# Patient Record
Sex: MALE | Race: ASIAN | NOT HISPANIC OR LATINO | Employment: OTHER | URBAN - METROPOLITAN AREA
[De-identification: names, ages, dates, MRNs, and addresses within clinical notes are randomized per-mention and may not be internally consistent; named-entity substitution may affect disease eponyms.]

---

## 2019-06-04 ENCOUNTER — HISTORICAL (OUTPATIENT)
Dept: ADMINISTRATIVE | Facility: HOSPITAL | Age: 69
End: 2019-06-04

## 2022-01-31 ENCOUNTER — HISTORICAL (OUTPATIENT)
Dept: ADMINISTRATIVE | Facility: HOSPITAL | Age: 72
End: 2022-01-31

## 2022-04-30 VITALS
WEIGHT: 133.38 LBS | HEIGHT: 61 IN | DIASTOLIC BLOOD PRESSURE: 82 MMHG | BODY MASS INDEX: 25.18 KG/M2 | SYSTOLIC BLOOD PRESSURE: 124 MMHG

## 2022-12-09 ENCOUNTER — HOSPITAL ENCOUNTER (EMERGENCY)
Facility: HOSPITAL | Age: 72
Discharge: LEFT BEFORE DISCHARGE | End: 2022-12-09
Attending: FAMILY MEDICINE
Payer: MEDICAID

## 2022-12-09 VITALS
HEART RATE: 75 BPM | OXYGEN SATURATION: 97 % | RESPIRATION RATE: 22 BRPM | BODY MASS INDEX: 27.59 KG/M2 | DIASTOLIC BLOOD PRESSURE: 74 MMHG | TEMPERATURE: 99 F | WEIGHT: 146 LBS | SYSTOLIC BLOOD PRESSURE: 103 MMHG

## 2022-12-09 DIAGNOSIS — N13.30 HYDRONEPHROSIS OF LEFT KIDNEY: Primary | ICD-10-CM

## 2022-12-09 DIAGNOSIS — N17.9 ACUTE KIDNEY INJURY: ICD-10-CM

## 2022-12-09 DIAGNOSIS — N13.5 URETERAL OBSTRUCTION, LEFT: ICD-10-CM

## 2022-12-09 LAB
ALBUMIN SERPL-MCNC: 2.9 GM/DL (ref 3.4–4.8)
ALBUMIN/GLOB SERPL: 0.8 RATIO (ref 1.1–2)
ALP SERPL-CCNC: 58 UNIT/L (ref 40–150)
ALT SERPL-CCNC: 12 UNIT/L (ref 0–55)
APPEARANCE UR: CLEAR
AST SERPL-CCNC: 11 UNIT/L (ref 5–34)
BACTERIA #/AREA URNS AUTO: ABNORMAL /HPF
BASOPHILS # BLD AUTO: 0.03 X10(3)/MCL (ref 0–0.2)
BASOPHILS NFR BLD AUTO: 0.2 %
BILIRUB UR QL STRIP.AUTO: NEGATIVE MG/DL
BILIRUBIN DIRECT+TOT PNL SERPL-MCNC: 0.5 MG/DL
BUN SERPL-MCNC: 47 MG/DL (ref 8.4–25.7)
CALCIUM SERPL-MCNC: 9.6 MG/DL (ref 8.8–10)
CHLORIDE SERPL-SCNC: 101 MMOL/L (ref 98–107)
CO2 SERPL-SCNC: 20 MMOL/L (ref 23–31)
COLOR UR AUTO: COLORLESS
CREAT SERPL-MCNC: 2.8 MG/DL (ref 0.73–1.18)
EOSINOPHIL # BLD AUTO: 0.16 X10(3)/MCL (ref 0–0.9)
EOSINOPHIL NFR BLD AUTO: 1 %
ERYTHROCYTE [DISTWIDTH] IN BLOOD BY AUTOMATED COUNT: 13.5 % (ref 11.5–17)
GFR SERPLBLD CREATININE-BSD FMLA CKD-EPI: 23 MLS/MIN/1.73/M2
GLOBULIN SER-MCNC: 3.7 GM/DL (ref 2.4–3.5)
GLUCOSE SERPL-MCNC: 168 MG/DL (ref 82–115)
GLUCOSE UR QL STRIP.AUTO: NORMAL MG/DL
HCT VFR BLD AUTO: 33.9 % (ref 42–52)
HGB BLD-MCNC: 11 GM/DL (ref 14–18)
HYALINE CASTS #/AREA URNS LPF: ABNORMAL /LPF
IMM GRANULOCYTES # BLD AUTO: 0.09 X10(3)/MCL (ref 0–0.04)
IMM GRANULOCYTES NFR BLD AUTO: 0.5 %
KETONES UR QL STRIP.AUTO: NEGATIVE MG/DL
LACTATE SERPL-SCNC: 1.1 MMOL/L (ref 0.5–2.2)
LEUKOCYTE ESTERASE UR QL STRIP.AUTO: NEGATIVE UNIT/L
LIPASE SERPL-CCNC: 31 U/L
LYMPHOCYTES # BLD AUTO: 2.05 X10(3)/MCL (ref 0.6–4.6)
LYMPHOCYTES NFR BLD AUTO: 12.5 %
MCH RBC QN AUTO: 27.9 PG (ref 27–31)
MCHC RBC AUTO-ENTMCNC: 32.4 MG/DL (ref 33–36)
MCV RBC AUTO: 86 FL (ref 80–94)
MONOCYTES # BLD AUTO: 1.28 X10(3)/MCL (ref 0.1–1.3)
MONOCYTES NFR BLD AUTO: 7.8 %
MUCOUS THREADS URNS QL MICRO: ABNORMAL /LPF
NEUTROPHILS # BLD AUTO: 12.8 X10(3)/MCL (ref 2.1–9.2)
NEUTROPHILS NFR BLD AUTO: 78 %
NITRITE UR QL STRIP.AUTO: NEGATIVE
NRBC BLD AUTO-RTO: 0 %
PH UR STRIP.AUTO: 6 [PH]
PLATELET # BLD AUTO: 239 X10(3)/MCL (ref 130–400)
PMV BLD AUTO: 11.7 FL (ref 7.4–10.4)
POTASSIUM SERPL-SCNC: 4.7 MMOL/L (ref 3.5–5.1)
PROT SERPL-MCNC: 6.6 GM/DL (ref 5.8–7.6)
PROT UR QL STRIP.AUTO: ABNORMAL MG/DL
RBC # BLD AUTO: 3.94 X10(6)/MCL (ref 4.7–6.1)
RBC #/AREA URNS AUTO: ABNORMAL /HPF
RBC UR QL AUTO: ABNORMAL UNIT/L
SODIUM SERPL-SCNC: 132 MMOL/L (ref 136–145)
SP GR UR STRIP.AUTO: 1.01
SQUAMOUS #/AREA URNS LPF: ABNORMAL /HPF
UROBILINOGEN UR STRIP-ACNC: NORMAL MG/DL
WBC # SPEC AUTO: 16.4 X10(3)/MCL (ref 4.5–11.5)
WBC #/AREA URNS AUTO: ABNORMAL /HPF

## 2022-12-09 PROCEDURE — 96365 THER/PROPH/DIAG IV INF INIT: CPT

## 2022-12-09 PROCEDURE — 96376 TX/PRO/DX INJ SAME DRUG ADON: CPT

## 2022-12-09 PROCEDURE — 81001 URINALYSIS AUTO W/SCOPE: CPT | Performed by: NURSE PRACTITIONER

## 2022-12-09 PROCEDURE — 85025 COMPLETE CBC W/AUTO DIFF WBC: CPT | Performed by: NURSE PRACTITIONER

## 2022-12-09 PROCEDURE — 36415 COLL VENOUS BLD VENIPUNCTURE: CPT | Performed by: FAMILY MEDICINE

## 2022-12-09 PROCEDURE — 80053 COMPREHEN METABOLIC PANEL: CPT | Performed by: NURSE PRACTITIONER

## 2022-12-09 PROCEDURE — 63600175 PHARM REV CODE 636 W HCPCS: Performed by: NURSE PRACTITIONER

## 2022-12-09 PROCEDURE — 87040 BLOOD CULTURE FOR BACTERIA: CPT | Performed by: FAMILY MEDICINE

## 2022-12-09 PROCEDURE — 63600175 PHARM REV CODE 636 W HCPCS: Performed by: FAMILY MEDICINE

## 2022-12-09 PROCEDURE — 99285 EMERGENCY DEPT VISIT HI MDM: CPT | Mod: 25

## 2022-12-09 PROCEDURE — 25000003 PHARM REV CODE 250: Performed by: FAMILY MEDICINE

## 2022-12-09 PROCEDURE — 83690 ASSAY OF LIPASE: CPT | Performed by: NURSE PRACTITIONER

## 2022-12-09 PROCEDURE — 25000003 PHARM REV CODE 250: Performed by: NURSE PRACTITIONER

## 2022-12-09 PROCEDURE — 83605 ASSAY OF LACTIC ACID: CPT | Performed by: FAMILY MEDICINE

## 2022-12-09 PROCEDURE — 96361 HYDRATE IV INFUSION ADD-ON: CPT

## 2022-12-09 PROCEDURE — 96375 TX/PRO/DX INJ NEW DRUG ADDON: CPT

## 2022-12-09 RX ORDER — HYDROCODONE BITARTRATE AND ACETAMINOPHEN 5; 325 MG/1; MG/1
1 TABLET ORAL
Status: COMPLETED | OUTPATIENT
Start: 2022-12-09 | End: 2022-12-09

## 2022-12-09 RX ORDER — ONDANSETRON 2 MG/ML
4 INJECTION INTRAMUSCULAR; INTRAVENOUS
Status: COMPLETED | OUTPATIENT
Start: 2022-12-09 | End: 2022-12-09

## 2022-12-09 RX ORDER — DIPHENHYDRAMINE HYDROCHLORIDE 50 MG/ML
12.5 INJECTION INTRAMUSCULAR; INTRAVENOUS
Status: COMPLETED | OUTPATIENT
Start: 2022-12-09 | End: 2022-12-09

## 2022-12-09 RX ORDER — MORPHINE SULFATE 2 MG/ML
2 INJECTION, SOLUTION INTRAMUSCULAR; INTRAVENOUS
Status: COMPLETED | OUTPATIENT
Start: 2022-12-09 | End: 2022-12-09

## 2022-12-09 RX ORDER — SODIUM CHLORIDE 9 MG/ML
INJECTION, SOLUTION INTRAVENOUS CONTINUOUS
Status: DISCONTINUED | OUTPATIENT
Start: 2022-12-09 | End: 2022-12-10 | Stop reason: HOSPADM

## 2022-12-09 RX ORDER — ACETAMINOPHEN 500 MG
1000 TABLET ORAL
Status: COMPLETED | OUTPATIENT
Start: 2022-12-09 | End: 2022-12-09

## 2022-12-09 RX ADMIN — ONDANSETRON 4 MG: 2 INJECTION INTRAMUSCULAR; INTRAVENOUS at 09:12

## 2022-12-09 RX ADMIN — MORPHINE SULFATE 2 MG: 2 INJECTION, SOLUTION INTRAMUSCULAR; INTRAVENOUS at 09:12

## 2022-12-09 RX ADMIN — ACETAMINOPHEN 1000 MG: 500 TABLET ORAL at 10:12

## 2022-12-09 RX ADMIN — HYDROCODONE BITARTRATE AND ACETAMINOPHEN 1 TABLET: 5; 325 TABLET ORAL at 05:12

## 2022-12-09 RX ADMIN — ONDANSETRON 4 MG: 2 INJECTION INTRAMUSCULAR; INTRAVENOUS at 05:12

## 2022-12-09 RX ADMIN — SODIUM CHLORIDE 1000 ML: 9 INJECTION, SOLUTION INTRAVENOUS at 07:12

## 2022-12-09 RX ADMIN — DIPHENHYDRAMINE HYDROCHLORIDE 12.5 MG: 50 INJECTION INTRAMUSCULAR; INTRAVENOUS at 10:12

## 2022-12-09 RX ADMIN — PIPERACILLIN AND TAZOBACTAM 4.5 G: 4; .5 INJECTION, POWDER, LYOPHILIZED, FOR SOLUTION INTRAVENOUS; PARENTERAL at 07:12

## 2022-12-09 RX ADMIN — SODIUM CHLORIDE 500 ML: 9 INJECTION, SOLUTION INTRAVENOUS at 05:12

## 2022-12-09 NOTE — ED PROVIDER NOTES
"Encounter Date: 12/9/2022       History     Chief Complaint   Patient presents with    Abdominal Pain     Abd pain and n/v, gen weakness, seen pcp and put on antibiotics for "elevated wbc count".      The patient presents with abdominal pain.  The onset was 6 days ago.  The course/duration of symptoms is constant and fluctuating in intensity.  The character of symptoms is dull.  The degree at onset was minimal.  The Location of pain at onset was left, lower and abdominal.  The degree at present is moderate.  The Location of pain at present is left, lower and abdominal.  Radiating pain: none. There are exacerbating factors including eating and movement.  The relieving factor is rest.  Therapy today: augmentin, tramadol.  Risk factors consist of age, htn, dm.  Associated symptoms: denies chest pain, denies nausea, denies vomiting, denies diarrhea, denies back pain, denies shortness of breath, denies fever, denies chills, denies headache and denies dizziness. Additional history: was seen by his pcp 2 days ago, wbc was 16.9, no imaging, diverticulitis was suspected and patient was prescribed augmentin and tramadol. He is here with his son who translates for him.        Review of patient's allergies indicates:  No Known Allergies  History reviewed. No pertinent past medical history.  History reviewed. No pertinent surgical history.  History reviewed. No pertinent family history.  Social History     Tobacco Use    Smoking status: Former     Types: Cigarettes    Smokeless tobacco: Former     Review of Systems   Constitutional:  Negative for fever.   HENT:  Negative for sore throat.    Respiratory:  Negative for shortness of breath.    Cardiovascular:  Negative for chest pain.   Gastrointestinal:  Positive for abdominal pain. Negative for nausea.   Genitourinary:  Negative for dysuria.   Musculoskeletal:  Negative for back pain.   Skin:  Negative for rash.   Neurological:  Negative for weakness.   Hematological:  Does not " bruise/bleed easily.   All other systems reviewed and are negative.    Physical Exam     Initial Vitals [12/09/22 1708]   BP Pulse Resp Temp SpO2   (!) 176/89 94 18 99.1 °F (37.3 °C) 96 %      MAP       --         Physical Exam    Nursing note and vitals reviewed.  Constitutional: He appears well-developed and well-nourished.   HENT:   Head: Normocephalic and atraumatic.   Neck: Neck supple.   Normal range of motion.  Cardiovascular:  Normal rate, regular rhythm, normal heart sounds and intact distal pulses.           Pulmonary/Chest: Breath sounds normal.   Abdominal: Abdomen is soft. Bowel sounds are normal. There is abdominal tenderness in the left lower quadrant. There is guarding. There is no rebound.   Musculoskeletal:         General: Normal range of motion.      Cervical back: Normal range of motion and neck supple.     Neurological: He is alert and oriented to person, place, and time. He has normal strength.   Skin: Skin is warm and dry.   Psychiatric: He has a normal mood and affect.       ED Course   Procedures  Labs Reviewed   COMPREHENSIVE METABOLIC PANEL - Abnormal; Notable for the following components:       Result Value    Sodium Level 132 (*)     Carbon Dioxide 20 (*)     Glucose Level 168 (*)     Blood Urea Nitrogen 47.0 (*)     Creatinine 2.80 (*)     Albumin Level 2.9 (*)     Globulin 3.7 (*)     Albumin/Globulin Ratio 0.8 (*)     All other components within normal limits   URINALYSIS, REFLEX TO URINE CULTURE - Abnormal; Notable for the following components:    Color, UA Colorless (*)     Protein, UA 3+ (*)     Blood, UA 1+ (*)     Squamous Epithelial Cells, UA Trace (*)     Mucous, UA Trace (*)     All other components within normal limits   CBC WITH DIFFERENTIAL - Abnormal; Notable for the following components:    WBC 16.4 (*)     RBC 3.94 (*)     Hgb 11.0 (*)     Hct 33.9 (*)     MCHC 32.4 (*)     MPV 11.7 (*)     Neut # 12.8 (*)     IG# 0.09 (*)     All other components within normal limits    LIPASE - Normal   LACTIC ACID, PLASMA - Normal   BLOOD CULTURE OLG   BLOOD CULTURE OLG   CBC W/ AUTO DIFFERENTIAL    Narrative:     The following orders were created for panel order CBC auto differential.  Procedure                               Abnormality         Status                     ---------                               -----------         ------                     CBC with Differential[[id number]]        Abnormal            Final result                 Please view results for these tests on the individual orders.   EXTRA TUBES    Narrative:     The following orders were created for panel order EXTRA TUBES.  Procedure                               Abnormality         Status                     ---------                               -----------         ------                     Light Blue Top Hold[[id number]]                              In process                 Red Top Hold[[id number]]                                     In process                   Please view results for these tests on the individual orders.   LIGHT BLUE TOP HOLD   RED TOP HOLD          Imaging Results              CT Abdomen Pelvis  Without Contrast (Final result)  Result time 12/09/22 19:48:55      Final result by Иван Saleh MD (12/09/22 19:48:55)                   Impression:      1. Left marked hydroureteronephrosis caused by distal ureteral calculi described above.    2. Bilateral kidneys hypodensities are not adequately characterized on this exam without contrast and may represent cysts.  These can be assessed with ultrasound exam on non emergent basis.      Electronically signed by: Иван Saleh  Date:    12/09/2022  Time:    19:48               Narrative:    EXAMINATION:  CT ABDOMEN PELVIS WITHOUT CONTRAST    CLINICAL HISTORY:  LLQ abdominal pain;    TECHNIQUE:  Multidetector axial images were obtained from the  diaphragms to below symphysis pubis without the administration of IV contrast. Oral contrast was not  administered.    Dose length product of 441 mGycm. Automated exposure control was utilized to minimize radiation dose.    COMPARISON:  None available    FINDINGS:  Image portion lungs show ground-glass opacity which may be related to hypoventilation versus mild congestive process in a patient with cardiomegaly.    Within limitations of noncontrast technique, no acute findings of the liver, pancreas and spleen identified. Gallbladder wall is not thickened and there is no intraluminal calcified calculus. No apparent biliary dilation. The adrenal glands noncontrast evaluation is unremarkable.    Right kidney is remarkable for hypodensities which are not adequately characterized on this exam without contrast and may represent cysts.  There is right kidney small non occluding calculus.  No right hydronephrosis.  There is marked left hydronephrosis with perinephric and periureteric inflammatory standings.  Within distal left ureter is either one large 1.2 cm long calculus versus 2 or 3 adjacent calculi seen on image 67 series 6.  There is additional more distal adjacent to the ureterovesical junction 3 mm calculus on image 63 series 6.  Left kidney is also remarkable multiple hypodensities.  Urinary bladder wall is mildly thickened.  No intravesical calculus.  Prostate is mildly enlarged in size with few calcifications.    Small size hiatal hernia.  Stomach is decompressed.  No abnormal dilatation of loops of small bowel.  Appendix is unremarkable.  No apparent dilatation of the colon pericolonic acute standings.  No free fluid.    No acute or aggressive skeletal abnormality.                                       Medications   0.9%  NaCl infusion (has no administration in time range)   sodium chloride 0.9% bolus 500 mL (500 mLs Intravenous New Bag 12/9/22 1755)   HYDROcodone-acetaminophen 5-325 mg per tablet 1 tablet (1 tablet Oral Given 12/9/22 1755)   ondansetron injection 4 mg (4 mg Intravenous Given 12/9/22 1755)    sodium chloride 0.9% bolus 1,000 mL (0 mLs Intravenous Stopped 12/9/22 2030)   piperacillin-tazobactam (ZOSYN) 4.5 g in sodium chloride 0.9% 100 mL IVPB (MB+) (0 g Intravenous Stopped 12/9/22 2030)   morphine injection 2 mg (2 mg Intravenous Given 12/9/22 2113)   ondansetron injection 4 mg (4 mg Intravenous Given 12/9/22 2113)   diphenhydrAMINE injection 12.5 mg (12.5 mg Intravenous Given 12/9/22 2211)   acetaminophen tablet 1,000 mg (1,000 mg Oral Given 12/9/22 2211)     Medical Decision Making:   History:   Old Records Summarized: records from clinic visits and records from previous admission(s).  ED Management:  Care transitioned to Dr Gutierrez (ER staff)           ED Course as of 12/09/22 2323   Fri Dec 09, 2022   1900 Pt transitioned to me at 1900. I, Dr. Gutierrez have seen and examined the patient.  Pt speaks a dialect of alysa therefore son used as .   HPI- son reports patient was not feeling good and went to his PCP who put him on antibiotics.  Son reports the patient continued to complain of pain and started shuffling his feet which is unusual for him therefore brought him to the ER for further evaluation.  Son reports that his shuffling of the feet usually happens if he is in pain or sick.  He reports brother-in-law can verify this.     Physical Exam-   General- NAD   CV- s1 S 2 RRR   Lungs: CTAB  Abdomen- LLQ- mild tenderness upon deep palpation.  No guarding, no rebound. Normal bs  Rectal tone- normal- RN Shannan- chaperone    Ext- BLE- no peripheral edema   Neuro- AAOx4, GCS 15, no acute neuro defecits     Management and plan  Reviewed labs.  CT pending we will continue to monitor. [AK]   2000 Reviewed CT imaging with son at bedside.  Discussed admission patient agrees.  Medicine consulted for admission however recommends transfer for higher level of care as no urology services over the weekend at this facility  in addition to patient might be needing interventional urology given size of stone.  Please  see medicine consultation note [AK]   2158 Reassessed patient.  RN reports patient started to itch after receiving morphine.  I evaluated patient.  Patient with no rash, vital signs stable, patient with no shortness of breath or difficulty speaking.  Benadryl ordered.  Patient with diagnosis of  left hydronephrosis with ureteral obstruction and sepsis.  Currently awaiting transfer to a facility with urology services  [AK]   2235 Rn reports pt has been accepted to [facility] in [place], [place] by Dr. JARAD Bateman.  Of note- multiple local facilities were called however are currently on divert due to capacity or capability reasons- please see [facility]  transfer  and RN documentation.  [AK]   2237 Pt Suleiman Beck have a diagnosis of left hydronephrosis with urethral obstruction. This requires evaluation and management by Urology service. At this facility we do have the capacity and the capability this patient need at this time. Pt and son was informed about his condition and the recommendation of transfer for specialty care. Pt will be transfer by ([facility]) by ground Transportation using a Critical Care unit . Treatment in route will be NS @ 100ml/hr.  The risk of transfer are Motor Vehicle Accident, Respiratory Failure, Cardiac Dysrhythmias, Progression of urethral stone, severe sepsis or Death.  The benefits of the transfer are adequate evaluation and management by a specialist in the area of Urology.  At this time Pt is stable for transfer.      [AK]   1036 Spoke with patient's son regarding acceptance at [facility].  Patient's son is currently declineing transfer as it is too far for  the patient. Dylan Kulkarni called the transfer center who reports that patient can be placed on a waiting list for closer facilities  however I advised patient's son that it may take greater than 24 hours to get acceptance from a closer facility.  I urged son to reconsider his decesion.   Patient's son is  currently thinking about and calling family to figure out the best solution for the patient. Conversation held in front of RN Shannan.   [AK]   2314 Patient's son decided to leave against medical advice.  I have urged multiple times with RN  to continue the transfer process however patient's son verbalized understanding to the risk including death and signed out against medical advice. [AK]   2321 This patient is choosing to leave against medical advice.  I has personally explained to him and the son at bedside that choosing to do so may result in permanent bodily harm or death.  The EP discussed at great length that without further evaluation and monitoring there may be unforeseen circumstances and/or deterioration causing permanent bodily harm or death as a result of his choice.  He and his son verbalized these risks back to the physician in layman's terms.  He and his son is alert, oriented, and shows the mental capacity to make clear decisions regarding his health care at this time. He  and his son continues to wish to leave against medical advice.     In light of his decision to leave AMA, follow-up has been arranged and he is aware of the importance of following up as instructed.  He  and his son has been advised that he should return to the ED immediately if he changes his mind at any time, or if his condition begins to change or worsen in any way.    [AK]      ED Course User Index  [AK] Brittney Gutierrez MD                 Clinical Impression:   Final diagnoses:  [N13.30] Hydronephrosis of left kidney (Primary)  [N13.5] Ureteral obstruction, left  [N17.9] Acute kidney injury      ED Disposition Condition    AMA Stable                Brittney Gutierrez MD  12/09/22 2317       Brittney Gutierrez MD  12/09/22 6904

## 2022-12-10 ENCOUNTER — HOSPITAL ENCOUNTER (INPATIENT)
Facility: HOSPITAL | Age: 72
LOS: 9 days | Discharge: HOME | DRG: 659 | End: 2022-12-19
Attending: EMERGENCY MEDICINE | Admitting: STUDENT IN AN ORGANIZED HEALTH CARE EDUCATION/TRAINING PROGRAM
Payer: MEDICAID

## 2022-12-10 DIAGNOSIS — G93.40 ENCEPHALOPATHY ACUTE: ICD-10-CM

## 2022-12-10 DIAGNOSIS — N13.5 OBSTRUCTION OF URETER, UNSPECIFIED LATERALITY: ICD-10-CM

## 2022-12-10 DIAGNOSIS — R00.0 TACHYCARDIA: ICD-10-CM

## 2022-12-10 DIAGNOSIS — R06.02 SOB (SHORTNESS OF BREATH): ICD-10-CM

## 2022-12-10 DIAGNOSIS — N13.9 OBSTRUCTIVE UROPATHY: ICD-10-CM

## 2022-12-10 DIAGNOSIS — Z66 DO NOT RESUSCITATE: ICD-10-CM

## 2022-12-10 DIAGNOSIS — N17.9 ACUTE RENAL FAILURE, UNSPECIFIED ACUTE RENAL FAILURE TYPE: Primary | ICD-10-CM

## 2022-12-10 DIAGNOSIS — J96.90 RESPIRATORY FAILURE, UNSPECIFIED CHRONICITY, UNSPECIFIED WHETHER WITH HYPOXIA OR HYPERCAPNIA: ICD-10-CM

## 2022-12-10 DIAGNOSIS — N13.5 OBSTRUCTION OF LEFT URETER: ICD-10-CM

## 2022-12-10 DIAGNOSIS — I49.9 CARDIAC ARRHYTHMIA: ICD-10-CM

## 2022-12-10 DIAGNOSIS — R14.0 ABDOMINAL DISTENSION: ICD-10-CM

## 2022-12-10 LAB
ALBUMIN SERPL-MCNC: 2.9 GM/DL (ref 3.4–4.8)
ALBUMIN/GLOB SERPL: 1 RATIO (ref 1.1–2)
ALP SERPL-CCNC: 58 UNIT/L (ref 40–150)
ALT SERPL-CCNC: 13 UNIT/L (ref 0–55)
APPEARANCE UR: CLEAR
AST SERPL-CCNC: 13 UNIT/L (ref 5–34)
BACTERIA #/AREA URNS AUTO: NORMAL /HPF
BASOPHILS # BLD AUTO: 0.03 X10(3)/MCL (ref 0–0.2)
BASOPHILS NFR BLD AUTO: 0.2 %
BILIRUB UR QL STRIP.AUTO: NEGATIVE MG/DL
BILIRUBIN DIRECT+TOT PNL SERPL-MCNC: 0.4 MG/DL
BUN SERPL-MCNC: 46.2 MG/DL (ref 8.4–25.7)
CALCIUM SERPL-MCNC: 8.3 MG/DL (ref 8.8–10)
CHLORIDE SERPL-SCNC: 104 MMOL/L (ref 98–107)
CO2 SERPL-SCNC: 21 MMOL/L (ref 23–31)
COLOR UR AUTO: YELLOW
CREAT SERPL-MCNC: 3.26 MG/DL (ref 0.73–1.18)
EOSINOPHIL # BLD AUTO: 0.28 X10(3)/MCL (ref 0–0.9)
EOSINOPHIL NFR BLD AUTO: 1.8 %
ERYTHROCYTE [DISTWIDTH] IN BLOOD BY AUTOMATED COUNT: 14.1 % (ref 11.5–17)
GFR SERPLBLD CREATININE-BSD FMLA CKD-EPI: 19 MLS/MIN/1.73/M2
GLOBULIN SER-MCNC: 3 GM/DL (ref 2.4–3.5)
GLUCOSE SERPL-MCNC: 279 MG/DL (ref 82–115)
GLUCOSE UR QL STRIP.AUTO: NEGATIVE MG/DL
HCT VFR BLD AUTO: 31.1 % (ref 42–52)
HGB BLD-MCNC: 9.8 GM/DL (ref 14–18)
IMM GRANULOCYTES # BLD AUTO: 0.09 X10(3)/MCL (ref 0–0.04)
IMM GRANULOCYTES NFR BLD AUTO: 0.6 %
KETONES UR QL STRIP.AUTO: NEGATIVE MG/DL
LEUKOCYTE ESTERASE UR QL STRIP.AUTO: NEGATIVE UNIT/L
LIPASE SERPL-CCNC: 38 U/L
LYMPHOCYTES # BLD AUTO: 1.7 X10(3)/MCL (ref 0.6–4.6)
LYMPHOCYTES NFR BLD AUTO: 10.7 %
MCH RBC QN AUTO: 28 PG (ref 27–31)
MCHC RBC AUTO-ENTMCNC: 31.5 MG/DL (ref 33–36)
MCV RBC AUTO: 88.9 FL (ref 80–94)
MONOCYTES # BLD AUTO: 1.12 X10(3)/MCL (ref 0.1–1.3)
MONOCYTES NFR BLD AUTO: 7 %
NEUTROPHILS # BLD AUTO: 12.7 X10(3)/MCL (ref 2.1–9.2)
NEUTROPHILS NFR BLD AUTO: 79.7 %
NITRITE UR QL STRIP.AUTO: NEGATIVE
NRBC BLD AUTO-RTO: 0 %
PH UR STRIP.AUTO: 5.5 [PH]
PLATELET # BLD AUTO: 253 X10(3)/MCL (ref 130–400)
PMV BLD AUTO: 12.4 FL (ref 7.4–10.4)
POTASSIUM SERPL-SCNC: 4.8 MMOL/L (ref 3.5–5.1)
PROT SERPL-MCNC: 5.9 GM/DL (ref 5.8–7.6)
PROT UR QL STRIP.AUTO: ABNORMAL MG/DL
RBC # BLD AUTO: 3.5 X10(6)/MCL (ref 4.7–6.1)
RBC #/AREA URNS AUTO: 5 /HPF
RBC UR QL AUTO: ABNORMAL UNIT/L
SODIUM SERPL-SCNC: 132 MMOL/L (ref 136–145)
SP GR UR STRIP.AUTO: 1.01 (ref 1–1.03)
SQUAMOUS #/AREA URNS AUTO: <5 /HPF
UROBILINOGEN UR STRIP-ACNC: 0.2 MG/DL
WBC # SPEC AUTO: 15.9 X10(3)/MCL (ref 4.5–11.5)
WBC #/AREA URNS AUTO: <5 /HPF

## 2022-12-10 PROCEDURE — 11000001 HC ACUTE MED/SURG PRIVATE ROOM

## 2022-12-10 PROCEDURE — 99291 CRITICAL CARE FIRST HOUR: CPT | Mod: 25

## 2022-12-10 PROCEDURE — 31500 INSERT EMERGENCY AIRWAY: CPT

## 2022-12-10 PROCEDURE — 25000003 PHARM REV CODE 250: Performed by: EMERGENCY MEDICINE

## 2022-12-10 PROCEDURE — 63600175 PHARM REV CODE 636 W HCPCS: Performed by: EMERGENCY MEDICINE

## 2022-12-10 PROCEDURE — 81001 URINALYSIS AUTO W/SCOPE: CPT | Performed by: NURSE PRACTITIONER

## 2022-12-10 PROCEDURE — 85025 COMPLETE CBC W/AUTO DIFF WBC: CPT | Performed by: NURSE PRACTITIONER

## 2022-12-10 PROCEDURE — 96361 HYDRATE IV INFUSION ADD-ON: CPT

## 2022-12-10 PROCEDURE — 96375 TX/PRO/DX INJ NEW DRUG ADDON: CPT

## 2022-12-10 PROCEDURE — 83690 ASSAY OF LIPASE: CPT | Performed by: NURSE PRACTITIONER

## 2022-12-10 PROCEDURE — 96374 THER/PROPH/DIAG INJ IV PUSH: CPT

## 2022-12-10 PROCEDURE — 80053 COMPREHEN METABOLIC PANEL: CPT | Performed by: NURSE PRACTITIONER

## 2022-12-10 RX ORDER — MINERAL OIL
60 ENEMA (ML) RECTAL DAILY
COMMUNITY
End: 2022-12-11

## 2022-12-10 RX ORDER — SODIUM CHLORIDE 9 MG/ML
1000 INJECTION, SOLUTION INTRAVENOUS
Status: COMPLETED | OUTPATIENT
Start: 2022-12-10 | End: 2022-12-10

## 2022-12-10 RX ORDER — NIFEDIPINE 30 MG/1
30 TABLET, FILM COATED, EXTENDED RELEASE ORAL DAILY
COMMUNITY
End: 2022-12-11

## 2022-12-10 RX ORDER — ONDANSETRON 2 MG/ML
4 INJECTION INTRAMUSCULAR; INTRAVENOUS
Status: COMPLETED | OUTPATIENT
Start: 2022-12-10 | End: 2022-12-10

## 2022-12-10 RX ORDER — HALOPERIDOL 5 MG/ML
2 INJECTION INTRAMUSCULAR ONCE
Status: COMPLETED | OUTPATIENT
Start: 2022-12-11 | End: 2022-12-10

## 2022-12-10 RX ORDER — HALOPERIDOL 5 MG/ML
INJECTION INTRAMUSCULAR
Status: DISPENSED
Start: 2022-12-10 | End: 2022-12-11

## 2022-12-10 RX ORDER — TERAZOSIN 1 MG/1
1 CAPSULE ORAL 2 TIMES DAILY
COMMUNITY
End: 2022-12-11

## 2022-12-10 RX ORDER — HYDROMORPHONE HYDROCHLORIDE 2 MG/ML
0.5 INJECTION, SOLUTION INTRAMUSCULAR; INTRAVENOUS; SUBCUTANEOUS
Status: COMPLETED | OUTPATIENT
Start: 2022-12-10 | End: 2022-12-10

## 2022-12-10 RX ORDER — SODIUM CHLORIDE 9 MG/ML
1000 INJECTION, SOLUTION INTRAVENOUS
Status: COMPLETED | OUTPATIENT
Start: 2022-12-10 | End: 2022-12-11

## 2022-12-10 RX ORDER — GLYCOPYRROLATE 1 MG/1
1 TABLET ORAL DAILY
COMMUNITY
End: 2022-12-11

## 2022-12-10 RX ORDER — CLONIDINE HYDROCHLORIDE 0.1 MG/1
0.1 TABLET ORAL
COMMUNITY
End: 2022-12-11

## 2022-12-10 RX ADMIN — HALOPERIDOL LACTATE 2 MG: 5 INJECTION, SOLUTION INTRAMUSCULAR at 11:12

## 2022-12-10 RX ADMIN — ONDANSETRON 4 MG: 2 INJECTION INTRAMUSCULAR; INTRAVENOUS at 09:12

## 2022-12-10 RX ADMIN — HYDROMORPHONE HYDROCHLORIDE 0.5 MG: 2 INJECTION INTRAMUSCULAR; INTRAVENOUS; SUBCUTANEOUS at 09:12

## 2022-12-10 RX ADMIN — SODIUM CHLORIDE 1000 ML: 9 INJECTION, SOLUTION INTRAVENOUS at 09:12

## 2022-12-10 NOTE — LETTER
Anabel Sims  accompanied  Suleiman Beck  in the Intensive Care Unit on 12/11/2022. They may return to work on 12/19/2022    If you have any questions or concerns, please don't hesitate to call.      Usha Gonzalez RN , Charge Nurse

## 2022-12-10 NOTE — ED NOTES
Transfer has been arranged with acceptance to [facility] in [place]. Son present has concerns with transfer not local. Son states a transfer out of town effects all of his family and work. Son does not feel his father can be without his presence stating a a prior episode of tansfer where patient had a panic attack requiring CPAP and ICU for >a week. Notified Dr Gutierrez of son/patient concerns. Patient is Spiritism speaking only. Son states he previously would not communicate with .Son's name is Dil. Warned of risks leaving AMA if refusing transfer. Son requests to call his sister in [place] to discuss plan.

## 2022-12-10 NOTE — FIRST PROVIDER EVALUATION
Medical screening examination initiated.  I have conducted a focused provider triage encounter, findings are as follows:    Brief history of present illness:  72-year-old male presents to ER complaining of abdominal pain and swelling and not being able to ambulate well.  Patient was hospitalized at [facility] and left AMA yesterday due to feeling better.  Son at bedside who is also the  reports abdominal swelling started today.  Previous labs show some TINO with creatinine of 2.8 white count of 16.  CT abdomen and pelvis without contrast was reviewed:  EXAMINATION:  CT ABDOMEN PELVIS WITHOUT CONTRAST     CLINICAL HISTORY:  LLQ abdominal pain;     TECHNIQUE:  Multidetector axial images were obtained from the  diaphragms to below symphysis pubis without the administration of IV contrast. Oral contrast was not administered.     Dose length product of 441 mGycm. Automated exposure control was utilized to minimize radiation dose.     COMPARISON:  None available     FINDINGS:  Image portion lungs show ground-glass opacity which may be related to hypoventilation versus mild congestive process in a patient with cardiomegaly.     Within limitations of noncontrast technique, no acute findings of the liver, pancreas and spleen identified. Gallbladder wall is not thickened and there is no intraluminal calcified calculus. No apparent biliary dilation. The adrenal glands noncontrast evaluation is unremarkable.     Right kidney is remarkable for hypodensities which are not adequately characterized on this exam without contrast and may represent cysts.  There is right kidney small non occluding calculus.  No right hydronephrosis.  There is marked left hydronephrosis with perinephric and periureteric inflammatory standings.  Within distal left ureter is either one large 1.2 cm long calculus versus 2 or 3 adjacent calculi seen on image 67 series 6.  There is additional more distal adjacent to the ureterovesical junction 3 mm  calculus on image 63 series 6.  Left kidney is also remarkable multiple hypodensities.  Urinary bladder wall is mildly thickened.  No intravesical calculus.  Prostate is mildly enlarged in size with few calcifications.     Small size hiatal hernia.  Stomach is decompressed.  No abnormal dilatation of loops of small bowel.  Appendix is unremarkable.  No apparent dilatation of the colon pericolonic acute standings.  No free fluid.     No acute or aggressive skeletal abnormality.     Impression:     1. Left marked hydroureteronephrosis caused by distal ureteral calculi described above.     2. Bilateral kidneys hypodensities are not adequately characterized on this exam without contrast and may represent cysts.  These can be assessed with ultrasound exam on non emergent basis.        Electronically signed by: Иван Saleh  Date:                                            12/09/2022  Time:                                           19:48    Vitals:    12/10/22 1539   BP: (!) 171/76   BP Location: Left arm   Patient Position: Sitting   Pulse: 88   Resp: 16   Temp: 98.4 °F (36.9 °C)   TempSrc: Oral   SpO2: 96%   Weight: 67.1 kg (148 lb)       Pertinent physical exam:  Patient appears uncomfortable with abdominal distention present.    Brief workup plan:  Ultrasound and labs    Preliminary workup initiated; this workup will be continued and followed by the physician or advanced practice provider that is assigned to the patient when roomed.

## 2022-12-10 NOTE — CONSULTS
"[facility] History and Physical     Date of Admit: 12/9/2022    Chief Complaint     Abdominal Pain (Abd pain and n/v, gen weakness, seen pcp and put on antibiotics for "elevated wbc count". )  Hydronephrosis and TINO 2/2 stone     Subjective:      History of Present Illness:  Suleiman Beck is a 72 y.o. male with a PMHx of hypertension, hemorrhoids, and appendicitis s/p open appendectomy several years ago. The patient presented to [facility] ED on 12/9/2022 with a primary complaint of abdominal pain associated with nausea and one episode of emesis. Patient's son at bedside. Reports several days of abdominal pain for which patient was prescribed Augmentin at recent PCP visit 3 days ago with a diagnosis of diverticulitis. Patient found to have WBC of 16.9 at that time. Over the past 3 days, patient's son reports patient has continued to complain of abdominal pain and nausea with an episode of emesis. He reports chronic constipation. Since patient was diagnosed with diverticulitis, patient's son reports patient has appeared weak and shuffled feet while walking whereas patient was playing soccer with grandchildren the day prior. Patient's son reports patient often behaves this way with shuffling feet when patient is ill. Patient has a significant smoking history of 40+ pack years and occasional drinking history. Denies smoking or drinking at this time.    ED workup significant for WBC 16.4, BUN/Cr 47/2.80, and CT Abdomen/Pelvis with left marked hydroureteronephrosis 2/2 distal ureteral calculi (1.2 cm vs 2 or 3 adjacent calculi). Internal Medicine consulted for admission. Recommend transfer out to facility with Urology services as [facility] does not have Urology services this weekend.     Past Medical History:  Hypertension  Chronic constipation    Past Surgical History:  Hemorrhoidectomy  Appendectomy, open    Allergies:  Review of patient's allergies indicates:  No Known Allergies    Home Medications:  Prior to Admission " medications    Medication Sig Start Date End Date Taking? Authorizing Provider   amoxicillin-clavulanate 875-125mg (AUGMENTIN) 875-125 mg per tablet Take 1 tablet by mouth every 12 (twelve) hours. for 10 days 12/7/22 12/17/22  NATALY Winn Jr., MD   aspirin (ECOTRIN) 81 MG EC tablet Take 81 mg by mouth. 3/11/22   Historical Provider   carvediloL (COREG) 3.125 MG tablet Take 6.25 mg by mouth 2 (two) times daily. 10/30/22   Historical Provider   glimepiride (AMARYL) 2 MG tablet Take 2 mg by mouth. 3/11/22   Historical Provider   meloxicam (MOBIC) 15 MG tablet Take 15 mg by mouth. 4/7/22   Historical Provider   metFORMIN (GLUCOPHAGE-XR) 500 MG ER 24hr tablet TAKE 2 TABLETS BY MOUTH ONCE DAILY WITH EVENING MEAL 12/2/22   Historical Provider   pantoprazole (PROTONIX) 40 MG tablet Take 40 mg by mouth. 3/11/22   Historical Provider   pioglitazone (ACTOS) 15 MG tablet Take 15 mg by mouth. 3/11/22   Historical Provider   traMADoL (ULTRAM) 50 mg tablet Take 1 tablet (50 mg total) by mouth every 6 (six) hours as needed for Pain. 12/7/22   NATALY Winn Jr., MD       Family History:  History reviewed. No pertinent family history.    Social History:  Social History     Tobacco Use    Smoking status: Former     Types: Cigarettes    Smokeless tobacco: Former       Review of Systems:  Constitutional: +weakness, +fatigue, no fever  Eye: no vision loss, eye redness, drainage, or pain  ENMT: no sore throat, ear pain, sinus pain/congestion, nasal congestion/drainage  Respiratory: no cough, no wheezing, no shortness of breath  Cardiovascular: no chest pain, no palpitations, no edema  Gastrointestinal: +abdominal pain, +nausea, +constipation  Genitourinary: +nocturia, no dysuria, no urinary frequency or urgency, no hematuria  Hema/Lymph: no abnormal bruising or bleeding  Endocrine: no heat or cold intolerance, no excessive thirst or excessive urination  Musculoskeletal: no muscle or joint pain, no joint  swelling  Integumentary: no skin rash or abnormal lesion  Neurologic: no headache, no dizziness, no weakness or numbness         Objective:   Last 24 Hour Vital Signs:  Vitals  BP: (!) 155/79  Temp: 99.1 °F (37.3 °C)  Temp src: Oral  Pulse: 72  Resp: 20  SpO2: (!) 94 %  Weight: 66.2 kg (146 lb)    Physical Examination:  General: no acute distress, son at bedside  HEENT: NC/AT, EOMI, clear conjunctivae, anicteric sclera  Resp: normal breathing effort, no increased WOB  CV: regular rate  GI: soft, mildly distended, LLQ tender to palpation  MSK: no LE edema, sensorimotor function grossly intact in all 4 extremities  Neuro: alert and oriented      Laboratory:  Most Recent Data:  CMP:  Recent Labs   Lab 12/09/22  1747   CHLORIDE 101   CO2 20*   BUN 47.0*   CREATININE 2.80*   GLUCOSE 168*   ALBUMIN 2.9*   AST 11   ALT 12   ALKPHOS 58     CBC:  Recent Labs   Lab 12/07/22  1556 12/09/22  1748   WBC 16.9* 16.4*   ABSNEUTRO 12.9* 12.8*   RBC 4.38* 3.94*   HGB 12.2* 11.0*   HCT 37.9* 33.9*   MCV 86.5 86.0   RDW 13.8 13.5     Coags: No results found for: INR, PROTIME, PTT  FLP: No results found for: CHOL, HDL, LDLCALC, TRIG, CHOLHDL  DM:   Lab Results   Component Value Date    HGBA1C 6.1 02/01/2022    CREATININE 2.80 (H) 12/09/2022     Thyroid:   Lab Results   Component Value Date    TSH 0.0995 02/03/2022     Anemia:   Lab Results   Component Value Date    FERRITIN 726.46 02/14/2022     Cardiac:   Lab Results   Component Value Date    TROPONINI 0.050 02/03/2022    .0 02/20/2022     Urinalysis:   Lab Results   Component Value Date    PHUA 6.0 12/09/2022    UROBILINOGEN Normal 12/09/2022    WBCUA 0-5 12/09/2022       Trended Cardiac Data:  No results for input(s): TROPONINI, CKTOTAL, CKMB, BNP in the last 168 hours.      Radiology:  Imaging Results              CT Abdomen Pelvis  Without Contrast (Final result)  Result time 12/09/22 19:48:55      Final result by Иван Saleh MD (12/09/22 19:48:55)                    Impression:      1. Left marked hydroureteronephrosis caused by distal ureteral calculi described above.    2. Bilateral kidneys hypodensities are not adequately characterized on this exam without contrast and may represent cysts.  These can be assessed with ultrasound exam on non emergent basis.      Electronically signed by: Иван Saleh  Date:    12/09/2022  Time:    19:48               Narrative:    EXAMINATION:  CT ABDOMEN PELVIS WITHOUT CONTRAST    CLINICAL HISTORY:  LLQ abdominal pain;    TECHNIQUE:  Multidetector axial images were obtained from the  diaphragms to below symphysis pubis without the administration of IV contrast. Oral contrast was not administered.    Dose length product of 441 mGycm. Automated exposure control was utilized to minimize radiation dose.    COMPARISON:  None available    FINDINGS:  Image portion lungs show ground-glass opacity which may be related to hypoventilation versus mild congestive process in a patient with cardiomegaly.    Within limitations of noncontrast technique, no acute findings of the liver, pancreas and spleen identified. Gallbladder wall is not thickened and there is no intraluminal calcified calculus. No apparent biliary dilation. The adrenal glands noncontrast evaluation is unremarkable.    Right kidney is remarkable for hypodensities which are not adequately characterized on this exam without contrast and may represent cysts.  There is right kidney small non occluding calculus.  No right hydronephrosis.  There is marked left hydronephrosis with perinephric and periureteric inflammatory standings.  Within distal left ureter is either one large 1.2 cm long calculus versus 2 or 3 adjacent calculi seen on image 67 series 6.  There is additional more distal adjacent to the ureterovesical junction 3 mm calculus on image 63 series 6.  Left kidney is also remarkable multiple hypodensities.  Urinary bladder wall is mildly thickened.  No intravesical calculus.  Prostate  is mildly enlarged in size with few calcifications.    Small size hiatal hernia.  Stomach is decompressed.  No abnormal dilatation of loops of small bowel.  Appendix is unremarkable.  No apparent dilatation of the colon pericolonic acute standings.  No free fluid.    No acute or aggressive skeletal abnormality.                                         Assessment & Plan:     Hydroureteronephrosis 2/2 kidney stone  -CT Abd/Pelv significant for 1.2 cm left distal ureteral stone with marked hydroureteronephrosis  -Patient also with marked TINO given BUN/Cr 47/2.80 with no history of CKD  -Urology services currently unavailable over the weekend   -Recommend transfer to facility with Urology services      Norma Brooke MD  [facility], [facility]  12/9/2022

## 2022-12-10 NOTE — ED NOTES
Son, Dil and patient have to decided to refuse transfer. AMA form signed. Dr Gutierrez fully explained the risks of kidney stone, renal failure, sepsis and possiblity of dialysis/death. IV dc'd with cath intact. Total urine output 1000cc's. External urinary cath dc'd. Assisted with dressing. Patient smiles, says thank you . Has no pain now.

## 2022-12-10 NOTE — LETTER
December 16, 2022    Suleiman Beck  [address] [place] [facility] [address]         AUDIOLOGY    Alanna Benavides M.S., CCC-A  TORI Boyle, CCC-A  Alverto Stoner, CCC-A  Alverto Thornton, CCC-A  Jeremie Jin Jr., M.C.D., CCA-A  TORI Fan, CCC-A  Alverto Marsh, CCC-A    Patient:  Suleiman Beck  YOB: 1950  Date of Visit:  12/10/2022      To Whom it May Concern:    Suleiman Beck was seen in the ICU Department on 12/11/2022 and may return to work/school on 12/19/2022.  If you have any questions or concerns please contact our office.    Sincerely,    Usha Gonzalez, Charge Nurse       [facility] [address] [place] [address]  phone [phone]  fax [phone]  [email or web address]

## 2022-12-11 ENCOUNTER — ANESTHESIA (OUTPATIENT)
Dept: SURGERY | Facility: HOSPITAL | Age: 72
DRG: 659 | End: 2022-12-11
Payer: MEDICAID

## 2022-12-11 ENCOUNTER — ANESTHESIA EVENT (OUTPATIENT)
Dept: SURGERY | Facility: HOSPITAL | Age: 72
DRG: 659 | End: 2022-12-11
Payer: MEDICAID

## 2022-12-11 PROBLEM — N13.5 URETERAL OBSTRUCTION: Status: ACTIVE | Noted: 2022-12-11

## 2022-12-11 LAB
ALBUMIN SERPL-MCNC: 2.9 GM/DL (ref 3.4–4.8)
ALBUMIN/GLOB SERPL: 1 RATIO (ref 1.1–2)
ALP SERPL-CCNC: 57 UNIT/L (ref 40–150)
ALT SERPL-CCNC: 9 UNIT/L (ref 0–55)
AMPHET UR QL SCN: NEGATIVE
AST SERPL-CCNC: 17 UNIT/L (ref 5–34)
BARBITURATE SCN PRESENT UR: NEGATIVE
BASOPHILS # BLD AUTO: 0.04 X10(3)/MCL (ref 0–0.2)
BASOPHILS NFR BLD AUTO: 0.3 %
BENZODIAZ UR QL SCN: NEGATIVE
BILIRUBIN DIRECT+TOT PNL SERPL-MCNC: 0.6 MG/DL
BNP BLD-MCNC: 211 PG/ML
BUN SERPL-MCNC: 37.2 MG/DL (ref 8.4–25.7)
CALCIUM SERPL-MCNC: 8.6 MG/DL (ref 8.8–10)
CANNABINOIDS UR QL SCN: NEGATIVE
CHLORIDE SERPL-SCNC: 106 MMOL/L (ref 98–107)
CO2 SERPL-SCNC: 16 MMOL/L (ref 23–31)
COCAINE UR QL SCN: NEGATIVE
CREAT SERPL-MCNC: 2.7 MG/DL (ref 0.73–1.18)
EOSINOPHIL # BLD AUTO: 0.33 X10(3)/MCL (ref 0–0.9)
EOSINOPHIL NFR BLD AUTO: 2.8 %
ERYTHROCYTE [DISTWIDTH] IN BLOOD BY AUTOMATED COUNT: 14 % (ref 11.5–17)
FENTANYL UR QL SCN: NEGATIVE
FLUAV AG UPPER RESP QL IA.RAPID: NOT DETECTED
FLUBV AG UPPER RESP QL IA.RAPID: NOT DETECTED
GFR SERPLBLD CREATININE-BSD FMLA CKD-EPI: 24 MLS/MIN/1.73/M2
GLOBULIN SER-MCNC: 2.8 GM/DL (ref 2.4–3.5)
GLUCOSE SERPL-MCNC: 179 MG/DL (ref 82–115)
HCT VFR BLD AUTO: 31 % (ref 42–52)
HGB BLD-MCNC: 9.8 GM/DL (ref 14–18)
IMM GRANULOCYTES # BLD AUTO: 0.04 X10(3)/MCL (ref 0–0.04)
IMM GRANULOCYTES NFR BLD AUTO: 0.3 %
LYMPHOCYTES # BLD AUTO: 2.17 X10(3)/MCL (ref 0.6–4.6)
LYMPHOCYTES NFR BLD AUTO: 18.1 %
MCH RBC QN AUTO: 27.8 PG (ref 27–31)
MCHC RBC AUTO-ENTMCNC: 31.6 MG/DL (ref 33–36)
MCV RBC AUTO: 88.1 FL (ref 80–94)
MDMA UR QL SCN: NEGATIVE
MONOCYTES # BLD AUTO: 0.98 X10(3)/MCL (ref 0.1–1.3)
MONOCYTES NFR BLD AUTO: 8.2 %
NEUTROPHILS # BLD AUTO: 8.4 X10(3)/MCL (ref 2.1–9.2)
NEUTROPHILS NFR BLD AUTO: 70.3 %
NRBC BLD AUTO-RTO: 0 %
OPIATES UR QL SCN: POSITIVE
PCP UR QL: NEGATIVE
PH UR: 5.5 [PH] (ref 3–11)
PLATELET # BLD AUTO: 238 X10(3)/MCL (ref 130–400)
PMV BLD AUTO: 12.5 FL (ref 7.4–10.4)
POCT GLUCOSE: 113 MG/DL (ref 70–110)
POTASSIUM SERPL-SCNC: 3.9 MMOL/L (ref 3.5–5.1)
PROT SERPL-MCNC: 5.7 GM/DL (ref 5.8–7.6)
RBC # BLD AUTO: 3.52 X10(6)/MCL (ref 4.7–6.1)
SARS-COV-2 RNA RESP QL NAA+PROBE: NOT DETECTED
SODIUM SERPL-SCNC: 135 MMOL/L (ref 136–145)
SPECIFIC GRAVITY, URINE AUTO (.000) (OHS): 1.01 (ref 1–1.03)
TROPONIN I SERPL-MCNC: 0.04 NG/ML (ref 0–0.04)
WBC # SPEC AUTO: 12 X10(3)/MCL (ref 4.5–11.5)

## 2022-12-11 PROCEDURE — 63600175 PHARM REV CODE 636 W HCPCS: Performed by: INTERNAL MEDICINE

## 2022-12-11 PROCEDURE — 25000003 PHARM REV CODE 250: Performed by: EMERGENCY MEDICINE

## 2022-12-11 PROCEDURE — 27200966 HC CLOSED SUCTION SYSTEM

## 2022-12-11 PROCEDURE — 20000000 HC ICU ROOM

## 2022-12-11 PROCEDURE — 0240U COVID/FLU A&B PCR: CPT | Performed by: EMERGENCY MEDICINE

## 2022-12-11 PROCEDURE — 63600175 PHARM REV CODE 636 W HCPCS: Performed by: STUDENT IN AN ORGANIZED HEALTH CARE EDUCATION/TRAINING PROGRAM

## 2022-12-11 PROCEDURE — C9113 INJ PANTOPRAZOLE SODIUM, VIA: HCPCS | Performed by: STUDENT IN AN ORGANIZED HEALTH CARE EDUCATION/TRAINING PROGRAM

## 2022-12-11 PROCEDURE — 25500020 PHARM REV CODE 255: Performed by: UROLOGY

## 2022-12-11 PROCEDURE — 94761 N-INVAS EAR/PLS OXIMETRY MLT: CPT

## 2022-12-11 PROCEDURE — 85025 COMPLETE CBC W/AUTO DIFF WBC: CPT | Performed by: INTERNAL MEDICINE

## 2022-12-11 PROCEDURE — C1758 CATHETER, URETERAL: HCPCS | Performed by: UROLOGY

## 2022-12-11 PROCEDURE — 25000003 PHARM REV CODE 250: Performed by: INTERNAL MEDICINE

## 2022-12-11 PROCEDURE — 37000008 HC ANESTHESIA 1ST 15 MINUTES: Performed by: UROLOGY

## 2022-12-11 PROCEDURE — 99900035 HC TECH TIME PER 15 MIN (STAT)

## 2022-12-11 PROCEDURE — 63600175 PHARM REV CODE 636 W HCPCS

## 2022-12-11 PROCEDURE — 80307 DRUG TEST PRSMV CHEM ANLYZR: CPT | Performed by: STUDENT IN AN ORGANIZED HEALTH CARE EDUCATION/TRAINING PROGRAM

## 2022-12-11 PROCEDURE — 93005 ELECTROCARDIOGRAM TRACING: CPT

## 2022-12-11 PROCEDURE — 84484 ASSAY OF TROPONIN QUANT: CPT | Performed by: EMERGENCY MEDICINE

## 2022-12-11 PROCEDURE — 99900026 HC AIRWAY MAINTENANCE (STAT)

## 2022-12-11 PROCEDURE — 27000221 HC OXYGEN, UP TO 24 HOURS

## 2022-12-11 PROCEDURE — 25000003 PHARM REV CODE 250: Performed by: STUDENT IN AN ORGANIZED HEALTH CARE EDUCATION/TRAINING PROGRAM

## 2022-12-11 PROCEDURE — C1769 GUIDE WIRE: HCPCS | Performed by: UROLOGY

## 2022-12-11 PROCEDURE — 94002 VENT MGMT INPAT INIT DAY: CPT

## 2022-12-11 PROCEDURE — 25000003 PHARM REV CODE 250: Performed by: NURSE ANESTHETIST, CERTIFIED REGISTERED

## 2022-12-11 PROCEDURE — 37000009 HC ANESTHESIA EA ADD 15 MINS: Performed by: UROLOGY

## 2022-12-11 PROCEDURE — 93010 ELECTROCARDIOGRAM REPORT: CPT | Mod: ,,, | Performed by: INTERNAL MEDICINE

## 2022-12-11 PROCEDURE — 63600175 PHARM REV CODE 636 W HCPCS: Performed by: NURSE ANESTHETIST, CERTIFIED REGISTERED

## 2022-12-11 PROCEDURE — A4217 STERILE WATER/SALINE, 500 ML: HCPCS | Performed by: STUDENT IN AN ORGANIZED HEALTH CARE EDUCATION/TRAINING PROGRAM

## 2022-12-11 PROCEDURE — 36000707: Performed by: UROLOGY

## 2022-12-11 PROCEDURE — 83880 ASSAY OF NATRIURETIC PEPTIDE: CPT | Performed by: EMERGENCY MEDICINE

## 2022-12-11 PROCEDURE — 93010 EKG 12-LEAD: ICD-10-PCS | Mod: ,,, | Performed by: INTERNAL MEDICINE

## 2022-12-11 PROCEDURE — 87088 URINE BACTERIA CULTURE: CPT | Performed by: INTERNAL MEDICINE

## 2022-12-11 PROCEDURE — 51702 INSERT TEMP BLADDER CATH: CPT

## 2022-12-11 PROCEDURE — 80053 COMPREHEN METABOLIC PANEL: CPT | Performed by: INTERNAL MEDICINE

## 2022-12-11 PROCEDURE — 25000003 PHARM REV CODE 250

## 2022-12-11 PROCEDURE — 36000706: Performed by: UROLOGY

## 2022-12-11 PROCEDURE — C2617 STENT, NON-COR, TEM W/O DEL: HCPCS | Performed by: UROLOGY

## 2022-12-11 PROCEDURE — 27201423 OPTIME MED/SURG SUP & DEVICES STERILE SUPPLY: Performed by: UROLOGY

## 2022-12-11 DEVICE — STENT SET URETERAL 6X26CM: Type: IMPLANTABLE DEVICE | Site: URETER | Status: FUNCTIONAL

## 2022-12-11 RX ORDER — INSULIN ASPART 100 [IU]/ML
1-10 INJECTION, SOLUTION INTRAVENOUS; SUBCUTANEOUS EVERY 6 HOURS PRN
Status: DISCONTINUED | OUTPATIENT
Start: 2022-12-11 | End: 2022-12-19 | Stop reason: HOSPADM

## 2022-12-11 RX ORDER — PANTOPRAZOLE SODIUM 40 MG/10ML
40 INJECTION, POWDER, LYOPHILIZED, FOR SOLUTION INTRAVENOUS DAILY
Status: DISCONTINUED | OUTPATIENT
Start: 2022-12-11 | End: 2022-12-18

## 2022-12-11 RX ORDER — TRAMADOL HYDROCHLORIDE 50 MG/1
50 TABLET ORAL EVERY 6 HOURS PRN
Status: DISCONTINUED | OUTPATIENT
Start: 2022-12-11 | End: 2022-12-13

## 2022-12-11 RX ORDER — PROPOFOL 10 MG/ML
VIAL (ML) INTRAVENOUS
Status: DISCONTINUED | OUTPATIENT
Start: 2022-12-11 | End: 2022-12-26

## 2022-12-11 RX ORDER — SODIUM CHLORIDE 0.9 % (FLUSH) 0.9 %
10 SYRINGE (ML) INJECTION
Status: DISCONTINUED | OUTPATIENT
Start: 2022-12-11 | End: 2022-12-19 | Stop reason: HOSPADM

## 2022-12-11 RX ORDER — DEXMEDETOMIDINE HYDROCHLORIDE 4 UG/ML
0-1.4 INJECTION, SOLUTION INTRAVENOUS CONTINUOUS
Status: DISCONTINUED | OUTPATIENT
Start: 2022-12-11 | End: 2022-12-13

## 2022-12-11 RX ORDER — MUPIROCIN 20 MG/G
OINTMENT TOPICAL 2 TIMES DAILY
Status: DISPENSED | OUTPATIENT
Start: 2022-12-11 | End: 2022-12-16

## 2022-12-11 RX ORDER — PHENYLEPHRINE HCL IN 0.9% NACL 1 MG/10 ML
SYRINGE (ML) INTRAVENOUS
Status: DISCONTINUED | OUTPATIENT
Start: 2022-12-11 | End: 2022-12-26

## 2022-12-11 RX ORDER — NALOXONE HCL 0.4 MG/ML
VIAL (ML) INJECTION
Status: COMPLETED
Start: 2022-12-11 | End: 2022-12-11

## 2022-12-11 RX ORDER — GLUCAGON 1 MG
1 KIT INJECTION
Status: DISCONTINUED | OUTPATIENT
Start: 2022-12-11 | End: 2022-12-19 | Stop reason: HOSPADM

## 2022-12-11 RX ORDER — LABETALOL HYDROCHLORIDE 5 MG/ML
10 INJECTION, SOLUTION INTRAVENOUS
Status: DISCONTINUED | OUTPATIENT
Start: 2022-12-11 | End: 2022-12-19 | Stop reason: HOSPADM

## 2022-12-11 RX ORDER — NALOXONE HYDROCHLORIDE 1 MG/ML
INJECTION INTRAMUSCULAR; INTRAVENOUS; SUBCUTANEOUS
Status: DISPENSED
Start: 2022-12-11 | End: 2022-12-11

## 2022-12-11 RX ORDER — SODIUM CHLORIDE 9 MG/ML
INJECTION, SOLUTION INTRAVENOUS CONTINUOUS
Status: DISCONTINUED | OUTPATIENT
Start: 2022-12-11 | End: 2022-12-13

## 2022-12-11 RX ORDER — FENTANYL CITRATE 50 UG/ML
INJECTION, SOLUTION INTRAMUSCULAR; INTRAVENOUS
Status: DISCONTINUED | OUTPATIENT
Start: 2022-12-11 | End: 2022-12-26

## 2022-12-11 RX ORDER — TALC
6 POWDER (GRAM) TOPICAL NIGHTLY PRN
Status: DISCONTINUED | OUTPATIENT
Start: 2022-12-11 | End: 2022-12-19 | Stop reason: HOSPADM

## 2022-12-11 RX ORDER — PROPOFOL 10 MG/ML
0-50 INJECTION, EMULSION INTRAVENOUS CONTINUOUS
Status: DISCONTINUED | OUTPATIENT
Start: 2022-12-11 | End: 2022-12-13

## 2022-12-11 RX ORDER — ONDANSETRON 2 MG/ML
4 INJECTION INTRAMUSCULAR; INTRAVENOUS EVERY 8 HOURS PRN
Status: DISCONTINUED | OUTPATIENT
Start: 2022-12-11 | End: 2022-12-19 | Stop reason: HOSPADM

## 2022-12-11 RX ORDER — DEXMEDETOMIDINE HYDROCHLORIDE 4 UG/ML
INJECTION, SOLUTION INTRAVENOUS
Status: COMPLETED
Start: 2022-12-11 | End: 2022-12-11

## 2022-12-11 RX ORDER — DIPHENHYDRAMINE HCL 25 MG
25 CAPSULE ORAL
Status: COMPLETED | OUTPATIENT
Start: 2022-12-11 | End: 2022-12-11

## 2022-12-11 RX ORDER — ETOMIDATE 2 MG/ML
20 INJECTION INTRAVENOUS
Status: COMPLETED | OUTPATIENT
Start: 2022-12-11 | End: 2022-12-11

## 2022-12-11 RX ORDER — FENTANYL CITRATE 50 UG/ML
50 INJECTION, SOLUTION INTRAMUSCULAR; INTRAVENOUS
Status: DISCONTINUED | OUTPATIENT
Start: 2022-12-12 | End: 2022-12-13

## 2022-12-11 RX ORDER — CHLORHEXIDINE GLUCONATE ORAL RINSE 1.2 MG/ML
15 SOLUTION DENTAL 2 TIMES DAILY
Status: DISCONTINUED | OUTPATIENT
Start: 2022-12-11 | End: 2022-12-13

## 2022-12-11 RX ORDER — ROCURONIUM BROMIDE 10 MG/ML
INJECTION, SOLUTION INTRAVENOUS
Status: DISCONTINUED | OUTPATIENT
Start: 2022-12-11 | End: 2022-12-26

## 2022-12-11 RX ORDER — FENTANYL CITRATE 50 UG/ML
50 INJECTION, SOLUTION INTRAMUSCULAR; INTRAVENOUS
Status: DISCONTINUED | OUTPATIENT
Start: 2022-12-12 | End: 2022-12-12

## 2022-12-11 RX ORDER — HALOPERIDOL 5 MG/ML
2 INJECTION INTRAMUSCULAR
Status: COMPLETED | OUTPATIENT
Start: 2022-12-11 | End: 2022-12-11

## 2022-12-11 RX ORDER — ACETAMINOPHEN 325 MG/1
650 TABLET ORAL EVERY 8 HOURS PRN
Status: DISCONTINUED | OUTPATIENT
Start: 2022-12-11 | End: 2022-12-19 | Stop reason: HOSPADM

## 2022-12-11 RX ORDER — HYDRALAZINE HYDROCHLORIDE 20 MG/ML
10 INJECTION INTRAMUSCULAR; INTRAVENOUS
Status: DISCONTINUED | OUTPATIENT
Start: 2022-12-11 | End: 2022-12-19 | Stop reason: HOSPADM

## 2022-12-11 RX ORDER — HALOPERIDOL 5 MG/ML
INJECTION INTRAMUSCULAR
Status: COMPLETED
Start: 2022-12-11 | End: 2022-12-11

## 2022-12-11 RX ORDER — PROPOFOL 10 MG/ML
INJECTION, EMULSION INTRAVENOUS
Status: COMPLETED
Start: 2022-12-11 | End: 2022-12-11

## 2022-12-11 RX ORDER — ROCURONIUM BROMIDE 10 MG/ML
100 INJECTION, SOLUTION INTRAVENOUS ONCE
Status: COMPLETED | OUTPATIENT
Start: 2022-12-11 | End: 2022-12-11

## 2022-12-11 RX ADMIN — HALOPERIDOL LACTATE: 5 INJECTION, SOLUTION INTRAMUSCULAR at 03:12

## 2022-12-11 RX ADMIN — DEXMEDETOMIDINE HYDROCHLORIDE 1 MCG/KG/HR: 400 INJECTION INTRAVENOUS at 09:12

## 2022-12-11 RX ADMIN — MUPIROCIN: 20 OINTMENT TOPICAL at 08:12

## 2022-12-11 RX ADMIN — Medication 100 MCG: at 07:12

## 2022-12-11 RX ADMIN — SODIUM CHLORIDE 1000 ML: 9 INJECTION, SOLUTION INTRAVENOUS at 01:12

## 2022-12-11 RX ADMIN — PANTOPRAZOLE SODIUM 40 MG: 40 INJECTION, POWDER, FOR SOLUTION INTRAVENOUS at 08:12

## 2022-12-11 RX ADMIN — HALOPERIDOL LACTATE 2 MG: 5 INJECTION, SOLUTION INTRAMUSCULAR at 12:12

## 2022-12-11 RX ADMIN — CEFEPIME 1 G: 1 INJECTION, POWDER, FOR SOLUTION INTRAMUSCULAR; INTRAVENOUS at 08:12

## 2022-12-11 RX ADMIN — CHLORHEXIDINE GLUCONATE 0.12% ORAL RINSE 15 ML: 1.2 LIQUID ORAL at 08:12

## 2022-12-11 RX ADMIN — SODIUM BICARBONATE: 84 INJECTION, SOLUTION INTRAVENOUS at 09:12

## 2022-12-11 RX ADMIN — DIPHENHYDRAMINE HYDROCHLORIDE 25 MG: 25 CAPSULE ORAL at 02:12

## 2022-12-11 RX ADMIN — ONDANSETRON 4 MG: 2 INJECTION INTRAMUSCULAR; INTRAVENOUS at 04:12

## 2022-12-11 RX ADMIN — PROPOFOL 50 MCG/KG/MIN: 10 INJECTION, EMULSION INTRAVENOUS at 04:12

## 2022-12-11 RX ADMIN — ROCURONIUM BROMIDE 100 MG: 10 INJECTION, SOLUTION INTRAVENOUS at 04:12

## 2022-12-11 RX ADMIN — PROPOFOL 50 MG: 10 INJECTION, EMULSION INTRAVENOUS at 06:12

## 2022-12-11 RX ADMIN — FENTANYL CITRATE 50 MCG: 50 INJECTION, SOLUTION INTRAMUSCULAR; INTRAVENOUS at 07:12

## 2022-12-11 RX ADMIN — HYDRALAZINE HYDROCHLORIDE 10 MG: 20 INJECTION INTRAMUSCULAR; INTRAVENOUS at 08:12

## 2022-12-11 RX ADMIN — NALOXONE HYDROCHLORIDE 0.4 MG: 0.4 INJECTION, SOLUTION INTRAMUSCULAR; INTRAVENOUS; SUBCUTANEOUS at 04:12

## 2022-12-11 RX ADMIN — AMIODARONE HYDROCHLORIDE: 1.8 INJECTION, SOLUTION INTRAVENOUS at 04:12

## 2022-12-11 RX ADMIN — PROPOFOL 40 MCG/KG/MIN: 10 INJECTION, EMULSION INTRAVENOUS at 01:12

## 2022-12-11 RX ADMIN — MELATONIN TAB 3 MG 6 MG: 3 TAB at 02:12

## 2022-12-11 RX ADMIN — PROPOFOL 40 MCG/KG/MIN: 10 INJECTION, EMULSION INTRAVENOUS at 05:12

## 2022-12-11 RX ADMIN — ROCURONIUM BROMIDE 50 MG: 50 INJECTION INTRAVENOUS at 06:12

## 2022-12-11 RX ADMIN — CEFEPIME 1 G: 1 INJECTION, POWDER, FOR SOLUTION INTRAMUSCULAR; INTRAVENOUS at 12:12

## 2022-12-11 RX ADMIN — PROPOFOL 40 MCG/KG/MIN: 10 INJECTION, EMULSION INTRAVENOUS at 08:12

## 2022-12-11 RX ADMIN — ETOMIDATE 20 MG: 2 INJECTION INTRAVENOUS at 04:12

## 2022-12-11 RX ADMIN — SODIUM CHLORIDE: 9 INJECTION, SOLUTION INTRAVENOUS at 03:12

## 2022-12-11 NOTE — ED NOTES
Pt's son stating that pt c/o dry mouth. D/t patient's current altered state, unable to give water but provided swabs for patient to wet his mouth.

## 2022-12-11 NOTE — H&P
[facility] - 7th Floor ICU  Pulmonary Critical Care Note    Patient Name: Suleiman Beck  MRN: [id number]  Admission Date: 12/10/2022  Hospital Length of Stay: 0 days  Code Status: Full Code  Attending Provider: Joe Mckeon MD  Primary Care Provider: Primary Doctor No     Subjective:     HPI:   Patient is a 72-year-old in the speaking male with PMH pertinent for CKD stage 3, HTN, and hemorrhoids who initially was admitted to [facility] for hydronephrosis with obstructive nephrolithiasis of the left ureter.  During hospitalization, had acute agitation episode with tachyarrhythmia and hypoxia.  Patient was given IM Haldol but eventually was not responding.  Patient required intubation and was given initiation of amiodarone.  Patient was transferred to ICU for post intubation management.    Discussed case with patient's son, states that patient has had similar episode like this in past as patient is a little bit and only speaks [place] he gets agitated and scared when patient's son is not present.  Of note, patient's son states that patient is DNR/DNI.    Hospital Course/Significant events:  12/11:  Agitation episode, intubated    24 Hour Interval History:  Pending    Past Medical History:   Diagnosis Date    Essential (primary) hypertension     Unspecified chronic bronchitis        History reviewed. No pertinent surgical history.    Social History     Socioeconomic History    Marital status:    Tobacco Use    Smoking status: Never    Smokeless tobacco: Never   Substance and Sexual Activity    Alcohol use: Not Currently           Current Outpatient Medications   Medication Instructions    amoxicillin-clavulanate 875-125mg (AUGMENTIN) 875-125 mg per tablet 1 tablet, Oral, Every 12 hours    carvediloL (COREG) 12.5 mg, Oral, 2 times daily, 6.25mg in mornings and 12.5 mg in evenings.    meloxicam (MOBIC) 15 mg, Oral    metFORMIN (GLUCOPHAGE-XR) 500 MG ER 24hr tablet TAKE 2 TABLETS BY MOUTH ONCE DAILY  WITH EVENING MEAL    traMADoL (ULTRAM) 50 mg, Oral, Every 6 hours PRN       Current Inpatient Medications   amiodarone in dextrose        chlorhexidine  15 mL Mouth/Throat BID    haloperidol lactate        mupirocin   Nasal BID    naloxone        pantoprazole  40 mg Intravenous Daily       Current Intravenous Infusions   sodium chloride 0.9% 125 mL/hr at 12/11/22 0330    propofoL           Review of Systems   Unable to perform ROS: Intubated       Objective:     No intake or output data in the 24 hours ending 12/11/22 0605      Vital Signs (Most Recent):  Temp: 98.4 °F (36.9 °C) (12/10/22 1539)  Pulse: 90 (12/11/22 0550)  Resp: 18 (12/11/22 0550)  BP: 132/65 (12/11/22 0550)  SpO2: 98 % (12/11/22 0550)  Body mass index is 27.96 kg/m².  Weight: 67.1 kg (148 lb) Vital Signs (24h Range):  Temp:  [98.4 °F (36.9 °C)] 98.4 °F (36.9 °C)  Pulse:  [84-90] 90  Resp:  [16-28] 18  SpO2:  [94 %-98 %] 98 %  BP: (132-171)/() 132/65     Physical Exam:  General: Patient resting in bed, in no acute distress, intubated and sedated  Eye: PERRLA, clear conjunctiva, eyelids normal  HENT: Head-normocephalic and atraumatic, ET tube in place  Neck: full range of motion, no thyromegaly or lymphadenopathy, trachea midline, supple, no palpable thyroid nodules  Respiratory: clear to auscultation bilaterally without wheezes, rales, rhonchi  Cardiovascular: regular rate and rhythm without murmurs.  No gallops or rubs no JVD.  Capillary refill within normal limits.  Gastrointestinal: soft, non-tender, non-distended with normal bowel sounds, without masses to palpation  Musculoskeletal: full range of motion of all extremities/spine without limitation or discomfort  Integumentary: no rashes or skin lesions present  Neurologic: intubated and sedated, PERRLA, cough/gag intact        Lines/Drains/Airways       Peripheral Intravenous Line  Duration                  Peripheral IV - Single Lumen 12/10/22 1959 20 G Left Antecubital <1 day                     Significant Labs:    CBC:  Recent Labs   Lab 12/09/22  1748 12/10/22  1610 12/11/22  0437   WBC 16.4* 15.9* 12.0*   HGB 11.0* 9.8* 9.8*   HCT 33.9* 31.1* 31.0*    253 238   MCV 86.0 88.9 88.1   RDW 13.5 14.1 14.0     CMP:  Recent Labs   Lab 12/09/22 1747 12/10/22  1610 12/11/22 0437   K 4.7 4.8 3.9   CO2 20* 21* 16*   BUN 47.0* 46.2* 37.2*   CREATININE 2.80* 3.26* 2.70*   ALBUMIN 2.9* 2.9* 2.9*   BILITOT 0.5 0.4 0.6   AST 11 13 17   ALKPHOS 58 58 57   ALT 12 13 9     Coags:   Recent Labs   Lab 12/09/22  1747 12/10/22  1610 12/11/22  0437   LABPROT 6.6 5.9 5.7*     DM:   Recent Labs   Lab 12/09/22  1747 12/10/22  1610 12/11/22  0437   CREATININE 2.80* 3.26* 2.70*     Cardiac:   Recent Labs   Lab 12/11/22 0437   TROPONINI 0.044   .0*     Urinalysis:   Lab Results   Component Value Date    APPEARANCEUA Clear 12/10/2022    PROTEINUA 4+ (A) 12/10/2022    UROBILINOGEN 0.2 12/10/2022    BILIRUBINUA Negative 12/10/2022    RBCUA 5 12/10/2022    WBCUA <5 12/10/2022       Trended Cardiac Data:  Recent Labs   Lab 12/11/22 0437   TROPONINI 0.044   .0*         Mechanical Ventilation Support:  Vent Mode: A/C (12/11/22 0550)  Set Rate: 18 BPM (12/11/22 0550)  Vt Set: 400 mL (12/11/22 0550)  PEEP/CPAP: 5 cmH20 (12/11/22 0550)  Peak Airway Pressure: 22 cmH20 (12/11/22 0550)  Total Ve: 8.5 L/m (12/11/22 0550)  F/VT Ratio<105 (RSBI): (!) 38.71 (12/11/22 0550)    Significant Imaging:  I have reviewed the pertinent imaging within the past 24 hours.    Imaging Results              CT Head Without Contrast (Preliminary result)  Result time 12/11/22 07:31:48      Preliminary result by Иван Saleh MD (12/11/22 07:31:48)                   Narrative:    START OF REPORT:  Technique: CT of the head was performed without intravenous contrast with axial as well as coronal and sagittal images.    Comparison: Comparison is with study dated â2022-02-18 01:51:49â.    Dosage Information: Automated exposure control  was utilized.    Clinical history: Ams.    Findings:  Artifact: There is mild motion artifact a few of the images which decreases sensitivity and specificity of the study.  Hemorrhage: No acute intracranial hemorrhage is seen.  CSF spaces: The ventricles, sulci and basal cisterns all appear moderately prominent consistent with global cerebral atrophy.  Brain parenchyma: Mild stable appearing scattered microvascular change is seen in portions of the periventricular and deep white matter tracts.  Cerebellum: Unremarkable.  Sella and skull base: The sella appears to be within normal limits for age.  Intracranial calcifications: Incidental note is made of bilateral choroid plexus calcification. Incidental note is made of some pineal region calcification.  Calvarium: No acute linear or depressed skull fracture is seen.    Maxillofacial Structures:  Paranasal sinuses: There is some opacity in the bilateral maxillary sinuses and bilateral ethmoid air cells. This may reflect acute sinusitis.  Orbits: The orbits appear unremarkable.  Zygomatic arches: The zygomatic arches are intact and unremarkable.  Temporal bones and mastoids: The temporal bones and mastoids appear unremarkable.  TMJ: The mandibular condyles appear normally placed with respect to the mandibular fossa.    Visualized upper cervical spine: The visualized cervical spine appears unremarkable.      Impression:  1. No acute intracranial process identified. Details and other findings as noted above.                          Preliminary result by Interface, Rad Results In (12/11/22 05:54:17)                   Narrative:    START OF REPORT:  Technique: CT of the head was performed without intravenous contrast with axial as well as coronal and sagittal images.    Comparison: Comparison is with study dated â2022-02-18 01:51:49â.    Dosage Information: Automated exposure control was utilized.    Clinical history: Ams.    Findings:  Artifact: There is mild motion  artifact a few of the images which decreases sensitivity and specificity of the study.  Hemorrhage: No acute intracranial hemorrhage is seen.  CSF spaces: The ventricles, sulci and basal cisterns all appear moderately prominent consistent with global cerebral atrophy.  Brain parenchyma: Mild stable appearing scattered microvascular change is seen in portions of the periventricular and deep white matter tracts.  Cerebellum: Unremarkable.  Sella and skull base: The sella appears to be within normal limits for age.  Intracranial calcifications: Incidental note is made of bilateral choroid plexus calcification. Incidental note is made of some pineal region calcification.  Calvarium: No acute linear or depressed skull fracture is seen.    Maxillofacial Structures:  Paranasal sinuses: There is some opacity in the bilateral maxillary sinuses and bilateral ethmoid air cells. This may reflect acute sinusitis.  Orbits: The orbits appear unremarkable.  Zygomatic arches: The zygomatic arches are intact and unremarkable.  Temporal bones and mastoids: The temporal bones and mastoids appear unremarkable.  TMJ: The mandibular condyles appear normally placed with respect to the mandibular fossa.    Visualized upper cervical spine: The visualized cervical spine appears unremarkable.      Impression:  1. No acute intracranial process identified. Details and other findings as noted above.                          Preliminary result by Interface, Rad Results In (12/11/22 05:54:17)                   Narrative:    START OF REPORT:  Technique: CT of the head was performed without intravenous contrast with axial as well as coronal and sagittal images.    Comparison: Comparison is with study dated â2022-02-18 01:51:49â.    Dosage Information: Automated exposure control was utilized.    Clinical history: Ams.    Findings:  Artifact: There is mild motion artifact a few of the images which decreases sensitivity and specificity of the  study.  Hemorrhage: No acute intracranial hemorrhage is seen.  CSF spaces: The ventricles, sulci and basal cisterns all appear moderately prominent consistent with global cerebral atrophy.  Brain parenchyma: Mild stable appearing scattered microvascular change is seen in portions of the periventricular and deep white matter tracts.  Cerebellum: Unremarkable.  Sella and skull base: The sella appears to be within normal limits for age.  Intracranial calcifications: Incidental note is made of bilateral choroid plexus calcification. Incidental note is made of some pineal region calcification.  Calvarium: No acute linear or depressed skull fracture is seen.    Maxillofacial Structures:  Paranasal sinuses: There is some opacity in the bilateral maxillary sinuses and bilateral ethmoid air cells. This may reflect acute sinusitis.  Orbits: The orbits appear unremarkable.  Zygomatic arches: The zygomatic arches are intact and unremarkable.  Temporal bones and mastoids: The temporal bones and mastoids appear unremarkable.  TMJ: The mandibular condyles appear normally placed with respect to the mandibular fossa.    Visualized upper cervical spine: The visualized cervical spine appears unremarkable.      Impression:  1. No acute intracranial process identified. Details and other findings as noted above.                                         X-Ray Chest AP Portable (In process)                      US Abdomen Complete (Final result)  Result time 12/10/22 19:12:34      Final result by Jeremie Magallon MD (12/10/22 19:12:34)                   Impression:      One.  Findings consistent medical renal disease and Bosniak type 1 cyst.    1. Hepatic steatosis.  2. Other secondary findings as noted.      Electronically signed by: Jeremie Magallon MD  Date:    12/10/2022  Time:    19:12               Narrative:    EXAMINATION:  US ABDOMEN COMPLETE    CLINICAL HISTORY:  Abdominal distension (gaseous)    TECHNIQUE:  Multiple sonographic images of  the abdomen were obtained by department sonographer.    COMPARISON:  None    FINDINGS:  Liver demonstrates increased echogenicity with a smooth capsule.  No evidence for focal hepatic lesion.  No intrahepatic duct dilatation.  The portal vein is patent with hepatopetal flow.  Gallbladder is present without evidence for pericholecystic fluid, gallbladder wall thickening, or cholelithiasis.  Sonographic Kate sign is negative.  The common duct is not identified.    The right kidney measures 11.1 cm in length.  The left kidney measures 10.5 cm in length.  No evidence for hydronephrosis or calculus.  Parenchyma demonstrates increased echogenicity without evidence for cortical thinning.  Simple cystic changes are identified.    The spleen is of normal size.  The visualized pancreas, abdominal aorta, IVC within normal limits.                                       X-Ray Abdomen Flat And Erect (Final result)  Result time 12/10/22 16:06:41      Final result by Иван Saleh MD (12/10/22 16:06:41)                   Impression:      Moderate colonic fecal loading with overall nonspecific bowel gas pattern.      Electronically signed by: Иван Saleh  Date:    12/10/2022  Time:    16:06               Narrative:    EXAMINATION:  XR ABDOMEN FLAT AND ERECT    CLINICAL HISTORY:  Abdominal distension (gaseous)    TECHNIQUE:  Two views    COMPARISON:  February 9, 2022    FINDINGS:  Moderate colonic fecal loading.  The intestinal gas pattern is nonspecific and nonobstructive. No air fluid levels or pneumoperitoneum identified.  Demineralization and degenerative changes of the lumbar spine.                                        Assessment/Plan:     Assessment  Hydronephrosis  Obstructive nephrolithiasis  Acute hypoxic respiratory failure  NAGMA  Hx of CKD 3      Plan  - admit to ICU   - continue mechanical ventilation, can wean as tolerates  - will wean sedation, currently on propofol  - consult to urology, appreciate their  assistance  - will give 1L of free water with 150 mEQ of bicarb  - had discussion with patient's son at bedside, patient is DNR/DNI, will confirm with son for wishes should patient require intubation  - acidosis potentially from obstruction of ureter vs RTA4, will consult nephrology should remain refractory to bicarb gtt    DVT Prophylaxis: SCDs pending urology evaluation  GI Prophylaxis: protonix     32 minutes of critical care was time spent personally by me on the following activities: development of treatment plan with patient or surrogate and bedside caregivers, discussions with consultants, evaluation of patient's response to treatment, examination of patient, ordering and performing treatments and interventions, ordering and review of laboratory studies, ordering and review of radiographic studies, pulse oximetry, re-evaluation of patient's condition.  This critical care time did not overlap with that of any other provider or involve time for any procedures.     Sanjay Henry DO  [facility], -       Alert-The patient is alert, awake and responds to voice. The patient is oriented to time, place, and person. The triage nurse is able to obtain subjective information.

## 2022-12-11 NOTE — ED NOTES
Pt transferred from CT to ICU, ER RN, ICU RN and RT at bedside. Monitors in progress throughout, no distress noted.

## 2022-12-11 NOTE — ED NOTES
Pt to ED c/o L flank pain radiating through abdomen. Was seen at [facility] yesterday and dx with kidney stone in ureter but needed to be transferred for urology services. Pt's son states that only facility that could accept patient was in [place] and they did not want to be transferred so far, signed out AMA but returned to ED for worsening pain. Pt speaks unknown dialect, unable to use translation services but son at bedside able to translate for patient. Monitors in progress, NAD noted. Will continue to monitor.

## 2022-12-11 NOTE — NURSING
Nurses Note -- 4 Eyes      12/11/2022   5:06 PM      Skin assessed during: Daily Assessment      [x] No Pressure Injuries Present    [x]Prevention Measures Documented      [] Yes- Altered Skin Integrity Present or Discovered   [] LDA Added if Not in Epic (Describe Wound)   [] New Altered Skin Integrity was Present on Admit and Documented in LDA   [] Wound Image Taken    Wound Care Consulted? No    Attending Nurse:  Monica Etienne RN     Second RN/Staff Member:  Mitch Awan RN

## 2022-12-11 NOTE — ED PROVIDER NOTES
PROGRESS NOTE         Patient was boarding in the emergency department due to lack of inpatient beds.  I noticed patient's monitor was showing a wide complex tachycardia in his oxygen saturation was in the mid 80s with a good waveform.  At that time I went to check on patient.  There were multiple nurses in the room with patient and he was acutely agitated, diaphoretic requiring them to restrain him.  I was told by the nursing staff the patient had been admitted for a kidney stone and it had a bad reaction to Dilaudid that he was given earlier in the night.  He had been given Haldol twice for agitation but woke up shortly severely agitated.  Unable to get accurate pulse ox, heart rate or blood pressure due to patient's combativeness.  His cardiac monitor showed a wide complex tachycardia, either V-tach or AFib with bundle branch block.  Decided to give patient amiodarone bolus.  I called and spoke with the hospitalist to obtain more of a history on the patient and discuss patient's current state.  Nursing staff did call in RR T on this patient.  I discussed with nursing staff as well as Dr. Mckeon that I thought it was best to sedate and intubate this patient stated he did not harm himself or others.  He was intubated without difficulty and OG tube was placed.  Post intubation chest x-ray showed tubes in place.  His 1st blood pressure after intubation with severely elevated with a systolic blood pressure of 250.  He was given a bolus of propofol and started on a drip and blood pressure began trending down.  At that time I obtained a stat CT scan as well as stat chemistry, troponin, BNP.  Dr. Mckeon called the ICU resident to discuss this patient and he was transitioned to their care.    Intubation    Date/Time: 12/11/2022 4:30 AM  Location procedure was performed: [facility] EMERGENCY DEPARTMENT  Performed by: Makenna King MD  Authorized by: Makenna King MD   Indications: respiratory distress and airway  protection  Intubation method: video-assisted  Patient status: paralyzed (RSI)  Preoxygenation: bag valve mask  Sedatives: etomidate  Paralytic: rocuronium  Laryngoscope size: Glide 4  Tube size: 8.0 mm  Tube type: cuffed  Number of attempts: 1  Cords visualized: yes  Post-procedure assessment: ETCO2 monitor and chest rise  Breath sounds: equal  Cuff inflated: yes  ETT to lip (cm): 22.  Chest x-ray interpreted by me.  Chest x-ray findings: endotracheal tube in appropriate position  Patient tolerance: Patient tolerated the procedure well with no immediate complications      Critical Care    Date/Time: 12/11/2022 4:30 AM  Performed by: Makenna King MD  Authorized by: Makenna King MD   Direct patient critical care time: 20 minutes  Additional history critical care time: 5 minutes  Ordering / reviewing critical care time: 3 minutes  Documentation critical care time: 3 minutes  Consulting other physicians critical care time: 10 minutes  Total critical care time (exclusive of procedural time) : 41 minutes  Critical care time was exclusive of separately billable procedures and treating other patients and teaching time.  Critical care was necessary to treat or prevent imminent or life-threatening deterioration of the following conditions: cardiac failure, dehydration, metabolic crisis, sepsis, circulatory failure, renal failure, shock, CNS failure or compromise, respiratory failure, hepatic failure, toxidrome, endocrine crisis and trauma.  Critical care was time spent personally by me on the following activities: discussions with consultants, discussions with primary provider, ordering and review of laboratory studies, interpretation of cardiac output measurements, evaluation of patient's response to treatment, examination of patient, obtaining history from patient or surrogate, ordering and performing treatments and interventions, ordering and review of radiographic studies, pulse oximetry, re-evaluation of patient's  condition and ventilator management.           Makenna King MD  12/11/22 1002

## 2022-12-11 NOTE — CONSULTS
"Name: Suleiman Beck   : 1950   MRN: [id number]   Consulting Provider: Mar Fernandez   Today"s date: 2022     HPI:  Hydronephrosis with urinary stones.    This history is obtained from review of the chart patient is currently sedated and intubated with no family at the bedside.      Prior history of kidney stones is unknown however patient was giving a 3-4 day history of abdominal pain he was treated initially as an outpatient for diverticulitis but when he failed to improve he was brought to the hospital.  CT scan of the abdomen was obtained which demonstrated left-sided hydronephrosis and distal ureteral stones.  Before I could be consulted the patient was experiencing agitation possibly due to a language barrier as his son was not available at the time he was over-sedated requiring transferred to the ICU for intubation.    It seems his baseline is quite active.  Past medical History:   Past Medical History:   Diagnosis Date    Essential (primary) hypertension     Unspecified chronic bronchitis         Past Surgical Hx: History reviewed. No pertinent surgical history.       Medication:   Current Facility-Administered Medications:     0.9%  NaCl infusion, , Intravenous, Continuous, Joe Mckeon MD, Last Rate: 125 mL/hr at 22 0330, New Bag at 22 0330    acetaminophen tablet 650 mg, 650 mg, Oral, Q8H PRN, Joe Mckeon MD    amiodarone in dextrose 150 mg/100 mL (1.5 mg/mL) loading dose, , , ,     ceFEPIme (MAXIPIME) 1 g in dextrose 5 % in water (D5W) 5 % 50 mL IVPB (MB+), 1 g, Intravenous, Q12H, Jose A Soliz MD    chlorhexidine 0.12 % solution 15 mL, 15 mL, Mouth/Throat, BID, Sanjay Henry, DO, 15 mL at 22 0819    dextrose 10% bolus 125 mL, 12.5 g, Intravenous, PRN, Joe Mckeon MD    dextrose 10% bolus 250 mL, 25 g, Intravenous, PRN, Joe Mckeon MD    glucagon (human recombinant) injection 1 mg, 1 mg, Intramuscular, PRN, Joe" JEFF Mckeon MD    haloperidol lactate (HALDOL) 5 mg/mL injection, , , ,     insulin aspart U-100 injection 1-10 Units, 1-10 Units, Subcutaneous, Q6H PRN, Joe Mckeon MD    melatonin tablet 6 mg, 6 mg, Oral, Nightly PRN, Joe Mckeon MD, 6 mg at 12/11/22 0207    melatonin tablet 6 mg, 6 mg, Oral, Nightly PRN, Joe Mckeon MD    mupirocin 2 % ointment, , Nasal, BID, Joe Mckeon MD, Given at 12/11/22 0819    naloxone (NARCAN) 1 mg/mL injection, , , ,     ondansetron injection 4 mg, 4 mg, Intravenous, Q8H PRN, Joe Mckeon MD, 4 mg at 12/11/22 0416    pantoprazole injection 40 mg, 40 mg, Intravenous, Daily, Sanjay Henry DO, 40 mg at 12/11/22 0819    propofol (DIPRIVAN) 10 mg/mL infusion, 0-50 mcg/kg/min, Intravenous, Continuous, Sanjay Henry DO, Last Rate: 16.1 mL/hr at 12/11/22 0818, 40 mcg/kg/min at 12/11/22 0818    sodium bicarbonate 150 mEq in sterile water 1,000 mL infusion, , Intravenous, Continuous, Sanjay Henry DO, Last Rate: 75 mL/hr at 12/11/22 0953, New Bag at 12/11/22 0953    sodium chloride 0.9% flush 10 mL, 10 mL, Intravenous, PRN, Jeo Mckeon MD    traMADoL tablet 50 mg, 50 mg, Oral, Q6H PRN, Joe Mckeon MD       Allergy:   Review of patient's allergies indicates:   Allergen Reactions    Dilaudid [hydromorphone] Anxiety          History: History reviewed. No pertinent family history.   Social Determinants of Health     Tobacco Use: Low Risk     Smoking Tobacco Use: Never    Smokeless Tobacco Use: Never    Passive Exposure: Not on file   Alcohol Use: Not on file   Financial Resource Strain: Not on file   Food Insecurity: Not on file   Transportation Needs: Not on file   Physical Activity: Not on file   Stress: Not on file   Social Connections: Not on file   Housing Stability: Not on file   Depression: Low Risk     Last PHQ Score: 0          Review of Systems: Review of Systems       Physical Exam: Physical  Exam  Constitutional:       Comments: Patient is sedated on the ventilator.   Pulmonary:      Comments: Intubated, ventilated.  Genitourinary:     Comments: Urinary Feliz catheter in place draining a very light nearly  Skin:     General: Skin is warm and dry.          Recent Labs   Lab 12/09/22  1748 12/10/22  1610 12/11/22  0437   WBC 16.4* 15.9* 12.0*   HGB 11.0* 9.8* 9.8*   HCT 33.9* 31.1* 31.0*    253 238          Imaging:     X-Ray Abdomen Flat And Erect    Result Date: 12/10/2022  EXAMINATION: XR ABDOMEN FLAT AND ERECT CLINICAL HISTORY: Abdominal distension (gaseous) TECHNIQUE: Two views COMPARISON: February 9, 2022 FINDINGS: Moderate colonic fecal loading.  The intestinal gas pattern is nonspecific and nonobstructive. No air fluid levels or pneumoperitoneum identified.  Demineralization and degenerative changes of the lumbar spine.     Moderate colonic fecal loading with overall nonspecific bowel gas pattern. Electronically signed by: Иван Saleh Date:    12/10/2022 Time:    16:06    CT Head Without Contrast    Result Date: 12/11/2022  Technique:CT of the head was performed without intravenous contrast with axial as well as coronal and sagittal images. Comparison:Comparison is with study dated 2022-02-18 01:51:49. Dosage Information:Automated exposure control was utilized. Clinical history:Ams. Findings: Artifact:There is mild motion artifact a few of the images which decreases sensitivity and specificity of the study. Hemorrhage:No acute intracranial hemorrhage is seen. CSF spaces:The ventricles, sulci and basal cisterns all appear moderately prominent consistent with global cerebral atrophy.  Left frontal lobe periventricular old lacunar infarct.  There is no acute large vessel territory infarct. Brain parenchyma: Chronic stable appearing scattered microvascular change is seen in portions of the periventricular and deep white matter tracts. Cerebellum:Unremarkable. Calvarium:No acute linear or  depressed skull fracture is seen. Maxillofacial Structures: Paranasal sinuses:There is some opacity in the bilateral maxillary sinuses and bilateral ethmoid air cells. This may reflect acute sinusitis. Visualized upper cervical spine:The visualized cervical spine appears unremarkable.     Impression: No acute intracranial process identified. Details and other findings as noted above. No significant discrepancy with overnight report. Electronically signed by: Иван Saleh Date:    12/11/2022 Time:    07:35    US Abdomen Complete    Result Date: 12/10/2022  EXAMINATION: US ABDOMEN COMPLETE CLINICAL HISTORY: Abdominal distension (gaseous) TECHNIQUE: Multiple sonographic images of the abdomen were obtained by department sonographer. COMPARISON: None FINDINGS: Liver demonstrates increased echogenicity with a smooth capsule.  No evidence for focal hepatic lesion.  No intrahepatic duct dilatation.  The portal vein is patent with hepatopetal flow.  Gallbladder is present without evidence for pericholecystic fluid, gallbladder wall thickening, or cholelithiasis.  Sonographic Kate sign is negative.  The common duct is not identified. The right kidney measures 11.1 cm in length.  The left kidney measures 10.5 cm in length.  No evidence for hydronephrosis or calculus.  Parenchyma demonstrates increased echogenicity without evidence for cortical thinning.  Simple cystic changes are identified. The spleen is of normal size.  The visualized pancreas, abdominal aorta, IVC within normal limits.     One.  Findings consistent medical renal disease and Bosniak type 1 cyst. 1. Hepatic steatosis. 2. Other secondary findings as noted. Electronically signed by: Jeremie Magallon MD Date:    12/10/2022 Time:    19:12    X-Ray Chest AP Portable    Result Date: 12/11/2022  EXAMINATION: XR CHEST AP PORTABLE CLINICAL HISTORY: ET tube placement;Respiratory failure, unspecified, unspecified whether with hypoxia or hypercapnia TECHNIQUE: One view  COMPARISON: December 11, 2022. FINDINGS: Cardiopericardial silhouette appearance is similar.  Endotracheal tube tip is at the marcia.  Please withdrawal 3 cm.  Lungs are low volume with improved congestive changes.  Small pleural effusions are similar.  No pneumothorax.  Nasogastric tube extends the stomach     Please partially withdraw the endotracheal tube which terminates at the marcia. Electronically signed by: Иван Saleh Date:    12/11/2022 Time:    09:29    X-Ray Chest AP Portable    Result Date: 12/11/2022  EXAMINATION: XR CHEST AP PORTABLE CLINICAL HISTORY: Shortness of breath TECHNIQUE: One view COMPARISON: April 7, 2022. FINDINGS: Cardiopericardial silhouette is within normal limits.  Endotracheal tube tip is approximately 1.8 cm from the marcia.  Please minimally withdraw.  Nasogastric tube extends in the stomach.  Lungs ground-glass and reticulonodular opacities suggest mild congestive process.  There is no focally dense consolidation.  There may be minimal pleural effusions.  No pneumothorax.     Please minimally withdrawal the endotracheal tube. Electronically signed by: Иван Saleh Date:    12/11/2022 Time:    08:10    CT Abdomen Pelvis  Without Contrast    Result Date: 12/9/2022  EXAMINATION: CT ABDOMEN PELVIS WITHOUT CONTRAST CLINICAL HISTORY: LLQ abdominal pain; TECHNIQUE: Multidetector axial images were obtained from the  diaphragms to below symphysis pubis without the administration of IV contrast. Oral contrast was not administered. Dose length product of 441 mGycm. Automated exposure control was utilized to minimize radiation dose. COMPARISON: None available FINDINGS: Image portion lungs show ground-glass opacity which may be related to hypoventilation versus mild congestive process in a patient with cardiomegaly. Within limitations of noncontrast technique, no acute findings of the liver, pancreas and spleen identified. Gallbladder wall is not thickened and there is no intraluminal  calcified calculus. No apparent biliary dilation. The adrenal glands noncontrast evaluation is unremarkable. Right kidney is remarkable for hypodensities which are not adequately characterized on this exam without contrast and may represent cysts.  There is right kidney small non occluding calculus.  No right hydronephrosis.  There is marked left hydronephrosis with perinephric and periureteric inflammatory standings.  Within distal left ureter is either one large 1.2 cm long calculus versus 2 or 3 adjacent calculi seen on image 67 series 6.  There is additional more distal adjacent to the ureterovesical junction 3 mm calculus on image 63 series 6.  Left kidney is also remarkable multiple hypodensities.  Urinary bladder wall is mildly thickened.  No intravesical calculus.  Prostate is mildly enlarged in size with few calcifications. Small size hiatal hernia.  Stomach is decompressed.  No abnormal dilatation of loops of small bowel.  Appendix is unremarkable.  No apparent dilatation of the colon pericolonic acute standings.  No free fluid. No acute or aggressive skeletal abnormality.     1. Left marked hydroureteronephrosis caused by distal ureteral calculi described above. 2. Bilateral kidneys hypodensities are not adequately characterized on this exam without contrast and may represent cysts.  These can be assessed with ultrasound exam on non emergent basis. Electronically signed by: Иван Saleh Date:    12/09/2022 Time:    19:48         Assesment & Plan:  Left distal ureteral stones with left-sided hydronephrosis.  Recommend cystoscopy with left ureteral stent placement.  We will continue to attempt contact with the patient's son to give consent.  It would be appropriate to leave him intubated until he is ready to be extubated after surgery.

## 2022-12-11 NOTE — ANESTHESIA PREPROCEDURE EVALUATION
12/11/2022  Suleiman Beck is a 72 y.o., male admitted December 9th with abdominal pain and nausea with further workup revealing symptomatic nephrolithiasis.  Patient presents today for cystoscopy with ureteral stent.  Patient was upgraded to ICU care due to respiratory failure and intubated/ventilated (50% FiO2). consent obtained over the phone from sun  HPI: Patient is a 72-year-old in the speaking male with PMH pertinent for CKD stage 3, HTN, and hemorrhoids who initially was admitted to [facility] for hydronephrosis with obstructive nephrolithiasis of the left ureter.  During hospitalization, had acute agitation episode with tachyarrhythmia and hypoxia.  Patient was given IM Haldol but eventually was not responding.  Patient required intubation and was given initiation of amiodarone.  Patient was transferred to ICU for post intubation management.    Discussed case with patient's son, states that patient has had similar episode like this in past as patient is a little bit and only speaks [place] he gets agitated and scared when patient's son is not present.  Of note, patient's son states that patient is DNR/DNI.     Hospital Course/Significant events: 12/11:  Agitation episode, intubated    CT HEAD includes:  CSF spaces: The ventricles, sulci and basal cisterns all appear moderately prominent consistent with global cerebral atrophy.  Brain parenchyma: Mild stable appearing scattered microvascular change is seen in portions of the periventricular and deep white matter tracts.    Suleiman Beck    Pre-op Diagnosis: Hydronephrosis, unspecified hydronephrosis type [N13.30]    Procedure(s): CYSTOSCOPY, WITH URETERAL STENT INSERTION     Review of patient's allergies indicates:   Allergen Reactions    Dilaudid [hydromorphone] Anxiety       Current Outpatient Medications   Medication Instructions    amoxicillin-clavulanate  875-125mg (AUGMENTIN) 875-125 mg per tablet 1 tablet, Oral, Every 12 hours    carvediloL (COREG) 12.5 mg, Oral, 2 times daily, 6.25mg in mornings and 12.5 mg in evenings.    meloxicam (MOBIC) 15 mg, Oral    metFORMIN (GLUCOPHAGE-XR) 500 MG ER 24hr tablet TAKE 2 TABLETS BY MOUTH ONCE DAILY WITH EVENING MEAL    traMADoL (ULTRAM) 50 mg, Oral, Every 6 hours PRN       Past Medical History:   Diagnosis Date    Essential (primary) hypertension     Unspecified chronic bronchitis        History reviewed. No pertinent surgical history.    Transthoracic echo February 2022   EF 55%  Mild TR   RVSP less than 35      Pre-op Assessment    I have reviewed the Patient Summary Reports.    I have reviewed the NPO Status.   I have reviewed the Medications.     Review of Systems  Anesthesia Hx:   Denies Personal Hx of Anesthesia complications.   Social:  Non-Smoker    Cardiovascular:   Exercise tolerance: poor Hypertension  Functional Capacity low / < 4 METS    Pulmonary:   CHRONIC BRONCHITIS   Renal/:   Chronic Renal Disease (ckd stage 3), CKD renal calculi    Psych:   anxiety          Physical Exam  General: Well nourished  SEDATED ON PROPOFOL DRIP  Airway:  Mouth Opening: Normal  TM Distance: Normal  Tongue: Normal  Neck ROM: Normal ROM  Pre-Existing Airway: Oral Endotracheal tube    Dental:  Intact    Chest/Lungs:  Clear to auscultation, Normal Respiratory Rate    Heart:  Rate: Normal  Rhythm: Regular Rhythm      Lab Results   Component Value Date    WBC 12.0 (H) 12/11/2022    HGB 9.8 (L) 12/11/2022    HCT 31.0 (L) 12/11/2022    MCV 88.1 12/11/2022     12/11/2022   BMP  Lab Results   Component Value Date     (L) 12/11/2022    K 3.9 12/11/2022    CO2 16 (L) 12/11/2022    BUN 37.2 (H) 12/11/2022    CREATININE 2.70 (H) 12/11/2022    CALCIUM 8.6 (L) 12/11/2022    EGFRNONAA 38 02/22/2022          Anesthesia Plan  Type of Anesthesia, risks & benefits discussed:    Anesthesia Type: Gen ETT  Intra-op Monitoring Plan:  Standard ASA Monitors  Post Op Pain Control Plan: multimodal analgesia and IV/PO Opioids PRN  Induction:  IV  Informed Consent: Informed consent signed with the Patient representative and all parties understand the risks and agree with anesthesia plan.  All questions answered.   ASA Score: 4 Emergent  Day of Surgery Review of History & Physical: H&P Update referred to the surgeon/provider.  Anesthesia Plan Notes: INTUBATED ON VENTILATOR- PROPOFOL DRIP; RESP FAILURE  PLAN IS POSTOP VENTILATION  PHONE CONSENT WITH SON BY DR NAPIER    Ready For Surgery From Anesthesia Perspective.     .

## 2022-12-11 NOTE — CARE UPDATE
Patient became progressively more agitated overnight, would briefly respond to IM Haldol but within an hour or 2 required additional doses.  At some point he suddenly went into a wide complex tachycardia and became extremely agitated, hypoxic, with inability to obtain a blood pressure.  He required urgent intubation.  Planning for CT head after, stat laboratory work, EKG and troponin.    Discussed plan of care with patient's son who was in the lobby, and discussed case with ICU as patient is being upgraded to the ICU.    Joe Mckeon MD

## 2022-12-11 NOTE — ED NOTES
Pt continues to be restless. Family very concerned about behavior, requesting more medications to alleviate symptoms. Dr Mike blake. Will continue to monitor.

## 2022-12-11 NOTE — ED PROVIDER NOTES
Encounter Date: 12/10/2022       History     Chief Complaint   Patient presents with    Abdominal Pain     Pt presents c/o left flank/abd pain with one episode of vomiting. Onset Saturday.  Left AMA from [facility].       HPI  Review of patient's allergies indicates:  No Known Allergies  Past Medical History:   Diagnosis Date    Essential (primary) hypertension     Unspecified chronic bronchitis      History reviewed. No pertinent surgical history.  History reviewed. No pertinent family history.  Social History     Tobacco Use    Smoking status: Never    Smokeless tobacco: Never   Substance Use Topics    Alcohol use: Not Currently     Review of Systems    Physical Exam     Initial Vitals [12/10/22 1539]   BP Pulse Resp Temp SpO2   (!) 171/76 88 16 98.4 °F (36.9 °C) 96 %      MAP       --         Physical Exam    ED Course   Procedures  Labs Reviewed   COMPREHENSIVE METABOLIC PANEL - Abnormal; Notable for the following components:       Result Value    Sodium Level 132 (*)     Carbon Dioxide 21 (*)     Glucose Level 279 (*)     Blood Urea Nitrogen 46.2 (*)     Creatinine 3.26 (*)     Calcium Level Total 8.3 (*)     Albumin Level 2.9 (*)     Albumin/Globulin Ratio 1.0 (*)     All other components within normal limits   CBC WITH DIFFERENTIAL - Abnormal; Notable for the following components:    WBC 15.9 (*)     RBC 3.50 (*)     Hgb 9.8 (*)     Hct 31.1 (*)     MCHC 31.5 (*)     MPV 12.4 (*)     Neut # 12.7 (*)     IG# 0.09 (*)     All other components within normal limits   LIPASE - Normal   CBC W/ AUTO DIFFERENTIAL    Narrative:     The following orders were created for panel order CBC auto differential.  Procedure                               Abnormality         Status                     ---------                               -----------         ------                     CBC with Differential[[id number]]        Abnormal            Final result                 Please view results for these tests on the individual orders.    URINALYSIS, REFLEX TO URINE CULTURE          Imaging Results              US Abdomen Complete (Final result)  Result time 12/10/22 19:12:34      Final result by Jeremie Magallon MD (12/10/22 19:12:34)                   Impression:      One.  Findings consistent medical renal disease and Bosniak type 1 cyst.    1. Hepatic steatosis.  2. Other secondary findings as noted.      Electronically signed by: Jeremie Magallon MD  Date:    12/10/2022  Time:    19:12               Narrative:    EXAMINATION:  US ABDOMEN COMPLETE    CLINICAL HISTORY:  Abdominal distension (gaseous)    TECHNIQUE:  Multiple sonographic images of the abdomen were obtained by department sonographer.    COMPARISON:  None    FINDINGS:  Liver demonstrates increased echogenicity with a smooth capsule.  No evidence for focal hepatic lesion.  No intrahepatic duct dilatation.  The portal vein is patent with hepatopetal flow.  Gallbladder is present without evidence for pericholecystic fluid, gallbladder wall thickening, or cholelithiasis.  Sonographic Kate sign is negative.  The common duct is not identified.    The right kidney measures 11.1 cm in length.  The left kidney measures 10.5 cm in length.  No evidence for hydronephrosis or calculus.  Parenchyma demonstrates increased echogenicity without evidence for cortical thinning.  Simple cystic changes are identified.    The spleen is of normal size.  The visualized pancreas, abdominal aorta, IVC within normal limits.                                       X-Ray Abdomen Flat And Erect (Final result)  Result time 12/10/22 16:06:41      Final result by Иван Saleh MD (12/10/22 16:06:41)                   Impression:      Moderate colonic fecal loading with overall nonspecific bowel gas pattern.      Electronically signed by: Иван Saleh  Date:    12/10/2022  Time:    16:06               Narrative:    EXAMINATION:  XR ABDOMEN FLAT AND ERECT    CLINICAL HISTORY:  Abdominal distension  (gaseous)    TECHNIQUE:  Two views    COMPARISON:  February 9, 2022    FINDINGS:  Moderate colonic fecal loading.  The intestinal gas pattern is nonspecific and nonobstructive. No air fluid levels or pneumoperitoneum identified.  Demineralization and degenerative changes of the lumbar spine.                                       Medications   HYDROmorphone (PF) injection 0.5 mg (has no administration in time range)   0.9%  NaCl infusion (has no administration in time range)   ondansetron injection 4 mg (has no administration in time range)                Attending Attestation:     Physician Attestation Statement for NP/PA:   I have conducted a face to face encounter with this patient in addition to the NP/PA, due to    Other NP/PA Attestation Additions:    History of Present Illness: History per the son patient with back/abdominal pain for the last several days.  They were seen at a local emergency department, diagnosed with obstructive ureterolithiasis worsening renal indices.  Arrangements were made to transfer to facility with Medicine Service and urology backup but because of distance patient left against medical advice.   Physical Exam: Patient seems to be writhing on the stretcher and pain   Medical Decision Making: I performed a substantive portion of the medical decision-making.  Differential diagnosis occluded obstructive uropathy, acute renal failure, electrolyte abnormalities, UTI, sepsis  A review of the patient's CT scan from last night shows obstructive uropathy on 1 side.  Lab work from today reveals worsening acute renal failure.  In this context patient needs to be admitted for further pain control IV hydration.  Patient admitted to hospitalist                        Clinical Impression:   Final diagnoses:  [R14.0] Abdominal distension               Doug El MD  12/10/22 4934

## 2022-12-11 NOTE — ED NOTES
Pt became very agitated at this time, attempting to leave bed and pull lines. Roll belt placed. D/t son leaving, unable to communicate with patient appropriately. Pt diaphoretic. HR noted to increase to 150s, appears to be SVT on monitor but unable to determine rhythm d/t patient continually moving. Zoll pads placed, Oxymask placed. Pt demonstrating tachypnea, breath sounds wet. RRT called, Dr King at bedside. Son called to return to ER Dil [phone]

## 2022-12-11 NOTE — ED NOTES
Dr Mckeon and Dr King at bedside, decision made to intubate patient d/t HR, increased BP, and AMS. RT at bedside. Patient hyper-oxygenated, etomidate 20 mg and rocuronium 100 mg administered. Dr King intubated with 8.0ETT, 23' at the lip. Positive color change, breath sounds clear and equal bilaterally.. OG placed, verified by XR. Pt noted to be extremely hypertensive, propofol drip started with 10 mg bolus pushed by Dr. King. Zoll pads continue to be in place, monitors in progress.

## 2022-12-11 NOTE — H&P
[facility] Hospital Medicine History & Physical Examination       Patient Name: Suleiman Beck  MRN: [id number]  Patient Class: IP- Inpatient   Admission Date: 12/10/2022  3:40 PM  Length of Stay: 0  Admitting Service: Hospital Medicine   Attending Physician: Joe Mckeon MD   Primary Care Provider: Primary Doctor No  History source: EMR, patient and/or patient's family    CHIEF COMPLAINT   Abdominal Pain (Pt presents c/o left flank/abd pain with one episode of vomiting. Onset Saturday.  Left AMA from [facility].  )    HISTORY OF PRESENT ILLNESS:   Patient is a 72-year-old non-English-speaking male with past medical history of hypertension, chronic kidney disease and diabetes who presented to an outside facility yesterday 12/09/2022 with complaints of abdominal pain and had a CT of his abdomen which showed a 1.2 left distal ureteral stone with significant left-sided hydronephrosis and laboratory work showing acute kidney injury.  It appears he left from that facility against medical advice and presented to the ER again tonight.  His son provides translation services at bedside as patient speaks a language that is not available in our translating system.     On arrival patient was afebrile hemodynamically stable laboratory work showed leukocytosis, anemia and acute on chronic kidney disease with a creatinine of 3.2 and a baseline around 1.8.  Hospitalist was consulted for admission for further management.    PAST MEDICAL HISTORY:   Hypertension  Type 2 diabetes mellitus   Chronic kidney disease stage IIIb    PAST SURGICAL HISTORY:   Appendectomy  Hemorrhoidectomy    ALLERGIES:   Dilaudid [hydromorphone]    FAMILY HISTORY:   Reviewed and non-contributory     SOCIAL HISTORY:   No reported tobacco drug or alcohol use    HOME MEDICATIONS:     amoxicillin-clavulanate 875-125mg (AUGMENTIN) 875-125 mg per tablet Take 1 tablet by mouth every 12 (twelve) hours. for 10 days   carvediloL (COREG)  3.125 MG tablet Take 12.5 mg by mouth 2 (two) times daily. 6.25mg in mornings and 12.5 mg in evenings.   meloxicam (MOBIC) 15 MG tablet Take 15 mg by mouth.   metFORMIN (GLUCOPHAGE-XR) 500 MG ER 24hr tablet TAKE 2 TABLETS BY MOUTH ONCE DAILY WITH EVENING MEAL   traMADoL (ULTRAM) 50 mg tablet Take 1 tablet (50 mg total) by mouth every 6 (six) hours as needed for Pain.   albuterol sulfate 90 mcg/actuation aebs Inhale into the lungs 2 (two) times a day.   aspirin (ECOTRIN) 81 MG EC tablet Take 81 mg by mouth.   azilsartan med/chlorthalidone (EDARBYCLOR ORAL) Take 80 mg by mouth once daily.   cloNIDine (CATAPRES) 0.1 MG tablet Take 0.1 mg by mouth as needed.   fexofenadine (ALLEGRA) 180 MG tablet Take 60 mg by mouth once daily.   glimepiride (AMARYL) 2 MG tablet Take 2 mg by mouth.   glycopyrrolate (ROBINUL) 1 mg Tab Take 1 mg by mouth once daily.   NIFEdipine (ADALAT CC) 30 MG TbSR Take 30 mg by mouth once daily.   pantoprazole (PROTONIX) 40 MG tablet Take 40 mg by mouth.   pioglitazone (ACTOS) 15 MG tablet Take 15 mg by mouth.   terazosin (HYTRIN) 1 MG capsule Take 1 mg by mouth 2 (two) times a day.       REVIEW OF SYSTEMS:   Except as documented, all other systems reviewed and negative     PHYSICAL EXAM:   T 98.4 °F (36.9 °C)   BP (!) 142/101   P 86   RR (!) 26   O2 (!) 94 %  GENERAL: awake, alert, oriented and in no acute distress, non-toxic appearing   HEENT: normocephalic atraumatic   NECK: supple   LUNGS: Clear bilaterally, no wheezing or rales, no accessory muscle use   CVS: Regular rate and rhythm, normal peripheral perfusion  ABD: Soft, non-tender, non-distended, bowel sounds present  EXTREMITIES: no clubbing or cyanosis  SKIN: Warm, dry.   NEURO: alert and oriented, grossly without focal deficits   PSYCHIATRIC: Cooperative    LABS AND IMAGING:     Recent Labs     12/09/22  1748 12/10/22  1610   WBC 16.4* 15.9*   RBC 3.94* 3.50*   HGB 11.0* 9.8*   HCT 33.9* 31.1*   MCV 86.0 88.9   MCH 27.9 28.0   MCHC 32.4*  31.5*   RDW 13.5 14.1    253     Recent Labs     12/09/22  2017   LACTIC 1.1     No results for input(s): INR, APTT, D-DIMER in the last 72 hours.  No results for input(s): HGBA1C, CHOL, TRIG, LDL, VLDL, HDL in the last 72 hours.   Recent Labs     12/09/22  1747 12/10/22  1610   * 132*   K 4.7 4.8   CHLORIDE 101 104   CO2 20* 21*   BUN 47.0* 46.2*   CREATININE 2.80* 3.26*   GLUCOSE 168* 279*   CALCIUM 9.6 8.3*   ALBUMIN 2.9* 2.9*   GLOBULIN 3.7* 3.0   ALKPHOS 58 58   ALT 12 13   AST 11 13   BILITOT 0.5 0.4   LIPASE 31 38     No results for input(s): BNP, CPK, TROPONINI in the last 72 hours.       US Abdomen Complete  Impression: One.  Findings consistent medical renal disease and Bosniak type 1 cyst.  1. Hepatic steatosis. 2. Other secondary findings as noted.  Electronically signed by: Jeremie Magallon MD  Date:    12/10/2022  Time:    19:12    X-Ray Abdomen Flat And Erect  Impression: Moderate colonic fecal loading with overall nonspecific bowel gas pattern.  Electronically signed by: Иван Saleh  Date:    12/10/2022  Time:    16:06      ASSESSMENT & PLAN:   Obstructing left ureteral calculi with hydroureteronephrosis   Acute on chronic kidney disease secondary to above   Anemia, likely anemia of chronic kidney disease  Hx of: Hypertension; Type 2 diabetes mellitus, CKD3b    - NPO, IV fluids, consultation to Urology   - ISS and resume home med as appropriate    DVT prophylaxis: SCDs pending urology eval   Code status: full     If patient was admitted under observational status it is with my approval/permission.     At least 55 min was spent on this history and physical.  Time seen: 11PM   Joe Mckeon MD

## 2022-12-11 NOTE — ED NOTES
Pt's family member continuing to voice concerns that patient is restless. Per Dr. Mckeon, allow approx 35 minutes post-administration of Haldol and if continues to be restless, will reassess need for more medication. Pt's son verbalized understanding of current plan. Will continue to monitor.

## 2022-12-11 NOTE — ED NOTES
Pt becoming extremely restless, pulling monitors off. Patient's son states restlessness started after receiving dilaudid. Dr Mckeon at bedside, ordered 2mg IM haldol. IV wrapped to avoid patient pulling out line. Son at bedside. Will continue to monitor.

## 2022-12-11 NOTE — ED PROVIDER NOTES
Encounter Date: 12/10/2022       History     Chief Complaint   Patient presents with    Abdominal Pain     Pt presents c/o left flank/abd pain with one episode of vomiting. Onset Saturday.  Left AMA from [facility].       This is a 72-year-old male who came in with his son.  His son is translating for him.  His son states that his father which is diagnosed yesterday with obstructive ureterolithiasis worsening renal indices.  Arrangements were made to transfer to a facility at the [facility], unfortunate he was not able to bring his that to the facility and he came in today for further evaluation.  Patient's son advised that he is eating and drinking properly, he is in constant pain.       Review of patient's allergies indicates:   Allergen Reactions    Dilaudid [hydromorphone] Anxiety     Past Medical History:   Diagnosis Date    Essential (primary) hypertension     Unspecified chronic bronchitis      History reviewed. No pertinent surgical history.  History reviewed. No pertinent family history.  Social History     Tobacco Use    Smoking status: Never    Smokeless tobacco: Never   Substance Use Topics    Alcohol use: Not Currently     Review of Systems   Constitutional:  Negative for fever.   HENT:  Negative for sore throat.    Respiratory:  Negative for shortness of breath.    Cardiovascular:  Negative for chest pain.   Gastrointestinal:  Positive for abdominal pain. Negative for nausea.   Genitourinary:  Negative for dysuria.   Musculoskeletal:  Positive for back pain.   Skin:  Negative for rash.   Neurological:  Negative for weakness.   Hematological:  Does not bruise/bleed easily.     Physical Exam     Initial Vitals [12/10/22 1539]   BP Pulse Resp Temp SpO2   (!) 171/76 88 16 98.4 °F (36.9 °C) 96 %      MAP       --         Physical Exam    Vitals reviewed.  Constitutional: He appears well-developed.   Cardiovascular:  Normal rate.           Pulmonary/Chest: Breath sounds normal.   Abdominal: There is abdominal tenderness  in the left lower quadrant. There is guarding. There is no rebound.     Neurological: He is alert and oriented to person, place, and time. He has normal strength.   Skin: Skin is warm.   Psychiatric: His behavior is normal. Thought content normal.       ED Course   Procedures  Labs Reviewed   COMPREHENSIVE METABOLIC PANEL - Abnormal; Notable for the following components:       Result Value    Sodium Level 132 (*)     Carbon Dioxide 21 (*)     Glucose Level 279 (*)     Blood Urea Nitrogen 46.2 (*)     Creatinine 3.26 (*)     Calcium Level Total 8.3 (*)     Albumin Level 2.9 (*)     Albumin/Globulin Ratio 1.0 (*)     All other components within normal limits   URINALYSIS, REFLEX TO URINE CULTURE - Abnormal; Notable for the following components:    Protein, UA 4+ (*)     Blood, UA 1+ (*)     All other components within normal limits   CBC WITH DIFFERENTIAL - Abnormal; Notable for the following components:    WBC 15.9 (*)     RBC 3.50 (*)     Hgb 9.8 (*)     Hct 31.1 (*)     MCHC 31.5 (*)     MPV 12.4 (*)     Neut # 12.7 (*)     IG# 0.09 (*)     All other components within normal limits   LIPASE - Normal   URINALYSIS, MICROSCOPIC - Normal   CBC W/ AUTO DIFFERENTIAL    Narrative:     The following orders were created for panel order CBC auto differential.  Procedure                               Abnormality         Status                     ---------                               -----------         ------                     CBC with Differential[[id number]]        Abnormal            Final result                 Please view results for these tests on the individual orders.          Imaging Results              US Abdomen Complete (Final result)  Result time 12/10/22 19:12:34      Final result by Jeremie Magallon MD (12/10/22 19:12:34)                   Impression:      One.  Findings consistent medical renal disease and Bosniak type 1 cyst.    1. Hepatic steatosis.  2. Other secondary findings as  noted.      Electronically signed by: Jeremie Magallon MD  Date:    12/10/2022  Time:    19:12               Narrative:    EXAMINATION:  US ABDOMEN COMPLETE    CLINICAL HISTORY:  Abdominal distension (gaseous)    TECHNIQUE:  Multiple sonographic images of the abdomen were obtained by department sonographer.    COMPARISON:  None    FINDINGS:  Liver demonstrates increased echogenicity with a smooth capsule.  No evidence for focal hepatic lesion.  No intrahepatic duct dilatation.  The portal vein is patent with hepatopetal flow.  Gallbladder is present without evidence for pericholecystic fluid, gallbladder wall thickening, or cholelithiasis.  Sonographic Kate sign is negative.  The common duct is not identified.    The right kidney measures 11.1 cm in length.  The left kidney measures 10.5 cm in length.  No evidence for hydronephrosis or calculus.  Parenchyma demonstrates increased echogenicity without evidence for cortical thinning.  Simple cystic changes are identified.    The spleen is of normal size.  The visualized pancreas, abdominal aorta, IVC within normal limits.                                       X-Ray Abdomen Flat And Erect (Final result)  Result time 12/10/22 16:06:41      Final result by Иван Saleh MD (12/10/22 16:06:41)                   Impression:      Moderate colonic fecal loading with overall nonspecific bowel gas pattern.      Electronically signed by: Иван Saleh  Date:    12/10/2022  Time:    16:06               Narrative:    EXAMINATION:  XR ABDOMEN FLAT AND ERECT    CLINICAL HISTORY:  Abdominal distension (gaseous)    TECHNIQUE:  Two views    COMPARISON:  February 9, 2022    FINDINGS:  Moderate colonic fecal loading.  The intestinal gas pattern is nonspecific and nonobstructive. No air fluid levels or pneumoperitoneum identified.  Demineralization and degenerative changes of the lumbar spine.                                       Medications   0.9%  NaCl infusion (has no administration in  time range)   haloperidol lactate (HALDOL) 5 mg/mL injection (has no administration in time range)   HYDROmorphone (PF) injection 0.5 mg (0.5 mg Intravenous Given 12/10/22 2127)   0.9%  NaCl infusion (1,000 mLs Intravenous New Bag 12/10/22 2127)   ondansetron injection 4 mg (4 mg Intravenous Given 12/10/22 2127)   haloperidol lactate injection 2 mg (2 mg Intramuscular Given 12/10/22 2348)     Medical Decision Making:   Differential Diagnosis:   Occluded obstructive uropathy, acute renal failure, electrolyte abnormality, UTI, sepsis.  Other:   I discussed test(s) with the performing physician.       <> Summary of the Findings: Discussed case with Dr. El at ED  Discussed case with Dr. Mckeon, patient will be admitted for further higher care  A review for patient's CT scan from last night shows obstructive uropathy.  Lab work from today reveals worsening acute renal failure.  Patient will be admitted for further pain control and IV hydration to the hospitalist Dr. Mckeon                        Clinical Impression:   Final diagnoses:  [R14.0] Abdominal distension  [N17.9] Acute renal failure, unspecified acute renal failure type (Primary)  [N13.9] Obstructive uropathy        ED Disposition Condition    Admit Stable                ARIANA Ovalle  12/11/22 0004

## 2022-12-12 LAB
ALBUMIN SERPL-MCNC: 2.4 GM/DL (ref 3.4–4.8)
ALBUMIN/GLOB SERPL: 0.8 RATIO (ref 1.1–2)
ALP SERPL-CCNC: 54 UNIT/L (ref 40–150)
ALT SERPL-CCNC: 11 UNIT/L (ref 0–55)
AST SERPL-CCNC: 20 UNIT/L (ref 5–34)
BASOPHILS # BLD AUTO: 0.05 X10(3)/MCL (ref 0–0.2)
BASOPHILS NFR BLD AUTO: 0.4 %
BILIRUBIN DIRECT+TOT PNL SERPL-MCNC: 0.3 MG/DL
BUN SERPL-MCNC: 33.1 MG/DL (ref 8.4–25.7)
CALCIUM SERPL-MCNC: 8.5 MG/DL (ref 8.8–10)
CHLORIDE SERPL-SCNC: 109 MMOL/L (ref 98–107)
CO2 SERPL-SCNC: 26 MMOL/L (ref 23–31)
CREAT SERPL-MCNC: 2.32 MG/DL (ref 0.73–1.18)
EOSINOPHIL # BLD AUTO: 0.48 X10(3)/MCL (ref 0–0.9)
EOSINOPHIL NFR BLD AUTO: 4.3 %
ERYTHROCYTE [DISTWIDTH] IN BLOOD BY AUTOMATED COUNT: 14 % (ref 11.5–17)
GFR SERPLBLD CREATININE-BSD FMLA CKD-EPI: 29 MLS/MIN/1.73/M2
GLOBULIN SER-MCNC: 2.9 GM/DL (ref 2.4–3.5)
GLUCOSE SERPL-MCNC: 117 MG/DL (ref 82–115)
HCT VFR BLD AUTO: 31.8 % (ref 42–52)
HGB BLD-MCNC: 9.8 GM/DL (ref 14–18)
IMM GRANULOCYTES # BLD AUTO: 0.05 X10(3)/MCL (ref 0–0.04)
IMM GRANULOCYTES NFR BLD AUTO: 0.4 %
LYMPHOCYTES # BLD AUTO: 1.46 X10(3)/MCL (ref 0.6–4.6)
LYMPHOCYTES NFR BLD AUTO: 13.1 %
MCH RBC QN AUTO: 27.7 PG (ref 27–31)
MCHC RBC AUTO-ENTMCNC: 30.8 MG/DL (ref 33–36)
MCV RBC AUTO: 89.8 FL (ref 80–94)
MONOCYTES # BLD AUTO: 0.8 X10(3)/MCL (ref 0.1–1.3)
MONOCYTES NFR BLD AUTO: 7.2 %
NEUTROPHILS # BLD AUTO: 8.3 X10(3)/MCL (ref 2.1–9.2)
NEUTROPHILS NFR BLD AUTO: 74.6 %
NRBC BLD AUTO-RTO: 0 %
PCO2 BLDA: 46 MMHG
PH SMN: 7.41 [PH]
PLATELET # BLD AUTO: 247 X10(3)/MCL (ref 130–400)
PMV BLD AUTO: 12.6 FL (ref 7.4–10.4)
PO2 BLDA: 133 MMHG
POC BASE DEFICIT: 3.8 MMOL/L
POC HCO3: 29.2 MMOL/L
POC IONIZED CALCIUM: 1.14 MMOL/L
POC SATURATED O2: 99 %
POC TEMPERATURE: 37 C
POTASSIUM BLD-SCNC: 3.5 MMOL/L
POTASSIUM SERPL-SCNC: 4.5 MMOL/L (ref 3.5–5.1)
PROT SERPL-MCNC: 5.3 GM/DL (ref 5.8–7.6)
RBC # BLD AUTO: 3.54 X10(6)/MCL (ref 4.7–6.1)
SODIUM BLD-SCNC: 140 MMOL/L
SODIUM SERPL-SCNC: 144 MMOL/L (ref 136–145)
SPECIMEN SOURCE: ABNORMAL
WBC # SPEC AUTO: 11.1 X10(3)/MCL (ref 4.5–11.5)

## 2022-12-12 PROCEDURE — 27000221 HC OXYGEN, UP TO 24 HOURS

## 2022-12-12 PROCEDURE — 87088 URINE BACTERIA CULTURE: CPT | Performed by: INTERNAL MEDICINE

## 2022-12-12 PROCEDURE — 25000003 PHARM REV CODE 250: Performed by: STUDENT IN AN ORGANIZED HEALTH CARE EDUCATION/TRAINING PROGRAM

## 2022-12-12 PROCEDURE — 85025 COMPLETE CBC W/AUTO DIFF WBC: CPT | Performed by: INTERNAL MEDICINE

## 2022-12-12 PROCEDURE — 20000000 HC ICU ROOM

## 2022-12-12 PROCEDURE — 94761 N-INVAS EAR/PLS OXIMETRY MLT: CPT

## 2022-12-12 PROCEDURE — 82803 BLOOD GASES ANY COMBINATION: CPT

## 2022-12-12 PROCEDURE — C9113 INJ PANTOPRAZOLE SODIUM, VIA: HCPCS | Performed by: STUDENT IN AN ORGANIZED HEALTH CARE EDUCATION/TRAINING PROGRAM

## 2022-12-12 PROCEDURE — 94003 VENT MGMT INPAT SUBQ DAY: CPT

## 2022-12-12 PROCEDURE — 63600175 PHARM REV CODE 636 W HCPCS: Performed by: STUDENT IN AN ORGANIZED HEALTH CARE EDUCATION/TRAINING PROGRAM

## 2022-12-12 PROCEDURE — 36415 COLL VENOUS BLD VENIPUNCTURE: CPT | Performed by: INTERNAL MEDICINE

## 2022-12-12 PROCEDURE — 25000003 PHARM REV CODE 250: Performed by: INTERNAL MEDICINE

## 2022-12-12 PROCEDURE — 80053 COMPREHEN METABOLIC PANEL: CPT | Performed by: INTERNAL MEDICINE

## 2022-12-12 PROCEDURE — 63600175 PHARM REV CODE 636 W HCPCS: Performed by: INTERNAL MEDICINE

## 2022-12-12 PROCEDURE — 99900035 HC TECH TIME PER 15 MIN (STAT)

## 2022-12-12 PROCEDURE — 36600 WITHDRAWAL OF ARTERIAL BLOOD: CPT

## 2022-12-12 RX ORDER — LOPERAMIDE HYDROCHLORIDE 2 MG/1
2 CAPSULE ORAL 4 TIMES DAILY PRN
Status: DISCONTINUED | OUTPATIENT
Start: 2022-12-12 | End: 2022-12-19 | Stop reason: HOSPADM

## 2022-12-12 RX ORDER — HYDROCODONE BITARTRATE AND ACETAMINOPHEN 5; 325 MG/1; MG/1
1 TABLET ORAL EVERY 4 HOURS PRN
Status: DISCONTINUED | OUTPATIENT
Start: 2022-12-12 | End: 2022-12-19 | Stop reason: HOSPADM

## 2022-12-12 RX ADMIN — CEFEPIME 1 G: 1 INJECTION, POWDER, FOR SOLUTION INTRAMUSCULAR; INTRAVENOUS at 08:12

## 2022-12-12 RX ADMIN — MUPIROCIN: 20 OINTMENT TOPICAL at 08:12

## 2022-12-12 RX ADMIN — LOPERAMIDE HYDROCHLORIDE 2 MG: 2 CAPSULE ORAL at 10:12

## 2022-12-12 RX ADMIN — DEXMEDETOMIDINE HYDROCHLORIDE 1 MCG/KG/HR: 400 INJECTION INTRAVENOUS at 02:12

## 2022-12-12 RX ADMIN — PROPOFOL 35 MCG/KG/MIN: 10 INJECTION, EMULSION INTRAVENOUS at 04:12

## 2022-12-12 RX ADMIN — HYDRALAZINE HYDROCHLORIDE 10 MG: 20 INJECTION INTRAMUSCULAR; INTRAVENOUS at 11:12

## 2022-12-12 RX ADMIN — PROPOFOL 35 MCG/KG/MIN: 10 INJECTION, EMULSION INTRAVENOUS at 12:12

## 2022-12-12 RX ADMIN — CHLORHEXIDINE GLUCONATE 0.12% ORAL RINSE 15 ML: 1.2 LIQUID ORAL at 08:12

## 2022-12-12 RX ADMIN — PANTOPRAZOLE SODIUM 40 MG: 40 INJECTION, POWDER, FOR SOLUTION INTRAVENOUS at 08:12

## 2022-12-12 RX ADMIN — ACETAMINOPHEN 650 MG: 325 TABLET, FILM COATED ORAL at 07:12

## 2022-12-12 RX ADMIN — MELATONIN TAB 3 MG 6 MG: 3 TAB at 10:12

## 2022-12-12 RX ADMIN — DEXMEDETOMIDINE HYDROCHLORIDE 1.2 MCG/KG/HR: 400 INJECTION INTRAVENOUS at 08:12

## 2022-12-12 NOTE — OP NOTE
[facility] 7th Floor ICU  Surgery Department  Operative Note    SUMMARY     Date of Procedure: 12/11/2022     Procedure:   Cystoscopy with left ureteral stent placement  Surgeon: Mar Fernandez        Pre-Operative Diagnosis:   Left distal ureteral stone, hydronephrosis, urinary tract infection.  Post-Operative Diagnosis:   Same  Anesthesia: General    Operative Findings:  Patient was found to have a left lower ureteral stone with proximal hydronephrosis and suspected infection.  He was agitated on the floor prior to my seeing him and required significant amount of sedatives which necessitated intubation in the ICU.  Some of this was possibly due to a language barrier and the patient being confused without family member at the bedside.  He has no prior history of stones.  CT scan demonstrates possible multiple small stones in the left distal ureter.  He was consented for the above procedure.      After satisfactory informed written consent was obtained he was taken to the operating suite.  General anesthesia was administered.  He was placed in the dorsal lithotomy position the area of the genitalia was prepped and draped sterilely.  A 22 Azeri cystourethroscope was introduced into the urethra.  The urethra and prostatic urethra were free of obstruction.  The bladder was viewed in its entirety with a 30 degree lens and found to be without significant abnormality.  There was some mild-to-moderate periureteral edema a round the left ureteral orifice due to a stone peeking out of the left UO.  It was necessary to use a Glidewire to negotiate past the stone and once this was done some purulence was noted from the left ureteral orifice.  Fluoroscopy was used to assure that the wire was placed adequately into the renal pelvis.  Further pyelogram was performed highlighting these calices which showed moderate hydronephrosis.  Once the guidewire was satisfactorily in place a 6 Azeri by 26 cm  double-J ureteral stent was advanced in the usual fashion with good proximal and distal coil.  The bladder was drained the endoscope was removed and a 16 English Feliz catheter was replaced to facilitate bladder drainage.  Patient was then brought back to the ICU still intubated.  Estimated Blood Loss (EBL): * No values recorded between 12/11/2022  7:15 AM and 12/11/2022  7:41 PM *           Implants: * No implants in log *  6 English by 26 cm double-J ureteral stent.  Specimens:   Specimen (24h ago, onward)      None                    Condition: Stable    Disposition: ICU - intubated and hemodynamically stable.    Attestation: I was present and scrubbed for the entire procedure.

## 2022-12-12 NOTE — NURSING
Nurses Note -- 4 Eyes      12/12/2022   6:36 AM      Skin assessed during: Q Shift Change      [x] No Pressure Injuries Present    []Prevention Measures Documented      [] Yes- Altered Skin Integrity Present or Discovered   [] LDA Added if Not in Epic (Describe Wound)   [] New Altered Skin Integrity was Present on Admit and Documented in LDA   [] Wound Image Taken    Wound Care Consulted? No    Attending Nurse:  Leyla oPwer RN     Second RN/Staff Member:  Hugo HOOPER

## 2022-12-12 NOTE — ANESTHESIA POSTPROCEDURE EVALUATION
Anesthesia Post Evaluation    Patient: Suleiman Beck    Procedure(s) Performed: Procedure(s) (LRB):  CYSTOSCOPY, WITH URETERAL STENT INSERTION (Left)    Final Anesthesia Type: general      Patient location during evaluation: ICU  Patient participation: No - Unable to Participate, Sedation  Level of consciousness: sedated  Post-procedure vital signs: reviewed and stable  Pain management: adequate    PONV status at discharge: No PONV  Anesthetic complications: no      Cardiovascular status: hemodynamically stable  Respiratory status: ETT and intubated  Hydration status: euvolemic  Follow-up not needed.          Vitals Value Taken Time   /116 12/11/22 2001   Temp 97.3 12/11/22 2008   Pulse 82 12/11/22 2007   Resp 18 12/11/22 2007   SpO2 100 % 12/11/22 2007   Vitals shown include unvalidated device data.      No case tracking events are documented in the log.      Pain/Smith Score: Pain Rating Prior to Med Admin: 9 (12/10/2022  9:27 PM)  Pain Rating Post Med Admin: 0 (12/11/2022 12:56 AM)         DISPLAY PLAN FREE TEXT

## 2022-12-12 NOTE — PROGRESS NOTES
.UROLOGY  PROGRESS  NOTE    Suleiman Beck 1950  [id number]  12/12/2022    S/p cysto with left ureteral stent placement POD 1    Family at bedside  Denies flank pain  Leukocytosis and renal function improving  Labile BP  Great urine output      Intake/Output:  I/O this shift:  In: -   Out: 250 [Urine:250]  I/O last 3 completed shifts:  In: 2074.5 [I.V.:2074.5]  Out: 2400 [Urine:2400]       Exam:    NAD  Card RRR  Resp unlabored   clear yellow urine draining to  bag      Recent Results (from the past 24 hour(s))   Comprehensive Metabolic Panel    Collection Time: 12/12/22  6:29 AM   Result Value Ref Range    Sodium Level 144 136 - 145 mmol/L    Potassium Level 4.5 3.5 - 5.1 mmol/L    Chloride 109 (H) 98 - 107 mmol/L    Carbon Dioxide 26 23 - 31 mmol/L    Glucose Level 117 (H) 82 - 115 mg/dL    Blood Urea Nitrogen 33.1 (H) 8.4 - 25.7 mg/dL    Creatinine 2.32 (H) 0.73 - 1.18 mg/dL    Calcium Level Total 8.5 (L) 8.8 - 10.0 mg/dL    Protein Total 5.3 (L) 5.8 - 7.6 gm/dL    Albumin Level 2.4 (L) 3.4 - 4.8 gm/dL    Globulin 2.9 2.4 - 3.5 gm/dL    Albumin/Globulin Ratio 0.8 (L) 1.1 - 2.0 ratio    Bilirubin Total 0.3 <=1.5 mg/dL    Alkaline Phosphatase 54 40 - 150 unit/L    Alanine Aminotransferase 11 0 - 55 unit/L    Aspartate Aminotransferase 20 5 - 34 unit/L    eGFR 29 mls/min/1.73/m2   CBC with Differential    Collection Time: 12/12/22  6:29 AM   Result Value Ref Range    WBC 11.1 4.5 - 11.5 x10(3)/mcL    RBC 3.54 (L) 4.70 - 6.10 x10(6)/mcL    Hgb 9.8 (L) 14.0 - 18.0 gm/dL    Hct 31.8 (L) 42.0 - 52.0 %    MCV 89.8 80.0 - 94.0 fL    MCH 27.7 27.0 - 31.0 pg    MCHC 30.8 (L) 33.0 - 36.0 mg/dL    RDW 14.0 11.5 - 17.0 %    Platelet 247 130 - 400 x10(3)/mcL    MPV 12.6 (H) 7.4 - 10.4 fL    Neut % 74.6 %    Lymph % 13.1 %    Mono % 7.2 %    Eos % 4.3 %    Basophil % 0.4 %    Lymph # 1.46 0.6 - 4.6 x10(3)/mcL    Neut # 8.3 2.1 - 9.2 x10(3)/mcL    Mono # 0.80 0.1 - 1.3 x10(3)/mcL    Eos # 0.48 0 - 0.9 x10(3)/mcL    Baso # 0.05  0 - 0.2 x10(3)/mcL    IG# 0.05 (H) 0 - 0.04 x10(3)/mcL    IG% 0.4 %    NRBC% 0.0 %   POCT ARTERIAL BLOOD GAS    Collection Time: 12/12/22  8:33 AM   Result Value Ref Range    POC PH 7.41     POC PCO2 46 (A) mmHg    POC PO2 133 (A) mmHg    POC SATURATED O2 99 %    POC Potassium 3.5 mmol/l    POC Sodium 140 mmol/l    POC Ionized Calcium 1.14 mmol/l    POC HCO3 29.2 mmol/l    Base Deficit 3.8 mmol/l    POC Temp 37.0 C    Specimen source Arterial sample          Assessment:  Left distal ureteral stone causing hydronephrosis s/p cysto with left stent placement    UTI; prelim cx shows no growth at 24 hours      Plan:  Can d/c Feliz when deemed appropriate per primary team  Continue empiric abx and follow cultures  Will need definitve stone treatment once infection is adequately treated    MARILEE Del Valle

## 2022-12-12 NOTE — PROGRESS NOTES
[facility] - 7th Floor ICU  Pulmonary Critical Care Note    Patient Name: Suleiman Beck  MRN: [id number]  Admission Date: 12/10/2022  Hospital Length of Stay: 1 days  Code Status: DNR  Attending Provider: No att. providers found  Primary Care Provider: Primary Doctor No     Subjective:     HPI:   Patient is a 72-year-old in the speaking male with PMH pertinent for CKD stage 3, HTN, and hemorrhoids who initially was admitted to [facility] for hydronephrosis with obstructive nephrolithiasis of the left ureter.  During hospitalization, had acute agitation episode with tachyarrhythmia and hypoxia.  Patient was given IM Haldol but eventually was not responding.  Patient required intubation and was given initiation of amiodarone.  Patient was transferred to ICU for post intubation management.    Discussed case with patient's son, states that patient has had similar episode like this in past as patient is a little bit and only speaks [place] he gets agitated and scared when patient's son is not present.  Of note, patient's son states that patient is DNR/DNI.      24 Hour Interval History:  Went to OR overnight with urology for stone removal, with purulent drainage noted from left ureter following stone extraction.  Remains hemodynamically stable on no vasopressor support this morning.  Oxygenating well on low ventilator settings.  Son planning to come to bedside at 9:00 a.m. for sedation wean and possible extubation.        Past Medical History:   Diagnosis Date    Essential (primary) hypertension     Unspecified chronic bronchitis        History reviewed. No pertinent surgical history.    Social History     Socioeconomic History    Marital status:    Tobacco Use    Smoking status: Never    Smokeless tobacco: Never   Substance and Sexual Activity    Alcohol use: Not Currently           Current Outpatient Medications   Medication Instructions    amoxicillin-clavulanate 875-125mg (AUGMENTIN) 875-125 mg per tablet 1  tablet, Oral, Every 12 hours    carvediloL (COREG) 12.5 mg, Oral, 2 times daily, 6.25mg in mornings and 12.5 mg in evenings.    meloxicam (MOBIC) 15 mg, Oral    metFORMIN (GLUCOPHAGE-XR) 500 MG ER 24hr tablet TAKE 2 TABLETS BY MOUTH ONCE DAILY WITH EVENING MEAL    traMADoL (ULTRAM) 50 mg, Oral, Every 6 hours PRN       Current Inpatient Medications   ceFEPime (MAXIPIME) IVPB  1 g Intravenous Q12H    chlorhexidine  15 mL Mouth/Throat BID    mupirocin   Nasal BID    pantoprazole  40 mg Intravenous Daily       Current Intravenous Infusions   sodium chloride 0.9% 125 mL/hr at 12/11/22 1711    dexmedetomidine (PRECEDEX) infusion 1.2 mcg/kg/hr (12/12/22 0400)    propofoL 35 mcg/kg/min (12/12/22 0410)    sodium bicarbonate drip 75 mL/hr at 12/11/22 1711         Objective:       Intake/Output Summary (Last 24 hours) at 12/12/2022 0756  Last data filed at 12/12/2022 0600  Gross per 24 hour   Intake 2074.5 ml   Output 2400 ml   Net -325.5 ml         Vital Signs (Most Recent):  Temp: 99.3 °F (37.4 °C) (12/12/22 0400)  Pulse: 65 (12/12/22 0700)  Resp: 19 (12/12/22 0700)  BP: (!) 144/62 (12/12/22 0700)  SpO2: 97 % (12/12/22 0700)    Body mass index is 27.96 kg/m².  Weight: 67.1 kg (148 lb) Vital Signs (24h Range):  Temp:  [98.3 °F (36.8 °C)-99.7 °F (37.6 °C)] 99.3 °F (37.4 °C)  Pulse:  [] 65  Resp:  [18-31] 19  SpO2:  [95 %-100 %] 97 %  BP: ()/() 144/62       Physical Exam  Gen- intubated, sedated  CV- RRR, no murmurs  Resp- CTAB  MSK- WWP, no LE edema  Neuro- intubated and sedated       Lines/Drains/Airways       Drain  Duration                  Urethral Catheter 12/11/22 0600 16 Fr. 1 day              Airway  Duration                  Airway - Non-Surgical 12/11/22 0400 1 day              Peripheral Intravenous Line  Duration                  Peripheral IV - Single Lumen 12/10/22 1959 20 G Left Antecubital 1 day         Peripheral IV - Single Lumen 12/11/22 0400 20 G Posterior;Right Hand 1 day                     Significant Labs:    Lab Results   Component Value Date    WBC 11.1 12/12/2022    HGB 9.8 (L) 12/12/2022    HCT 31.8 (L) 12/12/2022    MCV 89.8 12/12/2022     12/12/2022         BMP  Lab Results   Component Value Date     (L) 12/11/2022    K 3.9 12/11/2022    CO2 16 (L) 12/11/2022    BUN 37.2 (H) 12/11/2022    CREATININE 2.70 (H) 12/11/2022    CALCIUM 8.6 (L) 12/11/2022    EGFRNONAA 38 02/22/2022           Mechanical Ventilation Support:  Vent Mode: A/C (12/12/22 0452)  Ventilator Initiated: Yes (12/11/22 0839)  Set Rate: 18 BPM (12/12/22 0452)  Vt Set: 400 mL (12/12/22 0452)  PEEP/CPAP: 5 cmH20 (12/12/22 0452)  Oxygen Concentration (%): 40 (12/12/22 0452)  Peak Airway Pressure: 22 cmH20 (12/12/22 0452)  Total Ve: 10.7 L/m (12/12/22 0452)  F/VT Ratio<105 (RSBI): (!) 38.75 (12/12/22 0452)            Assessment/Plan:     Mr. Beck is a 73yo Antolin speaking male who is currently admitted to intensive care unit for acute respiratory failure due to severe agitation.  I suspect a significant component of his agitation is due to his language barrier, as we have been unable to communicate with him given his native language is not available through  services.  We will attempt to arrange for his son to be present at bedside as often as possible.     Assessment:  Obstructive nephrolithiasis with hydronephrosis   S/P stone extraction 121/11/2022  Acute hypoxic respiratory failure requiring mechanical ventilation  Suspect significant component of language barrier as  of agitation  Acute kidney injury  Non-anion gap metabolic acidosis      Plan:  -son planning on being at bedside this morning, defer sedation weaning and extubation attempts until son can be present to help control agitation  -continue cefepime for likely infectious component of hydronephrosis, repeat urine culture today given significant purulence expressed following stone extraction yesterday in OR   -continue sodium bicarb  drip for non-anion gap metabolic acidosis, acute kidney injury        I spent 33 minutes providing critical care services to this patient.  This does not include time spent for separately billed procedures.       Jose A Soliz MD  Pulmonary Critical Care Medicine  [facility] - 7th Floor ICU

## 2022-12-13 LAB
ALBUMIN SERPL-MCNC: 2.7 GM/DL (ref 3.4–4.8)
ALBUMIN/GLOB SERPL: 0.8 RATIO (ref 1.1–2)
ALP SERPL-CCNC: 67 UNIT/L (ref 40–150)
ALT SERPL-CCNC: 14 UNIT/L (ref 0–55)
AST SERPL-CCNC: 17 UNIT/L (ref 5–34)
BACTERIA UR CULT: NO GROWTH
BASOPHILS # BLD AUTO: 0.05 X10(3)/MCL (ref 0–0.2)
BASOPHILS NFR BLD AUTO: 0.5 %
BILIRUBIN DIRECT+TOT PNL SERPL-MCNC: 0.6 MG/DL
BUN SERPL-MCNC: 28.1 MG/DL (ref 8.4–25.7)
CALCIUM SERPL-MCNC: 9 MG/DL (ref 8.8–10)
CHLORIDE SERPL-SCNC: 104 MMOL/L (ref 98–107)
CO2 SERPL-SCNC: 23 MMOL/L (ref 23–31)
CREAT SERPL-MCNC: 1.85 MG/DL (ref 0.73–1.18)
EOSINOPHIL # BLD AUTO: 0.31 X10(3)/MCL (ref 0–0.9)
EOSINOPHIL NFR BLD AUTO: 3 %
ERYTHROCYTE [DISTWIDTH] IN BLOOD BY AUTOMATED COUNT: 13.8 % (ref 11.5–17)
GFR SERPLBLD CREATININE-BSD FMLA CKD-EPI: 38 MLS/MIN/1.73/M2
GLOBULIN SER-MCNC: 3.4 GM/DL (ref 2.4–3.5)
GLUCOSE SERPL-MCNC: 252 MG/DL (ref 82–115)
HCT VFR BLD AUTO: 36.1 % (ref 42–52)
HGB BLD-MCNC: 11 GM/DL (ref 14–18)
IMM GRANULOCYTES # BLD AUTO: 0.06 X10(3)/MCL (ref 0–0.04)
IMM GRANULOCYTES NFR BLD AUTO: 0.6 %
LYMPHOCYTES # BLD AUTO: 1.2 X10(3)/MCL (ref 0.6–4.6)
LYMPHOCYTES NFR BLD AUTO: 11.5 %
MCH RBC QN AUTO: 27.8 PG (ref 27–31)
MCHC RBC AUTO-ENTMCNC: 30.5 MG/DL (ref 33–36)
MCV RBC AUTO: 91.2 FL (ref 80–94)
MONOCYTES # BLD AUTO: 0.65 X10(3)/MCL (ref 0.1–1.3)
MONOCYTES NFR BLD AUTO: 6.3 %
NEUTROPHILS # BLD AUTO: 8.1 X10(3)/MCL (ref 2.1–9.2)
NEUTROPHILS NFR BLD AUTO: 78.1 %
NRBC BLD AUTO-RTO: 0 %
PLATELET # BLD AUTO: 323 X10(3)/MCL (ref 130–400)
PMV BLD AUTO: 11.9 FL (ref 7.4–10.4)
POCT GLUCOSE: 288 MG/DL (ref 70–110)
POTASSIUM SERPL-SCNC: 4.1 MMOL/L (ref 3.5–5.1)
PROT SERPL-MCNC: 6.1 GM/DL (ref 5.8–7.6)
RBC # BLD AUTO: 3.96 X10(6)/MCL (ref 4.7–6.1)
SODIUM SERPL-SCNC: 142 MMOL/L (ref 136–145)
WBC # SPEC AUTO: 10.4 X10(3)/MCL (ref 4.5–11.5)

## 2022-12-13 PROCEDURE — C9113 INJ PANTOPRAZOLE SODIUM, VIA: HCPCS | Performed by: STUDENT IN AN ORGANIZED HEALTH CARE EDUCATION/TRAINING PROGRAM

## 2022-12-13 PROCEDURE — 63600175 PHARM REV CODE 636 W HCPCS: Performed by: INTERNAL MEDICINE

## 2022-12-13 PROCEDURE — 63600175 PHARM REV CODE 636 W HCPCS: Performed by: STUDENT IN AN ORGANIZED HEALTH CARE EDUCATION/TRAINING PROGRAM

## 2022-12-13 PROCEDURE — 25000003 PHARM REV CODE 250: Performed by: INTERNAL MEDICINE

## 2022-12-13 PROCEDURE — 36415 COLL VENOUS BLD VENIPUNCTURE: CPT | Performed by: PHYSICIAN ASSISTANT

## 2022-12-13 PROCEDURE — 25000003 PHARM REV CODE 250: Performed by: FAMILY MEDICINE

## 2022-12-13 PROCEDURE — 80053 COMPREHEN METABOLIC PANEL: CPT | Performed by: PHYSICIAN ASSISTANT

## 2022-12-13 PROCEDURE — 11000001 HC ACUTE MED/SURG PRIVATE ROOM

## 2022-12-13 PROCEDURE — 97162 PT EVAL MOD COMPLEX 30 MIN: CPT

## 2022-12-13 PROCEDURE — 25000003 PHARM REV CODE 250: Performed by: STUDENT IN AN ORGANIZED HEALTH CARE EDUCATION/TRAINING PROGRAM

## 2022-12-13 PROCEDURE — 85025 COMPLETE CBC W/AUTO DIFF WBC: CPT | Performed by: PHYSICIAN ASSISTANT

## 2022-12-13 PROCEDURE — 63600175 PHARM REV CODE 636 W HCPCS: Performed by: FAMILY MEDICINE

## 2022-12-13 PROCEDURE — 20000000 HC ICU ROOM

## 2022-12-13 PROCEDURE — 97166 OT EVAL MOD COMPLEX 45 MIN: CPT

## 2022-12-13 RX ORDER — CARVEDILOL 12.5 MG/1
12.5 TABLET ORAL 2 TIMES DAILY
Status: DISCONTINUED | OUTPATIENT
Start: 2022-12-13 | End: 2022-12-19 | Stop reason: HOSPADM

## 2022-12-13 RX ORDER — SODIUM CHLORIDE, SODIUM LACTATE, POTASSIUM CHLORIDE, CALCIUM CHLORIDE 600; 310; 30; 20 MG/100ML; MG/100ML; MG/100ML; MG/100ML
2000 INJECTION, SOLUTION INTRAVENOUS CONTINUOUS
Status: ACTIVE | OUTPATIENT
Start: 2022-12-13 | End: 2022-12-14

## 2022-12-13 RX ORDER — NIFEDIPINE 30 MG/1
30 TABLET, EXTENDED RELEASE ORAL DAILY
Status: DISCONTINUED | OUTPATIENT
Start: 2022-12-13 | End: 2022-12-19 | Stop reason: HOSPADM

## 2022-12-13 RX ADMIN — HYDRALAZINE HYDROCHLORIDE 10 MG: 20 INJECTION INTRAMUSCULAR; INTRAVENOUS at 04:12

## 2022-12-13 RX ADMIN — CARVEDILOL 12.5 MG: 12.5 TABLET, FILM COATED ORAL at 08:12

## 2022-12-13 RX ADMIN — HYDRALAZINE HYDROCHLORIDE 10 MG: 20 INJECTION INTRAMUSCULAR; INTRAVENOUS at 10:12

## 2022-12-13 RX ADMIN — MUPIROCIN: 20 OINTMENT TOPICAL at 09:12

## 2022-12-13 RX ADMIN — CEFEPIME 1 G: 1 INJECTION, POWDER, FOR SOLUTION INTRAMUSCULAR; INTRAVENOUS at 09:12

## 2022-12-13 RX ADMIN — CEFEPIME 1 G: 1 INJECTION, POWDER, FOR SOLUTION INTRAMUSCULAR; INTRAVENOUS at 08:12

## 2022-12-13 RX ADMIN — CARVEDILOL 12.5 MG: 12.5 TABLET, FILM COATED ORAL at 09:12

## 2022-12-13 RX ADMIN — HYDRALAZINE HYDROCHLORIDE 10 MG: 20 INJECTION INTRAMUSCULAR; INTRAVENOUS at 06:12

## 2022-12-13 RX ADMIN — SODIUM CHLORIDE, POTASSIUM CHLORIDE, SODIUM LACTATE AND CALCIUM CHLORIDE 2000 ML: 600; 310; 30; 20 INJECTION, SOLUTION INTRAVENOUS at 10:12

## 2022-12-13 RX ADMIN — LOPERAMIDE HYDROCHLORIDE 2 MG: 2 CAPSULE ORAL at 05:12

## 2022-12-13 RX ADMIN — ACETAMINOPHEN 650 MG: 325 TABLET, FILM COATED ORAL at 06:12

## 2022-12-13 RX ADMIN — PANTOPRAZOLE SODIUM 40 MG: 40 INJECTION, POWDER, FOR SOLUTION INTRAVENOUS at 09:12

## 2022-12-13 RX ADMIN — INSULIN ASPART 6 UNITS: 100 INJECTION, SOLUTION INTRAVENOUS; SUBCUTANEOUS at 05:12

## 2022-12-13 RX ADMIN — NIFEDIPINE 30 MG: 30 TABLET, FILM COATED, EXTENDED RELEASE ORAL at 09:12

## 2022-12-13 NOTE — PLAN OF CARE
CM attempted call to son Jaja [phone], left VM. DCP assessment completed as below per review of the chart. Patient lives with spouse, son, JAJA and extended family in a SS home. Ordinarily IADLs prior to admission. Recently began using a walker (24 hours PTA). Additional DME includes walker and bath bench. Financial counselor Jenifer has applied patient for Medicaid, currently self-pay. Current recommendations per PT/OT for HH and 24/7 SPV from family. Appears from review of the chart patient will not have 24/7 SPV available at discharge. Will offer sitter list for family review. CM will continue to follow for discharge needs as indicated.        12/13/22 1309   Discharge Assessment   Assessment Type Discharge Planning Brief Assessment   Confirmed/corrected address, phone number and insurance No   Reason No family to provide information/patient unable to answer   Source of Information health record   If unable to respond/provide information was family/caregiver contacted? Yes   Contact Name/Number left VM for son Jaja; [phone]   Communicated TANYA with patient/caregiver Date not available/Unable to determine   People in Home other relative(s);child(rush), adult;spouse   Do you expect to return to your current living situation? Yes   Do you have help at home or someone to help you manage your care at home? Yes   Who are your caregiver(s) and their phone number(s)? family   Prior to hospitilization cognitive status: Unable to Assess   Current cognitive status: Unable to Assess   Dressing/Bathing bathing difficulty, requires equipment   Dressing/Bathing Management bath bench   Equipment Currently Used at Home wheelchair;walker, rolling;bath bench   Readmission within 30 days? No   Patient currently being followed by outpatient case management? Unable to determine (comments)   Discharge Plan A Home with family   Discharge Plan B Home with family       John Erazo RN Case Manager

## 2022-12-13 NOTE — PROGRESS NOTES
[facility] - 7th Floor ICU  Pulmonary Critical Care Note    Patient Name: Suleiman Beck  MRN: [id number]  Admission Date: 12/10/2022  Hospital Length of Stay: 2 days  Code Status: DNR  Attending Provider: No att. providers found  Primary Care Provider: Primary Doctor No     Subjective:     HPI:   Patient is a 72-year-old in the speaking male with PMH pertinent for CKD stage 3, HTN, and hemorrhoids who initially was admitted to [facility] for hydronephrosis with obstructive nephrolithiasis of the left ureter.  During hospitalization, had acute agitation episode with tachyarrhythmia and hypoxia.  Patient was given IM Haldol but eventually was not responding.  Patient required intubation and was given initiation of amiodarone.  Patient was transferred to ICU for post intubation management.    Discussed case with patient's son, states that patient has had similar episode like this in past as patient is a little bit and only speaks [place] he gets agitated and scared when patient's son is not present.  Of note, patient's son states that patient is DNR/DNI.      24 Hour Interval History:  VSS overnight, moderately hypertensive 130-180/70-80s on 2L NC, saturating well, with T-max of 100.7 and minimally elevated fever curve. Pt remains on cefepime, and urine cultures are still pending finalization, but there has been NGTD. Bctx NGTD for 72 hours.        Past Medical History:   Diagnosis Date    Essential (primary) hypertension     Unspecified chronic bronchitis        Past Surgical History:   Procedure Laterality Date    CYSTOSCOPY W/ URETERAL STENT PLACEMENT Left 12/11/2022    Procedure: CYSTOSCOPY, WITH URETERAL STENT INSERTION;  Surgeon: Mar Fernandez MD;  Location: [facility];  Service: Urology;  Laterality: Left;       Social History     Socioeconomic History    Marital status:    Tobacco Use    Smoking status: Never    Smokeless tobacco: Never   Substance and Sexual Activity    Alcohol use: Not  Currently           Current Outpatient Medications   Medication Instructions    amoxicillin-clavulanate 875-125mg (AUGMENTIN) 875-125 mg per tablet 1 tablet, Oral, Every 12 hours    carvediloL (COREG) 12.5 mg, Oral, 2 times daily, 6.25mg in mornings and 12.5 mg in evenings.    meloxicam (MOBIC) 15 mg, Oral    metFORMIN (GLUCOPHAGE-XR) 500 MG ER 24hr tablet TAKE 2 TABLETS BY MOUTH ONCE DAILY WITH EVENING MEAL    traMADoL (ULTRAM) 50 mg, Oral, Every 6 hours PRN       Current Inpatient Medications   carvediloL  12.5 mg Oral BID    ceFEPime (MAXIPIME) IVPB  1 g Intravenous Q12H    chlorhexidine  15 mL Mouth/Throat BID    mupirocin   Nasal BID    NIFEdipine  30 mg Oral Daily    pantoprazole  40 mg Intravenous Daily       Current Intravenous Infusions   sodium chloride 0.9% 125 mL/hr at 12/11/22 1711    dexmedetomidine (PRECEDEX) infusion Stopped (12/12/22 1500)    propofoL Stopped (12/12/22 0900)    sodium bicarbonate drip 75 mL/hr at 12/11/22 1711         Objective:       Intake/Output Summary (Last 24 hours) at 12/13/2022 0933  Last data filed at 12/13/2022 0600  Gross per 24 hour   Intake --   Output 2700 ml   Net -2700 ml         Vital Signs (Most Recent):  Temp: 99.1 °F (37.3 °C) (12/13/22 0400)  Pulse: 81 (12/13/22 0700)  Resp: 19 (12/13/22 0700)  BP: (!) 180/75 (12/13/22 0600)  SpO2: 97 % (12/13/22 0700)    Body mass index is 27.96 kg/m².  Weight: 67.1 kg (148 lb) Vital Signs (24h Range):  Temp:  [98.4 °F (36.9 °C)-100.7 °F (38.2 °C)] 99.1 °F (37.3 °C)  Pulse:  [62-90] 81  Resp:  [14-26] 19  SpO2:  [95 %-100 %] 97 %  BP: ()/(52-86) 180/75       Physical Exam  General: Awake, alert, & oriented to person, place & time. No acute distress  Psychiatric: Mood and affect normal  HEENT: Normocephalic, atraumatic. Face symmetric.  Cardiovascular: Regular rate & rhythm. Normal S1 & S2 w/out murmurs, rubs or gallops.  No JVD appreciated.  2+ pulses throughout.  Pulmonary: Bilateral symmetric chest rise. Non-labored, no  wheezes, rhonchi or crackles are appreciated.  Abdominal:  Soft, nontender, nondistended. Bowel sounds present  Extremities: No clubbing, cyanosis or edema.  Skin:  Exposed skin is warm & dry.  Neuro:   Patient moves all extremities equally. Sensation intact bilateraly.      Lines/Drains/Airways       Drain  Duration                  Urethral Catheter 12/11/22 0600 16 Fr. 2 days              Peripheral Intravenous Line  Duration                  Peripheral IV - Single Lumen 12/11/22 0400 20 G Posterior;Right Hand 2 days         Peripheral IV - Single Lumen 12/13/22 0630 20 G Left;Posterior Hand <1 day                    Significant Labs:    Lab Results   Component Value Date    WBC 11.1 12/12/2022    HGB 9.8 (L) 12/12/2022    HCT 31.8 (L) 12/12/2022    MCV 89.8 12/12/2022     12/12/2022         BMP  Lab Results   Component Value Date     12/12/2022    K 4.5 12/12/2022    CO2 26 12/12/2022    BUN 33.1 (H) 12/12/2022    CREATININE 2.32 (H) 12/12/2022    CALCIUM 8.5 (L) 12/12/2022    EGFRNONAA 38 02/22/2022           Mechanical Ventilation Support:  Vent Mode: CPAP / PSV (12/12/22 0925)  Ventilator Initiated: Yes (12/11/22 0839)  Set Rate: 18 BPM (12/12/22 0826)  Vt Set: 400 mL (12/12/22 0826)  Pressure Support: 10 cmH20 (12/12/22 0925)  PEEP/CPAP: 5 cmH20 (12/12/22 0925)  Oxygen Concentration (%): 2 (12/12/22 2000)  Peak Airway Pressure: 20 cmH20 (12/12/22 0826)  Total Ve: 9.2 L/m (12/12/22 0826)  F/VT Ratio<105 (RSBI): (!) 38.75 (12/12/22 0452)      Assessment/Plan:     Mr. Beck is a 73yo Antolin speaking male who is currently admitted to intensive care unit for acute respiratory failure due to severe agitation.  I suspect a significant component of his agitation is due to his language barrier, as we have been unable to communicate with him given his native language is not available through  services.  We will attempt to arrange for his son to be present at bedside as often as possible.      Assessment:  Obstructive nephrolithiasis with hydronephrosis   S/P stone extraction 121/11/2022  Acute hypoxic respiratory failure requiring mechanical ventilation  Suspect significant component of language barrier as  of agitation  Acute kidney injury - resolving  Non-anion gap metabolic acidosis - resolved      Plan:  - extubated yesterday to 2L NC, without need for further sedation  -continue cefepime for likely infectious component of hydronephrosis, repeat urine culture 12/12/22 given significant purulence expressed following stone extraction 12/11/22 in OR   -discontinue sodium bicarb drip, and will initiate diet, as post obstructive TINO will continue to resolve with stent.   - further management per urology, stable to be downgraded     I spent 33 minutes providing critical care services to this patient.  This does not include time spent for separately billed procedures.       Jami Horn MD  Pulmonary Critical Care Medicine  [facility] - 7th Floor ICU

## 2022-12-13 NOTE — PLAN OF CARE
Problem: Occupational Therapy  Goal: Occupational Therapy Goal  Description: Goals to be met by: 1/13/23     Patient will increase functional independence with ADLs by performing:    LE Dressing with Modified [place].  Grooming while standing at sink with Modified [place].  Toileting from toilet with Modified [place] for hygiene and clothing management.   Toilet transfer to toilet with Modified [place].    Outcome: Ongoing, Progressing

## 2022-12-13 NOTE — PT/OT/SLP EVAL
Physical Therapy Evaluation    Patient Name:  Suleiman Beck   MRN:  [id number]    Recommendations:     Discharge Recommendations:  home with home health (home with 24 hr assistance)   Discharge Equipment Recommendations: none   Barriers to discharge: Decreased caregiver support    Assessment:     Suleiman Beck is a 72 y.o. male admitted with a medical diagnosis of Ureteral obstruction, respiratory failure, metabolic acidosis.  He is s/p ERCP and ureteral stent placement. He presents with the following impairments/functional limitations: weakness, impaired endurance, impaired self care skills, impaired functional mobility, gait instability, impaired balance, decreased safety awareness Pt does not speak English and family translates for him. Pt's son noted he was previously independent and he noticed his father dragging his feet last week. He got him a walker and a WC. The patient did not even use the walker a whole day before being admitted to the hospital. Pt is independent with bed mobility and requires Zander to go from supine to sitting EOB. He preformed STS transfer using a RW with CGA. He ambulated using RW and Zander to the restroom and sat on the commode. PT and OT helped pt clean up. He required Zander to stand up from the commode. Pt ambulated to bed side chair and sat up. Upon d/c pt will need 24 hr support at home and would benefit from home health. Son states the pt does not have insurance, so a rehab is not an option.     Rehab Prognosis: Good; patient would benefit from acute skilled PT services to address these deficits and reach maximum level of function.    Recent Surgery: Procedure(s) (LRB):  CYSTOSCOPY, WITH URETERAL STENT INSERTION (Left) 2 Days Post-Op    Plan:     During this hospitalization, patient to be seen 6 x/week to address the identified rehab impairments via therapeutic activities, therapeutic exercises and progress toward the following goals:    Plan of Care Expires:  01/13/23    Subjective      Chief Complaint: none stated  Patient/Family Comments/goals: get better  Pain/Comfort:       Patients cultural, spiritual, [employer]ybeterian conflicts given the current situation: no    Living Environment:  Lives with wife, son, daughter-in-law, grandchildren, and nephew in a SLH with one step to enter. Pt has a bathtub shower but son stated he has a stool in there for his father to sit and bath like they do in [place] by pouring water on himself.  Prior to admission, patients level of function was independent with ADLs.  Equipment used at home: walker, rolling, wheelchair.  DME owned (not currently used): none.  Upon discharge, patient will have assistance from family but son there will be no one at home during the day that will be able to assist him. Nephew leaves to go back to college in three weeks.    Objective:     Communicated with nursing prior to session.  Patient found HOB elevated with blood pressure cuff, veliz catheter, peripheral IV, pulse ox (continuous), telemetry  upon PT entry to room.    General Precautions: Standard, fall  Orthopedic Precautions:    Braces:    Respiratory Status: Room air  Pre-activity vitals:   HR: 73   O2: 94   BP: 152/74  Post activity vitals:   HR: 67   O2: 96   BP: 138/60    Exams:  RLE ROM: WNL  RLE Strength: WNL  LLE ROM: WNL  LLE Strength: WNL    Functional Mobility:  Bed Mobility:     Scooting: independence  Supine to Sit: minimum assistance  Transfers:     Sit to Stand:  minimum assistance with rolling walker  Gait: 15 ft, RW, Zander for balance and maneuvering RW      AM-PAC 6 CLICK MOBILITY  Total Score:17       Treatment & Education:  Bed mobility  STSx3  Ambulation  Toilet transfer w/ OT and clean up    Patient left up in chair with all lines intact, call button in reach, nurse notified, and son present.    GOALS:   Multidisciplinary Problems       Physical Therapy Goals          Problem: Physical Therapy    Goal Priority Disciplines Outcome Goal Variances Interventions    Physical Therapy Goal     PT, PT/OT Ongoing, Progressing     Description: Goals to be met by: 2023     Patient will increase functional independence with mobility by performin. Supine to sit with [place]  2. Sit to supine with [place]  3. Sit to stand transfer with Stand-by Assistance  4. Bed to chair transfer with Stand-by Assistance using Rolling Walker or LRAD  5. Gait  x 400 feet with Stand-by Assistance using Rolling Walker or LRAD.                          History:     Past Medical History:   Diagnosis Date    Essential (primary) hypertension     Unspecified chronic bronchitis        Past Surgical History:   Procedure Laterality Date    CYSTOSCOPY W/ URETERAL STENT PLACEMENT Left 2022    Procedure: CYSTOSCOPY, WITH URETERAL STENT INSERTION;  Surgeon: Mar Fernandez MD;  Location: [facility];  Service: Urology;  Laterality: Left;       Time Tracking:     PT Received On: 22  PT Start Time: 1045     PT Stop Time: 1128  PT Total Time (min): 43 min     Billable Minutes: Evaluation 43      2022

## 2022-12-13 NOTE — PT/OT/SLP EVAL
Occupational Therapy   Evaluation    Name: Suleiman Beck  MRN: [id number]  Admitting Diagnosis: Ureteral obstruction  Recent Surgery: Procedure(s) (LRB):  CYSTOSCOPY, WITH URETERAL STENT INSERTION (Left) 2 Days Post-Op    Recommendations:     Discharge Recommendations: home with home health (home with family/friends SVN)  Discharge Equipment Recommendations:     Barriers to discharge:  Decreased caregiver support    Assessment:     Suleiman Beck is a 72 y.o. male with a medical diagnosis of Ureteral obstruction, respiratory failure, metabolic acidosis.  He is s/p ERCP and ureteral stent placement. He presents with the following impairments/functional limitations: weakness, impaired endurance, impaired self care skills, impaired functional mobility, gait instability, impaired balance, decreased safety awareness. Pt does not speak English and family translates for him.      Rehab Prognosis: Good; patient would benefit from acute skilled OT services to address these deficits and reach maximum level of function.       Plan:     Patient to be seen 5 x/week, daily to address the above listed problems via self-care/home management  Plan of Care Expires:    Plan of Care Reviewed with: patient, son    Subjective     Chief Complaint: Pt does not state to son  Patient/Family Comments/goals: get better    Occupational Profile:  Living Environment: Pt lives in [facility] with multiple family members.  Previous level of function: Pt is independent at baseline; however, Wednesday before hospitalization he required assist with BADLs (dressing, bathing) and IADLs. Started dragging his feet, utilizing RW 1 day prior to hospitalization  Roles and Routines: , father, grandfather,   Equipment Used at Home:  (son reports having wheelchair, RW, shower chair; Pt started using RW one day prior to hospital admission)  Assistance upon Discharge: support is limited at home as family works and Pt's wife requires more assist than he  does    Pain/Comfort:  Pain Rating 1: 0/10    Patients cultural, spiritual, Nondenominational conflicts given the current situation: no    Objective:     Communicated with: NSG prior to session.  Patient found HOB elevated with  (catheter, PIV) upon OT entry to room.    General Precautions: Standard,    Orthopedic Precautions:    Braces:    Respiratory Status: Room air  Pre-activity vitals:              HR: 73              O2: 94              BP: 152/74  Post activity vitals:              HR: 67              O2: 96              BP: 138/60    Occupational Performance:    Bed Mobility:    Patient completed Supine to Sit with minimum assistance    Functional Mobility/Transfers:  Patient completed Sit <> Stand Transfer with minimum assistance  with  rolling walker and gait belt   Patient completed Toilet Transfer Step Transfer technique with Min A to descend/ascend from toilet with  rolling walker and gait belt  Functional Mobility: Pt with 2 LOB posteriorly, Min A to recover. Unfamiliar with RW in which he required tactile guidance    Activities of Daily Living:  Toileting: demo's the ability to wipe after BM, Pt is left handed with PIV in L hand ; noted slight pulling on PIV so OT performed cleanup     Cognitive/Visual Perceptual:  Limited in communication; requires family to help translate. Impaired safety awareness at this time    Physical Exam:  Balance:    -       2 LOB posteriorly during ambulation  Dominant hand:    -       left  Upper Extremity Strength:    -       Right Upper Extremity: WFL except formal MMT limited as Pt does not follow directions fully 2/2 language barrier; able to functionally perform WFL  -       Left Upper Extremity: WFL except formal MMT limited as Pt does not follow directions fully 2/2 language barrier; able to functionally perform WFL    Treatment & Education:  Pt speaks L[employer]-day and son reports he only communicates with family translating. Son reporting limited help at home though numerous  family members are in the household. Pt also does not have insurance, son reporting; therefore placement limited    Patient left up in chair with all lines intact, call button in reach, NSG notified, and son present    GOALS:   Multidisciplinary Problems       Occupational Therapy Goals          Problem: Occupational Therapy    Goal Priority Disciplines Outcome Interventions   Occupational Therapy Goal     OT, PT/OT Ongoing, Progressing    Description: Goals to be met by: 1/13/23     Patient will increase functional independence with ADLs by performing:    LE Dressing with Modified [place].  Grooming while standing at sink with Modified [place].  Toileting from toilet with Modified [place] for hygiene and clothing management.   Toilet transfer to toilet with Modified [place].                         History:     Past Medical History:   Diagnosis Date    Essential (primary) hypertension     Unspecified chronic bronchitis          Past Surgical History:   Procedure Laterality Date    CYSTOSCOPY W/ URETERAL STENT PLACEMENT Left 12/11/2022    Procedure: CYSTOSCOPY, WITH URETERAL STENT INSERTION;  Surgeon: Mar Fernandez MD;  Location: [facility];  Service: Urology;  Laterality: Left;       Time Tracking:     OT Date of Treatment:    OT Start Time: 1058  OT Stop Time: 1129  OT Total Time (min): 31 min    Billable Minutes:Evaluation mod    12/13/2022

## 2022-12-13 NOTE — PLAN OF CARE
Problem: Physical Therapy  Goal: Physical Therapy Goal  Description: Goals to be met by: 2023     Patient will increase functional independence with mobility by performin. Supine to sit with [place]  2. Sit to supine with [place]  3. Sit to stand transfer with Stand-by Assistance  4. Bed to chair transfer with Stand-by Assistance using Rolling Walker or LRAD  5. Gait  x 400 feet with Stand-by Assistance using Rolling Walker or LRAD.     Outcome: Ongoing, Progressing

## 2022-12-14 LAB
ALBUMIN SERPL-MCNC: 2.6 GM/DL (ref 3.4–4.8)
ALBUMIN/GLOB SERPL: 0.7 RATIO (ref 1.1–2)
ALP SERPL-CCNC: 64 UNIT/L (ref 40–150)
ALT SERPL-CCNC: 10 UNIT/L (ref 0–55)
AORTIC VALVE CUSP SEPERATION: 2.1 CM
AST SERPL-CCNC: 15 UNIT/L (ref 5–34)
AV INDEX (PROSTH): 0.97
AV MEAN GRADIENT: 6 MMHG
AV PEAK GRADIENT: 12 MMHG
AV VALVE AREA: 2.74 CM2
AV VELOCITY RATIO: 0.8
BACTERIA BLD CULT: NORMAL
BACTERIA BLD CULT: NORMAL
BACTERIA UR CULT: NO GROWTH
BASOPHILS # BLD AUTO: 0.04 X10(3)/MCL (ref 0–0.2)
BASOPHILS NFR BLD AUTO: 0.3 %
BILIRUBIN DIRECT+TOT PNL SERPL-MCNC: 0.5 MG/DL
BSA FOR ECHO PROCEDURE: 1.69 M2
BUN SERPL-MCNC: 23.2 MG/DL (ref 8.4–25.7)
CALCIUM SERPL-MCNC: 9.8 MG/DL (ref 8.8–10)
CHLORIDE SERPL-SCNC: 106 MMOL/L (ref 98–107)
CO2 SERPL-SCNC: 25 MMOL/L (ref 23–31)
CREAT SERPL-MCNC: 1.53 MG/DL (ref 0.73–1.18)
CV ECHO LV RWT: 0.58 CM
DOP CALC AO PEAK VEL: 1.7 M/S
DOP CALC AO VTI: 21.7 CM
DOP CALC LVOT AREA: 2.8 CM2
DOP CALC LVOT DIAMETER: 1.9 CM
DOP CALC LVOT PEAK VEL: 1.36 M/S
DOP CALC LVOT STROKE VOLUME: 59.51 CM3
DOP CALC MV VTI: 21.2 CM
DOP CALCLVOT PEAK VEL VTI: 21 CM
E WAVE DECELERATION TIME: 182 MSEC
E/A RATIO: 0.61
E/E' RATIO: 8.88 M/S
ECHO LV POSTERIOR WALL: 1.17 CM (ref 0.6–1.1)
EJECTION FRACTION: 60 %
EOSINOPHIL # BLD AUTO: 0.54 X10(3)/MCL (ref 0–0.9)
EOSINOPHIL NFR BLD AUTO: 4.5 %
ERYTHROCYTE [DISTWIDTH] IN BLOOD BY AUTOMATED COUNT: 13.6 % (ref 11.5–17)
EST. AVERAGE GLUCOSE BLD GHB EST-MCNC: 174.3 MG/DL
FERRITIN SERPL-MCNC: 261.54 NG/ML (ref 21.81–274.66)
FRACTIONAL SHORTENING: 33 % (ref 28–44)
GFR SERPLBLD CREATININE-BSD FMLA CKD-EPI: 48 MLS/MIN/1.73/M2
GLOBULIN SER-MCNC: 4 GM/DL (ref 2.4–3.5)
GLUCOSE SERPL-MCNC: 178 MG/DL (ref 70–110)
GLUCOSE SERPL-MCNC: 179 MG/DL (ref 82–115)
GLUCOSE SERPL-MCNC: 304 MG/DL (ref 70–110)
HBA1C MFR BLD: 7.7 %
HCT VFR BLD AUTO: 36 % (ref 42–52)
HGB BLD-MCNC: 11.2 GM/DL (ref 14–18)
IMM GRANULOCYTES # BLD AUTO: 0.05 X10(3)/MCL (ref 0–0.04)
IMM GRANULOCYTES NFR BLD AUTO: 0.4 %
INTERVENTRICULAR SEPTUM: 1.26 CM (ref 0.6–1.1)
IRON SATN MFR SERPL: 35 % (ref 20–50)
IRON SERPL-MCNC: 45 UG/DL (ref 65–175)
LEFT ATRIUM SIZE: 3.4 CM
LEFT INTERNAL DIMENSION IN SYSTOLE: 2.74 CM (ref 2.1–4)
LEFT VENTRICLE DIASTOLIC VOLUME INDEX: 43.94 ML/M2
LEFT VENTRICLE DIASTOLIC VOLUME: 72.5 ML
LEFT VENTRICLE MASS INDEX: 104 G/M2
LEFT VENTRICLE SYSTOLIC VOLUME INDEX: 17 ML/M2
LEFT VENTRICLE SYSTOLIC VOLUME: 28 ML
LEFT VENTRICULAR INTERNAL DIMENSION IN DIASTOLE: 4.06 CM (ref 3.5–6)
LEFT VENTRICULAR MASS: 172.35 G
LV LATERAL E/E' RATIO: 7.1 M/S
LV SEPTAL E/E' RATIO: 11.83 M/S
LVOT MG: 4 MMHG
LVOT MV: 0.89 CM/S
LYMPHOCYTES # BLD AUTO: 1.46 X10(3)/MCL (ref 0.6–4.6)
LYMPHOCYTES NFR BLD AUTO: 12.2 %
MCH RBC QN AUTO: 27.3 PG (ref 27–31)
MCHC RBC AUTO-ENTMCNC: 31.1 MG/DL (ref 33–36)
MCV RBC AUTO: 87.8 FL (ref 80–94)
MONOCYTES # BLD AUTO: 0.87 X10(3)/MCL (ref 0.1–1.3)
MONOCYTES NFR BLD AUTO: 7.3 %
MV MEAN GRADIENT: 3 MMHG
MV PEAK A VEL: 1.16 M/S
MV PEAK E VEL: 0.71 M/S
MV PEAK GRADIENT: 7 MMHG
MV VALVE AREA BY CONTINUITY EQUATION: 2.81 CM2
NEUTROPHILS # BLD AUTO: 9 X10(3)/MCL (ref 2.1–9.2)
NEUTROPHILS NFR BLD AUTO: 75.3 %
NRBC BLD AUTO-RTO: 0 %
PLATELET # BLD AUTO: 359 X10(3)/MCL (ref 130–400)
PMV BLD AUTO: 11.9 FL (ref 7.4–10.4)
POCT GLUCOSE: 182 MG/DL (ref 70–110)
POCT GLUCOSE: 188 MG/DL (ref 70–110)
POCT GLUCOSE: 304 MG/DL (ref 70–110)
POTASSIUM SERPL-SCNC: 3.4 MMOL/L (ref 3.5–5.1)
PROT SERPL-MCNC: 6.6 GM/DL (ref 5.8–7.6)
RA PRESSURE: 8 MMHG
RA WIDTH: 3.42 CM
RBC # BLD AUTO: 4.1 X10(6)/MCL (ref 4.7–6.1)
SODIUM SERPL-SCNC: 143 MMOL/L (ref 136–145)
TDI LATERAL: 0.1 M/S
TDI SEPTAL: 0.06 M/S
TDI: 0.08 M/S
TIBC SERPL-MCNC: 130 UG/DL (ref 250–450)
TIBC SERPL-MCNC: 85 UG/DL (ref 69–240)
TRANSFERRIN SERPL-MCNC: 113 MG/DL (ref 163–344)
TRICUSPID ANNULAR PLANE SYSTOLIC EXCURSION: 1.6 CM
WBC # SPEC AUTO: 12 X10(3)/MCL (ref 4.5–11.5)

## 2022-12-14 PROCEDURE — 83036 HEMOGLOBIN GLYCOSYLATED A1C: CPT | Performed by: INTERNAL MEDICINE

## 2022-12-14 PROCEDURE — 20000000 HC ICU ROOM

## 2022-12-14 PROCEDURE — C9113 INJ PANTOPRAZOLE SODIUM, VIA: HCPCS | Performed by: STUDENT IN AN ORGANIZED HEALTH CARE EDUCATION/TRAINING PROGRAM

## 2022-12-14 PROCEDURE — 63600175 PHARM REV CODE 636 W HCPCS: Performed by: STUDENT IN AN ORGANIZED HEALTH CARE EDUCATION/TRAINING PROGRAM

## 2022-12-14 PROCEDURE — 63600175 PHARM REV CODE 636 W HCPCS: Performed by: INTERNAL MEDICINE

## 2022-12-14 PROCEDURE — 36415 COLL VENOUS BLD VENIPUNCTURE: CPT | Performed by: PHYSICIAN ASSISTANT

## 2022-12-14 PROCEDURE — 82728 ASSAY OF FERRITIN: CPT | Performed by: INTERNAL MEDICINE

## 2022-12-14 PROCEDURE — 97530 THERAPEUTIC ACTIVITIES: CPT

## 2022-12-14 PROCEDURE — 85025 COMPLETE CBC W/AUTO DIFF WBC: CPT | Performed by: PHYSICIAN ASSISTANT

## 2022-12-14 PROCEDURE — 80053 COMPREHEN METABOLIC PANEL: CPT | Performed by: PHYSICIAN ASSISTANT

## 2022-12-14 PROCEDURE — 25000003 PHARM REV CODE 250: Performed by: FAMILY MEDICINE

## 2022-12-14 PROCEDURE — 25000003 PHARM REV CODE 250: Performed by: INTERNAL MEDICINE

## 2022-12-14 PROCEDURE — 36415 COLL VENOUS BLD VENIPUNCTURE: CPT | Performed by: INTERNAL MEDICINE

## 2022-12-14 PROCEDURE — 83540 ASSAY OF IRON: CPT | Performed by: INTERNAL MEDICINE

## 2022-12-14 PROCEDURE — 11000001 HC ACUTE MED/SURG PRIVATE ROOM

## 2022-12-14 RX ORDER — GUAIFENESIN 100 MG/5ML
200 SOLUTION ORAL EVERY 4 HOURS PRN
Status: DISCONTINUED | OUTPATIENT
Start: 2022-12-14 | End: 2022-12-19 | Stop reason: HOSPADM

## 2022-12-14 RX ORDER — QUETIAPINE FUMARATE 25 MG/1
25 TABLET, FILM COATED ORAL ONCE
Status: COMPLETED | OUTPATIENT
Start: 2022-12-15 | End: 2022-12-14

## 2022-12-14 RX ADMIN — HYDRALAZINE HYDROCHLORIDE 10 MG: 20 INJECTION INTRAMUSCULAR; INTRAVENOUS at 04:12

## 2022-12-14 RX ADMIN — CEFEPIME 1 G: 1 INJECTION, POWDER, FOR SOLUTION INTRAMUSCULAR; INTRAVENOUS at 09:12

## 2022-12-14 RX ADMIN — CEFEPIME 1 G: 1 INJECTION, POWDER, FOR SOLUTION INTRAMUSCULAR; INTRAVENOUS at 08:12

## 2022-12-14 RX ADMIN — MUPIROCIN: 20 OINTMENT TOPICAL at 08:12

## 2022-12-14 RX ADMIN — INSULIN ASPART 2 UNITS: 100 INJECTION, SOLUTION INTRAVENOUS; SUBCUTANEOUS at 04:12

## 2022-12-14 RX ADMIN — NIFEDIPINE 30 MG: 30 TABLET, FILM COATED, EXTENDED RELEASE ORAL at 08:12

## 2022-12-14 RX ADMIN — CARVEDILOL 12.5 MG: 12.5 TABLET, FILM COATED ORAL at 09:12

## 2022-12-14 RX ADMIN — MELATONIN TAB 3 MG 6 MG: 3 TAB at 01:12

## 2022-12-14 RX ADMIN — CARVEDILOL 12.5 MG: 12.5 TABLET, FILM COATED ORAL at 08:12

## 2022-12-14 RX ADMIN — MELATONIN TAB 3 MG 6 MG: 3 TAB at 09:12

## 2022-12-14 RX ADMIN — PANTOPRAZOLE SODIUM 40 MG: 40 INJECTION, POWDER, FOR SOLUTION INTRAVENOUS at 08:12

## 2022-12-14 RX ADMIN — MUPIROCIN: 20 OINTMENT TOPICAL at 09:12

## 2022-12-14 RX ADMIN — QUETIAPINE FUMARATE 25 MG: 25 TABLET ORAL at 11:12

## 2022-12-14 RX ADMIN — INSULIN ASPART 8 UNITS: 100 INJECTION, SOLUTION INTRAVENOUS; SUBCUTANEOUS at 11:12

## 2022-12-14 RX ADMIN — INSULIN ASPART 2 UNITS: 100 INJECTION, SOLUTION INTRAVENOUS; SUBCUTANEOUS at 06:12

## 2022-12-14 RX ADMIN — INSULIN ASPART 1 UNITS: 100 INJECTION, SOLUTION INTRAVENOUS; SUBCUTANEOUS at 01:12

## 2022-12-14 NOTE — PROGRESS NOTES
[facility] Medicine Progress Note        Chief Complaint: Inpatient Follow-up for nephrolithiasis    HPI: Patient is a 72-year-old in the speaking male with PMH pertinent for CKD stage 3, HTN, and hemorrhoids who initially was admitted to [facility] for hydronephrosis with obstructive nephrolithiasis of the left ureter.  During hospitalization, had acute agitation episode with tachyarrhythmia and hypoxia.  Patient was given IM Haldol but eventually was not responding.  Patient required intubation and was given initiation of amiodarone.  Patient was transferred to ICU for post intubation management.    Discussed case with patient's son, states that patient has had similar episode like this in past as patient is a little bit and only speaks [place] he gets agitated and scared when patient's son is not present.  Of note, patient's son states that patient is DNR/DNI.    Interval Hx:   Feliz removed, reports cough,chest discomfort,sputum, no fever/chills/SOB/abdominal pain/dysuria/bowel issues    Objective/physical exam:  General: In no acute distress, afebrile  Chest: Clear to auscultation bilaterally  Heart: RRR, +S1, S2, no appreciable murmur  Abdomen: Soft, nontender, BS +  MSK: Warm, no lower extremity edema, no clubbing or cyanosis  Neurologic: Alert and oriented x4, Cranial nerve II-XII intact, Strength 5/5 in all 4 extremities    VITAL SIGNS: 24 HRS MIN & MAX LAST   Temp  Min: 97.7 °F (36.5 °C)  Max: 99.6 °F (37.6 °C) 97.7 °F (36.5 °C)   BP  Min: 95/64  Max: 192/103 102/63   Pulse  Min: 70  Max: 101  83   Resp  Min: 14  Max: 30 (!) 28     SpO2  Min: 93 %  Max: 97 % 97 %       Recent Labs   Lab 12/12/22  0629 12/13/22  1356 12/14/22  0244   WBC 11.1 10.4 12.0*   RBC 3.54* 3.96* 4.10*   HGB 9.8* 11.0* 11.2*   HCT 31.8* 36.1* 36.0*   MCV 89.8 91.2 87.8   MCH 27.7 27.8 27.3   MCHC 30.8* 30.5* 31.1*   RDW 14.0 13.8 13.6    323 359   MPV 12.6* 11.9* 11.9*       Recent Labs   Lab  12/12/22  0629 12/12/22  0833 12/13/22  1356 12/14/22  0244     --  142 143   K 4.5  --  4.1 3.4*   CO2 26  --  23 25   BUN 33.1*  --  28.1* 23.2   CREATININE 2.32*  --  1.85* 1.53*   CALCIUM 8.5*  --  9.0 9.8   PH  --  7.41  --   --    ALBUMIN 2.4*  --  2.7* 2.6*   ALKPHOS 54  --  67 64   ALT 11  --  14 10   AST 20  --  17 15   BILITOT 0.3  --  0.6 0.5          Microbiology Results (last 7 days)       Procedure Component Value Units Date/Time    Urine Culture High Risk [[id number]] Collected: 12/12/22 0829    Order Status: Completed Specimen: Urine, Catheterized Updated: 12/14/22 0852     Urine Culture No Growth    Urine Culture High Risk [[id number]] Collected: 12/11/22 1256    Order Status: Completed Specimen: Urine, Catheterized Updated: 12/13/22 0925     Urine Culture No Growth             See below for Radiology    Scheduled Med:   carvediloL  12.5 mg Oral BID    ceFEPime (MAXIPIME) IVPB  1 g Intravenous Q12H    mupirocin   Nasal BID    NIFEdipine  30 mg Oral Daily    pantoprazole  40 mg Intravenous Daily        Continuous Infusions:   lactated ringers 2,000 mL (12/13/22 1051)        PRN Meds:  acetaminophen, dextrose 10%, dextrose 10%, glucagon (human recombinant), guaiFENesin 100 mg/5 ml, hydrALAZINE, HYDROcodone-acetaminophen, insulin aspart U-100, labetalol, loperamide, melatonin, melatonin, ondansetron, sodium chloride 0.9%       Assessment/Plan:  #Obstructive nephrolithiasis with hydronephrosis Left distal ureteral stone s/p cysto with left stent, 12/11/2022  #Acute hypoxic respiratory failure requiring mechanical ventilation-extubated 12/12/22,(Suspect significant component of language barrier as  of agitation??)  #Acute kidney injury-improving  #Non-anion gap metabolic acidosis-resolved  #Leukocytosis  #Normocytic Anemia, Unspecified    Chronic:hypertension, hemorrhoids, and appendicitis s/p open appendectomy several years ago  Plan:  -Urology was consulted. S/p cysto with left stent, veliz  was removed, Bladder Scan, Feliz if Urinary Rentention, further work up outpatient,Will need definitve stone treatment once infection is adequately treated  -F/u BMP, Cr improving  -F/u CXR, No growth on Blood and Urine Cultures,Cont Cefepime, transition to oral antibiotics if WBC continue to rise  -Iron Panel, B12, Folate  -PT,OT   -cont appropriate Home Meds, Check A1C sugars-high, continue sliding scale, fingersticks ACHS    VTE prophylaxis: scds    Patient condition:  Fair      Anticipated discharge and Disposition:         All diagnosis and differential diagnosis have been reviewed; assessment and plan has been documented; I have personally reviewed the labs and test results that are presently available; I have reviewed the patients medication list; I have reviewed the consulting providers response and recommendations. I have reviewed or attempted to review medical records based upon their availability    All of the patient's questions have been  addressed and answered. Patient's is agreeable to the above stated plan. I will continue to monitor closely and make adjustments to medical management as needed.  _____________________________________________________________________    Nutrition Status:    Radiology:  Echo  · Patient was tachycardic during the acquisition of the images.  · Mild concentric hypertrophy and normal systolic function.  · The estimated ejection fraction is 60%.  · Grade I left ventricular diastolic dysfunction.  · Normal right ventricular size with normal right ventricular systolic   function.  · Mild mitral regurgitation.  · Intermediate central venous pressure (8 mmHg).         Dolores Powell MD   12/14/2022

## 2022-12-14 NOTE — PROGRESS NOTES
.UROLOGY  PROGRESS  NOTE    Suleiman Beck 1950  [id number]  12/14/2022    Sitting up in the chair  Family at bedside; reports weak legs per his father  Feliz removed yesterday, has voided a few times      Intake/Output:  No intake/output data recorded.  I/O last 3 completed shifts:  In: 1585 [I.V.:1585]  Out: 4730 [Urine:4730]       Exam:    NAD  Card RRR  Resp unlabored      Recent Results (from the past 24 hour(s))   Comprehensive Metabolic Panel    Collection Time: 12/13/22  1:56 PM   Result Value Ref Range    Sodium Level 142 136 - 145 mmol/L    Potassium Level 4.1 3.5 - 5.1 mmol/L    Chloride 104 98 - 107 mmol/L    Carbon Dioxide 23 23 - 31 mmol/L    Glucose Level 252 (H) 82 - 115 mg/dL    Blood Urea Nitrogen 28.1 (H) 8.4 - 25.7 mg/dL    Creatinine 1.85 (H) 0.73 - 1.18 mg/dL    Calcium Level Total 9.0 8.8 - 10.0 mg/dL    Protein Total 6.1 5.8 - 7.6 gm/dL    Albumin Level 2.7 (L) 3.4 - 4.8 gm/dL    Globulin 3.4 2.4 - 3.5 gm/dL    Albumin/Globulin Ratio 0.8 (L) 1.1 - 2.0 ratio    Bilirubin Total 0.6 <=1.5 mg/dL    Alkaline Phosphatase 67 40 - 150 unit/L    Alanine Aminotransferase 14 0 - 55 unit/L    Aspartate Aminotransferase 17 5 - 34 unit/L    eGFR 38 mls/min/1.73/m2   CBC with Differential    Collection Time: 12/13/22  1:56 PM   Result Value Ref Range    WBC 10.4 4.5 - 11.5 x10(3)/mcL    RBC 3.96 (L) 4.70 - 6.10 x10(6)/mcL    Hgb 11.0 (L) 14.0 - 18.0 gm/dL    Hct 36.1 (L) 42.0 - 52.0 %    MCV 91.2 80.0 - 94.0 fL    MCH 27.8 27.0 - 31.0 pg    MCHC 30.5 (L) 33.0 - 36.0 mg/dL    RDW 13.8 11.5 - 17.0 %    Platelet 323 130 - 400 x10(3)/mcL    MPV 11.9 (H) 7.4 - 10.4 fL    Neut % 78.1 %    Lymph % 11.5 %    Mono % 6.3 %    Eos % 3.0 %    Basophil % 0.5 %    Lymph # 1.20 0.6 - 4.6 x10(3)/mcL    Neut # 8.1 2.1 - 9.2 x10(3)/mcL    Mono # 0.65 0.1 - 1.3 x10(3)/mcL    Eos # 0.31 0 - 0.9 x10(3)/mcL    Baso # 0.05 0 - 0.2 x10(3)/mcL    IG# 0.06 (H) 0 - 0.04 x10(3)/mcL    IG% 0.6 %    NRBC% 0.0 %   POCT glucose     Collection Time: 12/13/22  5:10 PM   Result Value Ref Range    POCT Glucose 288 (H) 70 - 110 mg/dL   POCT glucose    Collection Time: 12/14/22  1:08 AM   Result Value Ref Range    POCT Glucose 188 (H) 70 - 110 mg/dL   Comprehensive Metabolic Panel    Collection Time: 12/14/22  2:44 AM   Result Value Ref Range    Sodium Level 143 136 - 145 mmol/L    Potassium Level 3.4 (L) 3.5 - 5.1 mmol/L    Chloride 106 98 - 107 mmol/L    Carbon Dioxide 25 23 - 31 mmol/L    Glucose Level 179 (H) 82 - 115 mg/dL    Blood Urea Nitrogen 23.2 8.4 - 25.7 mg/dL    Creatinine 1.53 (H) 0.73 - 1.18 mg/dL    Calcium Level Total 9.8 8.8 - 10.0 mg/dL    Protein Total 6.6 5.8 - 7.6 gm/dL    Albumin Level 2.6 (L) 3.4 - 4.8 gm/dL    Globulin 4.0 (H) 2.4 - 3.5 gm/dL    Albumin/Globulin Ratio 0.7 (L) 1.1 - 2.0 ratio    Bilirubin Total 0.5 <=1.5 mg/dL    Alkaline Phosphatase 64 40 - 150 unit/L    Alanine Aminotransferase 10 0 - 55 unit/L    Aspartate Aminotransferase 15 5 - 34 unit/L    eGFR 48 mls/min/1.73/m2   CBC with Differential    Collection Time: 12/14/22  2:44 AM   Result Value Ref Range    WBC 12.0 (H) 4.5 - 11.5 x10(3)/mcL    RBC 4.10 (L) 4.70 - 6.10 x10(6)/mcL    Hgb 11.2 (L) 14.0 - 18.0 gm/dL    Hct 36.0 (L) 42.0 - 52.0 %    MCV 87.8 80.0 - 94.0 fL    MCH 27.3 27.0 - 31.0 pg    MCHC 31.1 (L) 33.0 - 36.0 mg/dL    RDW 13.6 11.5 - 17.0 %    Platelet 359 130 - 400 x10(3)/mcL    MPV 11.9 (H) 7.4 - 10.4 fL    Neut % 75.3 %    Lymph % 12.2 %    Mono % 7.3 %    Eos % 4.5 %    Basophil % 0.3 %    Lymph # 1.46 0.6 - 4.6 x10(3)/mcL    Neut # 9.0 2.1 - 9.2 x10(3)/mcL    Mono # 0.87 0.1 - 1.3 x10(3)/mcL    Eos # 0.54 0 - 0.9 x10(3)/mcL    Baso # 0.04 0 - 0.2 x10(3)/mcL    IG# 0.05 (H) 0 - 0.04 x10(3)/mcL    IG% 0.4 %    NRBC% 0.0 %   POCT glucose    Collection Time: 12/14/22  5:59 AM   Result Value Ref Range    POCT Glucose 182 (H) 70 - 110 mg/dL   Echo    Collection Time: 12/14/22  9:58 AM   Result Value Ref Range    BSA 1.69 m2    TDI SEPTAL 0.06  m/s    LV LATERAL E/E' RATIO 7.10 m/s    LV SEPTAL E/E' RATIO 11.83 m/s    Right Atrial Pressure (from IVC) 8 mmHg    EF 60 %    Left Ventricular Outflow Tract Mean Velocity 0.89 cm/s    Left Ventricular Outflow Tract Mean Gradient 4.00 mmHg    AORTIC VALVE CUSP SEPERATION 2.10 cm    TDI LATERAL 0.10 m/s    LVIDd 4.06 3.5 - 6.0 cm    IVS 1.26 (A) 0.6 - 1.1 cm    Posterior Wall 1.17 (A) 0.6 - 1.1 cm    LVIDs 2.74 2.1 - 4.0 cm    FS 33 28 - 44 %    LV mass 172.35 g    LA size 3.40 cm    TAPSE 1.60 cm    Left Ventricle Relative Wall Thickness 0.58 cm    AV mean gradient 6 mmHg    AV valve area 2.74 cm2    AV Velocity Ratio 0.80     AV index (prosthetic) 0.97     MV mean gradient 3 mmHg    MV valve area by continuity eq 2.81 cm2    E/A ratio 0.61     Mean e' 0.08 m/s    E wave deceleration time 182.00 msec    LVOT diameter 1.90 cm    LVOT area 2.8 cm2    LVOT peak addi 1.36 m/s    LVOT peak VTI 21.00 cm    Ao peak addi 1.70 m/s    Ao VTI 21.7 cm    LVOT stroke volume 59.51 cm3    AV peak gradient 12 mmHg    MV peak gradient 7 mmHg    E/E' ratio 8.88 m/s    MV Peak E Addi 0.71 m/s    MV VTI 21.2 cm    MV Peak A Addi 1.16 m/s    LV Systolic Volume 28.00 mL    LV Systolic Volume Index 17.0 mL/m2    LV Diastolic Volume 72.50 mL    LV Diastolic Volume Index 43.94 mL/m2    LV Mass Index 104 g/m2    RA Width 3.42 cm   POCT glucose    Collection Time: 12/14/22 11:11 AM   Result Value Ref Range    POCT Glucose 304 (H) 70 - 110 mg/dL   POCT Glucose, Hand-Held Device    Collection Time: 12/14/22 11:13 AM   Result Value Ref Range    POC Glucose 304 (A) 70 - 110 MG/DL         Assessment:  Left distal ureteral stone s/p cysto with left stent  UTI; final cx with no growth      Plan:  Urology signing off  Can replace Feliz if PVR > 400 mL's  Needs to follow up with Dr. Villalba within the next few weeks for stone treatment    MARILEE Del Valle

## 2022-12-14 NOTE — NURSING
Nurses Note -- 4 Eyes      12/14/2022   4:31 AM      Skin assessed during: Q Shift Change      [x] No Pressure Injuries Present    []Prevention Measures Documented      [] Yes- Altered Skin Integrity Present or Discovered   [] LDA Added if Not in Epic (Describe Wound)   [] New Altered Skin Integrity was Present on Admit and Documented in LDA   [] Wound Image Taken    Wound Care Consulted? No    Attending Nurse:  Reed Marina RN     Second RN/Staff Member:  LAURI RN

## 2022-12-14 NOTE — PROGRESS NOTES
.UROLOGY  PROGRESS  NOTE    Suleiman Beck 1950  [id number]  12/13/2022    Leukocytosis resolved  Renal function continues to improve  VSS; afebrile  Feliz removed; on VT      Intake/Output:  No intake/output data recorded.  I/O last 3 completed shifts:  In: 212 [I.V.:212]  Out: 4105 [Urine:4105]       Exam:    NAD  Card RRR  Resp unlabored      Recent Results (from the past 24 hour(s))   Comprehensive Metabolic Panel    Collection Time: 12/13/22  1:56 PM   Result Value Ref Range    Sodium Level 142 136 - 145 mmol/L    Potassium Level 4.1 3.5 - 5.1 mmol/L    Chloride 104 98 - 107 mmol/L    Carbon Dioxide 23 23 - 31 mmol/L    Glucose Level 252 (H) 82 - 115 mg/dL    Blood Urea Nitrogen 28.1 (H) 8.4 - 25.7 mg/dL    Creatinine 1.85 (H) 0.73 - 1.18 mg/dL    Calcium Level Total 9.0 8.8 - 10.0 mg/dL    Protein Total 6.1 5.8 - 7.6 gm/dL    Albumin Level 2.7 (L) 3.4 - 4.8 gm/dL    Globulin 3.4 2.4 - 3.5 gm/dL    Albumin/Globulin Ratio 0.8 (L) 1.1 - 2.0 ratio    Bilirubin Total 0.6 <=1.5 mg/dL    Alkaline Phosphatase 67 40 - 150 unit/L    Alanine Aminotransferase 14 0 - 55 unit/L    Aspartate Aminotransferase 17 5 - 34 unit/L    eGFR 38 mls/min/1.73/m2   CBC with Differential    Collection Time: 12/13/22  1:56 PM   Result Value Ref Range    WBC 10.4 4.5 - 11.5 x10(3)/mcL    RBC 3.96 (L) 4.70 - 6.10 x10(6)/mcL    Hgb 11.0 (L) 14.0 - 18.0 gm/dL    Hct 36.1 (L) 42.0 - 52.0 %    MCV 91.2 80.0 - 94.0 fL    MCH 27.8 27.0 - 31.0 pg    MCHC 30.5 (L) 33.0 - 36.0 mg/dL    RDW 13.8 11.5 - 17.0 %    Platelet 323 130 - 400 x10(3)/mcL    MPV 11.9 (H) 7.4 - 10.4 fL    Neut % 78.1 %    Lymph % 11.5 %    Mono % 6.3 %    Eos % 3.0 %    Basophil % 0.5 %    Lymph # 1.20 0.6 - 4.6 x10(3)/mcL    Neut # 8.1 2.1 - 9.2 x10(3)/mcL    Mono # 0.65 0.1 - 1.3 x10(3)/mcL    Eos # 0.31 0 - 0.9 x10(3)/mcL    Baso # 0.05 0 - 0.2 x10(3)/mcL    IG# 0.06 (H) 0 - 0.04 x10(3)/mcL    IG% 0.6 %    NRBC% 0.0 %   POCT glucose    Collection Time: 12/13/22  5:10 PM    Result Value Ref Range    POCT Glucose 288 (H) 70 - 110 mg/dL         Assessment:  Left distal ureteral stone s/p cysto with left stent  UTI; prelim cx with no growth      Plan:  On VT, will check in tomorrow    MARILEE Del Valle

## 2022-12-14 NOTE — NURSING
Nurses Note -- 4 Eyes      12/14/2022   10:16 AM      Skin assessed during: Daily Assessment      [x] No Pressure Injuries Present    [x]Prevention Measures Documented      [] Yes- Altered Skin Integrity Present or Discovered   [] LDA Added if Not in Epic (Describe Wound)   [] New Altered Skin Integrity was Present on Admit and Documented in LDA   [] Wound Image Taken    Wound Care Consulted? No    Attending Nurse:  Marlyn Almaraz RN     Second RN/Staff Member:  Shirley Diego RN

## 2022-12-14 NOTE — PT/OT/SLP PROGRESS
Occupational Therapy   Treatment    Name: Suleiman Beck  MRN: [id number]  Admitting Diagnosis:  Ureteral obstruction  3 Days Post-Op    Recommendations:     Discharge Recommendations: home with home health (home with family/friends SVN)  Discharge Equipment Recommendations:     Barriers to discharge:  Decreased caregiver support    Assessment:     Suleiman Beck is a 72 y.o. male with a medical diagnosis of Ureteral obstruction; respiratory failure, metabolic acidosis.  He is s/p ERCP and ureteral stent placement. He presents with the following impairments/functional limitations: weakness, impaired endurance, impaired self care skills, impaired functional mobility, gait instability, impaired balance, decreased safety awareness.  Language is still a barrier at this time.  #[id number] used    Rehab Prognosis:  Good; patient would benefit from acute skilled OT services to address these deficits and reach maximum level of function.       Plan:     Patient to be seen 5 x/week, daily to address the above listed problems via self-care/home management  Plan of Care Expires:    Plan of Care Reviewed with: patient    Subjective     Pain/Comfort:  Pain Rating 1: 0/10    Objective:     Communicated with: NSG prior to session.  Patient found HOB elevated with  (PIV) upon OT entry to room.    General Precautions: Standard,      Orthopedic Precautions:   Braces:    Respiratory Status: Room air     Occupational Performance:     Bed Mobility:    Patient completed Supine to Sit with minimum assistance  Patient completed Sit to Supine with minimum assistance     Functional Mobility/Transfers:  Patient completed Sit <> Stand Transfer with moderate assistance  with  rolling walker and gait belt   Functional Mobility: Pt ambulating halls with RW, bumping into objects/walls without awareness of surroundings. Mod A      Treatment & Education:  Pt with impaired safety awareness, eager to walk around the halls. Unsteady with RW. Pt demo'ing  ability to thread L UE through hospital gown, limited by PIV on R UE.     Patient left HOB elevated with all lines intact, call button in reach, bed alarm on, and NSG notified    GOALS:   Multidisciplinary Problems       Occupational Therapy Goals          Problem: Occupational Therapy    Goal Priority Disciplines Outcome Interventions   Occupational Therapy Goal     OT, PT/OT Ongoing, Progressing    Description: Goals to be met by: 1/13/23     Patient will increase functional independence with ADLs by performing:    LE Dressing with Modified [place].  Grooming while standing at sink with Modified [place].  Toileting from toilet with Modified [place] for hygiene and clothing management.   Toilet transfer to toilet with Modified [place].                         Time Tracking:     OT Date of Treatment:    OT Start Time: 1321  OT Stop Time: 1337  OT Total Time (min): 16 min    Billable Minutes:Therapeutic Activity 1    OT/REMI: OT     REMI Visit Number: 1    12/14/2022

## 2022-12-15 LAB
ANION GAP SERPL CALC-SCNC: 9 MEQ/L
BASOPHILS # BLD AUTO: 0.05 X10(3)/MCL (ref 0–0.2)
BASOPHILS NFR BLD AUTO: 0.5 %
BUN SERPL-MCNC: 20.9 MG/DL (ref 8.4–25.7)
CALCIUM SERPL-MCNC: 9.2 MG/DL (ref 8.8–10)
CHLORIDE SERPL-SCNC: 103 MMOL/L (ref 98–107)
CO2 SERPL-SCNC: 29 MMOL/L (ref 23–31)
CREAT SERPL-MCNC: 1.47 MG/DL (ref 0.73–1.18)
CREAT/UREA NIT SERPL: 14
EOSINOPHIL # BLD AUTO: 0.66 X10(3)/MCL (ref 0–0.9)
EOSINOPHIL NFR BLD AUTO: 6.2 %
ERYTHROCYTE [DISTWIDTH] IN BLOOD BY AUTOMATED COUNT: 13.5 % (ref 11.6–14.4)
GFR SERPLBLD CREATININE-BSD FMLA CKD-EPI: 51 MLS/MIN/1.73/M2
GLUCOSE SERPL-MCNC: 169 MG/DL (ref 70–110)
GLUCOSE SERPL-MCNC: 169 MG/DL (ref 70–110)
GLUCOSE SERPL-MCNC: 169 MG/DL (ref 82–115)
HCT VFR BLD AUTO: 34.4 % (ref 42–52)
HGB BLD-MCNC: 10.6 GM/DL (ref 14–18)
IMM GRANULOCYTES # BLD AUTO: 0.03 X10(3)/MCL (ref 0–0.04)
IMM GRANULOCYTES NFR BLD AUTO: 0.3 %
LYMPHOCYTES # BLD AUTO: 2.09 X10(3)/MCL (ref 0.6–4.6)
LYMPHOCYTES NFR BLD AUTO: 19.8 %
MCH RBC QN AUTO: 27.2 PG
MCHC RBC AUTO-ENTMCNC: 30.8 MG/DL (ref 33–36)
MCV RBC AUTO: 88.4 FL (ref 80–94)
MONOCYTES # BLD AUTO: 0.78 X10(3)/MCL (ref 0.1–1.3)
MONOCYTES NFR BLD AUTO: 7.4 %
NEUTROPHILS # BLD AUTO: 6.96 X10(3)/MCL (ref 2.1–9.2)
NEUTROPHILS NFR BLD AUTO: 65.8 %
NRBC BLD AUTO-RTO: 0 % (ref 0–1)
PLATELET # BLD AUTO: 374 X10(3)/MCL (ref 140–371)
PMV BLD AUTO: 11.2 FL (ref 9.4–12.4)
POCT GLUCOSE: 127 MG/DL (ref 70–110)
POCT GLUCOSE: 169 MG/DL (ref 70–110)
POCT GLUCOSE: 175 MG/DL (ref 70–110)
POCT GLUCOSE: 178 MG/DL (ref 70–110)
POCT GLUCOSE: 206 MG/DL (ref 70–110)
POTASSIUM SERPL-SCNC: 3.6 MMOL/L (ref 3.5–5.1)
RBC # BLD AUTO: 3.89 X10(6)/MCL (ref 4.7–6.1)
SODIUM SERPL-SCNC: 141 MMOL/L (ref 136–145)
WBC # SPEC AUTO: 10.6 X10(3)/MCL (ref 4.5–11.5)

## 2022-12-15 PROCEDURE — 87040 BLOOD CULTURE FOR BACTERIA: CPT | Performed by: INTERNAL MEDICINE

## 2022-12-15 PROCEDURE — 63600175 PHARM REV CODE 636 W HCPCS: Performed by: INTERNAL MEDICINE

## 2022-12-15 PROCEDURE — 97535 SELF CARE MNGMENT TRAINING: CPT

## 2022-12-15 PROCEDURE — 25000003 PHARM REV CODE 250: Performed by: FAMILY MEDICINE

## 2022-12-15 PROCEDURE — 11000001 HC ACUTE MED/SURG PRIVATE ROOM

## 2022-12-15 PROCEDURE — 25000003 PHARM REV CODE 250: Performed by: INTERNAL MEDICINE

## 2022-12-15 PROCEDURE — C9113 INJ PANTOPRAZOLE SODIUM, VIA: HCPCS | Performed by: STUDENT IN AN ORGANIZED HEALTH CARE EDUCATION/TRAINING PROGRAM

## 2022-12-15 PROCEDURE — 85025 COMPLETE CBC W/AUTO DIFF WBC: CPT | Performed by: INTERNAL MEDICINE

## 2022-12-15 PROCEDURE — 36415 COLL VENOUS BLD VENIPUNCTURE: CPT | Performed by: INTERNAL MEDICINE

## 2022-12-15 PROCEDURE — 80048 BASIC METABOLIC PNL TOTAL CA: CPT | Performed by: INTERNAL MEDICINE

## 2022-12-15 PROCEDURE — 63600175 PHARM REV CODE 636 W HCPCS: Performed by: STUDENT IN AN ORGANIZED HEALTH CARE EDUCATION/TRAINING PROGRAM

## 2022-12-15 PROCEDURE — 25000003 PHARM REV CODE 250: Performed by: NURSE PRACTITIONER

## 2022-12-15 PROCEDURE — 97530 THERAPEUTIC ACTIVITIES: CPT

## 2022-12-15 PROCEDURE — 20000000 HC ICU ROOM

## 2022-12-15 RX ORDER — QUETIAPINE FUMARATE 25 MG/1
25 TABLET, FILM COATED ORAL NIGHTLY
Status: DISCONTINUED | OUTPATIENT
Start: 2022-12-15 | End: 2022-12-19 | Stop reason: HOSPADM

## 2022-12-15 RX ORDER — SODIUM CHLORIDE 9 MG/ML
INJECTION, SOLUTION INTRAVENOUS CONTINUOUS
Status: ACTIVE | OUTPATIENT
Start: 2022-12-15 | End: 2022-12-16

## 2022-12-15 RX ADMIN — NIFEDIPINE 30 MG: 30 TABLET, FILM COATED, EXTENDED RELEASE ORAL at 08:12

## 2022-12-15 RX ADMIN — CARVEDILOL 12.5 MG: 12.5 TABLET, FILM COATED ORAL at 08:12

## 2022-12-15 RX ADMIN — HYDRALAZINE HYDROCHLORIDE 10 MG: 20 INJECTION INTRAMUSCULAR; INTRAVENOUS at 03:12

## 2022-12-15 RX ADMIN — MUPIROCIN: 20 OINTMENT TOPICAL at 08:12

## 2022-12-15 RX ADMIN — PANTOPRAZOLE SODIUM 40 MG: 40 INJECTION, POWDER, FOR SOLUTION INTRAVENOUS at 08:12

## 2022-12-15 RX ADMIN — INSULIN ASPART 2 UNITS: 100 INJECTION, SOLUTION INTRAVENOUS; SUBCUTANEOUS at 11:12

## 2022-12-15 RX ADMIN — CEFEPIME 1 G: 1 INJECTION, POWDER, FOR SOLUTION INTRAMUSCULAR; INTRAVENOUS at 08:12

## 2022-12-15 RX ADMIN — SODIUM CHLORIDE: 9 INJECTION, SOLUTION INTRAVENOUS at 06:12

## 2022-12-15 RX ADMIN — DOXYCYCLINE 100 MG: 100 INJECTION, POWDER, LYOPHILIZED, FOR SOLUTION INTRAVENOUS at 09:12

## 2022-12-15 RX ADMIN — HYDRALAZINE HYDROCHLORIDE 10 MG: 20 INJECTION INTRAMUSCULAR; INTRAVENOUS at 01:12

## 2022-12-15 RX ADMIN — INSULIN ASPART 1 UNITS: 100 INJECTION, SOLUTION INTRAVENOUS; SUBCUTANEOUS at 12:12

## 2022-12-15 RX ADMIN — MELATONIN TAB 3 MG 6 MG: 3 TAB at 09:12

## 2022-12-15 RX ADMIN — HYDRALAZINE HYDROCHLORIDE 10 MG: 20 INJECTION INTRAMUSCULAR; INTRAVENOUS at 06:12

## 2022-12-15 RX ADMIN — CARVEDILOL 12.5 MG: 12.5 TABLET, FILM COATED ORAL at 09:12

## 2022-12-15 RX ADMIN — QUETIAPINE FUMARATE 25 MG: 25 TABLET ORAL at 09:12

## 2022-12-15 RX ADMIN — INSULIN ASPART 2 UNITS: 100 INJECTION, SOLUTION INTRAVENOUS; SUBCUTANEOUS at 05:12

## 2022-12-15 RX ADMIN — PIPERACILLIN AND TAZOBACTAM 4.5 G: 4; .5 INJECTION, POWDER, LYOPHILIZED, FOR SOLUTION INTRAVENOUS; PARENTERAL at 06:12

## 2022-12-15 NOTE — NURSING
Nurses Note -- 4 Eyes      12/15/2022   4:49 PM      Skin assessed during: Daily Assessment      [x] No Pressure Injuries Present    [x]Prevention Measures Documented      [] Yes- Altered Skin Integrity Present or Discovered   [] LDA Added if Not in Epic (Describe Wound)   [] New Altered Skin Integrity was Present on Admit and Documented in LDA   [] Wound Image Taken    Wound Care Consulted? No    Attending Nurse:  Marlyn Almaraz RN     Second RN/Staff Member:  renan Castro RN

## 2022-12-15 NOTE — PLAN OF CARE
Problem: Adult Inpatient Plan of Care  Goal: Plan of Care Review  Outcome: Ongoing, Progressing  Goal: Patient-Specific Goal (Individualized)  Outcome: Ongoing, Progressing  Goal: Absence of Hospital-Acquired Illness or Injury  Outcome: Ongoing, Progressing  Goal: Optimal Comfort and Wellbeing  Outcome: Ongoing, Progressing  Goal: Readiness for Transition of Care  Outcome: Ongoing, Progressing     Problem: Skin Injury Risk Increased  Goal: Skin Health and Integrity  Outcome: Ongoing, Progressing     Problem: Infection  Goal: Absence of Infection Signs and Symptoms  Outcome: Ongoing, Progressing     CICU DAILY GOALS       A: Awake    RASS: Goal -  0  Actual - RASS (Rojas Agitation-Sedation Scale): -1-->drowsy   Restraint necessity:    B: Breath   SBT: Not intubated   C: Coordinate A & B, analgesics/sedatives   Pain: managed    SAT: Not intubated  D: Delirium   CAM-ICU:    E: Early(intubated/ Progressive (non-intubated) Mobility   MOVE Screen: Fail   Activity: Activity Management: Ambulated in room - L4  FAS: Feeding/Nutrition   Diet order: Diet/Nutrition Received: regular,   Fluid restriction:     Nutritional Supplement Intake: Quantity --, Type:  --  T: Thrombus   DVT prophylaxis: VTE Required Core Measure: Pharmacological prophylaxis initiated/maintained  H: HOB Elevation   Head of Bed (HOB) Positioning: HOB at 20-30 degrees  U: Ulcer Prophylaxis   GI: no  G: Glucose control   managed    S: Skin   Bundle compliance: yes   Bathing/Skin Care: bath, complete Date: 12/15/22  B: Bowel Function   LBM 12/14/22    I: Indwelling Catheters   Feliz necessity: [REMOVED]      Urethral Catheter 12/11/22 0600 16 Fr.-Reason for Continuing Urinary Catheterization: Placed by Urology Service   CVC necessity: No   IPAD offered: Not appropriate  D: De-escalation Antibx   Yes  Plan for the day   Delirium protocol  Family/Goals of care/Code Status   Code Status: DNR     Patient restless and confused overnight. Hospitalist ordered  one time dose of Seroquel (details per flow chart). Patient's son at bedside majority of PM shift. VS and assessment per flow sheet, patient progressing towards goals as tolerated, plan of care reviewed with Suleiman Beck and family, all concerns addressed, will continue to monitor.

## 2022-12-15 NOTE — PROGRESS NOTES
[facility] Medicine Progress Note        Chief Complaint: Inpatient Follow-up for nephrolithiasis    HPI: Patient is a 72-year-old in the speaking male with PMH pertinent for CKD stage 3, HTN, and hemorrhoids who initially was admitted to [facility] for hydronephrosis with obstructive nephrolithiasis of the left ureter.  During hospitalization, had acute agitation episode with tachyarrhythmia and hypoxia.  Patient was given IM Haldol but eventually was not responding.  Patient required intubation and was given initiation of amiodarone.  Patient was transferred to ICU for post intubation management.    Discussed case with patient's son, states that patient has had similar episode like this in past as patient is a little bit and only speaks [place] he gets agitated and scared when patient's son is not present.  Of note, patient's son states that patient is DNR/DNI.      Cystoscopy with left ureteral stent placement was perfromed on 12./11/22 for Left distal ureteral stone, hydronephrosis. Pt was eventually extubated and then transferred to floors.        Interval Hx:   Feliz removed, reports cough,chest discomfort,sputum, no fever/chills/SOB/abdominal pain/dysuria/bowel issues    Objective/physical exam:  General: In no acute distress, afebrile  Chest: Clear to auscultation bilaterally  Heart: RRR, +S1, S2, no appreciable murmur  Abdomen: Soft, nontender, BS +  MSK: Warm, no lower extremity edema, no clubbing or cyanosis  Neurologic: Alert and oriented x4, Cranial nerve II-XII intact, Strength 5/5 in all 4 extremities    VITAL SIGNS: 24 HRS MIN & MAX LAST   Temp  Min: 98 °F (36.7 °C)  Max: 99.3 °F (37.4 °C) 99.3 °F (37.4 °C)   BP  Min: 101/55  Max: 196/107 (!) 140/67   Pulse  Min: 76  Max: 95  82   Resp  Min: 12  Max: 27 12     SpO2  Min: 92 %  Max: 98 % 95 %       Recent Labs   Lab 12/13/22  1356 12/14/22  0244 12/15/22  0149   WBC 10.4 12.0* 10.6   RBC 3.96* 4.10* 3.89*   HGB 11.0*  11.2* 10.6*   HCT 36.1* 36.0* 34.4*   MCV 91.2 87.8 88.4   MCH 27.8 27.3 27.2   MCHC 30.5* 31.1* 30.8*   RDW 13.8 13.6 13.5    359 374*   MPV 11.9* 11.9* 11.2       Recent Labs   Lab 12/12/22  0629 12/12/22  0833 12/13/22  1356 12/14/22  0244 12/15/22  0149     --  142 143 141   K 4.5  --  4.1 3.4* 3.6   CO2 26  --  23 25 29   BUN 33.1*  --  28.1* 23.2 20.9   CREATININE 2.32*  --  1.85* 1.53* 1.47*   CALCIUM 8.5*  --  9.0 9.8 9.2   PH  --  7.41  --   --   --    ALBUMIN 2.4*  --  2.7* 2.6*  --    ALKPHOS 54  --  67 64  --    ALT 11  --  14 10  --    AST 20  --  17 15  --    BILITOT 0.3  --  0.6 0.5  --           Microbiology Results (last 7 days)       Procedure Component Value Units Date/Time    Blood Culture [[id number]] Collected: 12/15/22 1428    Order Status: Sent Specimen: Blood from Arm     Blood Culture [[id number]] Collected: 12/15/22 1428    Order Status: Sent Specimen: Blood from Arm     Respiratory Culture [[id number]]     Order Status: Sent Specimen: Sputum from Lung Aspirate     Urine Culture High Risk [[id number]] Collected: 12/12/22 0829    Order Status: Completed Specimen: Urine, Catheterized Updated: 12/14/22 0852     Urine Culture No Growth    Urine Culture High Risk [[id number]] Collected: 12/11/22 1256    Order Status: Completed Specimen: Urine, Catheterized Updated: 12/13/22 0925     Urine Culture No Growth             See below for Radiology    Scheduled Med:   carvediloL  12.5 mg Oral BID    ceFEPime (MAXIPIME) IVPB  1 g Intravenous Q12H    mupirocin   Nasal BID    NIFEdipine  30 mg Oral Daily    pantoprazole  40 mg Intravenous Daily        Continuous Infusions:         PRN Meds:  acetaminophen, dextrose 10%, dextrose 10%, glucagon (human recombinant), guaiFENesin 100 mg/5 ml, hydrALAZINE, HYDROcodone-acetaminophen, insulin aspart U-100, labetalol, loperamide, melatonin, melatonin, ondansetron, sodium chloride 0.9%       Assessment/Plan:  #Obstructive nephrolithiasis with  hydronephrosis Left distal ureteral stone s/p cysto with left stent, 12/11/2022  #Acute hypoxic respiratory failure requiring mechanical ventilation-extubated 12/12/22,(Suspect significant component of language barrier as  of agitation??)  #Acute kidney injury-improving  #Non-anion gap metabolic acidosis-resolved  #Leukocytosis  #Normocytic Anemia, Unspecified      Chronic:hypertension, hemorrhoids, and appendicitis s/p open appendectomy several years ago  Plan:  -Urology was consulted. S/p cysto with left stent, veliz was removed, Bladder Scan, Veliz if Urinary Rentention, further work up outpatient,Will need definitve stone treatment once infection is adequately treated, f/u Outpatient  -F/u BMP, Cr improving, Cont IVF  -No growth on Blood and Urine Cultures,CXR-possible Pneumonia, Cont Antibiotics-Cefepime>Zosyn,Doxy, transition to oral antibiotics if WBC continue to rise  , wait for Resp Culture and Blood Culture  -Iron Panel, B12, Folate  -PT,OT   -cont appropriate Home Meds, Check A1C sugars-high, continue sliding scale, fingersticks ACHS    VTE prophylaxis: scds    Patient condition:  Fair      Anticipated discharge and Disposition:         All diagnosis and differential diagnosis have been reviewed; assessment and plan has been documented; I have personally reviewed the labs and test results that are presently available; I have reviewed the patients medication list; I have reviewed the consulting providers response and recommendations. I have reviewed or attempted to review medical records based upon their availability    All of the patient's questions have been  addressed and answered. Patient's is agreeable to the above stated plan. I will continue to monitor closely and make adjustments to medical management as needed.  _____________________________________________________________________    Nutrition Status:    Radiology:  X-Ray Chest 1 View  Narrative: EXAMINATION:  XR CHEST 1 VIEW    CLINICAL  HISTORY:  pneumonia;    TECHNIQUE:  One view    COMPARISON:  December 12, 2022.    FINDINGS:  Cardiopericardial silhouette is within normal limits.  There are subtle patchy opacities which involve the left lower lung zone and suggest mild infiltrates.  Otherwise, generalized lungs interstitial markings prominence appears to be chronic.  No significant fluid within the pleural spaces.  No pneumothorax  Impression: Left lower lung zone subtle patchy infiltrates.    Electronically signed by: Иван Saleh  Date:    12/14/2022  Time:    13:32  Echo  · Patient was tachycardic during the acquisition of the images.  · Mild concentric hypertrophy and normal systolic function.  · The estimated ejection fraction is 60%.  · Grade I left ventricular diastolic dysfunction.  · Normal right ventricular size with normal right ventricular systolic   function.  · Mild mitral regurgitation.  · Intermediate central venous pressure (8 mmHg).         Dolores Powell MD   12/15/2022

## 2022-12-15 NOTE — PT/OT/SLP PROGRESS
Physical Therapy Treatment    Patient Name:  Suleiman Beck   MRN:  [id number]    Recommendations:     Discharge Recommendations: home with home health (home with 24 hr assistance)  Discharge Equipment Recommendations: none  Barriers to discharge: Decreased caregiver support    Assessment:     Pt requires Zander for bed mobility and to preform STS transfer using a RW. He ambulated in the hallway approx 250ft using a RW for approx 150 ft with Zander to direct to walker and then another 100 ft with handheld Zander. Pt was not as unsteady as he was previously and seems to understand a little English. Pt is more cautious without the RW.     Rehab Prognosis: Good; patient would benefit from acute skilled PT services to address these deficits and reach maximum level of function.    Recent Surgery: Procedure(s) (LRB):  CYSTOSCOPY, WITH URETERAL STENT INSERTION (Left) 4 Days Post-Op    Plan:     During this hospitalization, patient to be seen 6 x/week to address the identified rehab impairments via therapeutic activities, therapeutic exercises and progress toward the following goals:    Plan of Care Expires:  01/13/23    Subjective     Chief Complaint: knees hurt after ambulating  Patient/Family Comments/goals: get better to return home  Pain/Comfort:         Objective:     Communicated with nursing prior to session.  Patient found HOB elevated with blood pressure cuff, telemetry, pulse ox (continuous) upon PT entry to room.     General Precautions: Standard, fall  Orthopedic Precautions:    Braces:    Respiratory Status: Room air  Pre activity vitals:   HR: 83   O2: 94   BP: 165/91  Post activity vitals   HR:84   O2: 92   BP: 112/65     Functional Mobility:  Bed Mobility:     Scooting: independence  Supine to Sit: minimum assistance  Sit to Supine: minimum assistance  Transfers:     Sit to Stand:  minimum assistance with rolling walker  Gait: 150 ft RW, Zander; 100 ft no AD, handheld assist, Zander for balance      AM-PAC 6 CLICK  MOBILITY  Turning over in bed (including adjusting bedclothes, sheets and blankets)?: 3  Sitting down on and standing up from a chair with arms (e.g., wheelchair, bedside commode, etc.): 3  Moving from lying on back to sitting on the side of the bed?: 3  Moving to and from a bed to a chair (including a wheelchair)?: 3  Need to walk in hospital room?: 3  Climbing 3-5 steps with a railing?: 1  Basic Mobility Total Score: 16       Treatment & Education:  Bed mobility  STS  Ambulation     Patient left HOB elevated with all lines intact, call button in reach, bed alarm on, and nurse notified..    GOALS:   Multidisciplinary Problems       Physical Therapy Goals          Problem: Physical Therapy    Goal Priority Disciplines Outcome Goal Variances Interventions   Physical Therapy Goal     PT, PT/OT Ongoing, Progressing     Description: Goals to be met by: 2023     Patient will increase functional independence with mobility by performin. Supine to sit with [place]  2. Sit to supine with [place]  3. Sit to stand transfer with Stand-by Assistance  4. Bed to chair transfer with Stand-by Assistance using Rolling Walker or LRAD  5. Gait  x 400 feet with Stand-by Assistance using Rolling Walker or LRAD.                          Time Tracking:     PT Received On: 12/15/22  PT Start Time: 1016     PT Stop Time: 1042  PT Total Time (min): 26 min     Billable Minutes: Therapeutic Activity 26    Treatment Type: Treatment  PT/PTA: PT     PTA Visit Number: 2     12/15/2022

## 2022-12-15 NOTE — PT/OT/SLP PROGRESS
Occupational Therapy   Treatment    Name: Suleiman Beck  MRN: [id number]  Admitting Diagnosis:  Ureteral obstruction  4 Days Post-Op    Recommendations:     Discharge Recommendations: home with home health (home with family/friends SVN)  Discharge Equipment Recommendations:     Barriers to discharge:  Decreased caregiver support    Assessment:     Suleiman Beck is a 72 y.o. male with a medical diagnosis of Ureteral obstruction; respiratory failure, metabolic acidosis.  He is s/p ERCP and ureteral stent placement. He presents with the following impairments/functional limitations: weakness, impaired endurance, impaired self care skills, impaired functional mobility, gait instability, impaired balance, decreased safety awareness.   Does not require interpretation as Pt speaks some broken English and understands some gestures.    Rehab Prognosis:  good; patient would benefit from acute skilled OT services to address these deficits and reach maximum level of function.       Plan:     Patient to be seen 5 x/week to address the above listed problems via self-care/home management  Plan of Care Expires:    Plan of Care Reviewed with: patient    Subjective     Pain/Comfort:  Pain Rating 1: 0/10    Objective:     Communicated with: NSG prior to session.  Patient found supine with  (PIV) upon OT entry to room.    General Precautions: Standard,      Orthopedic Precautions:   Braces:    Respiratory Status: Room air     Occupational Performance:     Bed Mobility:    Supine <> sit with Min A    Functional Mobility/Transfers:  Patient completed Toilet Transfer Step Transfer technique with minimum assistance with  hand-held assist and gait belt  Functional Mobility: Pt ambulated to toilet and back with Min A, HHA, no LOB    Activities of Daily Living:  Feeding:  Pt requesting set up assist ; however, Pt able to independently feed self once OT challenged  Grooming: contact guard assistance combing hair while standing at sink; denies wanting  to perform oral hygiene  Toileting: stand by assistance able to wipe following BM; SBA for safety    Treatment & Education:  Pt requesting for OT to set him up to eat; however is capable of managing packages and feeding self. Does not have to demo doffing/donning pants after toileting, will need to assess in later session    Patient left  HOB elevated for Pt to eat  with call button in reach, bed alarm on, and NSG notified    GOALS:   Multidisciplinary Problems       Occupational Therapy Goals          Problem: Occupational Therapy    Goal Priority Disciplines Outcome Interventions   Occupational Therapy Goal     OT, PT/OT Ongoing, Progressing    Description: Goals to be met by: 1/13/23     Patient will increase functional independence with ADLs by performing:    LE Dressing with Modified [place].  Grooming while standing at sink with Modified [place].  Toileting from toilet with Modified [place] for hygiene and clothing management.   Toilet transfer to toilet with Modified [place].                         Time Tracking:     OT Date of Treatment:    OT Start Time: 1102  OT Stop Time: 1127  OT Total Time (min): 25 min    Billable Minutes:Self Care/Home Management 2    OT/REMI: OT     REMI Visit Number: 2    12/15/2022

## 2022-12-15 NOTE — NURSING
Nurses Note -- 4 Eyes      12/15/2022   4:33 AM      Skin assessed during: Q Shift Change      [x] No Pressure Injuries Present    []Prevention Measures Documented      [] Yes- Altered Skin Integrity Present or Discovered   [] LDA Added if Not in Epic (Describe Wound)   [] New Altered Skin Integrity was Present on Admit and Documented in LDA   [] Wound Image Taken    Wound Care Consulted? No    Attending Nurse:  Yee Almanza RN     Second RN/Staff Member:  Miriam Allison RN

## 2022-12-16 LAB
ANION GAP SERPL CALC-SCNC: 8 MEQ/L
BASOPHILS # BLD AUTO: 0.04 X10(3)/MCL (ref 0–0.2)
BASOPHILS NFR BLD AUTO: 0.4 %
BUN SERPL-MCNC: 24 MG/DL (ref 8.4–25.7)
CALCIUM SERPL-MCNC: 8.9 MG/DL (ref 8.8–10)
CHLORIDE SERPL-SCNC: 103 MMOL/L (ref 98–107)
CO2 SERPL-SCNC: 28 MMOL/L (ref 23–31)
CREAT SERPL-MCNC: 1.59 MG/DL (ref 0.73–1.18)
CREAT/UREA NIT SERPL: 15
EOSINOPHIL # BLD AUTO: 0.66 X10(3)/MCL (ref 0–0.9)
EOSINOPHIL NFR BLD AUTO: 7.2 %
ERYTHROCYTE [DISTWIDTH] IN BLOOD BY AUTOMATED COUNT: 13.5 % (ref 11.6–14.4)
GFR SERPLBLD CREATININE-BSD FMLA CKD-EPI: 46 MLS/MIN/1.73/M2
GLUCOSE SERPL-MCNC: 204 MG/DL (ref 82–115)
HCT VFR BLD AUTO: 33.4 % (ref 42–52)
HGB BLD-MCNC: 10.5 GM/DL (ref 14–18)
IMM GRANULOCYTES # BLD AUTO: 0.05 X10(3)/MCL (ref 0–0.04)
IMM GRANULOCYTES NFR BLD AUTO: 0.5 %
LYMPHOCYTES # BLD AUTO: 2.31 X10(3)/MCL (ref 0.6–4.6)
LYMPHOCYTES NFR BLD AUTO: 25.2 %
MCH RBC QN AUTO: 27.6 PG
MCHC RBC AUTO-ENTMCNC: 31.4 MG/DL (ref 33–36)
MCV RBC AUTO: 87.9 FL (ref 80–94)
MONOCYTES # BLD AUTO: 0.76 X10(3)/MCL (ref 0.1–1.3)
MONOCYTES NFR BLD AUTO: 8.3 %
NEUTROPHILS # BLD AUTO: 5.35 X10(3)/MCL (ref 2.1–9.2)
NEUTROPHILS NFR BLD AUTO: 58.4 %
NRBC BLD AUTO-RTO: 0 % (ref 0–1)
PLATELET # BLD AUTO: 351 X10(3)/MCL (ref 140–371)
PMV BLD AUTO: 11.4 FL (ref 9.4–12.4)
POCT GLUCOSE: 103 MG/DL (ref 70–110)
POCT GLUCOSE: 316 MG/DL (ref 70–110)
POTASSIUM SERPL-SCNC: 3.4 MMOL/L (ref 3.5–5.1)
RBC # BLD AUTO: 3.8 X10(6)/MCL (ref 4.7–6.1)
SODIUM SERPL-SCNC: 139 MMOL/L (ref 136–145)
WBC # SPEC AUTO: 9.2 X10(3)/MCL (ref 4.5–11.5)

## 2022-12-16 PROCEDURE — C9113 INJ PANTOPRAZOLE SODIUM, VIA: HCPCS | Performed by: STUDENT IN AN ORGANIZED HEALTH CARE EDUCATION/TRAINING PROGRAM

## 2022-12-16 PROCEDURE — 25000003 PHARM REV CODE 250: Performed by: STUDENT IN AN ORGANIZED HEALTH CARE EDUCATION/TRAINING PROGRAM

## 2022-12-16 PROCEDURE — 25000003 PHARM REV CODE 250: Performed by: INTERNAL MEDICINE

## 2022-12-16 PROCEDURE — 25000003 PHARM REV CODE 250: Performed by: NURSE PRACTITIONER

## 2022-12-16 PROCEDURE — 97535 SELF CARE MNGMENT TRAINING: CPT | Mod: CO

## 2022-12-16 PROCEDURE — 94640 AIRWAY INHALATION TREATMENT: CPT

## 2022-12-16 PROCEDURE — 80048 BASIC METABOLIC PNL TOTAL CA: CPT | Performed by: INTERNAL MEDICINE

## 2022-12-16 PROCEDURE — 25000003 PHARM REV CODE 250: Performed by: FAMILY MEDICINE

## 2022-12-16 PROCEDURE — 27000221 HC OXYGEN, UP TO 24 HOURS

## 2022-12-16 PROCEDURE — 25000242 PHARM REV CODE 250 ALT 637 W/ HCPCS: Performed by: INTERNAL MEDICINE

## 2022-12-16 PROCEDURE — 36415 COLL VENOUS BLD VENIPUNCTURE: CPT | Performed by: INTERNAL MEDICINE

## 2022-12-16 PROCEDURE — 85025 COMPLETE CBC W/AUTO DIFF WBC: CPT | Performed by: INTERNAL MEDICINE

## 2022-12-16 PROCEDURE — 99900035 HC TECH TIME PER 15 MIN (STAT)

## 2022-12-16 PROCEDURE — 97530 THERAPEUTIC ACTIVITIES: CPT

## 2022-12-16 PROCEDURE — 11000001 HC ACUTE MED/SURG PRIVATE ROOM

## 2022-12-16 PROCEDURE — 63600175 PHARM REV CODE 636 W HCPCS: Performed by: STUDENT IN AN ORGANIZED HEALTH CARE EDUCATION/TRAINING PROGRAM

## 2022-12-16 PROCEDURE — 63600175 PHARM REV CODE 636 W HCPCS: Performed by: INTERNAL MEDICINE

## 2022-12-16 PROCEDURE — 20000000 HC ICU ROOM

## 2022-12-16 PROCEDURE — 87070 CULTURE OTHR SPECIMN AEROBIC: CPT | Performed by: INTERNAL MEDICINE

## 2022-12-16 RX ORDER — ALBUTEROL SULFATE 0.83 MG/ML
2.5 SOLUTION RESPIRATORY (INHALATION) EVERY 4 HOURS PRN
Status: DISCONTINUED | OUTPATIENT
Start: 2022-12-16 | End: 2022-12-19 | Stop reason: HOSPADM

## 2022-12-16 RX ORDER — DOXYCYCLINE HYCLATE 100 MG
100 TABLET ORAL EVERY 12 HOURS
Status: DISCONTINUED | OUTPATIENT
Start: 2022-12-16 | End: 2022-12-19 | Stop reason: HOSPADM

## 2022-12-16 RX ORDER — SODIUM CHLORIDE 9 MG/ML
INJECTION, SOLUTION INTRAVENOUS CONTINUOUS
Status: DISCONTINUED | OUTPATIENT
Start: 2022-12-16 | End: 2022-12-16

## 2022-12-16 RX ORDER — POTASSIUM CHLORIDE 20 MEQ/1
40 TABLET, EXTENDED RELEASE ORAL ONCE
Status: COMPLETED | OUTPATIENT
Start: 2022-12-16 | End: 2022-12-16

## 2022-12-16 RX ADMIN — PIPERACILLIN AND TAZOBACTAM 4.5 G: 4; .5 INJECTION, POWDER, LYOPHILIZED, FOR SOLUTION INTRAVENOUS; PARENTERAL at 11:12

## 2022-12-16 RX ADMIN — CARVEDILOL 12.5 MG: 12.5 TABLET, FILM COATED ORAL at 08:12

## 2022-12-16 RX ADMIN — NIFEDIPINE 30 MG: 30 TABLET, FILM COATED, EXTENDED RELEASE ORAL at 09:12

## 2022-12-16 RX ADMIN — CARVEDILOL 12.5 MG: 12.5 TABLET, FILM COATED ORAL at 09:12

## 2022-12-16 RX ADMIN — INSULIN ASPART 2 UNITS: 100 INJECTION, SOLUTION INTRAVENOUS; SUBCUTANEOUS at 11:12

## 2022-12-16 RX ADMIN — PANTOPRAZOLE SODIUM 40 MG: 40 INJECTION, POWDER, FOR SOLUTION INTRAVENOUS at 09:12

## 2022-12-16 RX ADMIN — POTASSIUM CHLORIDE 40 MEQ: 1500 TABLET, EXTENDED RELEASE ORAL at 04:12

## 2022-12-16 RX ADMIN — GUAIFENESIN 200 MG: 200 SOLUTION ORAL at 04:12

## 2022-12-16 RX ADMIN — ALBUTEROL SULFATE 2.5 MG: 2.5 SOLUTION RESPIRATORY (INHALATION) at 01:12

## 2022-12-16 RX ADMIN — INSULIN ASPART 8 UNITS: 100 INJECTION, SOLUTION INTRAVENOUS; SUBCUTANEOUS at 11:12

## 2022-12-16 RX ADMIN — PIPERACILLIN AND TAZOBACTAM 4.5 G: 4; .5 INJECTION, POWDER, LYOPHILIZED, FOR SOLUTION INTRAVENOUS; PARENTERAL at 06:12

## 2022-12-16 RX ADMIN — DOXYCYCLINE HYCLATE 100 MG: 100 TABLET, COATED ORAL at 08:12

## 2022-12-16 RX ADMIN — QUETIAPINE FUMARATE 25 MG: 25 TABLET ORAL at 08:12

## 2022-12-16 RX ADMIN — DOXYCYCLINE 100 MG: 100 INJECTION, POWDER, LYOPHILIZED, FOR SOLUTION INTRAVENOUS at 07:12

## 2022-12-16 RX ADMIN — PIPERACILLIN AND TAZOBACTAM 4.5 G: 4; .5 INJECTION, POWDER, LYOPHILIZED, FOR SOLUTION INTRAVENOUS; PARENTERAL at 03:12

## 2022-12-16 NOTE — PROGRESS NOTES
[facility] Hospital Medicine Progress Note        Chief Complaint: Inpatient Follow-up for Nephrolithiasis    HPI: Patient is a 72-year-old in the speaking male with PMH pertinent for CKD stage 3, HTN, and hemorrhoids who initially was admitted to [facility] for hydronephrosis with obstructive nephrolithiasis of the left ureter.  During hospitalization, had acute agitation episode with tachyarrhythmia and hypoxia.  Patient was given IM Haldol but eventually was not responding.  Patient required intubation and was given initiation of amiodarone.  Patient was transferred to ICU for post intubation management.    Discussed case with patient's son, states that patient has had similar episode like this in past as patient is a little bit and only speaks [place] he gets agitated and scared when patient's son is not present.  Of note, patient's son states that patient is DNR/DNI.      Cystoscopy with left ureteral stent placement was perfromed on 12./11/22 for Left distal ureteral stone, hydronephrosis. Pt was eventually extubated and then transferred to floors.    Patient was found to have , leukocytosis, CXR-new infiltrates concerning for Pneumonia, broadened antibiotics to Doxy and Zosyn for pan coverage as there were low grade temps on Cefepime.(Patient was on Cefepime from 12/14/22). Plan to get cultures, MRSA nares and discharge on oral antibiotics if no fever in the next 24-48 hours.      TINO-improving.      Interval Hx:   Feliz removed, reports cough,chest discomfort,sputum, Tmax-99.3 while on antibiotics .No chills/SOB/abdominal pain/dysuria/bowel issues.Soft BP today.    Objective/physical exam:  General: In no acute distress, afebrile  Chest: Rhonchi  Heart: RRR, +S1, S2, no appreciable murmur  Abdomen: Soft, nontender, BS +  MSK: Warm, no lower extremity edema, no clubbing or cyanosis  Neurologic: Alert and oriented x4, Cranial nerve II-XII intact, Strength 5/5 in all 4  extremities    VITAL SIGNS: 24 HRS MIN & MAX LAST   Temp  Min: 97.3 °F (36.3 °C)  Max: 99.3 °F (37.4 °C) 98.7 °F (37.1 °C)   BP  Min: 96/49  Max: 140/82 (!) 96/49   Pulse  Min: 72  Max: 93  78   Resp  Min: 12  Max: 26 18     SpO2  Min: 84 %  Max: 99 % (!) 94 %       Recent Labs   Lab 12/14/22  0244 12/15/22  0149 12/16/22  0130   WBC 12.0* 10.6 9.2   RBC 4.10* 3.89* 3.80*   HGB 11.2* 10.6* 10.5*   HCT 36.0* 34.4* 33.4*   MCV 87.8 88.4 87.9   MCH 27.3 27.2 27.6   MCHC 31.1* 30.8* 31.4*   RDW 13.6 13.5 13.5    374* 351   MPV 11.9* 11.2 11.4       Recent Labs   Lab 12/12/22  0629 12/12/22  0833 12/13/22  1356 12/14/22  0244 12/15/22  0149 12/16/22  0131     --  142 143 141 139   K 4.5  --  4.1 3.4* 3.6 3.4*   CO2 26  --  23 25 29 28   BUN 33.1*  --  28.1* 23.2 20.9 24.0   CREATININE 2.32*  --  1.85* 1.53* 1.47* 1.59*   CALCIUM 8.5*  --  9.0 9.8 9.2 8.9   PH  --  7.41  --   --   --   --    ALBUMIN 2.4*  --  2.7* 2.6*  --   --    ALKPHOS 54  --  67 64  --   --    ALT 11  --  14 10  --   --    AST 20  --  17 15  --   --    BILITOT 0.3  --  0.6 0.5  --   --           Microbiology Results (last 7 days)       Procedure Component Value Units Date/Time    Respiratory Culture [[id number]] Collected: 12/16/22 7381    Order Status: Sent Specimen: Sputum from Lung Aspirate Updated: 12/16/22 1408    Blood Culture [[id number]] Collected: 12/15/22 1428    Order Status: Resulted Specimen: Blood from Arm Updated: 12/15/22 2030    Blood Culture [[id number]] Collected: 12/15/22 1428    Order Status: Resulted Specimen: Blood from Arm Updated: 12/15/22 1924    Urine Culture High Risk [[id number]] Collected: 12/12/22 0829    Order Status: Completed Specimen: Urine, Catheterized Updated: 12/14/22 0852     Urine Culture No Growth    Urine Culture High Risk [[id number]] Collected: 12/11/22 1256    Order Status: Completed Specimen: Urine, Catheterized Updated: 12/13/22 0925     Urine Culture No Growth             See below for  Radiology    Scheduled Med:   carvediloL  12.5 mg Oral BID    doxycycline  100 mg Oral Q12H    NIFEdipine  30 mg Oral Daily    pantoprazole  40 mg Intravenous Daily    piperacillin-tazobactam (ZOSYN) IVPB  4.5 g Intravenous Q8H    QUEtiapine  25 mg Oral QHS        Continuous Infusions:         PRN Meds:  acetaminophen, albuterol sulfate, dextrose 10%, dextrose 10%, glucagon (human recombinant), guaiFENesin 100 mg/5 ml, hydrALAZINE, HYDROcodone-acetaminophen, insulin aspart U-100, labetalol, loperamide, melatonin, melatonin, ondansetron, sodium chloride 0.9%       Assessment/Plan:  #Obstructive nephrolithiasis with hydronephrosis Left distal ureteral stone s/p cysto with left stent, 12/11/2022  #Acute hypoxic respiratory failure requiring mechanical ventilation-extubated 12/12/22,(Suspect significant component of language barrier as  of agitation??)  #Acute kidney injury-improving  #Non-anion gap metabolic acidosis-resolved  #Leukocytosis-improved  #Normocytic Anemia, Unspecified  #Suspected Pneumonia    Chronic:hypertension, hemorrhoids, and appendicitis s/p open appendectomy several years ago    Plan:  -Urology was consulted. S/p cysto with left stent, veliz was removed,no Urinary Retention,Will need definitve stone treatment once infection is adequately treated, f/u Outpatient  -F/u BMP, Cr improving likely post renal component is >pre renal,responded to gentle hydration (has diastolic dysfunction). Need labs outpatient.  -CXR-infiltrates concerning pneumonia, Pt still coughing with sputum production and recording low grade temp despite on antibiotics,Cefepime was started prophylactically for hydronephrosis on 12/14)>broadened to Zosyn,Doxy,plan to transition to oral antibiotics if Cultures negative and MRSA nares negative, likely Levaquin or Augmentin PO to complete full 7 day course.  -PT,OT  -cont appropriate Home Meds, Check A1C sugars-high, continue sliding scale, fingersticks ACHS.    VTE prophylaxis:  scds,ambulate as tolerated>lovenox    Patient condition:  Fair      Anticipated discharge and Disposition:   on oral antibiotics 24 -48hrs if Neg MRSA and Cultures and TINO improves.,PT,OT.    All diagnosis and differential diagnosis have been reviewed; assessment and plan has been documented; I have personally reviewed the labs and test results that are presently available; I have reviewed the patients medication list; I have reviewed the consulting providers response and recommendations. I have reviewed or attempted to review medical records based upon their availability    All of the patient's questions have been  addressed and answered. Patient's is agreeable to the above stated plan. I will continue to monitor closely and make adjustments to medical management as needed.  _____________________________________________________________________    Nutrition Status:    Radiology:  X-Ray Chest 1 View  Narrative: EXAMINATION:  XR CHEST 1 VIEW    CLINICAL HISTORY:  pneumonia;    TECHNIQUE:  One view    COMPARISON:  December 12, 2022.    FINDINGS:  Cardiopericardial silhouette is within normal limits.  There are subtle patchy opacities which involve the left lower lung zone and suggest mild infiltrates.  Otherwise, generalized lungs interstitial markings prominence appears to be chronic.  No significant fluid within the pleural spaces.  No pneumothorax  Impression: Left lower lung zone subtle patchy infiltrates.    Electronically signed by: Иван Saleh  Date:    12/14/2022  Time:    13:32  Echo  · Patient was tachycardic during the acquisition of the images.  · Mild concentric hypertrophy and normal systolic function.  · The estimated ejection fraction is 60%.  · Grade I left ventricular diastolic dysfunction.  · Normal right ventricular size with normal right ventricular systolic   function.  · Mild mitral regurgitation.  · Intermediate central venous pressure (8 mmHg).         Dolores Powell MD   12/16/2022

## 2022-12-16 NOTE — PROGRESS NOTES
"Inpatient Nutrition Evaluation    Admit Date: 12/10/2022   Total duration of encounter: 6 days    Nutrition Recommendation/Prescription     Continue heart healthy diet as tolerated  RD to monitor po intake and weight changes    Nutrition Assessment     Chart Review    Reason Seen: length of stay    Diagnosis:  Obstructive nephrolithiasis with hydronephrosis Left distal ureteral stone s/p cysto with left stent, 12/11/2022  Acute hypoxic respiratory failure requiring mechanical ventilation-extubated 12/12/22,(Suspect significant component of language barrier as  of agitation??)  Acute kidney injury-improving  Non-anion gap metabolic acidosis-resolved  Leukocytosis  Normocytic Anemia, Unspecified    Relevant Medical History: CKD, HTN, hemorrhoids    Nutrition-Related Medications: NaCl infusion, insulin PRN, Zofran PRN, Protonix daily    Nutrition-Related Labs: 12/16: RBC-3.80, H/H-10.5/33.4, K-3.4, creat-1.59, glu-204      Diet Order: Diet heart healthy  Oral Supplement Order: none  Appetite/Oral Intake: good/% of meals  Factors Affecting Nutritional Intake: none identified  Food/Muslim/Cultural Preferences: unable to obtain  Food Allergies: unable to obtain       Wound(s):       Comments    12/16: spoke with RN, who reports good appetite with no n/v/d/c. Unable to obtain weight history at this time. Weight of 67 kg (147 lb) on 12/14 and, per previous weights, weight stable at this time. Will continue to monitor.    Anthropometrics    Height: 5' 0.63" (154 cm)    Last Weight: 67 kg (147 lb 11.3 oz) (12/14/22 0900) Weight Method: Bed Scale  BMI (Calculated): 28.3  BMI Classification: overweight (BMI 25-29.9)        Ideal Body Weight (IBW), Male: 109.78 lb     % Ideal Body Weight, Male (lb): 134.55 %                          Usual Weight Provided By: EMR weight history    Wt Readings from Last 5 Encounters:   12/14/22 67 kg (147 lb 11.3 oz)   12/09/22 66.2 kg (146 lb)   12/07/22 61.7 kg (136 lb)   06/04/19 " 60.5 kg (133 lb 6.1 oz)     Weight Change(s) Since Admission:  Admit Weight: 67.1 kg (148 lb) (12/10/22 4829)      Patient Education    Not applicable.    Monitoring & Evaluation     Dietitian will monitor food and beverage intake and weight change.  Nutrition Risk/Follow-Up: low (follow-up in 5-7 days)  Patients assigned 'low nutrition risk' status do not qualify for a full nutritional assessment but will be monitored and re-evaluated in a 5-7 day time period. Please consult if re-evaluation needed sooner.    Elin Clancy, Registration Eligible, Provisional LDN

## 2022-12-16 NOTE — PT/OT/SLP PROGRESS
Physical Therapy Treatment    Patient Name:  Suleiman Beck   MRN:  [id number]    Recommendations:     Discharge Recommendations: home with home health (home with 24 hr assistance)  Discharge Equipment Recommendations: none  Barriers to discharge: Decreased caregiver support    Assessment:     Pt requires Zander (holds therapists hands and pulls himself up) to go from supine to sitting up in bed. He is independent with scooting and required CGA to perform STS with out AD. Pt lost his balance upon standing up initially and ended up sitting back on the bed. Pt used RW and ambulated approx 200 ft with Zander to direct walker. He ambulated another 50ft with no AD and CGA. Pt had no LOB while ambulating.      Rehab Prognosis: Good; patient would benefit from acute skilled PT services to address these deficits and reach maximum level of function.    Recent Surgery: Procedure(s) (LRB):  CYSTOSCOPY, WITH URETERAL STENT INSERTION (Left) 5 Days Post-Op    Plan:     During this hospitalization, patient to be seen 6 x/week to address the identified rehab impairments via therapeutic activities, therapeutic exercises and progress toward the following goals:    Plan of Care Expires:  01/13/23    Subjective     Chief Complaint: complain his knees hurt after walking again  Patient/Family Comments/goals: get better  Pain/Comfort:         Objective:     Communicated with nurse prior to session.  Patient found HOB elevated with bed alarm, blood pressure cuff, peripheral IV, pulse ox (continuous), telemetry upon PT entry to room.     General Precautions: Standard, fall  Orthopedic Precautions:    Braces:    Respiratory Status: Room air   Pre activity vitals:   HR: 85   O2: 94   BP: 108/49  Post activity vitals:   HR: 86   O2: 97   BP: 122/81  Functional Mobility:  Bed Mobility:     Scooting: independence  Supine to Sit: minimum assistance  Sit to Supine: independence  Transfers:     Sit to Stand:  contact guard assistance with no AD  Gait: 200  ft, RW, Zander; 50 ft, no AD, CGA      AM-PAC 6 CLICK MOBILITY  Turning over in bed (including adjusting bedclothes, sheets and blankets)?: 4  Sitting down on and standing up from a chair with arms (e.g., wheelchair, bedside commode, etc.): 3  Moving from lying on back to sitting on the side of the bed?: 3  Moving to and from a bed to a chair (including a wheelchair)?: 3  Need to walk in hospital room?: 3  Climbing 3-5 steps with a railing?: 1  Basic Mobility Total Score: 17       Treatment & Education:  Bed mobility  STS  Ambulation w/ AD and without AD    Patient left HOB elevated with all lines intact, call button in reach, and nurse notified..bed alarm set    GOALS:   Multidisciplinary Problems       Physical Therapy Goals          Problem: Physical Therapy    Goal Priority Disciplines Outcome Goal Variances Interventions   Physical Therapy Goal     PT, PT/OT Ongoing, Progressing     Description: Goals to be met by: 2023     Patient will increase functional independence with mobility by performin. Supine to sit with [place]  2. Sit to supine with [place]  3. Sit to stand transfer with Stand-by Assistance  4. Bed to chair transfer with Stand-by Assistance using Rolling Walker or LRAD  5. Gait  x 400 feet with Stand-by Assistance using Rolling Walker or LRAD.                          Time Tracking:     PT Received On: 22  PT Start Time: 846     PT Stop Time: 910  PT Total Time (min): 24 min     Billable Minutes: Therapeutic Activity 24    Treatment Type: Treatment  PT/PTA: PT     PTA Visit Number: 3     2022

## 2022-12-16 NOTE — PT/OT/SLP PROGRESS
Occupational Therapy  Treatment    Suleiman Beck   MRN: [id number]   Admitting Diagnosis: Ureteral obstruction    OT Date of Treatment: 12/16/22   OT Start Time: 1320    OT Stop Time: 1347  OT Total Time (min): 27 min     Billable Minutes:  Self Care/Home Management 2  Total Minutes: 27     OT/REMI: REMI KHALIL Visit Number: 3    General Precautions: Standard, fall  Orthopedic Precautions:    Braces:      Spiritual, Cultural Beliefs, Congregation Practices, Values that Affect Care: no    Subjective:  Communicated with RN prior to session.  Distracted, Imupulsive  Confused     Objective:  (Bed Mobility-Min A)  (Sitting balance-SBA for safety)  (Sit to stand- Min A)  Pt. Ambulating from EOB to bathroom using RW for UE support with balance, Min A for balance with increased cueing required for safety and navigating through environment.   Pt. Requiring Min A for safe descend onto low surface, with total A for pericare.  Pt. Standing at sink with Min A for balance while performing grooming task (brushing teeth) with Mod A for setup.      Patient left with bed in chair position with all lines intact, call button in reach, and bed alarm on    ASSESSMENT:  Suleiman Beck is a 72 y.o. male with a medical diagnosis of Ureteral obstruction Pt. Tolerated session well overall requiring constant cueing throughout session. Continue POC    Activity tolerance: Good    Discharge recommendations: home with home health     Equipment recommendations:       GOALS:   Multidisciplinary Problems       Occupational Therapy Goals          Problem: Occupational Therapy    Goal Priority Disciplines Outcome Interventions   Occupational Therapy Goal     OT, PT/OT Ongoing, Progressing    Description: Goals to be met by: 1/13/23     Patient will increase functional independence with ADLs by performing:    LE Dressing with Modified [place].  Grooming while standing at sink with Modified [place].  Toileting from toilet with Modified [place] for  hygiene and clothing management.   Toilet transfer to toilet with Modified [place].                         Plan:  Patient to be seen 5 x/week to address the above listed problems via self-care/home management  Plan of Care expires:    Plan of Care reviewed with: patient         12/16/2022

## 2022-12-16 NOTE — NURSING
Nurses Note -- 4 Eyes      12/15/2022   10:09 PM      Skin assessed during: Q Shift Change      [x] No Pressure Injuries Present    [x]Prevention Measures Documented      [] Yes- Altered Skin Integrity Present or Discovered   [] LDA Added if Not in Epic (Describe Wound)   [] New Altered Skin Integrity was Present on Admit and Documented in LDA   [] Wound Image Taken    Wound Care Consulted? No    Attending Nurse:  Gaye Merritt RN     Second RN/Staff Member:  anna

## 2022-12-17 LAB
ANION GAP SERPL CALC-SCNC: 8 MEQ/L
BASOPHILS # BLD AUTO: 0.04 X10(3)/MCL (ref 0–0.2)
BASOPHILS NFR BLD AUTO: 0.3 %
BUN SERPL-MCNC: 29.3 MG/DL (ref 8.4–25.7)
CALCIUM SERPL-MCNC: 9.9 MG/DL (ref 8.8–10)
CHLORIDE SERPL-SCNC: 107 MMOL/L (ref 98–107)
CO2 SERPL-SCNC: 26 MMOL/L (ref 23–31)
CREAT SERPL-MCNC: 1.7 MG/DL (ref 0.73–1.18)
CREAT/UREA NIT SERPL: 17
EOSINOPHIL # BLD AUTO: 0.81 X10(3)/MCL (ref 0–0.9)
EOSINOPHIL NFR BLD AUTO: 6.5 %
ERYTHROCYTE [DISTWIDTH] IN BLOOD BY AUTOMATED COUNT: 13.5 % (ref 11.6–14.4)
GFR SERPLBLD CREATININE-BSD FMLA CKD-EPI: 43 MLS/MIN/1.73/M2
GLUCOSE SERPL-MCNC: 144 MG/DL (ref 82–115)
HCT VFR BLD AUTO: 34.1 % (ref 42–52)
HGB BLD-MCNC: 10.5 GM/DL (ref 14–18)
IMM GRANULOCYTES # BLD AUTO: 0.06 X10(3)/MCL (ref 0–0.04)
IMM GRANULOCYTES NFR BLD AUTO: 0.5 %
LYMPHOCYTES # BLD AUTO: 3.68 X10(3)/MCL (ref 0.6–4.6)
LYMPHOCYTES NFR BLD AUTO: 29.3 %
MCH RBC QN AUTO: 27.4 PG
MCHC RBC AUTO-ENTMCNC: 30.8 MG/DL (ref 33–36)
MCV RBC AUTO: 89 FL (ref 80–94)
MONOCYTES # BLD AUTO: 1.07 X10(3)/MCL (ref 0.1–1.3)
MONOCYTES NFR BLD AUTO: 8.5 %
MRSA PCR SCRN (OHS): NOT DETECTED
NEUTROPHILS # BLD AUTO: 6.88 X10(3)/MCL (ref 2.1–9.2)
NEUTROPHILS NFR BLD AUTO: 54.9 %
NRBC BLD AUTO-RTO: 0 % (ref 0–1)
PLATELET # BLD AUTO: 372 X10(3)/MCL (ref 140–371)
PMV BLD AUTO: 11.4 FL (ref 9.4–12.4)
POCT GLUCOSE: 143 MG/DL (ref 70–110)
POCT GLUCOSE: 146 MG/DL (ref 70–110)
POCT GLUCOSE: 202 MG/DL (ref 70–110)
POCT GLUCOSE: 383 MG/DL (ref 70–110)
POTASSIUM SERPL-SCNC: 3.9 MMOL/L (ref 3.5–5.1)
RBC # BLD AUTO: 3.83 X10(6)/MCL (ref 4.7–6.1)
SODIUM SERPL-SCNC: 141 MMOL/L (ref 136–145)
WBC # SPEC AUTO: 12.5 X10(3)/MCL (ref 4.5–11.5)

## 2022-12-17 PROCEDURE — 25000003 PHARM REV CODE 250: Performed by: INTERNAL MEDICINE

## 2022-12-17 PROCEDURE — 80048 BASIC METABOLIC PNL TOTAL CA: CPT | Performed by: INTERNAL MEDICINE

## 2022-12-17 PROCEDURE — 87641 MR-STAPH DNA AMP PROBE: CPT | Performed by: INTERNAL MEDICINE

## 2022-12-17 PROCEDURE — 63600175 PHARM REV CODE 636 W HCPCS: Performed by: STUDENT IN AN ORGANIZED HEALTH CARE EDUCATION/TRAINING PROGRAM

## 2022-12-17 PROCEDURE — 63600175 PHARM REV CODE 636 W HCPCS: Performed by: INTERNAL MEDICINE

## 2022-12-17 PROCEDURE — 11000001 HC ACUTE MED/SURG PRIVATE ROOM

## 2022-12-17 PROCEDURE — 25000003 PHARM REV CODE 250: Performed by: NURSE PRACTITIONER

## 2022-12-17 PROCEDURE — 85025 COMPLETE CBC W/AUTO DIFF WBC: CPT | Performed by: INTERNAL MEDICINE

## 2022-12-17 PROCEDURE — 25000003 PHARM REV CODE 250: Performed by: STUDENT IN AN ORGANIZED HEALTH CARE EDUCATION/TRAINING PROGRAM

## 2022-12-17 PROCEDURE — 25000003 PHARM REV CODE 250: Performed by: FAMILY MEDICINE

## 2022-12-17 PROCEDURE — 21400001 HC TELEMETRY ROOM

## 2022-12-17 PROCEDURE — 36415 COLL VENOUS BLD VENIPUNCTURE: CPT | Performed by: INTERNAL MEDICINE

## 2022-12-17 PROCEDURE — C9113 INJ PANTOPRAZOLE SODIUM, VIA: HCPCS | Performed by: STUDENT IN AN ORGANIZED HEALTH CARE EDUCATION/TRAINING PROGRAM

## 2022-12-17 RX ADMIN — CARVEDILOL 12.5 MG: 12.5 TABLET, FILM COATED ORAL at 08:12

## 2022-12-17 RX ADMIN — NIFEDIPINE 30 MG: 30 TABLET, FILM COATED, EXTENDED RELEASE ORAL at 10:12

## 2022-12-17 RX ADMIN — DOXYCYCLINE HYCLATE 100 MG: 100 TABLET, COATED ORAL at 08:12

## 2022-12-17 RX ADMIN — PIPERACILLIN AND TAZOBACTAM 4.5 G: 4; .5 INJECTION, POWDER, LYOPHILIZED, FOR SOLUTION INTRAVENOUS; PARENTERAL at 02:12

## 2022-12-17 RX ADMIN — DOXYCYCLINE HYCLATE 100 MG: 100 TABLET, COATED ORAL at 10:12

## 2022-12-17 RX ADMIN — QUETIAPINE FUMARATE 25 MG: 25 TABLET ORAL at 08:12

## 2022-12-17 RX ADMIN — INSULIN ASPART 10 UNITS: 100 INJECTION, SOLUTION INTRAVENOUS; SUBCUTANEOUS at 11:12

## 2022-12-17 RX ADMIN — PIPERACILLIN AND TAZOBACTAM 4.5 G: 4; .5 INJECTION, POWDER, LYOPHILIZED, FOR SOLUTION INTRAVENOUS; PARENTERAL at 10:12

## 2022-12-17 RX ADMIN — PIPERACILLIN AND TAZOBACTAM 4.5 G: 4; .5 INJECTION, POWDER, LYOPHILIZED, FOR SOLUTION INTRAVENOUS; PARENTERAL at 06:12

## 2022-12-17 RX ADMIN — PANTOPRAZOLE SODIUM 40 MG: 40 INJECTION, POWDER, FOR SOLUTION INTRAVENOUS at 10:12

## 2022-12-17 RX ADMIN — CARVEDILOL 12.5 MG: 12.5 TABLET, FILM COATED ORAL at 10:12

## 2022-12-17 NOTE — NURSING
Nurses Note -- 4 Eyes      12/17/2022   4:55 AM      Skin assessed during: Daily Assessment      [x] No Pressure Injuries Present    [x]Prevention Measures Documented      [] Yes- Altered Skin Integrity Present or Discovered   [] LDA Added if Not in Epic (Describe Wound)   [] New Altered Skin Integrity was Present on Admit and Documented in LDA   [] Wound Image Taken    Wound Care Consulted? No    Attending Nurse:  Naty Posadas RN     Second RN/Staff Member:  ALAYNA Potter

## 2022-12-17 NOTE — SUBJECTIVE & OBJECTIVE
Review of Systems   Constitutional:  Negative for appetite change, chills and fever.   HENT:  Positive for congestion.    Respiratory:  Positive for cough, shortness of breath and wheezing.    Cardiovascular:  Negative for chest pain, palpitations and leg swelling.   Gastrointestinal: Negative.    Genitourinary: Negative.    Musculoskeletal: Negative.    Neurological: Negative.    All other systems reviewed and are negative.  Objective:     Vital Signs (Most Recent):  Temp: 98.1 °F (36.7 °C) (12/17/22 1335)  Pulse: 98 (12/17/22 1335)  Resp: 20 (12/17/22 1335)  BP: (!) 140/81 (12/17/22 1335)  SpO2: 100 % (12/17/22 1335)   Vital Signs (24h Range):  Temp:  [97.7 °F (36.5 °C)-99.3 °F (37.4 °C)] 98.1 °F (36.7 °C)  Pulse:  [68-98] 98  Resp:  [12-26] 20  SpO2:  [91 %-100 %] 100 %  BP: (110-153)/(60-89) 140/81     Weight: 67 kg (147 lb 11.3 oz)  Body mass index is 28.25 kg/m².    Intake/Output Summary (Last 24 hours) at 12/17/2022 1536  Last data filed at 12/17/2022 1226  Gross per 24 hour   Intake 490.17 ml   Output 1845 ml   Net -1354.83 ml      Physical Exam  Vitals and nursing note reviewed.   Constitutional:       Appearance: Normal appearance. He is normal weight.   HENT:      Head: Normocephalic and atraumatic.      Mouth/Throat:      Pharynx: Oropharynx is clear.   Cardiovascular:      Rate and Rhythm: Normal rate and regular rhythm.      Pulses: Normal pulses.      Heart sounds: Normal heart sounds.   Pulmonary:      Effort: Pulmonary effort is normal.      Breath sounds: Wheezing, rhonchi and rales present.   Abdominal:      General: Abdomen is flat. Bowel sounds are normal.      Palpations: Abdomen is soft.   Musculoskeletal:         General: Tenderness (bilateral knees) present. Normal range of motion.      Cervical back: Normal range of motion and neck supple.   Skin:     General: Skin is warm and dry.   Neurological:      General: No focal deficit present.      Mental Status: He is alert and oriented to  person, place, and time.   Psychiatric:         Mood and Affect: Mood normal.         Behavior: Behavior normal.         Thought Content: Thought content normal.         Judgment: Judgment normal.       Significant Labs: All pertinent labs within the past 24 hours have been reviewed.  BMP:   Recent Labs   Lab 12/17/22 0203      K 3.9   CO2 26   BUN 29.3*   CREATININE 1.70*   CALCIUM 9.9     CBC:   Recent Labs   Lab 12/16/22 0130 12/17/22 0203   WBC 9.2 12.5*   HGB 10.5* 10.5*   HCT 33.4* 34.1*    372*     CMP:   Recent Labs   Lab 12/16/22 0131 12/17/22 0203    141   K 3.4* 3.9   CO2 28 26   BUN 24.0 29.3*   CREATININE 1.59* 1.70*   CALCIUM 8.9 9.9       Significant Imaging: I have reviewed all pertinent imaging results/findings within the past 24 hours.

## 2022-12-17 NOTE — NURSING
Nurses Note -- 4 Eyes      12/16/2022   7:04 PM      Skin assessed during: Q Shift Change      [] No Pressure Injuries Present    []Prevention Measures Documented      [] Yes- Altered Skin Integrity Present or Discovered   [] LDA Added if Not in Epic (Describe Wound)   [] New Altered Skin Integrity was Present on Admit and Documented in LDA   [] Wound Image Taken    Wound Care Consulted? No    Attending Nurse:  Carmela Eaton RN     Second RN/Staff Member:  ROGELIO Alfonso

## 2022-12-17 NOTE — HOSPITAL COURSE
Patient interviewed and examined with the assistance of an .  He has no new complaints at this time except for little bit of a sore throat and some knee pain that is chronic.  He is coughing but nothing is coming up.  His appetite is good and he has been around the room with assistance.  No other issues at this time.

## 2022-12-17 NOTE — PROGRESS NOTES
[facility] - 7th Floor ICU  [facility] Medicine  Progress Note    Patient Name: Suleiman Beck  MRN: [id number]  Patient Class: IP- Inpatient   Admission Date: 12/10/2022  Length of Stay: 6 days  Attending Physician: Kevin Parker MD  Primary Care Provider: Primary Doctor No        Subjective:     Principal Problem:Ureteral obstruction        HPI:  Patient is a 72-year-old in the speaking male with PMH pertinent for CKD stage 3, HTN, and hemorrhoids who initially was admitted to [facility] for hydronephrosis with obstructive nephrolithiasis of the left ureter.  During hospitalization, had acute agitation episode with tachyarrhythmia and hypoxia.  Patient was given IM Haldol but eventually was not responding.  Patient required intubation and was given initiation of amiodarone.  Patient was transferred to ICU for post intubation management.    Discussed case with patient's son, states that patient has had similar episode like this in past as patient is a little bit and only speaks [place] he gets agitated and scared when patient's son is not present.  Of note, patient's son states that patient is DNR/DNI.        Cystoscopy with left ureteral stent placement was perfromed on 12./11/22 for Left distal ureteral stone, hydronephrosis. Pt was eventually extubated and then transferred to floors.      Overview/Hospital Course:  Patient interviewed and examined with the assistance of an .  He has no new complaints at this time except for little bit of a sore throat and some knee pain that is chronic.  He is coughing but nothing is coming up.  His appetite is good and he has been around the room with assistance.  No other issues at this time.          Review of Systems   Constitutional:  Negative for appetite change, chills and fever.   HENT:  Positive for congestion.    Respiratory:  Positive for cough, shortness of breath and wheezing.    Cardiovascular:  Negative for chest pain, palpitations and leg swelling.    Gastrointestinal: Negative.    Genitourinary: Negative.    Musculoskeletal: Negative.    Neurological: Negative.    All other systems reviewed and are negative.  Objective:     Vital Signs (Most Recent):  Temp: 98.1 °F (36.7 °C) (12/17/22 1335)  Pulse: 98 (12/17/22 1335)  Resp: 20 (12/17/22 1335)  BP: (!) 140/81 (12/17/22 1335)  SpO2: 100 % (12/17/22 1335)   Vital Signs (24h Range):  Temp:  [97.7 °F (36.5 °C)-99.3 °F (37.4 °C)] 98.1 °F (36.7 °C)  Pulse:  [68-98] 98  Resp:  [12-26] 20  SpO2:  [91 %-100 %] 100 %  BP: (110-153)/(60-89) 140/81     Weight: 67 kg (147 lb 11.3 oz)  Body mass index is 28.25 kg/m².    Intake/Output Summary (Last 24 hours) at 12/17/2022 1536  Last data filed at 12/17/2022 1226  Gross per 24 hour   Intake 490.17 ml   Output 1845 ml   Net -1354.83 ml      Physical Exam  Vitals and nursing note reviewed.   Constitutional:       Appearance: Normal appearance. He is normal weight.   HENT:      Head: Normocephalic and atraumatic.      Mouth/Throat:      Pharynx: Oropharynx is clear.   Cardiovascular:      Rate and Rhythm: Normal rate and regular rhythm.      Pulses: Normal pulses.      Heart sounds: Normal heart sounds.   Pulmonary:      Effort: Pulmonary effort is normal.      Breath sounds: Wheezing, rhonchi and rales present.   Abdominal:      General: Abdomen is flat. Bowel sounds are normal.      Palpations: Abdomen is soft.   Musculoskeletal:         General: Tenderness (bilateral knees) present. Normal range of motion.      Cervical back: Normal range of motion and neck supple.   Skin:     General: Skin is warm and dry.   Neurological:      General: No focal deficit present.      Mental Status: He is alert and oriented to person, place, and time.   Psychiatric:         Mood and Affect: Mood normal.         Behavior: Behavior normal.         Thought Content: Thought content normal.         Judgment: Judgment normal.       Significant Labs: All pertinent labs within the past 24 hours have  been reviewed.  BMP:   Recent Labs   Lab 12/17/22  0203      K 3.9   CO2 26   BUN 29.3*   CREATININE 1.70*   CALCIUM 9.9     CBC:   Recent Labs   Lab 12/16/22  0130 12/17/22  0203   WBC 9.2 12.5*   HGB 10.5* 10.5*   HCT 33.4* 34.1*    372*     CMP:   Recent Labs   Lab 12/16/22  0131 12/17/22  0203    141   K 3.4* 3.9   CO2 28 26   BUN 24.0 29.3*   CREATININE 1.59* 1.70*   CALCIUM 8.9 9.9       Significant Imaging: I have reviewed all pertinent imaging results/findings within the past 24 hours.      Assessment/Plan:      No notes have been filed under this hospital service.  Service: Hospital Medicine    #Obstructive nephrolithiasis with hydronephrosis Left distal ureteral stone s/p cysto with left stent, 12/11/2022  #Acute hypoxic respiratory failure requiring mechanical ventilation-extubated 12/12/22,(Suspect significant component of language barrier as  of agitation??)  #Acute kidney injury-improving on CKD3  #Non-anion gap metabolic acidosis-resolved  #Leukocytosis-improved  #Normocytic Anemia, Unspecified  #Suspected Pneumonia     Chronic:hypertension, hemorrhoids, and appendicitis s/p open appendectomy several years ago    Patient started on broad-spectrum antibiotics to cover suspected pneumonia.  He continues with good urine output and no retention so will need to follow up as an outpatient with Urology.  Kidney function appears to be stable and at baseline.  Continue all necessary home medications and likely home in the next 48 hours.    VTE Risk Mitigation (From admission, onward)         Ordered     Place sequential compression device  Until discontinued         12/11/22 0217                Discharge Planning   TANYA:      Code Status: DNR   Is the patient medically ready for discharge?:     Reason for patient still in hospital (select all that apply): Treatment  Discharge Plan A: Home with family                  Grace Capellan MD  Department of Hospital Medicine   [facility] - 7th [facility] ICU

## 2022-12-17 NOTE — HPI
Patient is a 72-year-old in the speaking male with PMH pertinent for CKD stage 3, HTN, and hemorrhoids who initially was admitted to [facility] for hydronephrosis with obstructive nephrolithiasis of the left ureter.  During hospitalization, had acute agitation episode with tachyarrhythmia and hypoxia.  Patient was given IM Haldol but eventually was not responding.  Patient required intubation and was given initiation of amiodarone.  Patient was transferred to ICU for post intubation management.    Discussed case with patient's son, states that patient has had similar episode like this in past as patient is a little bit and only speaks [place]olin he gets agitated and scared when patient's son is not present.  Of note, patient's son states that patient is DNR/DNI.        Cystoscopy with left ureteral stent placement was perfromed on 12./11/22 for Left distal ureteral stone, hydronephrosis. Pt was eventually extubated and then transferred to floors.

## 2022-12-18 LAB
ANION GAP SERPL CALC-SCNC: 9 MEQ/L
BASOPHILS # BLD AUTO: 0.05 X10(3)/MCL (ref 0–0.2)
BASOPHILS NFR BLD AUTO: 0.4 %
BUN SERPL-MCNC: 27.4 MG/DL (ref 8.4–25.7)
CALCIUM SERPL-MCNC: 9.3 MG/DL (ref 8.8–10)
CHLORIDE SERPL-SCNC: 103 MMOL/L (ref 98–107)
CO2 SERPL-SCNC: 27 MMOL/L (ref 23–31)
CREAT SERPL-MCNC: 1.88 MG/DL (ref 0.73–1.18)
CREAT/UREA NIT SERPL: 15
EOSINOPHIL # BLD AUTO: 0.63 X10(3)/MCL (ref 0–0.9)
EOSINOPHIL NFR BLD AUTO: 5.3 %
ERYTHROCYTE [DISTWIDTH] IN BLOOD BY AUTOMATED COUNT: 13.5 % (ref 11.6–14.4)
GFR SERPLBLD CREATININE-BSD FMLA CKD-EPI: 38 MLS/MIN/1.73/M2
GLUCOSE SERPL-MCNC: 406 MG/DL (ref 82–115)
HCT VFR BLD AUTO: 31.9 % (ref 42–52)
HGB BLD-MCNC: 9.6 GM/DL (ref 14–18)
IMM GRANULOCYTES # BLD AUTO: 0.05 X10(3)/MCL (ref 0–0.04)
IMM GRANULOCYTES NFR BLD AUTO: 0.4 %
LYMPHOCYTES # BLD AUTO: 2.7 X10(3)/MCL (ref 0.6–4.6)
LYMPHOCYTES NFR BLD AUTO: 22.9 %
MCH RBC QN AUTO: 27.3 PG
MCHC RBC AUTO-ENTMCNC: 30.1 MG/DL (ref 33–36)
MCV RBC AUTO: 90.6 FL (ref 80–94)
MONOCYTES # BLD AUTO: 0.87 X10(3)/MCL (ref 0.1–1.3)
MONOCYTES NFR BLD AUTO: 7.4 %
NEUTROPHILS # BLD AUTO: 7.48 X10(3)/MCL (ref 2.1–9.2)
NEUTROPHILS NFR BLD AUTO: 63.6 %
NRBC BLD AUTO-RTO: 0 % (ref 0–1)
PLATELET # BLD AUTO: 326 X10(3)/MCL (ref 140–371)
PMV BLD AUTO: 11.5 FL (ref 9.4–12.4)
POCT GLUCOSE: 131 MG/DL (ref 70–110)
POCT GLUCOSE: 172 MG/DL (ref 70–110)
POCT GLUCOSE: 211 MG/DL (ref 70–110)
POTASSIUM SERPL-SCNC: 3.8 MMOL/L (ref 3.5–5.1)
RBC # BLD AUTO: 3.52 X10(6)/MCL (ref 4.7–6.1)
SODIUM SERPL-SCNC: 139 MMOL/L (ref 136–145)
WBC # SPEC AUTO: 11.8 X10(3)/MCL (ref 4.5–11.5)

## 2022-12-18 PROCEDURE — 85025 COMPLETE CBC W/AUTO DIFF WBC: CPT | Performed by: INTERNAL MEDICINE

## 2022-12-18 PROCEDURE — 63600175 PHARM REV CODE 636 W HCPCS: Performed by: INTERNAL MEDICINE

## 2022-12-18 PROCEDURE — 25000003 PHARM REV CODE 250: Performed by: INTERNAL MEDICINE

## 2022-12-18 PROCEDURE — 25000003 PHARM REV CODE 250: Performed by: STUDENT IN AN ORGANIZED HEALTH CARE EDUCATION/TRAINING PROGRAM

## 2022-12-18 PROCEDURE — 25000003 PHARM REV CODE 250: Performed by: FAMILY MEDICINE

## 2022-12-18 PROCEDURE — 25000003 PHARM REV CODE 250: Performed by: NURSE PRACTITIONER

## 2022-12-18 PROCEDURE — 94640 AIRWAY INHALATION TREATMENT: CPT

## 2022-12-18 PROCEDURE — 21400001 HC TELEMETRY ROOM

## 2022-12-18 PROCEDURE — 36415 COLL VENOUS BLD VENIPUNCTURE: CPT | Performed by: INTERNAL MEDICINE

## 2022-12-18 PROCEDURE — 80048 BASIC METABOLIC PNL TOTAL CA: CPT | Performed by: INTERNAL MEDICINE

## 2022-12-18 PROCEDURE — 25000242 PHARM REV CODE 250 ALT 637 W/ HCPCS: Performed by: INTERNAL MEDICINE

## 2022-12-18 RX ORDER — PANTOPRAZOLE SODIUM 40 MG/1
40 TABLET, DELAYED RELEASE ORAL DAILY
Status: DISCONTINUED | OUTPATIENT
Start: 2022-12-18 | End: 2022-12-19 | Stop reason: HOSPADM

## 2022-12-18 RX ORDER — ENOXAPARIN SODIUM 100 MG/ML
40 INJECTION SUBCUTANEOUS EVERY 24 HOURS
Status: DISCONTINUED | OUTPATIENT
Start: 2022-12-18 | End: 2022-12-19 | Stop reason: HOSPADM

## 2022-12-18 RX ADMIN — ACETAMINOPHEN 650 MG: 325 TABLET, FILM COATED ORAL at 03:12

## 2022-12-18 RX ADMIN — INSULIN ASPART 8 UNITS: 100 INJECTION, SOLUTION INTRAVENOUS; SUBCUTANEOUS at 07:12

## 2022-12-18 RX ADMIN — GUAIFENESIN 200 MG: 200 SOLUTION ORAL at 09:12

## 2022-12-18 RX ADMIN — ALBUTEROL SULFATE 2.5 MG: 2.5 SOLUTION RESPIRATORY (INHALATION) at 01:12

## 2022-12-18 RX ADMIN — GUAIFENESIN 200 MG: 200 SOLUTION ORAL at 03:12

## 2022-12-18 RX ADMIN — PIPERACILLIN AND TAZOBACTAM 4.5 G: 4; .5 INJECTION, POWDER, LYOPHILIZED, FOR SOLUTION INTRAVENOUS; PARENTERAL at 03:12

## 2022-12-18 RX ADMIN — ENOXAPARIN SODIUM 40 MG: 40 INJECTION SUBCUTANEOUS at 05:12

## 2022-12-18 RX ADMIN — CARVEDILOL 12.5 MG: 12.5 TABLET, FILM COATED ORAL at 09:12

## 2022-12-18 RX ADMIN — DOXYCYCLINE HYCLATE 100 MG: 100 TABLET, COATED ORAL at 09:12

## 2022-12-18 RX ADMIN — PANTOPRAZOLE SODIUM 40 MG: 40 TABLET, DELAYED RELEASE ORAL at 11:12

## 2022-12-18 RX ADMIN — NIFEDIPINE 30 MG: 30 TABLET, FILM COATED, EXTENDED RELEASE ORAL at 09:12

## 2022-12-18 RX ADMIN — ALBUTEROL SULFATE 2.5 MG: 2.5 SOLUTION RESPIRATORY (INHALATION) at 07:12

## 2022-12-18 RX ADMIN — PIPERACILLIN AND TAZOBACTAM 4.5 G: 4; .5 INJECTION, POWDER, LYOPHILIZED, FOR SOLUTION INTRAVENOUS; PARENTERAL at 11:12

## 2022-12-18 RX ADMIN — QUETIAPINE FUMARATE 25 MG: 25 TABLET ORAL at 09:12

## 2022-12-18 NOTE — NURSING
Nurses Note -- 4 Eyes      12/18/2022   12:40 AM      Skin assessed during: Admit      [x] No Pressure Injuries Present    []Prevention Measures Documented      [] Yes- Altered Skin Integrity Present or Discovered   [] LDA Added if Not in Epic (Describe Wound)   [] New Altered Skin Integrity was Present on Admit and Documented in LDA   [] Wound Image Taken    Wound Care Consulted? No    Attending Nurse:  Leeanne Raphael RN     Second RN/Staff Member:  Arlin canales RN

## 2022-12-18 NOTE — NURSING
Patient arrived to room 517 via wheelchair via nurse. Patient is awake, alert and oriented x 3. Patient is on tele, box #8. Patient doesn't speak english but is able to give verbal cues when needed. Patient son is avalilable if needed for an , number is left if needed. (Dil, son #[phone]). Patient has an 20 gauge to the right hand, flushes easily and good blood return. Minimal assistance is needed.

## 2022-12-18 NOTE — PROGRESS NOTES
[facility]  Hospital Medicine Progress Note        Chief Complaint: Inpatient follow-up on pneumonia    HPI:   Patient is a 72-year-old in the speaking male with PMH pertinent for CKD stage 3, HTN, and hemorrhoids who initially was admitted to [facility] for hydronephrosis with obstructive nephrolithiasis of the left ureter.  During hospitalization, had acute agitation episode with tachyarrhythmia and hypoxia.  Patient was given IM Haldol but eventually was not responding.  Patient required intubation and was given initiation of amiodarone.  Patient was transferred to ICU for post intubation management.    Discussed case with patient's son, states that patient has had similar episode like this in past as patient is a little bit and only speaks [place]olin he gets agitated and scared when patient's son is not present.  Of note, patient's son states that patient is DNR/DNI.        Cystoscopy with left ureteral stent placement was perfromed on 12/11/2022 for left distal ureteral stone, hydronephrosis. Pt was eventually extubated and then transferred to the hospital medicine service       Interval Hx:   Patient is sleeping soundly with no acute issues reported.  No family is present at this time.  Patient is afebrile, on room air, hemodynamically stable.    Objective/physical exam:  General:  Thin male in no acute distress  HENT: normocephalic, atraumatic  Eye: PERRL, EOMI, clear conjunctiva  Neck: full ROM, no thyromegaly, no JVD  Respiratory: diminished breath sounds bilaterally  Cardiovascular: regular rate and rhythm  Gastrointestinal: non-distended, positive bowel sounds, non-tender  Musculoskeletal:  muscular atrophy  Integumentary: warm, dry, intact, no rashes  Neurological: cranial nerves grossly intact, no focal neurological deficit  Psychiatric: cooperative, anxious      VITAL SIGNS: 24 HRS MIN & MAX LAST   Temp  Min: 97.6 °F (36.4 °C)  Max: 99 °F (37.2 °C) 97.9 °F (36.6 °C)   BP  Min: 121/57   Max: 164/84 (!) 164/84     Pulse  Min: 77  Max: 98  82   Resp  Min: 16  Max: 20 19   SpO2  Min: 94 %  Max: 100 % (!) 94 %         Recent Labs   Lab 12/16/22  0130 12/17/22  0203 12/18/22  0622   WBC 9.2 12.5* 11.8*   RBC 3.80* 3.83* 3.52*   HGB 10.5* 10.5* 9.6*   HCT 33.4* 34.1* 31.9*   MCV 87.9 89.0 90.6   MCH 27.6 27.4 27.3   MCHC 31.4* 30.8* 30.1*   RDW 13.5 13.5 13.5    372* 326   MPV 11.4 11.4 11.5       Recent Labs   Lab 12/12/22  0629 12/12/22  0833 12/13/22  1356 12/14/22  0244 12/15/22  0149 12/16/22 0131 12/17/22 0203 12/18/22 0622     --  142 143   < > 139 141 139   K 4.5  --  4.1 3.4*   < > 3.4* 3.9 3.8   CO2 26  --  23 25   < > 28 26 27   BUN 33.1*  --  28.1* 23.2   < > 24.0 29.3* 27.4*   CREATININE 2.32*  --  1.85* 1.53*   < > 1.59* 1.70* 1.88*   CALCIUM 8.5*  --  9.0 9.8   < > 8.9 9.9 9.3   PH  --  7.41  --   --   --   --   --   --    ALBUMIN 2.4*  --  2.7* 2.6*  --   --   --   --    ALKPHOS 54  --  67 64  --   --   --   --    ALT 11  --  14 10  --   --   --   --    AST 20  --  17 15  --   --   --   --    BILITOT 0.3  --  0.6 0.5  --   --   --   --     < > = values in this interval not displayed.          Microbiology Results (last 7 days)       Procedure Component Value Units Date/Time    Blood Culture [[id number]]  (Normal) Collected: 12/15/22 1428    Order Status: Completed Specimen: Blood from Arm Updated: 12/17/22 2101     CULTURE, BLOOD ([facility]) No Growth At 48 Hours    Blood Culture [[id number]]  (Normal) Collected: 12/15/22 1428    Order Status: Completed Specimen: Blood from Arm Updated: 12/17/22 2101     CULTURE, BLOOD ([facility]) No Growth At 48 Hours    Respiratory Culture [[id number]] Collected: 12/16/22 1403    Order Status: Completed Specimen: Sputum from Lung Aspirate Updated: 12/17/22 1112     Respiratory Culture Normal respiratory lavelle     GRAM STAIN Moderate Gram positive cocci      Moderate WBC seen      Rare Gram Negative Rods    Urine Culture High Risk [[id number]]  Collected: 12/12/22 0829    Order Status: Completed Specimen: Urine, Catheterized Updated: 12/14/22 0852     Urine Culture No Growth    Urine Culture High Risk [[id number]] Collected: 12/11/22 1256    Order Status: Completed Specimen: Urine, Catheterized Updated: 12/13/22 0925     Urine Culture No Growth             See below for Radiology    Scheduled Med:   carvediloL  12.5 mg Oral BID    doxycycline  100 mg Oral Q12H    NIFEdipine  30 mg Oral Daily    pantoprazole  40 mg Oral Daily    piperacillin-tazobactam (ZOSYN) IVPB  4.5 g Intravenous Q8H    QUEtiapine  25 mg Oral QHS        Continuous Infusions:  None.    PRN Meds:  acetaminophen, albuterol sulfate, dextrose 10%, dextrose 10%, glucagon (human recombinant), guaiFENesin 100 mg/5 ml, hydrALAZINE, HYDROcodone-acetaminophen, insulin aspart U-100, labetalol, loperamide, melatonin, melatonin, ondansetron, sodium chloride 0.9%       Assessment/Plan:  Obstructive uropathy  Left-sided ureterolithiasis status post cystoscopy and stent placement on 12/11/2022  Acute hypoxic respiratory failure requiring intubation and mechanical ventilation, resolved   Acute kidney injury superimposed on chronic kidney disease  Anemia of chronic disease  Essential hypertension  Suspected bacterial pneumonia  Do not resuscitate       Plan:   Continue empiric antibiotic therapy and other supportive care    VTE prophylaxis:  Lovenox    Patient condition:  Stable    Anticipated discharge and Disposition:     TBD    All diagnosis and differential diagnosis have been reviewed; assessment and plan has been documented; I have personally reviewed the labs and test results that are presently available; I have reviewed the patients medication list; I have reviewed the consulting providers response and recommendations. I have reviewed or attempted to review medical records based upon their availability    All of the patient's questions have been  addressed and answered. Patient's is agreeable to  the above stated plan. I will continue to monitor closely and make adjustments to medical management as needed.  _____________________________________________________________________      Radiology:  X-Ray Chest 1 View  Narrative: EXAMINATION:  XR CHEST 1 VIEW    CLINICAL HISTORY:  pneumonia;    TECHNIQUE:  One view    COMPARISON:  December 12, 2022.    FINDINGS:  Cardiopericardial silhouette is within normal limits.  There are subtle patchy opacities which involve the left lower lung zone and suggest mild infiltrates.  Otherwise, generalized lungs interstitial markings prominence appears to be chronic.  No significant fluid within the pleural spaces.  No pneumothorax  Impression: Left lower lung zone subtle patchy infiltrates.    Electronically signed by: Иван Saleh  Date:    12/14/2022  Time:    13:32  Echo  · Patient was tachycardic during the acquisition of the images.  · Mild concentric hypertrophy and normal systolic function.  · The estimated ejection fraction is 60%.  · Grade I left ventricular diastolic dysfunction.  · Normal right ventricular size with normal right ventricular systolic   function.  · Mild mitral regurgitation.  · Intermediate central venous pressure (8 mmHg).         Tj Coley MD   12/18/2022

## 2022-12-18 NOTE — NURSING
Nurses Note -- 4 Eyes      12/17/2022   7:43 PM      Skin assessed during: Q Shift Change      [x] No Pressure Injuries Present    [x]Prevention Measures Documented      [] Yes- Altered Skin Integrity Present or Discovered   [] LDA Added if Not in Epic (Describe Wound)   [] New Altered Skin Integrity was Present on Admit and Documented in LDA   [] Wound Image Taken    Wound Care Consulted? No    Attending Nurse:  Naty Posadas RN     Second RN/Staff Member:  ROGELIO Suárez

## 2022-12-19 VITALS
BODY MASS INDEX: 27.88 KG/M2 | DIASTOLIC BLOOD PRESSURE: 74 MMHG | HEIGHT: 61 IN | HEART RATE: 78 BPM | TEMPERATURE: 98 F | SYSTOLIC BLOOD PRESSURE: 131 MMHG | WEIGHT: 147.69 LBS | OXYGEN SATURATION: 98 % | RESPIRATION RATE: 18 BRPM

## 2022-12-19 LAB
BACTERIA ASPIRATE CULT: NORMAL
GLUCOSE SERPL-MCNC: 174 MG/DL (ref 70–110)
GLUCOSE SERPL-MCNC: 308 MG/DL (ref 70–110)
GRAM STN SPEC: NORMAL
POCT GLUCOSE: 174 MG/DL (ref 70–110)
POCT GLUCOSE: 192 MG/DL (ref 70–110)
POCT GLUCOSE: 348 MG/DL (ref 70–110)
POCT GLUCOSE: 387 MG/DL (ref 70–110)

## 2022-12-19 PROCEDURE — 25000003 PHARM REV CODE 250: Performed by: STUDENT IN AN ORGANIZED HEALTH CARE EDUCATION/TRAINING PROGRAM

## 2022-12-19 PROCEDURE — 25000003 PHARM REV CODE 250: Performed by: INTERNAL MEDICINE

## 2022-12-19 PROCEDURE — 97116 GAIT TRAINING THERAPY: CPT | Mod: CQ

## 2022-12-19 PROCEDURE — 63600175 PHARM REV CODE 636 W HCPCS: Performed by: INTERNAL MEDICINE

## 2022-12-19 PROCEDURE — 97530 THERAPEUTIC ACTIVITIES: CPT | Mod: CQ

## 2022-12-19 PROCEDURE — 97535 SELF CARE MNGMENT TRAINING: CPT

## 2022-12-19 PROCEDURE — 25000003 PHARM REV CODE 250: Performed by: FAMILY MEDICINE

## 2022-12-19 RX ORDER — NIFEDIPINE 30 MG/1
30 TABLET, FILM COATED, EXTENDED RELEASE ORAL DAILY
Qty: 30 TABLET | Refills: 5 | Status: SHIPPED | OUTPATIENT
Start: 2022-12-19 | End: 2023-01-23 | Stop reason: SDUPTHER

## 2022-12-19 RX ADMIN — PIPERACILLIN AND TAZOBACTAM 4.5 G: 4; .5 INJECTION, POWDER, LYOPHILIZED, FOR SOLUTION INTRAVENOUS; PARENTERAL at 01:12

## 2022-12-19 RX ADMIN — CARVEDILOL 12.5 MG: 12.5 TABLET, FILM COATED ORAL at 09:12

## 2022-12-19 RX ADMIN — ENOXAPARIN SODIUM 40 MG: 40 INJECTION SUBCUTANEOUS at 05:12

## 2022-12-19 RX ADMIN — HYDROCODONE BITARTRATE AND ACETAMINOPHEN 1 TABLET: 5; 325 TABLET ORAL at 05:12

## 2022-12-19 RX ADMIN — PIPERACILLIN AND TAZOBACTAM 4.5 G: 4; .5 INJECTION, POWDER, LYOPHILIZED, FOR SOLUTION INTRAVENOUS; PARENTERAL at 02:12

## 2022-12-19 RX ADMIN — INSULIN ASPART 6 UNITS: 100 INJECTION, SOLUTION INTRAVENOUS; SUBCUTANEOUS at 01:12

## 2022-12-19 RX ADMIN — NIFEDIPINE 30 MG: 30 TABLET, FILM COATED, EXTENDED RELEASE ORAL at 09:12

## 2022-12-19 RX ADMIN — DOXYCYCLINE HYCLATE 100 MG: 100 TABLET, COATED ORAL at 09:12

## 2022-12-19 RX ADMIN — GUAIFENESIN 200 MG: 200 SOLUTION ORAL at 05:12

## 2022-12-19 RX ADMIN — PANTOPRAZOLE SODIUM 40 MG: 40 TABLET, DELAYED RELEASE ORAL at 09:12

## 2022-12-19 NOTE — PT/OT/SLP PROGRESS
Occupational Therapy   Treatment    Name: Suleiman Beck  MRN: [id number]  Admitting Diagnosis:  Ureteral obstruction  8 Days Post-Op    Recommendations:     Discharge Recommendations: home with home health  Discharge Equipment Recommendations:   (pt has necessary dme)  Barriers to discharge:       Assessment:     Suleiman Beck is a 72 y.o. male with a medical diagnosis of Ureteral obstruction.  He presents in bed, agreeable to therapy.  OT called son Anabel for translation.  Performance deficits affecting function are impaired functional mobility, decreased safety awareness, impaired self care skills, impaired balance.     Rehab Prognosis:  Good; patient would benefit from acute skilled OT services to address these deficits and reach maximum level of function.       Plan:     Patient to be seen 5 x/week to address the above listed problems via self-care/home management  Plan of Care Expires:    Plan of Care Reviewed with: patient    Subjective     Pain/Comfort:  Pain Rating 1: 0/10    Objective:     Communicated with: ROGELIO Trammell at end of session.  Patient found left sidelying with peripheral IV, pulse ox (continuous), telemetry upon OT entry to room.    General Precautions: Standard, fall    Orthopedic Precautions:N/A  Braces: N/A  Respiratory Status: Room air   HR 75 O2 96%RA  Occupational Performance:     Bed Mobility:    Patient completed Supine to Sit with stand by assistance  Patient completed Sit to Supine with stand by assistance     Functional Mobility/Transfers:  Patient completed Sit <> Stand Transfer with stand by assistance  with  rolling walker   Patient completed Toilet Transfer Step Transfer technique with stand by assistance with  rolling walker  Functional Mobility: RW and gait belt utilized.  Improved safety with mobility.    Activities of Daily Living:  Lower Body Dressing: stand by assistance donning socks      Treatment & Education:  Pt downgraded to floor recently.  Educated on call bell.  Returned to  bed per son request as he reports he will attempt to ambulate around room without assist if left in chair.  OT left in bed with bed alarm on, needs in reach.  Expected to d/c home today.  Pt has good family support and will have 24/7 supervision.    Patient left HOB elevated with all lines intact, call button in reach, and bed alarm off    GOALS:   Multidisciplinary Problems       Occupational Therapy Goals          Problem: Occupational Therapy    Goal Priority Disciplines Outcome Interventions   Occupational Therapy Goal     OT, PT/OT Ongoing, Progressing    Description: Goals to be met by: 1/13/23     Patient will increase functional independence with ADLs by performing:    LE Dressing with Modified [place].  Grooming while standing at sink with Modified [place].  Toileting from toilet with Modified [place] for hygiene and clothing management.   Toilet transfer to toilet with Modified [place].                         Time Tracking:     OT Date of Treatment:    OT Start Time: 1042  OT Stop Time: 1105  OT Total Time (min): 23 min    Billable Minutes:Self Care/Home Management 2    OT/REMI: OT     REMI Visit Number: 4    12/19/2022

## 2022-12-19 NOTE — PT/OT/SLP PROGRESS
Physical Therapy  Treatment    Suleiman Beck   MRN: [id number]   Admitting Diagnosis: Ureteral obstruction       PT Start Time: 1400     PT Stop Time: 1428    PT Total Time (min): 28 min       Billable Minutes:  Gait Training 10 and Therapeutic Activity 18    Treatment Type: Treatment  PT/PTA: PTA     PTA Visit Number: 4       General Precautions: Standard, fall  Orthopedic Precautions:    Braces:    Respiratory Status: Room air    Spiritual, Cultural Beliefs, Taoism Practices, Values that Affect Care: no    Subjective:  Communicated with NSG prior to session.         Objective:   Patient found with: peripheral IV, pulse ox (continuous), telemetry    Functional Mobility:  Bed Mobility:    Supine to/from sit: SBA     Transfers:   Sit to stand: SBA     Gait:    Pt amb 15ft to bathroom, in hallway 120ft using RW, 100ft no AD at Min A with cues to correct veering to L side using RW, proper use of AD, and safety awareness.     Treatment and Education:   Pt very impulsive during treatment possibility 2/2 language barrier and not comprehending verbal commands. Amb to bathroom to assist with hygiene products. Performed gait training. EOB therX x12 reps, demod decreased coordination and RLE crossing under LLE in attempt to perform LAQ. Poor follow through with verbal commands to correct. Pt amb to bathroom for BM and assisted with hygiene products. Brought B2B after session.     AM-PAC 6 CLICK MOBILITY  How much help from another person does this patient currently need?   1 = Unable, Total/Dependent Assistance  2 = A lot, Maximum/Moderate Assistance  3 = A little, Minimum/Contact Guard/Supervision  4 = None, Modified [place]/Independent         AM-PAC Raw Score CMS G-Code Modifier Level of Impairment Assistance   6 % Total / Unable   7 - 9 CM 80 - 100% Maximal Assist   10 - 14 CL 60 - 80% Moderate Assist   15 - 19 CK 40 - 60% Moderate Assist   20 - 22 CJ 20 - 40% Minimal Assist   23 CI 1-20% SBA / CGA   24 CH 0%  Independent/ Mod I     Patient left HOB elevated with all lines intact, call button in reach, and bed alarm on.    Assessment:  Suleiman Beck is a 72 y.o. male with a medical diagnosis of Ureteral obstruction and presents with .    Rehab identified problem list/impairments: impaired functional mobility, impaired self care skills, decreased safety awareness, gait instability, decreased coordination    Rehab potential is good.    Activity tolerance: Good    Discharge recommendations: home with home health (home with 24 hr assistance)      Barriers to discharge:      Equipment recommendations: none     GOALS:   Multidisciplinary Problems       Physical Therapy Goals          Problem: Physical Therapy    Goal Priority Disciplines Outcome Goal Variances Interventions   Physical Therapy Goal     PT, PT/OT Ongoing, Progressing     Description: Goals to be met by: 2023     Patient will increase functional independence with mobility by performin. Supine to sit with [place]  2. Sit to supine with [place]  3. Sit to stand transfer with Stand-by Assistance  4. Bed to chair transfer with Stand-by Assistance using Rolling Walker or LRAD  5. Gait  x 400 feet with Stand-by Assistance using Rolling Walker or LRAD.                          PLAN:    Patient to be seen 6 x/week to address the above listed problems via gait training, therapeutic activities, therapeutic exercises  Plan of Care expires: 23  Plan of Care reviewed with: patient         2022

## 2022-12-19 NOTE — DISCHARGE SUMMARY
[facility]  Hospital Medicine Discharge Summary    Admit Date: 12/10/2022  Discharge Date and Time: 12/19/2022 1:18 PM  Admitting Physician: Joe Mckeon MD  Discharging Physician: Tj Coley MD.  Primary Care Physician: Primary Doctor No  Consults: Pulmonary/Intensive Care and Urology    Discharge Diagnoses:  Obstructive uropathy  Left-sided ureterolithiasis status post cystoscopy and stent placement on 12/11/2022  Acute hypoxic respiratory failure requiring intubation and mechanical ventilation  Acute kidney injury superimposed on chronic kidney disease  Anemia of chronic disease  Essential hypertension  Suspected bacterial pneumonia status post treatment antibiotic therapy  Do not resuscitate     Hospital Course:   Patient is a 72-year-old [place] speaking male with past medical history of stage 3A chronic kidney disease, essential hypertension, and hemorrhoids who initially was admitted to [facility] for hydronephrosis with obstructive ureterolithiasis of the left ureter.  At some point during his hospitalization, but the patient had acute agitation episode with tachyarrhythmia and hypoxia.  Patient was given intramuscular Haldol but eventually was not responding.  Patient required intubation and mechanical ventilation and was also started on amiodarone.  Patient was transferred to ICU for post intubation management.  Cystoscopy with left ureteral stent placement was performed on 12/11/2022 for left distal ureterolithiasis with resulting hydronephrosis.  Patient was eventually extubated and then transferred to the hospital medicine service.  The remainder of his hospital course was unremarkable.  Patient is tolerating a solid diet, on room air, hemodynamically stable, afebrile, and ambulating on his own and therefore will be discharged home. Patient was seen and examined on the day of discharge.      Vitals:  VITAL SIGNS: 24 HRS MIN & MAX LAST   Temp  Min: 97.4 °F (36.3  °C)  Max: 98.5 °F (36.9 °C) 97.6 °F (36.4 °C)   BP  Min: 125/61  Max: 170/80 131/74   Pulse  Min: 73  Max: 85  78   Resp  Min: 17  Max: 20 19   SpO2  Min: 95 %  Max: 98 % 98 %       Physical Exam:  General: thin male in no acute distress  HENT: normocephalic, atraumatic  Eye: PERRL, EOMI, clear conjunctiva  Neck: full ROM, no thyromegaly, no JVD  Respiratory: diminished sounds bilaterally  Cardiovascular: regular rate and rhythm  Gastrointestinal: non-distended, positive bowel sounds, non-tender  Musculoskeletal: muscular atrophy  Integumentary: warm, dry, intact, no rashes  Neurological: cranial nerves grossly intact, no focal neurological deficit  Psychiatric: cooperative, anxious    Procedures Performed: No admission procedures for hospital encounter.     Significant Diagnostic Studies: See Full reports for all details    Recent Labs   Lab 12/16/22  0130 12/17/22  0203 12/18/22  0622   WBC 9.2 12.5* 11.8*   RBC 3.80* 3.83* 3.52*   HGB 10.5* 10.5* 9.6*   HCT 33.4* 34.1* 31.9*   MCV 87.9 89.0 90.6   MCH 27.6 27.4 27.3   MCHC 31.4* 30.8* 30.1*   RDW 13.5 13.5 13.5    372* 326   MPV 11.4 11.4 11.5       Recent Labs   Lab 12/13/22  1356 12/14/22  0244 12/15/22  0149 12/16/22  0131 12/17/22  0203 12/18/22  0622    143   < > 139 141 139   K 4.1 3.4*   < > 3.4* 3.9 3.8   CO2 23 25   < > 28 26 27   BUN 28.1* 23.2   < > 24.0 29.3* 27.4*   CREATININE 1.85* 1.53*   < > 1.59* 1.70* 1.88*   CALCIUM 9.0 9.8   < > 8.9 9.9 9.3   ALBUMIN 2.7* 2.6*  --   --   --   --    ALKPHOS 67 64  --   --   --   --    ALT 14 10  --   --   --   --    AST 17 15  --   --   --   --    BILITOT 0.6 0.5  --   --   --   --     < > = values in this interval not displayed.        Microbiology Results (last 7 days)       Procedure Component Value Units Date/Time    Respiratory Culture [[id number]] Collected: 12/16/22 1403    Order Status: Completed Specimen: Sputum from Lung Aspirate Updated: 12/19/22 1100     Respiratory Culture Normal  respiratory lavelle     GRAM STAIN Moderate Gram positive cocci      Moderate WBC seen      Rare Gram Negative Rods    Blood Culture [[id number]]  (Normal) Collected: 12/15/22 1428    Order Status: Completed Specimen: Blood from Arm Updated: 12/18/22 2101     CULTURE, BLOOD ([facility]) No Growth At 72 Hours    Blood Culture [[id number]]  (Normal) Collected: 12/15/22 1428    Order Status: Completed Specimen: Blood from Arm Updated: 12/18/22 2101     CULTURE, BLOOD ([facility]) No Growth At 72 Hours    Urine Culture High Risk [[id number]] Collected: 12/12/22 0829    Order Status: Completed Specimen: Urine, Catheterized Updated: 12/14/22 0852     Urine Culture No Growth    Urine Culture High Risk [[id number]] Collected: 12/11/22 1256    Order Status: Completed Specimen: Urine, Catheterized Updated: 12/13/22 0925     Urine Culture No Growth             X-Ray Chest 1 View  Narrative: EXAMINATION:  XR CHEST 1 VIEW    CLINICAL HISTORY:  pneumonia;    TECHNIQUE:  One view    COMPARISON:  December 12, 2022.    FINDINGS:  Cardiopericardial silhouette is within normal limits.  There are subtle patchy opacities which involve the left lower lung zone and suggest mild infiltrates.  Otherwise, generalized lungs interstitial markings prominence appears to be chronic.  No significant fluid within the pleural spaces.  No pneumothorax  Impression: Left lower lung zone subtle patchy infiltrates.    Electronically signed by: Иван Saleh  Date:    12/14/2022  Time:    13:32  Echo  · Patient was tachycardic during the acquisition of the images.  · Mild concentric hypertrophy and normal systolic function.  · The estimated ejection fraction is 60%.  · Grade I left ventricular diastolic dysfunction.  · Normal right ventricular size with normal right ventricular systolic   function.  · Mild mitral regurgitation.  · Intermediate central venous pressure (8 mmHg).            Medication List        CONTINUE taking these medications      carvediloL 3.125 MG  tablet  Commonly known as: COREG     NIFEdipine 30 MG Tbsr  Commonly known as: ADALAT CC  Take 1 tablet (30 mg total) by mouth once daily.     traMADoL 50 mg tablet  Commonly known as: ULTRAM  Take 1 tablet (50 mg total) by mouth every 6 (six) hours as needed for Pain.            STOP taking these medications      amoxicillin-clavulanate 875-125mg 875-125 mg per tablet  Commonly known as: AUGMENTIN     meloxicam 15 MG tablet  Commonly known as: MOBIC     metFORMIN 500 MG ER 24hr tablet  Commonly known as: GLUCOPHAGE-XR               Where to Get Your Medications        These medications were sent to [facility] - [address] [address]      Phone: [phone]   NIFEdipine 30 MG Tbsr          Discharge Disposition: Home  Discharge Condition: Stable  Diet: Cardiac   Follow-up Information       Mar Fernandez MD Follow up in 1 week(s).    Specialty: Urology  Why: Office will contact the patient.  Contact information:  [address] [place] [address]  [facility] [address] [phone]                           For further questions contact your urologist    Discharge time 35 minutes    For worsening symptoms, chest pain, shortness of breath, increased abdominal pain, high grade fever, stroke or stroke like symptoms, immediately go to the nearest Emergency Room or call 911 as soon as possible.      Tj Rock M.D on 12/19/2022. at 1:18 PM.

## 2022-12-19 NOTE — PLAN OF CARE
12/19/22 1049   Final Note   Assessment Type Final Discharge Note   Anticipated Discharge Disposition Home

## 2022-12-20 LAB
BACTERIA BLD CULT: NORMAL
BACTERIA BLD CULT: NORMAL

## 2023-01-23 PROBLEM — I10 HYPERTENSION: Status: ACTIVE | Noted: 2023-01-23

## 2023-01-23 PROBLEM — E11.9 TYPE 2 DIABETES MELLITUS: Status: ACTIVE | Noted: 2023-01-23

## 2024-02-28 DIAGNOSIS — N28.1 CYST OF KIDNEY, ACQUIRED: Primary | ICD-10-CM

## 2024-03-13 ENCOUNTER — HOSPITAL ENCOUNTER (OUTPATIENT)
Dept: RADIOLOGY | Facility: HOSPITAL | Age: 74
Discharge: HOME | End: 2024-03-13
Attending: UROLOGY
Payer: MEDICAID

## 2024-03-13 DIAGNOSIS — N28.1 CYST OF KIDNEY, ACQUIRED: ICD-10-CM

## 2024-03-13 PROCEDURE — 76770 US EXAM ABDO BACK WALL COMP: CPT | Mod: TC

## 2024-05-15 ENCOUNTER — HOSPITAL ENCOUNTER (EMERGENCY)
Facility: HOSPITAL | Age: 74
Discharge: HOME | End: 2024-05-15
Attending: INTERNAL MEDICINE
Payer: MEDICAID

## 2024-05-15 VITALS
TEMPERATURE: 98 F | DIASTOLIC BLOOD PRESSURE: 98 MMHG | HEART RATE: 69 BPM | OXYGEN SATURATION: 98 % | BODY MASS INDEX: 26.43 KG/M2 | HEIGHT: 61 IN | WEIGHT: 140 LBS | RESPIRATION RATE: 20 BRPM | SYSTOLIC BLOOD PRESSURE: 155 MMHG

## 2024-05-15 DIAGNOSIS — M75.31 CALCIFIC TENDONITIS OF RIGHT SHOULDER: ICD-10-CM

## 2024-05-15 DIAGNOSIS — M25.511 CHRONIC RIGHT SHOULDER PAIN: ICD-10-CM

## 2024-05-15 DIAGNOSIS — M25.551 RIGHT HIP PAIN: ICD-10-CM
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
## 2024-05-15 DIAGNOSIS — G89.29 CHRONIC RIGHT SHOULDER PAIN: ICD-10-CM

## 2024-05-15 LAB
ALBUMIN SERPL-MCNC: 3.5 G/DL (ref 3.4–4.8)
ALBUMIN/GLOB SERPL: 0.9 RATIO (ref 1.1–2)
ALP SERPL-CCNC: 64 UNIT/L (ref 40–150)
ALT SERPL-CCNC: 14 UNIT/L (ref 0–55)
AST SERPL-CCNC: 12 UNIT/L (ref 5–34)
BACTERIA #/AREA URNS AUTO: ABNORMAL /HPF
BASOPHILS # BLD AUTO: 0.04 X10(3)/MCL
BASOPHILS NFR BLD AUTO: 0.3 %
BILIRUB SERPL-MCNC: 0.2 MG/DL
BILIRUB UR QL STRIP.AUTO: NEGATIVE
BUN SERPL-MCNC: 44.5 MG/DL (ref 8.4–25.7)
CALCIUM SERPL-MCNC: 10.7 MG/DL (ref 8.8–10)
CHLORIDE SERPL-SCNC: 110 MMOL/L (ref 98–107)
CLARITY UR: CLEAR
CO2 SERPL-SCNC: 18 MMOL/L (ref 23–31)
COLOR UR AUTO: COLORLESS
CREAT SERPL-MCNC: 1.76 MG/DL (ref 0.73–1.18)
EOSINOPHIL # BLD AUTO: 0.41 X10(3)/MCL (ref 0–0.9)
EOSINOPHIL NFR BLD AUTO: 3.5 %
ERYTHROCYTE [DISTWIDTH] IN BLOOD BY AUTOMATED COUNT: 12.8 % (ref 11.5–17)
GFR SERPLBLD CREATININE-BSD FMLA CKD-EPI: 40 ML/MIN/1.73/M2
GLOBULIN SER-MCNC: 3.9 GM/DL (ref 2.4–3.5)
GLUCOSE SERPL-MCNC: 261 MG/DL (ref 82–115)
GLUCOSE UR QL STRIP: ABNORMAL
HCT VFR BLD AUTO: 38.4 % (ref 42–52)
HGB BLD-MCNC: 12.6 G/DL (ref 14–18)
HGB UR QL STRIP: ABNORMAL
HOLD SPECIMEN: NORMAL
HYALINE CASTS #/AREA URNS LPF: ABNORMAL /LPF
IMM GRANULOCYTES # BLD AUTO: 0.05 X10(3)/MCL (ref 0–0.04)
IMM GRANULOCYTES NFR BLD AUTO: 0.4 %
KETONES UR QL STRIP: NEGATIVE
LEUKOCYTE ESTERASE UR QL STRIP: NEGATIVE
LYMPHOCYTES # BLD AUTO: 2.25 X10(3)/MCL (ref 0.6–4.6)
LYMPHOCYTES NFR BLD AUTO: 19.2 %
MCH RBC QN AUTO: 28.6 PG (ref 27–31)
MCHC RBC AUTO-ENTMCNC: 32.8 G/DL (ref 33–36)
MCV RBC AUTO: 87.1 FL (ref 80–94)
MONOCYTES # BLD AUTO: 0.54 X10(3)/MCL (ref 0.1–1.3)
MONOCYTES NFR BLD AUTO: 4.6 %
MUCOUS THREADS URNS QL MICRO: ABNORMAL /LPF
NEUTROPHILS # BLD AUTO: 8.41 X10(3)/MCL (ref 2.1–9.2)
NEUTROPHILS NFR BLD AUTO: 72 %
NITRITE UR QL STRIP: NEGATIVE
NRBC BLD AUTO-RTO: 0 %
PH UR STRIP: 6 [PH]
PLATELET # BLD AUTO: 247 X10(3)/MCL (ref 130–400)
PLATELETS.RETICULATED NFR BLD AUTO: 6.5 % (ref 0.9–11.2)
PMV BLD AUTO: 12 FL (ref 7.4–10.4)
POTASSIUM SERPL-SCNC: 4.3 MMOL/L (ref 3.5–5.1)
PROT SERPL-MCNC: 7.4 GM/DL (ref 5.8–7.6)
PROT UR QL STRIP: ABNORMAL
RBC # BLD AUTO: 4.41 X10(6)/MCL (ref 4.7–6.1)
RBC #/AREA URNS AUTO: ABNORMAL /HPF
SODIUM SERPL-SCNC: 137 MMOL/L (ref 136–145)
SP GR UR STRIP.AUTO: 1.01 (ref 1–1.03)
SQUAMOUS #/AREA URNS LPF: ABNORMAL /HPF
TROPONIN I SERPL-MCNC: 0.03 NG/ML (ref 0–0.04)
UROBILINOGEN UR STRIP-ACNC: NORMAL
WBC # SPEC AUTO: 11.7 X10(3)/MCL (ref 4.5–11.5)
WBC #/AREA URNS AUTO: ABNORMAL /HPF

## 2024-05-15 PROCEDURE — 81001 URINALYSIS AUTO W/SCOPE: CPT | Performed by: NURSE PRACTITIONER

## 2024-05-15 PROCEDURE — 25000003 PHARM REV CODE 250: Performed by: NURSE PRACTITIONER

## 2024-05-15 PROCEDURE — 84484 ASSAY OF TROPONIN QUANT: CPT | Performed by: NURSE PRACTITIONER

## 2024-05-15 PROCEDURE — 99285 EMERGENCY DEPT VISIT HI MDM: CPT | Mod: 25

## 2024-05-15 PROCEDURE — 85025 COMPLETE CBC W/AUTO DIFF WBC: CPT | Performed by: NURSE PRACTITIONER

## 2024-05-15 PROCEDURE — 93005 ELECTROCARDIOGRAM TRACING: CPT

## 2024-05-15 PROCEDURE — 80053 COMPREHEN METABOLIC PANEL: CPT | Performed by: NURSE PRACTITIONER

## 2024-05-15 RX ORDER — CLONIDINE HYDROCHLORIDE 0.1 MG/1
0.1 TABLET ORAL
Status: COMPLETED | OUTPATIENT
Start: 2024-05-15 | End: 2024-05-15

## 2024-05-15 RX ORDER — TRAMADOL HYDROCHLORIDE 50 MG/1
50 TABLET ORAL
Status: COMPLETED | OUTPATIENT
Start: 2024-05-15 | End: 2024-05-15

## 2024-05-15 RX ADMIN — CLONIDINE HYDROCHLORIDE 0.1 MG: 0.1 TABLET ORAL at 07:05

## 2024-05-15 RX ADMIN — TRAMADOL HYDROCHLORIDE 50 MG: 50 TABLET, COATED ORAL at 07:05

## 2024-05-16 LAB
OHS QRS DURATION: 146 MS
OHS QRS DURATION: 148 MS
OHS QTC CALCULATION: 460 MS
OHS QTC CALCULATION: 468 MS

## 2024-05-16 NOTE — DISCHARGE INSTRUCTIONS
Follow up with your primary care physician in 3-5 days for follow up evaluation.  Continue home medications as prescribed.  Increase oral fluids at home.  Follow up with [facility] as discussed.

## 2024-05-16 NOTE — ED PROVIDER NOTES
"Encounter Date: 5/15/2024       History     Chief Complaint   Patient presents with    Fall    Dizziness    right leg and right shoulder pain     Pt is a 74 y.o. male who presents to the [facility] ED for evaluation of his Rt shoulder and hip after he was found sitting on the floor of his bathroom by his daughter-in-law. Son at bedside. Reports pt became unsteady as he was getting off the toilet and fell. Denies pt hitting his head, LOC, chest pain, SOB, weakness, dizziness, or loss of bowel or bladder control. Reports pt was able to stand and ambulate without assistance however he reported pain after taking a few steps. Son also reports pt's Rt shoulder pain has been present for numerous weeks however it is reported to be "worse" since pt fall. Pt currently hypertensive. Denies blurry vision, headache, or dizziness. Son further reports that pt has been complaining of urinary frequency with reported difficulty passing urine. Pt denies penile pain, testicular pain, or testicular swelling.       Review of patient's allergies indicates:   Allergen Reactions    Dilaudid [hydromorphone] Anxiety     Past Medical History:   Diagnosis Date    Essential (primary) hypertension     Unspecified chronic bronchitis      Past Surgical History:   Procedure Laterality Date    CYSTOSCOPY W/ URETERAL STENT PLACEMENT Left 12/11/2022    Procedure: CYSTOSCOPY, WITH URETERAL STENT INSERTION;  Surgeon: Mar Fernandez MD;  Location: [facility];  Service: Urology;  Laterality: Left;     No family history on file.  Social History     Tobacco Use    Smoking status: Never    Smokeless tobacco: Never   Substance Use Topics    Alcohol use: Not Currently    Drug use: Never     Review of Systems   Constitutional:  Negative for chills, diaphoresis, fatigue and fever.   HENT:  Negative for facial swelling, postnasal drip, rhinorrhea, sinus pressure, sinus pain, sore throat and trouble swallowing.    Respiratory:  Negative for cough, chest tightness, " shortness of breath and wheezing.    Cardiovascular:  Negative for chest pain, palpitations and leg swelling.   Gastrointestinal:  Negative for abdominal pain, diarrhea, nausea and vomiting.   Genitourinary:  Negative for dysuria, flank pain, hematuria and urgency.   Musculoskeletal:  Positive for arthralgias. Negative for back pain and myalgias.   Skin:  Negative for color change and rash.   Neurological:  Negative for dizziness, syncope, weakness and headaches.   Hematological:  Does not bruise/bleed easily.   All other systems reviewed and are negative.      Physical Exam     Initial Vitals [05/15/24 1921]   BP Pulse Resp Temp SpO2   (!) 217/103 65 20 98.1 °F (36.7 °C) 99 %      MAP       --         Physical Exam    Nursing note and vitals reviewed.  Constitutional: Vital signs are normal. He appears well-developed and well-nourished.   HENT:   Head: Normocephalic.   Nose: Nose normal.   Mouth/Throat: Oropharynx is clear and moist.   Eyes: Conjunctivae and EOM are normal. Pupils are equal, round, and reactive to light.   Neck: Neck supple.   Normal range of motion.  Cardiovascular:  Normal rate, regular rhythm, normal heart sounds and intact distal pulses.           Pulmonary/Chest: Effort normal and breath sounds normal. No respiratory distress. He has no wheezes. He has no rhonchi. He has no rales. He exhibits no tenderness.   Abdominal: Abdomen is soft and flat. Bowel sounds are normal. There is no abdominal tenderness. There is no rebound, no guarding, no tenderness at McBurney's point and negative Kate's sign.   Musculoskeletal:      Right shoulder: Tenderness present. No swelling or deformity. Decreased range of motion. Normal strength. Normal pulse.      Right upper arm: Tenderness present. No swelling or deformity.        Arms:       Cervical back: Normal range of motion and neck supple.      Right hip: Tenderness present. No deformity. Decreased range of motion. Normal strength.         Legs:      Neurological: He is alert and oriented to person, place, and time. He has normal strength.   Skin: Skin is warm and dry. Capillary refill takes less than 2 seconds.   Psychiatric: He has a normal mood and affect. His behavior is normal. Judgment and thought content normal.         ED Course   Procedures  Labs Reviewed   COMPREHENSIVE METABOLIC PANEL - Abnormal; Notable for the following components:       Result Value    Chloride 110 (*)     CO2 18 (*)     Glucose 261 (*)     Blood Urea Nitrogen 44.5 (*)     Creatinine 1.76 (*)     Calcium 10.7 (*)     Globulin 3.9 (*)     Albumin/Globulin Ratio 0.9 (*)     All other components within normal limits   CBC WITH DIFFERENTIAL - Abnormal; Notable for the following components:    WBC 11.70 (*)     RBC 4.41 (*)     Hgb 12.6 (*)     Hct 38.4 (*)     MCHC 32.8 (*)     MPV 12.0 (*)     IG# 0.05 (*)     All other components within normal limits   URINALYSIS, REFLEX TO URINE CULTURE - Abnormal; Notable for the following components:    Color, UA Colorless (*)     Protein, UA 3+ (*)     Glucose, UA 2+ (*)     Blood, UA 1+ (*)     Mucous, UA Trace (*)     All other components within normal limits   TROPONIN I - Normal   CBC W/ AUTO DIFFERENTIAL    Narrative:     The following orders were created for panel order CBC auto differential.  Procedure                               Abnormality         Status                     ---------                               -----------         ------                     CBC with Differential[[id number]]       Abnormal            Final result                 Please view results for these tests on the individual orders.   EXTRA TUBES    Narrative:     The following orders were created for panel order EXTRA TUBES.  Procedure                               Abnormality         Status                     ---------                               -----------         ------                     Light Blue Top Hold[[id number]]                              In process                 Lavender Top Hold[[id number]]                               In process                 Lavender Top Hold[[id number]]                               In process                 Gold Top Hold[[id number]]                                   In process                   Please view results for these tests on the individual orders.   LIGHT BLUE TOP HOLD   LAVENDER TOP HOLD   LAVENDER TOP HOLD   GOLD TOP HOLD          Imaging Results              X-Ray Femur 2 AP/LAT Right (Final result)  Result time 05/15/24 20:50:24      Final result by Jamil Valencia MD (05/15/24 20:50:24)                   Impression:      No acute abnormalities are seen      Electronically signed by: Jamil Valencia MD  Date:    05/15/2024  Time:    20:50               Narrative:    EXAMINATION:  XR FEMUR 2 VIEW RIGHT    CLINICAL HISTORY:  Unspecified fall, initial encounter    TECHNIQUE:  AP and lateral views of the right femur were performed.    COMPARISON:  None    FINDINGS:  There are no fractures seen.  There is no dislocation.  The patella is not well visualized.                                       X-Ray Hip 2 or 3 views Right (with Pelvis when performed) (Final result)  Result time 05/15/24 20:51:26      Final result by Jamil Valencia MD (05/15/24 20:51:26)                   Impression:      Limited study.  No obvious fractures are seen.      Electronically signed by: Jamil Valencia MD  Date:    05/15/2024  Time:    20:51               Narrative:    EXAMINATION:  XR HIP WITH PELVIS WHEN PERFORMED, 2 OR 3  VIEWS RIGHT    CLINICAL HISTORY:  Unspecified fall, initial encounter    TECHNIQUE:  AP view of the pelvis and frog leg lateral view of the right hip were performed.    COMPARISON:  None    FINDINGS:  The hips are not well position and cannot be well evaluated.  The femoral heads are not dislocated.  There is vascular calcification noted.                                       X-Ray Humerus 2 View Right (Final  result)  Result time 05/15/24 20:52:00      Final result by Jamil Valencia MD (05/15/24 20:52:00)                   Impression:      No acute abnormalities are seen      Electronically signed by: Jamil Valencia MD  Date:    05/15/2024  Time:    20:52               Narrative:    EXAMINATION:  XR HUMERUS 2 VIEW RIGHT    CLINICAL HISTORY:  Unspecified fall, initial encounter    COMPARISON:  None    FINDINGS:  In acute fracture is not seen.  There is no dislocation.  There is a small soft tissue calcification superior to the humeral head.                                       X-Ray Shoulder Complete 2 View Right (Final result)  Result time 05/15/24 20:52:34      Final result by Jamil Valencia MD (05/15/24 20:52:34)                   Impression:      Degenerative changes in the glenohumeral joint.    Small soft tissue calcification      Electronically signed by: Jamil Valencia MD  Date:    05/15/2024  Time:    20:52               Narrative:    EXAMINATION:  XR SHOULDER COMPLETE 2 OR MORE VIEWS RIGHT    CLINICAL HISTORY:  Unspecified fall, initial encounter    TECHNIQUE:  Two or three views of the right shoulder were performed.    COMPARISON:  None    FINDINGS:  There are no fractures seen.  There is no dislocation.  There degenerative changes in the glenohumeral joint.  There is a small soft tissue calcification cannot rule out calcific tendinitis.                                       Medications   traMADoL tablet 50 mg (50 mg Oral Given 5/15/24 1936)   cloNIDine tablet 0.1 mg (0.1 mg Oral Given 5/15/24 1936)     Medical Decision Making  Differential:  Fall  Strain  Fracture  UTI  Electrolyte imbalance  AMI      Amount and/or Complexity of Data Reviewed  Labs: ordered.  Radiology: ordered.    Risk  Prescription drug management.               ED Course as of 05/15/24 2156   Wed May 15, 2024   2153 Given strict ED return precautions. I have spoken with the patient and/or caregivers. I have explained the patient's  condition, diagnoses and treatment plan based on the information available to me at this time. I have answered the patient's and/or caregiver's questions and addressed any concerns. The patient and/or caregivers have as good an understanding of the patient's diagnosis, condition and treatment plan as can be expected at this point. The vital signs have been stable. The patient's condition is stable and appropriate for discharge from the emergency department.      The patient will pursue further outpatient evaluation with the primary care physician or other designated or consulting physician as outlined in the discharge instructions. The patient and/or caregivers are agreeable to this plan of care and follow-up instructions have been explained in detail. The patient and/or caregivers have received these instructions in written format and have expressed an understanding of the discharge instructions. The patient and/or caregivers are aware that any significant change in condition or worsening of symptoms should prompt an immediate return to this or the closest emergency department or a call to 911.   [JA]      ED Course User Index  [JA] Navi Mccullough Jr., [facility]                           Clinical Impression:  Final diagnoses:  [W19.XXXA] Fall (Primary)  [M25.511, G89.29] Chronic right shoulder pain  [M25.551] Right hip pain  [M75.31] Calcific tendonitis of right shoulder          ED Disposition Condition    Discharge Stable          ED Prescriptions    None       Follow-up Information       Follow up With Specialties Details Why Contact Info    NATALY Winn Jr., MD Family Medicine In 3 days  [address] [place] [address] [phone]      [facility] Emergency Medicine In 3 days As needed, If symptoms worsen [address] [facility] [address] [phone]    [facility]  Schedule an appointment as soon as possible for a visit in 5 days For follow up  with Orthopedic Clinic in next 5-7 days to establish care [address] [facility] [address]             Navi Mccullough Jr., [facility]  05/15/24 0480

## 2024-05-17 ENCOUNTER — HOSPITAL ENCOUNTER (INPATIENT)
Facility: HOSPITAL | Age: 74
LOS: 7 days | Discharge: NURSING, REHAB OR LONG-TERM CARE | DRG: 065 | End: 2024-05-24
Attending: STUDENT IN AN ORGANIZED HEALTH CARE EDUCATION/TRAINING PROGRAM | Admitting: STUDENT IN AN ORGANIZED HEALTH CARE EDUCATION/TRAINING PROGRAM
Payer: MEDICAID

## 2024-05-17 DIAGNOSIS — R00.0 TACHYCARDIA: ICD-10-CM

## 2024-05-17 DIAGNOSIS — E04.1 THYROID NODULE: ICD-10-CM

## 2024-05-17 DIAGNOSIS — R52 PAIN: ICD-10-CM

## 2024-05-17 DIAGNOSIS — I49.9 ARRHYTHMIA: ICD-10-CM

## 2024-05-17 DIAGNOSIS — I63.9 STROKE: ICD-10-CM

## 2024-05-17 DIAGNOSIS — R53.1 WEAKNESS: ICD-10-CM

## 2024-05-17 DIAGNOSIS — I63.9 CEREBROVASCULAR ACCIDENT (CVA), UNSPECIFIED MECHANISM: Primary | ICD-10-CM

## 2024-05-17 DIAGNOSIS — R19.7 DIARRHEA, UNSPECIFIED TYPE: ICD-10-CM

## 2024-05-17 DIAGNOSIS — R07.9 CHEST PAIN: ICD-10-CM

## 2024-05-17 DIAGNOSIS — I16.0 HYPERTENSIVE URGENCY: ICD-10-CM

## 2024-05-17 LAB
A-ADO2 BLOOD GAS (OHS): 32 MMHG
ALBUMIN SERPL-MCNC: 3.2 G/DL (ref 3.4–4.8)
ALBUMIN/GLOB SERPL: 0.9 RATIO (ref 1.1–2)
ALLENS TEST BLOOD GAS (OHS): YES
ALP SERPL-CCNC: 61 UNIT/L (ref 40–150)
ALT SERPL-CCNC: 17 UNIT/L (ref 0–55)
AMPHET UR QL SCN: NEGATIVE
AST SERPL-CCNC: 21 UNIT/L (ref 5–34)
AV INDEX (PROSTH): 0.75
AV MEAN GRADIENT: 3 MMHG
AV PEAK GRADIENT: 7 MMHG
AV REGURGITATION PRESSURE HALF TIME: 531.52 MS
AV VALVE AREA BY VELOCITY RATIO: 2.33 CM²
AV VALVE AREA: 2.39 CM²
AV VELOCITY RATIO: 0.73
B-OH-BUTYR SERPL-MCNC: 0.1 MMOL/L
BACTERIA #/AREA URNS AUTO: ABNORMAL /HPF
BARBITURATE SCN PRESENT UR: NEGATIVE
BASE EXCESS BLD CALC-SCNC: -7.3 MMOL/L
BASOPHILS # BLD AUTO: 0.03 X10(3)/MCL
BASOPHILS NFR BLD AUTO: 0.2 %
BENZODIAZ UR QL SCN: NEGATIVE
BILIRUB SERPL-MCNC: 0.3 MG/DL
BILIRUB UR QL STRIP.AUTO: NEGATIVE
BLOOD GAS SAMPLE TYPE (OHS): ABNORMAL
BSA FOR ECHO PROCEDURE: 1.65 M2
BUN SERPL-MCNC: 46.4 MG/DL (ref 8.4–25.7)
CALCIUM SERPL-MCNC: 9.8 MG/DL (ref 8.8–10)
CANNABINOIDS UR QL SCN: NEGATIVE
CHLORIDE SERPL-SCNC: 108 MMOL/L (ref 98–107)
CLARITY UR: CLEAR
CO2 BLDA-SCNC: 19.1 MMOL/L
CO2 SERPL-SCNC: 17 MMOL/L (ref 23–31)
COCAINE UR QL SCN: NEGATIVE
COHGB MFR BLDA: 2.1 %
COLOR UR AUTO: COLORLESS
CREAT SERPL-MCNC: 2.19 MG/DL (ref 0.73–1.18)
CV ECHO LV RWT: 0.75 CM
DOP CALC AO PEAK VEL: 1.32 M/S
DOP CALC AO VTI: 24.5 CM
DOP CALC LVOT AREA: 3.2 CM2
DOP CALC LVOT DIAMETER: 2.02 CM
DOP CALC LVOT PEAK VEL: 0.96 M/S
DOP CALC LVOT STROKE VOLUME: 58.62 CM3
DOP CALC MV VTI: 30.5 CM
DOP CALCLVOT PEAK VEL VTI: 18.3 CM
DRAWN BY BLOOD GAS (OHS): ABNORMAL
E WAVE DECELERATION TIME: 390.17 MSEC
E/A RATIO: 0.5
ECHO LV POSTERIOR WALL: 1.29 CM (ref 0.6–1.1)
EOSINOPHIL # BLD AUTO: 0.09 X10(3)/MCL (ref 0–0.9)
EOSINOPHIL NFR BLD AUTO: 0.6 %
ERYTHROCYTE [DISTWIDTH] IN BLOOD BY AUTOMATED COUNT: 13 % (ref 11.5–17)
EST. AVERAGE GLUCOSE BLD GHB EST-MCNC: 214.5 MG/DL
FENTANYL UR QL SCN: NEGATIVE
FRACTIONAL SHORTENING: 42 % (ref 28–44)
GAS PNL BLD: 106 MMHG
GFR SERPLBLD CREATININE-BSD FMLA CKD-EPI: 31 ML/MIN/1.73/M2
GLOBULIN SER-MCNC: 3.5 GM/DL (ref 2.4–3.5)
GLUCOSE SERPL-MCNC: 405 MG/DL (ref 82–115)
GLUCOSE UR QL STRIP: ABNORMAL
HBA1C MFR BLD: 9.1 %
HCO3 BLDA-SCNC: 18 MMOL/L
HCT VFR BLD AUTO: 36.6 % (ref 42–52)
HGB BLD-MCNC: 11.8 G/DL (ref 14–18)
HGB UR QL STRIP: ABNORMAL
HOLD SPECIMEN: NORMAL
HR MV ECHO: 64 BPM
HYALINE CASTS #/AREA URNS LPF: ABNORMAL /LPF
IMM GRANULOCYTES # BLD AUTO: 0.05 X10(3)/MCL (ref 0–0.04)
IMM GRANULOCYTES NFR BLD AUTO: 0.4 %
INHALED O2 CONCENTRATION: 21 %
INR PPP: 1
INTERVENTRICULAR SEPTUM: 1.24 CM (ref 0.6–1.1)
IVC DIAMETER: 1.7 CM
KETONES UR QL STRIP: NEGATIVE
LACTATE SERPL-SCNC: 2 MMOL/L (ref 0.5–2.2)
LEFT ATRIUM SIZE: 2.98 CM
LEFT ATRIUM VOLUME INDEX MOD: 13.1 ML/M2
LEFT ATRIUM VOLUME MOD: 21.3 CM3
LEFT INTERNAL DIMENSION IN SYSTOLE: 2 CM (ref 2.1–4)
LEFT VENTRICLE DIASTOLIC VOLUME INDEX: 29.69 ML/M2
LEFT VENTRICLE DIASTOLIC VOLUME: 48.1 ML
LEFT VENTRICLE MASS INDEX: 88 G/M2
LEFT VENTRICLE SYSTOLIC VOLUME INDEX: 7.9 ML/M2
LEFT VENTRICLE SYSTOLIC VOLUME: 12.73 ML
LEFT VENTRICULAR INTERNAL DIMENSION IN DIASTOLE: 3.42 CM (ref 3.5–6)
LEFT VENTRICULAR MASS: 142.58 G
LEUKOCYTE ESTERASE UR QL STRIP: NEGATIVE
LV LATERAL E/E' RATIO: 9.17 M/S
LVOT MG: 2.11 MMHG
LVOT MV: 0.69 CM/S
LYMPHOCYTES # BLD AUTO: 1.25 X10(3)/MCL (ref 0.6–4.6)
LYMPHOCYTES NFR BLD AUTO: 8.9 %
MAGNESIUM SERPL-MCNC: 2.1 MG/DL (ref 1.6–2.6)
MCH RBC QN AUTO: 28.4 PG (ref 27–31)
MCHC RBC AUTO-ENTMCNC: 32.2 G/DL (ref 33–36)
MCV RBC AUTO: 88 FL (ref 80–94)
MDMA UR QL SCN: NEGATIVE
METHGB MFR BLDA: 0.2 %
MONOCYTES # BLD AUTO: 0.48 X10(3)/MCL (ref 0.1–1.3)
MONOCYTES NFR BLD AUTO: 3.4 %
MUCOUS THREADS URNS QL MICRO: ABNORMAL /LPF
MV MEAN GRADIENT: 2 MMHG
MV PEAK A VEL: 1.11 M/S
MV PEAK E VEL: 0.55 M/S
MV PEAK GRADIENT: 7 MMHG
MV STENOSIS PRESSURE HALF TIME: 113.15 MS
MV VALVE AREA BY CONTINUITY EQUATION: 1.92 CM2
MV VALVE AREA P 1/2 METHOD: 1.94 CM2
NEUTROPHILS # BLD AUTO: 12.18 X10(3)/MCL (ref 2.1–9.2)
NEUTROPHILS NFR BLD AUTO: 86.5 %
NITRITE UR QL STRIP: NEGATIVE
NRBC BLD AUTO-RTO: 0 %
O2 HB BLOOD GAS (OHS): 95.3 %
OHS LV EJECTION FRACTION SIMPSONS BIPLANE MOD: 60 %
OHS QRS DURATION: 148 MS
OHS QTC CALCULATION: 481 MS
OPIATES UR QL SCN: NEGATIVE
OXYHGB MFR BLDA: 11.5 G/DL
PCO2 BLDA: 35 MMHG (ref 40–50)
PCP UR QL: NEGATIVE
PH BLDA: 7.32 [PH]
PH UR STRIP: 5.5 [PH]
PH UR: 5.5 [PH] (ref 3–11)
PISA AR MAX VEL: 3.13 M/S
PISA MRMAX VEL: 3.08 M/S
PISA TR MAX VEL: 2.09 M/S
PLATELET # BLD AUTO: 288 X10(3)/MCL (ref 130–400)
PMV BLD AUTO: 11.7 FL (ref 7.4–10.4)
PO2 BLDA: 74 MMHG (ref 30–40)
POCT GLUCOSE: 267 MG/DL (ref 70–110)
POTASSIUM SERPL-SCNC: 4 MMOL/L (ref 3.5–5.1)
PROT SERPL-MCNC: 6.7 GM/DL (ref 5.8–7.6)
PROT UR QL STRIP: ABNORMAL
PROTHROMBIN TIME: 13.7 SECONDS (ref 11.4–14)
RA MAJOR: 4.35 CM
RA PRESSURE ESTIMATED: 3 MMHG
RBC # BLD AUTO: 4.16 X10(6)/MCL (ref 4.7–6.1)
RBC #/AREA URNS AUTO: ABNORMAL /HPF
RV TB RVSP: 5 MMHG
SAMPLE SITE BLOOD GAS (OHS): ABNORMAL
SAO2 % BLDA: 97.5 %
SODIUM SERPL-SCNC: 136 MMOL/L (ref 136–145)
SP GR UR STRIP.AUTO: 1.01 (ref 1–1.03)
SPECIFIC GRAVITY, URINE AUTO (.000) (OHS): 1.01 (ref 1–1.03)
SQUAMOUS #/AREA URNS LPF: ABNORMAL /HPF
TDI LATERAL: 0.06 M/S
TR MAX PG: 17 MMHG
TRICUSPID ANNULAR PLANE SYSTOLIC EXCURSION: 1.07 CM
TROPONIN I SERPL-MCNC: 0.05 NG/ML (ref 0–0.04)
TSH SERPL-ACNC: 0.96 UIU/ML (ref 0.35–4.94)
TV REST PULMONARY ARTERY PRESSURE: 20 MMHG
UROBILINOGEN UR STRIP-ACNC: NORMAL
WBC # SPEC AUTO: 14.08 X10(3)/MCL (ref 4.5–11.5)
WBC #/AREA URNS AUTO: ABNORMAL /HPF
Z-SCORE OF LEFT VENTRICULAR DIMENSION IN END DIASTOLE: -2.93
Z-SCORE OF LEFT VENTRICULAR DIMENSION IN END SYSTOLE: -2.78

## 2024-05-17 PROCEDURE — 84484 ASSAY OF TROPONIN QUANT: CPT | Performed by: STUDENT IN AN ORGANIZED HEALTH CARE EDUCATION/TRAINING PROGRAM

## 2024-05-17 PROCEDURE — 96360 HYDRATION IV INFUSION INIT: CPT

## 2024-05-17 PROCEDURE — 83735 ASSAY OF MAGNESIUM: CPT | Performed by: STUDENT IN AN ORGANIZED HEALTH CARE EDUCATION/TRAINING PROGRAM

## 2024-05-17 PROCEDURE — 93005 ELECTROCARDIOGRAM TRACING: CPT

## 2024-05-17 PROCEDURE — 96372 THER/PROPH/DIAG INJ SC/IM: CPT

## 2024-05-17 PROCEDURE — G0378 HOSPITAL OBSERVATION PER HR: HCPCS

## 2024-05-17 PROCEDURE — 25000003 PHARM REV CODE 250

## 2024-05-17 PROCEDURE — 85610 PROTHROMBIN TIME: CPT | Performed by: STUDENT IN AN ORGANIZED HEALTH CARE EDUCATION/TRAINING PROGRAM

## 2024-05-17 PROCEDURE — 83605 ASSAY OF LACTIC ACID: CPT | Performed by: STUDENT IN AN ORGANIZED HEALTH CARE EDUCATION/TRAINING PROGRAM

## 2024-05-17 PROCEDURE — 83036 HEMOGLOBIN GLYCOSYLATED A1C: CPT

## 2024-05-17 PROCEDURE — 25000003 PHARM REV CODE 250: Performed by: STUDENT IN AN ORGANIZED HEALTH CARE EDUCATION/TRAINING PROGRAM

## 2024-05-17 PROCEDURE — 99285 EMERGENCY DEPT VISIT HI MDM: CPT | Mod: 25

## 2024-05-17 PROCEDURE — 81001 URINALYSIS AUTO W/SCOPE: CPT | Performed by: STUDENT IN AN ORGANIZED HEALTH CARE EDUCATION/TRAINING PROGRAM

## 2024-05-17 PROCEDURE — 21400001 HC TELEMETRY ROOM

## 2024-05-17 PROCEDURE — 85025 COMPLETE CBC W/AUTO DIFF WBC: CPT | Performed by: STUDENT IN AN ORGANIZED HEALTH CARE EDUCATION/TRAINING PROGRAM

## 2024-05-17 PROCEDURE — 36415 COLL VENOUS BLD VENIPUNCTURE: CPT | Performed by: STUDENT IN AN ORGANIZED HEALTH CARE EDUCATION/TRAINING PROGRAM

## 2024-05-17 PROCEDURE — 80053 COMPREHEN METABOLIC PANEL: CPT | Performed by: STUDENT IN AN ORGANIZED HEALTH CARE EDUCATION/TRAINING PROGRAM

## 2024-05-17 PROCEDURE — 63600175 PHARM REV CODE 636 W HCPCS

## 2024-05-17 PROCEDURE — 80307 DRUG TEST PRSMV CHEM ANLYZR: CPT

## 2024-05-17 PROCEDURE — 36415 COLL VENOUS BLD VENIPUNCTURE: CPT

## 2024-05-17 PROCEDURE — 84443 ASSAY THYROID STIM HORMONE: CPT | Performed by: STUDENT IN AN ORGANIZED HEALTH CARE EDUCATION/TRAINING PROGRAM

## 2024-05-17 PROCEDURE — 82010 KETONE BODYS QUAN: CPT | Performed by: STUDENT IN AN ORGANIZED HEALTH CARE EDUCATION/TRAINING PROGRAM

## 2024-05-17 RX ORDER — IBUPROFEN 200 MG
24 TABLET ORAL
Status: DISCONTINUED | OUTPATIENT
Start: 2024-05-17 | End: 2024-05-24 | Stop reason: HOSPADM

## 2024-05-17 RX ORDER — SIMETHICONE 80 MG
1 TABLET,CHEWABLE ORAL 3 TIMES DAILY PRN
Status: DISCONTINUED | OUTPATIENT
Start: 2024-05-17 | End: 2024-05-24 | Stop reason: HOSPADM

## 2024-05-17 RX ORDER — POLYETHYLENE GLYCOL 3350 17 G/17G
17 POWDER, FOR SOLUTION ORAL DAILY PRN
Status: DISCONTINUED | OUTPATIENT
Start: 2024-05-17 | End: 2024-05-24 | Stop reason: HOSPADM

## 2024-05-17 RX ORDER — GLUCAGON 1 MG
1 KIT INJECTION
Status: DISCONTINUED | OUTPATIENT
Start: 2024-05-17 | End: 2024-05-24 | Stop reason: HOSPADM

## 2024-05-17 RX ORDER — ONDANSETRON 4 MG/1
8 TABLET, ORALLY DISINTEGRATING ORAL EVERY 8 HOURS PRN
Status: DISCONTINUED | OUTPATIENT
Start: 2024-05-17 | End: 2024-05-24 | Stop reason: HOSPADM

## 2024-05-17 RX ORDER — AMOXICILLIN 250 MG
1 CAPSULE ORAL 2 TIMES DAILY PRN
Status: DISCONTINUED | OUTPATIENT
Start: 2024-05-17 | End: 2024-05-24 | Stop reason: HOSPADM

## 2024-05-17 RX ORDER — CLOPIDOGREL BISULFATE 75 MG/1
75 TABLET ORAL DAILY
Status: DISCONTINUED | OUTPATIENT
Start: 2024-05-18 | End: 2024-05-24 | Stop reason: HOSPADM

## 2024-05-17 RX ORDER — DICYCLOMINE HYDROCHLORIDE 10 MG/1
10 CAPSULE ORAL ONCE
Status: COMPLETED | OUTPATIENT
Start: 2024-05-17 | End: 2024-05-17

## 2024-05-17 RX ORDER — CLOPIDOGREL BISULFATE 75 MG/1
300 TABLET ORAL ONCE
Status: COMPLETED | OUTPATIENT
Start: 2024-05-17 | End: 2024-05-17

## 2024-05-17 RX ORDER — GLUCAGON 1 MG
1 KIT INJECTION
Status: DISCONTINUED | OUTPATIENT
Start: 2024-05-17 | End: 2024-05-17

## 2024-05-17 RX ORDER — ASPIRIN 325 MG
325 TABLET ORAL ONCE
Status: COMPLETED | OUTPATIENT
Start: 2024-05-17 | End: 2024-05-17

## 2024-05-17 RX ORDER — IBUPROFEN 200 MG
1 TABLET ORAL DAILY PRN
Status: DISCONTINUED | OUTPATIENT
Start: 2024-05-17 | End: 2024-05-24 | Stop reason: HOSPADM

## 2024-05-17 RX ORDER — ASPIRIN 325 MG
325 TABLET ORAL DAILY
Status: DISCONTINUED | OUTPATIENT
Start: 2024-05-17 | End: 2024-05-17

## 2024-05-17 RX ORDER — TALC
6 POWDER (GRAM) TOPICAL NIGHTLY PRN
Status: DISCONTINUED | OUTPATIENT
Start: 2024-05-17 | End: 2024-05-24 | Stop reason: HOSPADM

## 2024-05-17 RX ORDER — HYDRALAZINE HYDROCHLORIDE 20 MG/ML
10 INJECTION INTRAMUSCULAR; INTRAVENOUS
Status: DISCONTINUED | OUTPATIENT
Start: 2024-05-17 | End: 2024-05-18

## 2024-05-17 RX ORDER — ACETAMINOPHEN 325 MG/1
650 TABLET ORAL EVERY 4 HOURS PRN
Status: DISCONTINUED | OUTPATIENT
Start: 2024-05-17 | End: 2024-05-24 | Stop reason: HOSPADM

## 2024-05-17 RX ORDER — NALOXONE HCL 0.4 MG/ML
0.02 VIAL (ML) INJECTION
Status: DISCONTINUED | OUTPATIENT
Start: 2024-05-17 | End: 2024-05-24 | Stop reason: HOSPADM

## 2024-05-17 RX ORDER — PROCHLORPERAZINE EDISYLATE 5 MG/ML
5 INJECTION INTRAMUSCULAR; INTRAVENOUS EVERY 6 HOURS PRN
Status: DISCONTINUED | OUTPATIENT
Start: 2024-05-17 | End: 2024-05-24 | Stop reason: HOSPADM

## 2024-05-17 RX ORDER — IBUPROFEN 200 MG
16 TABLET ORAL
Status: DISCONTINUED | OUTPATIENT
Start: 2024-05-17 | End: 2024-05-17

## 2024-05-17 RX ORDER — INSULIN ASPART 100 [IU]/ML
0-5 INJECTION, SOLUTION INTRAVENOUS; SUBCUTANEOUS
Status: DISCONTINUED | OUTPATIENT
Start: 2024-05-17 | End: 2024-05-24 | Stop reason: HOSPADM

## 2024-05-17 RX ORDER — LABETALOL HCL 20 MG/4 ML
10 SYRINGE (ML) INTRAVENOUS EVERY 4 HOURS PRN
Status: DISCONTINUED | OUTPATIENT
Start: 2024-05-17 | End: 2024-05-18

## 2024-05-17 RX ORDER — CETIRIZINE HYDROCHLORIDE 10 MG/1
10 TABLET ORAL DAILY PRN
Status: DISCONTINUED | OUTPATIENT
Start: 2024-05-17 | End: 2024-05-24 | Stop reason: HOSPADM

## 2024-05-17 RX ORDER — SODIUM CHLORIDE 0.9 % (FLUSH) 0.9 %
10 SYRINGE (ML) INJECTION
Status: DISCONTINUED | OUTPATIENT
Start: 2024-05-17 | End: 2024-05-24 | Stop reason: HOSPADM

## 2024-05-17 RX ORDER — IBUPROFEN 200 MG
16 TABLET ORAL
Status: DISCONTINUED | OUTPATIENT
Start: 2024-05-17 | End: 2024-05-24 | Stop reason: HOSPADM

## 2024-05-17 RX ORDER — IBUPROFEN 200 MG
24 TABLET ORAL
Status: DISCONTINUED | OUTPATIENT
Start: 2024-05-17 | End: 2024-05-17

## 2024-05-17 RX ORDER — NAPROXEN SODIUM 220 MG/1
81 TABLET, FILM COATED ORAL DAILY
Status: DISCONTINUED | OUTPATIENT
Start: 2024-05-18 | End: 2024-05-24 | Stop reason: HOSPADM

## 2024-05-17 RX ADMIN — ACETAMINOPHEN 650 MG: 325 TABLET, FILM COATED ORAL at 10:05

## 2024-05-17 RX ADMIN — ASPIRIN 325 MG ORAL TABLET 325 MG: 325 PILL ORAL at 05:05

## 2024-05-17 RX ADMIN — SODIUM CHLORIDE 500 ML: 9 INJECTION, SOLUTION INTRAVENOUS at 03:05

## 2024-05-17 RX ADMIN — CLOPIDOGREL BISULFATE 300 MG: 75 TABLET ORAL at 05:05

## 2024-05-17 RX ADMIN — SODIUM CHLORIDE, POTASSIUM CHLORIDE, SODIUM LACTATE AND CALCIUM CHLORIDE 1000 ML: 600; 310; 30; 20 INJECTION, SOLUTION INTRAVENOUS at 06:05

## 2024-05-17 RX ADMIN — INSULIN ASPART 1 UNITS: 100 INJECTION, SOLUTION INTRAVENOUS; SUBCUTANEOUS at 09:05

## 2024-05-17 RX ADMIN — SODIUM CHLORIDE 500 ML: 9 INJECTION, SOLUTION INTRAVENOUS at 02:05

## 2024-05-17 RX ADMIN — DICYCLOMINE HYDROCHLORIDE 10 MG: 10 CAPSULE ORAL at 10:05

## 2024-05-17 RX ADMIN — HYDRALAZINE HYDROCHLORIDE 10 MG: 20 INJECTION INTRAMUSCULAR; INTRAVENOUS at 06:05

## 2024-05-17 NOTE — ED PROVIDER NOTES
Encounter Date: 2024       History     Chief Complaint   Patient presents with    Extremity Weakness     HX OF FALL 5/15/24, REPORT OF RT LEG WEAKNESS SINCE.  FAMILY REPORTS RT ARM WEAKNESS W DECREASE ROM  FOR YRS.  NO FACIAL DROOP OR SLURRED SPEECH.  DENIES VISION CHANGES.      Patient presents to the emergency department due to generalized weakness and frequent falls.  He was seen in the ER 2 days ago after a fall complaining of right shoulder and hip pain.  Imaging at that time was negative and patient was discharged.  Family reports that since going home the patient has been weak, dragging his right leg, and falling frequently.  Family has been walking with and catching him so he has not suffered significant trauma from the fall but he did injure his right knee yesterday.    The history is provided by the patient and a relative. The history is limited by a language barrier. A  was used.     Review of patient's allergies indicates:   Allergen Reactions    Dilaudid [hydromorphone] Anxiety     Past Medical History:   Diagnosis Date    Arthritis     Essential (primary) hypertension     Unspecified chronic bronchitis      Past Surgical History:   Procedure Laterality Date    CYSTOSCOPY W/ URETERAL STENT PLACEMENT Left 2022    Procedure: CYSTOSCOPY, WITH URETERAL STENT INSERTION;  Surgeon: Mar Fernandez MD;  Location: [facility];  Service: Urology;  Laterality: Left;     No family history on file.  Social History     Tobacco Use    Smoking status: Former     Current packs/day: 0.00     Types: Cigarettes     Quit date:      Years since quittin.3    Smokeless tobacco: Never   Substance Use Topics    Alcohol use: Not Currently    Drug use: Never     Review of Systems   Constitutional:  Negative for chills and fever.   HENT:  Negative for congestion and sore throat.    Respiratory:  Negative for cough and shortness of breath.    Cardiovascular:  Negative for chest pain and  palpitations.   Gastrointestinal:  Negative for abdominal pain and nausea.   Genitourinary:  Negative for dysuria and hematuria.   Musculoskeletal:  Negative for arthralgias and myalgias.   Neurological:  Positive for weakness. Negative for dizziness.       Physical Exam     Initial Vitals [05/17/24 1013]   BP Pulse Resp Temp SpO2   (!) 167/83 103 18 97.4 °F (36.3 °C) 100 %      MAP       --         Physical Exam    Nursing note and vitals reviewed.  Constitutional: He appears well-developed and well-nourished.   HENT:   Head: Normocephalic and atraumatic.   Eyes: Conjunctivae are normal. Pupils are equal, round, and reactive to light.   Neck: Neck supple.   Normal range of motion.  Cardiovascular:  Normal rate and regular rhythm.           Pulmonary/Chest: Breath sounds normal. No respiratory distress.   Abdominal: Abdomen is soft. There is no abdominal tenderness.   Musculoskeletal:         General: No edema. Normal range of motion.      Cervical back: Normal range of motion and neck supple.     Neurological: He is alert and oriented to person, place, and time.   Skin: Skin is warm and dry.         ED Course   Procedures  Labs Reviewed   COMPREHENSIVE METABOLIC PANEL - Abnormal; Notable for the following components:       Result Value    Chloride 108 (*)     CO2 17 (*)     Glucose 405 (*)     Blood Urea Nitrogen 46.4 (*)     Creatinine 2.19 (*)     Albumin 3.2 (*)     Albumin/Globulin Ratio 0.9 (*)     All other components within normal limits   TROPONIN I - Abnormal; Notable for the following components:    Troponin-I 0.048 (*)     All other components within normal limits   CBC WITH DIFFERENTIAL - Abnormal; Notable for the following components:    WBC 14.08 (*)     RBC 4.16 (*)     Hgb 11.8 (*)     Hct 36.6 (*)     MCHC 32.2 (*)     MPV 11.7 (*)     Neut # 12.18 (*)     IG# 0.05 (*)     All other components within normal limits   BLOOD GAS - Abnormal; Notable for the following components:    pCO2, Blood gas 35.0  (*)     pO2, Blood gas 74.0 (*)     All other components within normal limits   MAGNESIUM - Normal   LACTIC ACID, PLASMA - Normal   TSH - Normal   PROTIME-INR - Normal   BETA - HYDROXYBUTYRATE, SERUM - Normal   CBC W/ AUTO DIFFERENTIAL    Narrative:     The following orders were created for panel order CBC auto differential.  Procedure                               Abnormality         Status                     ---------                               -----------         ------                     CBC with Differential[[id number]]       Abnormal            Final result                 Please view results for these tests on the individual orders.   EXTRA TUBES    Narrative:     The following orders were created for panel order EXTRA TUBES.  Procedure                               Abnormality         Status                     ---------                               -----------         ------                     Red Top Hold[[id number]]                                    Final result               Light Green Top Hold[[id number]]                            Final result               Lavender Top Hold[[id number]]                               Final result               Gold Top Hold[[id number]]                                   Final result               Pink Top Hold[[id number]]                                   Final result                 Please view results for these tests on the individual orders.   RED TOP HOLD   LIGHT GREEN TOP HOLD   LAVENDER TOP HOLD   GOLD TOP HOLD   PINK TOP HOLD   URINALYSIS, REFLEX TO URINE CULTURE     EKG Readings: (Independently Interpreted)   Initial Reading: No STEMI. Rhythm: Normal Sinus Rhythm. Heart Rate: 82. Ectopy: No Ectopy. Conduction: RBBB and LAFB. Axis: Normal.     ECG Results              EKG 12-lead (Final result)        Collection Time Result Time QRS Duration OHS QTC Calculation    05/17/24 10:40:58 05/17/24 13:03:05 148 481                     Final result by Interface,  Lab In [facility] (05/17/24 13:03:07)                   Narrative:    Test Reason : R53.1,    Vent. Rate : 082 BPM     Atrial Rate : 082 BPM     P-R Int : 154 ms          QRS Dur : 148 ms      QT Int : 412 ms       P-R-T Axes : 044 -88 015 degrees     QTc Int : 481 ms    Normal sinus rhythm  Right bundle branch block  Left anterior fascicular block   Bifascicular block   Minimal voltage criteria for LVH, may be normal variant  Septal infarct ,age undetermined  Abnormal ECG  When compared with ECG of 15-MAY-2024 20:31,  Septal infarct is now Present  Nonspecific T wave abnormality now evident in Lateral leads  Confirmed by Radha Holt MD ([id number]) on 5/17/2024 1:03:03 PM    Referred By: ELINOR   SELF           Confirmed By:Radha Holt MD                                  Imaging Results              MRI Brain Without Contrast (Final result)  Result time 05/17/24 14:18:29      Final result by Иван Saleh MD (05/17/24 14:18:29)                   Impression:      1.  Left centrum semiovale acute nonhemorrhagic infarcts.    2.  Chronic microangiopathic ischemia and atrophy.      Electronically signed by: Иван Saleh  Date:    05/17/2024  Time:    14:18               Narrative:    EXAMINATION:  MRI BRAIN WITHOUT CONTRAST    CLINICAL HISTORY:  Stroke, follow up;    TECHNIQUE:  Multiplanar MRI sequences were performed of the brain without contrast.    COMPARISON:  CT brain without contrast May 17, 2014    FINDINGS:  The left centrum semiovale is remarkable for two adjacent acute nonhemorrhagic infarcts with diffusion weighted restrictions, signal dropout on ADC map and T2 FLAIR hyperintensities on image 27 series 6.  Chronic microvascular ischemic changes are mild with periventricular and deep white matter T2 FLAIR hyperintense signals.  Generalized cerebral and cerebellar cortical volume loss. Gradient echo sequences demonstrate no evidence of de phasing artifact to suggest hemorrhagic byproducts.  The sella and  suprasellar areas are unremarkable.    The cerebellar tonsils are normally positioned. There is no acute intracranial hemorrhage, hydrocephalus, midline shift or mass effect. No acute extra axial fluid collections identified. The mastoid air cells are clear.                                       X-Ray Chest AP Portable (Final result)  Result time 05/17/24 12:24:01      Final result by Joaquim Luis MD (05/17/24 12:24:01)                   Impression:      No acute cardiopulmonary process.      Electronically signed by: Joaquim Luis  Date:    05/17/2024  Time:    12:24               Narrative:    EXAMINATION:  XR CHEST AP PORTABLE    CLINICAL HISTORY:  Weakness;    TECHNIQUE:  Single view of the chest    COMPARISON:  10/25/2023    FINDINGS:  No focal opacification, pleural effusion, or pneumothorax.    The cardiomediastinal silhouette is within normal limits.    No acute osseous abnormality.                                       X-Ray Femur 2 View Right (Final result)  Result time 05/17/24 12:25:48      Final result by Joaquim Luis MD (05/17/24 12:25:48)                   Impression:      As above.  Prior imaging from 2 days prior.  If continued pain recommend further evaluation.      Electronically signed by: Joaquim Luis  Date:    05/17/2024  Time:    12:25               Narrative:    EXAMINATION:  XR FEMUR 2 VIEW RIGHT    CLINICAL HISTORY:  Pain;    TECHNIQUE:  AP and lateral views of the right femur were performed.    COMPARISON:  05/15/2024    FINDINGS:  no displaced fracture. Mild degenerative changes with acetabular and knee tricompartmental osteophytes.                                       X-Ray Knee 3 View Right (Final result)  Result time 05/17/24 12:26:26      Final result by Joaquim Luis MD (05/17/24 12:26:26)                   Impression:      As above.      Electronically signed by: Joaquim Luis  Date:    05/17/2024  Time:    12:26               Narrative:    EXAMINATION:  XR  KNEE 3 VIEW RIGHT    CLINICAL HISTORY:  Pain, unspecified    TECHNIQUE:  AP, lateral, and Merchant views of the right knee were performed.    COMPARISON:  None    FINDINGS:  Degenerative changes with tricompartmental osteophytes.  Joint spaces relatively well maintained.  Vascular atherosclerotic disease is noted.  Small suprapatellar effusion.                                        CT Head Without Contrast (Final result)  Result time 05/17/24 12:00:22      Final result by Noelle Felix MD (05/17/24 12:00:22)                   Impression:      Focal area of hypoattenuation in the cerebral convexity on the left side in the high left frontal lobe.  Findings are concerning for acute infarct.  MRI correlation would be beneficial.    Other chronic age related changes seen as outlined above    This report was flagged in Epic as abnormal.      Electronically signed by: Colt Felix  Date:    05/17/2024  Time:    12:00               Narrative:    EXAMINATION:  CT HEAD WITHOUT CONTRAST    CLINICAL HISTORY:  Neuro deficit, acute, stroke suspected;    TECHNIQUE:  Multiple axial images were obtained from the base of the brain to the vertex without contrast administration.  Sagittal and coronal reconstructions were performed..Automatic exposure control is utilized to reduce patient radiation exposure.    COMPARISON:  12/11/2022    FINDINGS:  There is no intracranial mass or lesion seen.  No hemorrhage is seen.  There is a focal area of hypoattenuation at the cerebral convexity on the left side in the left frontal region.  This is seen on images number 26 through 28 series 2.  Findings are concerning for acute infarct.  No other areas acute ischemia seen..  There is some cerebral atrophy seen.  There is some compensatory ventricular dilatation and periventricular white matter changes consistent with the patient's age.  Calvarium is intact.  The posterior fossa is unremarkable..  The visualized portions of the paranasal  sinuses show no acute abnormality.                                       CT Pelvis Without Contrast (Final result)  Result time 05/17/24 12:04:11      Final result by Navi Dorado MD (05/17/24 12:04:11)                   Impression:      No acute findings.      Electronically signed by: Navi Dorado  Date:    05/17/2024  Time:    12:04               Narrative:    EXAMINATION:  CT PELVIS WITHOUT CONTRAST    CLINICAL HISTORY:  Pelvis pain, stress fracture suspected, neg xray;    TECHNIQUE:  Helical acquisition through the pelvis without IV contrast.  Three plane reconstructions were provided for review.  mGycm. Automatic exposure control, adjustment of mA/kV or iterative reconstruction technique was used to reduce radiation.    COMPARISON:  9 December 2022    FINDINGS:  There is no fracture or dislocation.  Within the pelvis there is no free fluid or mass.  Moderate atherosclerotic disease.                                       Medications   sodium chloride 0.9% bolus 500 mL 500 mL (has no administration in time range)   sodium chloride 0.9% bolus 500 mL 500 mL ( Intravenous Stopped 5/17/24 1506)     Medical Decision Making  Hypertensive on arrival otherwise vital signs are stable.  EKG without acute concerning ischemic changes.  CBC with a mildly elevated white count of 14, CMP shows evidence of hyperglycemia with a glucose of 400.  X-rays and CT of the pelvis showed no new injuries.  CT of the head does show what appears to be an acute infarct.  Patient was weakness has been going on for almost 2 days now, well outside of the window for tPA.  Discussed the patient with Internal Medicine who will admit for further evaluation.      Amount and/or Complexity of Data Reviewed  Labs: ordered. Decision-making details documented in ED Course.  Radiology: ordered. Decision-making details documented in ED Course.  ECG/medicine tests: ordered and independent interpretation performed. Decision-making details  documented in ED Course.    Risk  Decision regarding hospitalization.      Additional MDM:   Differential Diagnosis:   Hypothyroidism, medication side effect, orthostatic weakness, kidney failure, stroke, among others                                     Clinical Impression:  Final diagnoses:  [R52] Pain  [R53.1] Weakness  [I63.9] Cerebrovascular accident (CVA), unspecified mechanism (Primary)          ED Disposition Condition    Admit Stable                Rashawn Kwong MD  05/17/24 1236

## 2024-05-17 NOTE — H&P
[facility] Internal Medicine Wards History & Physical Note    Resident Team: [facility] Medicine List 1  Attending Physician: Joe Posey MD  Resident: Elpidio Armendariz DO  Intern: Rasheed Carey MD    Subjective:      History of Present Illness:  Suleiman Beck is a 74 y.o. [place] American male with PMH of hypertension and osteoarthritis of multiple joints (right shoulder and right knee predominant), who presented to [facility] ED (5/17/2024) due to dizziness and falls x 3 days. Patient does not speak English and no  available who speaks dialect. History provided by son. Patient apparently has severe osteoarthritis affecting multiple joints but most severe in right shoulder and right knee which has impaired ambulation causing him to have unsteady gait/wobbling resulting in falls in past. Son also reports patient has on multiple occasions in past reported bilateral lower extremity weakness resulting in inability to ambulate or lift legs against gravity but would later self resolve. Patient brought to ED multiple times when symptoms presented in past with negative work ups per son. Episodes of dizziness, weakness, and falls began approximately two days prior presentation (05/15/2024) when patient apparently suffered a fall onto his right resulting in right arm pain. Concerned for right arm injury patient was brought to ED by son. X-rays performed at that time were negative and patient was discharged to home. Over the next several days patient continued to report dizziness to son but without episodes of falls. Patient son was not overly concerned about symptoms at that time due to patient history of similar symptoms in past. This a.m. patient was attempting to ambulate to rest room when he felt lightheadedness/dizziness without room spinning sensation prompting him to cease ambulation, sit down, and call for help from his wife. Wife attempted to help him stand up and get to rest room but upon standing patient lost all  strength in lower extremities and fell into wife's arms. Son immediately brought patient back to ED for evaluation. Denies fever, chills, sweats. Denies headache, eye pain, blurry vision. Denies vertigo, syncope, seizures. Denies chest pain, cough, hemoptysis, shortness of breath. Denies abdominal pain, nausea, vomiting, hematemesis, constipation, diarrhea, melena, hematochezia. Denies increased or decreased urinary frequency, dysuria, hematuria. Endorses painful bilateral upper and lower extremities which patient son states is his baseline 2/2 osteoarthritis. Denies weakness, numbness, or tingling to bilateral extremities.    ED course: Vitals show patient hypertensive (max /81) but remaining vital signs stable. CBC showed leukocytosis (WBC 14.08) but otherwise unremarkable. CMP showed hyperglycemia (Gluc 405), TINO (BUN 46.4; Cr 2.19), and normal anion gap acidosis (bicarb 17). Troponin 0.048. EKG showed normal sinus rhythm with right bundle branch block, bifasicular block, and new T wave inversions V4-V6 when compared to prior EKG. CT head without contrast (05/17/2024) showed focal hypoattenuation in high left frontal lobe concerning for acute infarct. MRI brain without contrast (50/17/2024) showed acute centrum semiovale nonhemorrhagic infarcts and chronic diffuse microangiopathic ischemia and atrophy. Internal medicine consulted for admission due to acute ischemic stroke.    Past Medical History:  Past Medical History:   Diagnosis Date    Arthritis     Essential (primary) hypertension     Unspecified chronic bronchitis      Past Surgical History:  Past Surgical History:   Procedure Laterality Date    CYSTOSCOPY W/ URETERAL STENT PLACEMENT Left 12/11/2022    Procedure: CYSTOSCOPY, WITH URETERAL STENT INSERTION;  Surgeon: Mar Fernandez MD;  [facility];  Service: Urology;  Laterality: Left;     Family History:  No family history on file.  Social History:  Social History     Tobacco Use     Smoking status: Former     Current packs/day: 0.00     Types: Cigarettes     Quit date:      Years since quittin.3    Smokeless tobacco: Never   Substance Use Topics    Alcohol use: Not Currently    Drug use: Never     Allergies:  Review of patient's allergies indicates:   Allergen Reactions    Dilaudid [hydromorphone] Anxiety     Home Medications:  Prior to Admission medications    Medication Sig Start Date End Date Taking? Authorizing Provider   carvediloL (COREG) 6.25 MG tablet Take 1 tablet (6.25 mg total) by mouth 2 (two) times daily. 24  Yes NATALY Winn Jr., MD   traMADoL (ULTRAM) 50 mg tablet Take 1 tablet (50 mg total) by mouth every 6 (six) hours as needed for Pain. 24  Yes NATALY Winn Jr., MD   albuterol (PROVENTIL) 2.5 mg /3 mL (0.083 %) nebulizer solution Take 3 mLs (2.5 mg total) by nebulization every 4 (four) hours as needed for Wheezing. Rescue 24  NATALY Winn Jr., MD   cyclobenzaprine (FLEXERIL) 5 MG tablet Take 1 Tab Oral at Bedtime as needed for Muscle Spasm 24   NATALY Winn Jr., MD   NIFEdipine (ADALAT CC) 30 MG TbSR Take 1 tablet (30 mg total) by mouth once daily. 24   NATALY Winn Jr., MD   omeprazole (PRILOSEC) 40 MG capsule Take 1 capsule (40 mg total) by mouth once daily. 23  Nessa Swift, NP   silver sulfADIAZINE 1% (SILVADENE) 1 % cream Apply topically once daily. 23  NATALY Winn Jr., MD     Review of Systems:  Review of Systems   Constitutional:  Negative for chills, diaphoresis, fatigue and fever.   HENT:  Negative for ear pain, hearing loss, rhinorrhea, sore throat, tinnitus and trouble swallowing.    Eyes:  Negative for pain, redness and visual disturbance.   Respiratory:  Negative for cough, chest tightness and shortness of breath.    Cardiovascular:  Negative for chest pain and palpitations.   Gastrointestinal:  Negative for abdominal pain, constipation, diarrhea,  "nausea and vomiting.   Genitourinary:  Negative for difficulty urinating, dysuria, frequency, hematuria and urgency.   Musculoskeletal:  Negative for arthralgias and myalgias.   Skin:  Negative for rash and wound.   Neurological:  Negative for dizziness, seizures, syncope, weakness, light-headedness, numbness and headaches.   Psychiatric/Behavioral:  Negative for confusion.         Objective:   Last 24 Hour Vital Signs:  BP  Min: 167/83  Max: 195/81  Temp  Av.3 °F (36.3 °C)  Min: 97.2 °F (36.2 °C)  Max: 97.4 °F (36.3 °C)  Pulse  Av.7  Min: 68  Max: 103  Resp  Av  Min: 18  Max: 18  SpO2  Av.3 %  Min: 97 %  Max: 100 %  Height  Av' 1" (154.9 cm)  Min: 5' 1" (154.9 cm)  Max: 5' 1" (154.9 cm)  Weight  Av.5 kg (140 lb)  Min: 63.5 kg (140 lb)  Max: 63.5 kg (140 lb)  Body mass index is 26.45 kg/m².  No intake/output data recorded.    Physical Examination:  Physical Exam  Vitals reviewed.   Constitutional:       General: He is not in acute distress.     Appearance: Normal appearance. He is normal weight. He is not ill-appearing.   HENT:      Head: Normocephalic and atraumatic.      Right Ear: External ear normal.      Left Ear: External ear normal.   Eyes:      General: No scleral icterus.        Right eye: No discharge.         Left eye: No discharge.      Extraocular Movements: Extraocular movements intact.      Conjunctiva/sclera: Conjunctivae normal.      Pupils: Pupils are equal, round, and reactive to light.   Cardiovascular:      Rate and Rhythm: Normal rate and regular rhythm.      Pulses: Normal pulses.      Heart sounds: Normal heart sounds. No murmur heard.     No friction rub. No gallop.   Pulmonary:      Effort: Pulmonary effort is normal. No respiratory distress.      Breath sounds: Normal breath sounds. No stridor. No wheezing, rhonchi or rales.   Abdominal:      General: Abdomen is flat. Bowel sounds are normal. There is no distension.      Palpations: Abdomen is soft.      " "Tenderness: There is no abdominal tenderness. There is no guarding.   Musculoskeletal:         General: No swelling, tenderness, deformity or signs of injury. Normal range of motion.      Right lower leg: No edema.      Left lower leg: No edema.   Skin:     General: Skin is warm and dry.      Capillary Refill: Capillary refill takes less than 2 seconds.      Coloration: Skin is not jaundiced.      Findings: No bruising, erythema or rash.   Neurological:      Mental Status: He is alert.      Comments: AAO to person, place, time, and situation; CN II-XII grossly intact         Laboratory:  Most Recent Data:  CBC:   Lab Results   Component Value Date    WBC 14.08 (H) 05/17/2024    HGB 11.8 (L) 05/17/2024    HCT 36.6 (L) 05/17/2024     05/17/2024    MCV 88.0 05/17/2024    RDW 13.0 05/17/2024     WBC Differential:   Recent Labs   Lab 05/15/24  2027 05/17/24  1058   WBC 11.70* 14.08*   HGB 12.6* 11.8*   HCT 38.4* 36.6*    288   MCV 87.1 88.0     BMP:   Lab Results   Component Value Date     05/17/2024    K 4.0 05/17/2024     (H) 03/20/2023    CO2 17 (L) 05/17/2024    BUN 46.4 (H) 05/17/2024    CREATININE 2.19 (H) 05/17/2024     (H) 03/20/2023    CALCIUM 9.8 05/17/2024    MG 2.10 05/17/2024    PHOS 3.4 02/14/2022     LFTs:   Lab Results   Component Value Date    ALBUMIN 3.2 (L) 05/17/2024    BILITOT 0.3 05/17/2024    AST 21 05/17/2024    ALKPHOS 61 05/17/2024    ALT 17 05/17/2024     Coags:   Lab Results   Component Value Date    INR 1.0 05/17/2024    PROTIME 13.7 05/17/2024     FLP: No results found for: "CHOL", "HDL", "LDLCALC", "TRIG", "CHOLHDL"  DM:   Lab Results   Component Value Date    HGBA1C 7.4 (H) 03/20/2023    HGBA1C 7.7 (H) 12/14/2022    HGBA1C 6.1 02/01/2022    CREATININE 2.19 (H) 05/17/2024     Thyroid:   Lab Results   Component Value Date    TSH 0.962 05/17/2024     Anemia:   Lab Results   Component Value Date    IRON 45 (L) 12/14/2022    TIBC 130 (L) 12/14/2022    FERRITIN " 261.54 12/14/2022     Cardiac:   Lab Results   Component Value Date    TROPONINI 0.048 (H) 05/17/2024    .0 (H) 12/11/2022     Urinalysis:   Lab Results   Component Value Date    LABURIN No Growth 12/12/2022    PHUA 6.0 05/15/2024    UROBILINOGEN Normal 05/15/2024    WBCUA 0-5 05/15/2024     Trended Lab Data:  Recent Labs   Lab 05/15/24  2027 05/17/24  1058   WBC 11.70* 14.08*   HGB 12.6* 11.8*   HCT 38.4* 36.6*    288   MCV 87.1 88.0   RDW 12.8 13.0    136   K 4.3 4.0   CO2 18* 17*   BUN 44.5* 46.4*   CREATININE 1.76* 2.19*   ALBUMIN 3.5 3.2*   BILITOT 0.2 0.3   AST 12 21   ALKPHOS 64 61   ALT 14 17     Trended Cardiac Data:  Recent Labs   Lab 05/15/24  2027 05/17/24  1058   TROPONINI 0.033 0.048*     Radiology:  Imaging Results              MRI Brain Without Contrast (Final result)  Result time 05/17/24 14:18:29      Final result by Иван Saleh MD (05/17/24 14:18:29)                   Impression:      1.  Left centrum semiovale acute nonhemorrhagic infarcts.    2.  Chronic microangiopathic ischemia and atrophy.      Electronically signed by: Иван Saleh  Date:    05/17/2024  Time:    14:18               Narrative:    EXAMINATION:  MRI BRAIN WITHOUT CONTRAST    CLINICAL HISTORY:  Stroke, follow up;    TECHNIQUE:  Multiplanar MRI sequences were performed of the brain without contrast.    COMPARISON:  CT brain without contrast May 17, 2014    FINDINGS:  The left centrum semiovale is remarkable for two adjacent acute nonhemorrhagic infarcts with diffusion weighted restrictions, signal dropout on ADC map and T2 FLAIR hyperintensities on image 27 series 6.  Chronic microvascular ischemic changes are mild with periventricular and deep white matter T2 FLAIR hyperintense signals.  Generalized cerebral and cerebellar cortical volume loss. Gradient echo sequences demonstrate no evidence of de phasing artifact to suggest hemorrhagic byproducts.  The sella and suprasellar areas are  unremarkable.    The cerebellar tonsils are normally positioned. There is no acute intracranial hemorrhage, hydrocephalus, midline shift or mass effect. No acute extra axial fluid collections identified. The mastoid air cells are clear.                                       X-Ray Chest AP Portable (Final result)  Result time 05/17/24 12:24:01      Final result by Joaquim Luis MD (05/17/24 12:24:01)                   Impression:      No acute cardiopulmonary process.      Electronically signed by: Joaquim Luis  Date:    05/17/2024  Time:    12:24               Narrative:    EXAMINATION:  XR CHEST AP PORTABLE    CLINICAL HISTORY:  Weakness;    TECHNIQUE:  Single view of the chest    COMPARISON:  10/25/2023    FINDINGS:  No focal opacification, pleural effusion, or pneumothorax.    The cardiomediastinal silhouette is within normal limits.    No acute osseous abnormality.                                       X-Ray Femur 2 View Right (Final result)  Result time 05/17/24 12:25:48      Final result by Joqauim Luis MD (05/17/24 12:25:48)                   Impression:      As above.  Prior imaging from 2 days prior.  If continued pain recommend further evaluation.      Electronically signed by: Joaquim Luis  Date:    05/17/2024  Time:    12:25               Narrative:    EXAMINATION:  XR FEMUR 2 VIEW RIGHT    CLINICAL HISTORY:  Pain;    TECHNIQUE:  AP and lateral views of the right femur were performed.    COMPARISON:  05/15/2024    FINDINGS:  no displaced fracture. Mild degenerative changes with acetabular and knee tricompartmental osteophytes.                                       X-Ray Knee 3 View Right (Final result)  Result time 05/17/24 12:26:26      Final result by Joaquim Luis MD (05/17/24 12:26:26)                   Impression:      As above.      Electronically signed by: Joaquim Luis  Date:    05/17/2024  Time:    12:26               Narrative:    EXAMINATION:  XR KNEE 3 VIEW  RIGHT    CLINICAL HISTORY:  Pain, unspecified    TECHNIQUE:  AP, lateral, and Merchant views of the right knee were performed.    COMPARISON:  None    FINDINGS:  Degenerative changes with tricompartmental osteophytes.  Joint spaces relatively well maintained.  Vascular atherosclerotic disease is noted.  Small suprapatellar effusion.                                        CT Head Without Contrast (Final result)  Result time 05/17/24 12:00:22      Final result by Noelle Felix MD (05/17/24 12:00:22)                   Impression:      Focal area of hypoattenuation in the cerebral convexity on the left side in the high left frontal lobe.  Findings are concerning for acute infarct.  MRI correlation would be beneficial.    Other chronic age related changes seen as outlined above    This report was flagged in Epic as abnormal.      Electronically signed by: Colt Felix  Date:    05/17/2024  Time:    12:00               Narrative:    EXAMINATION:  CT HEAD WITHOUT CONTRAST    CLINICAL HISTORY:  Neuro deficit, acute, stroke suspected;    TECHNIQUE:  Multiple axial images were obtained from the base of the brain to the vertex without contrast administration.  Sagittal and coronal reconstructions were performed..Automatic exposure control is utilized to reduce patient radiation exposure.    COMPARISON:  12/11/2022    FINDINGS:  There is no intracranial mass or lesion seen.  No hemorrhage is seen.  There is a focal area of hypoattenuation at the cerebral convexity on the left side in the left frontal region.  This is seen on images number 26 through 28 series 2.  Findings are concerning for acute infarct.  No other areas acute ischemia seen..  There is some cerebral atrophy seen.  There is some compensatory ventricular dilatation and periventricular white matter changes consistent with the patient's age.  Calvarium is intact.  The posterior fossa is unremarkable..  The visualized portions of the paranasal sinuses  show no acute abnormality.                                       CT Pelvis Without Contrast (Final result)  Result time 05/17/24 12:04:11      Final result by Navi Dorado MD (05/17/24 12:04:11)                   Impression:      No acute findings.      Electronically signed by: Navi Dorado  Date:    05/17/2024  Time:    12:04               Narrative:    EXAMINATION:  CT PELVIS WITHOUT CONTRAST    CLINICAL HISTORY:  Pelvis pain, stress fracture suspected, neg xray;    TECHNIQUE:  Helical acquisition through the pelvis without IV contrast.  Three plane reconstructions were provided for review.  mGycm. Automatic exposure control, adjustment of mA/kV or iterative reconstruction technique was used to reduce radiation.    COMPARISON:  9 December 2022    FINDINGS:  There is no fracture or dislocation.  Within the pelvis there is no free fluid or mass.  Moderate atherosclerotic disease.                                       Assessment & Plan:     Acute ischemic stroke  Hypertension  -NIHSS 0  -physical exam unremarkable for neurologic deficits  -CT head without contrast (05/17/2024) showed focal hypoattenuation in high left frontal lobe concerning for acute infarct  -MRI brain without contrast (50/17/2024) showed acute centrum semiovale nonhemorrhagic infarcts and chronic diffuse microangiopathic ischemia and atrophy  -allowing for permissive hypertension in the setting of acute ischemic stroke; maintain bp below 220/100 for first 24 hours  -loaded aspirin 325 mg and plavix 300 mg  -initiating aspirin 81 mg qd and plavix 75 mg beginning tomorrow  -will obtain fasting lipid panel in a.m. and start statin if indicated at that time  -will uptitrate antihypertensive as indicated    Prerenal acute kidney injury  -baseline renal indices approximately: BUN 30, creatinine 1.65, eGFR 38  -renal indices worse compared to baseline  -BUN:Cr ratio > 20 suggesting prerenal  -will bolus 1L LR  -will continue to monitor renal  indices    Hyperglycemia  -patient son reports patient was never a diagnosed diabetic  -goal cbg < 180  -initiated low dose ssi  -will obtain A1c in a.m.  -will continue to titrate antihyperglycemic regimen prn    Code Status: Full code  GI Access: PO  Feeding: Diabetic diet  IV Access: PIV  Fluids: None  Analgesia: Acetaminophen 650 mg q6h prn mild pain  Thromboprophylaxis: SCDs    Disposition: Admit to medicine service for management of acute ischemic stroke. Patient outside window for thrombolytic therapy and/or thrombectomy.     Rasheed Carey MD  [facility], PGY-1

## 2024-05-17 NOTE — LETTER
May 18, 2024         [address] [place] [id number]  Phone: [phone]  Fax: [phone]       Date of Visit: 5/17/24    To Whom It May Concern:    Please be advised that under state and federal laws as it relates to patient privacy and Health Insurance Accountability Act (HIPAA), we can not release our patient(s) name without authorization. Although, we can confirm that the individual listed below did accompany a person to our facility for healthcare services to be provided.    This document confirms that Anabel Sims accompanied a patient to our facility on 5/17/24.

## 2024-05-18 LAB
ALBUMIN SERPL-MCNC: 3 G/DL (ref 3.4–4.8)
ALBUMIN/GLOB SERPL: 1 RATIO (ref 1.1–2)
ALP SERPL-CCNC: 59 UNIT/L (ref 40–150)
ALT SERPL-CCNC: 17 UNIT/L (ref 0–55)
ANION GAP SERPL CALC-SCNC: 7 MEQ/L
AST SERPL-CCNC: 19 UNIT/L (ref 5–34)
BASOPHILS # BLD AUTO: 0.05 X10(3)/MCL
BASOPHILS NFR BLD AUTO: 0.4 %
BILIRUB SERPL-MCNC: 0.3 MG/DL
BUN SERPL-MCNC: 32 MG/DL (ref 8.4–25.7)
C DIFF TOX A+B STL QL IA: NEGATIVE
CALCIUM SERPL-MCNC: 9.4 MG/DL (ref 8.8–10)
CHLORIDE SERPL-SCNC: 115 MMOL/L (ref 98–107)
CHOLEST SERPL-MCNC: 196 MG/DL
CHOLEST/HDLC SERPL: 6 {RATIO} (ref 0–5)
CLOSTRIDIUM DIFFICILE GDH ANTIGEN (OHS): NEGATIVE
CO2 SERPL-SCNC: 20 MMOL/L (ref 23–31)
CREAT SERPL-MCNC: 1.52 MG/DL (ref 0.73–1.18)
CREAT/UREA NIT SERPL: 21
EOSINOPHIL # BLD AUTO: 0.43 X10(3)/MCL (ref 0–0.9)
EOSINOPHIL NFR BLD AUTO: 3.8 %
ERYTHROCYTE [DISTWIDTH] IN BLOOD BY AUTOMATED COUNT: 13.2 % (ref 11.5–17)
EST. AVERAGE GLUCOSE BLD GHB EST-MCNC: 211.6 MG/DL
GFR SERPLBLD CREATININE-BSD FMLA CKD-EPI: 48 ML/MIN/1.73/M2
GLOBULIN SER-MCNC: 3.1 GM/DL (ref 2.4–3.5)
GLUCOSE SERPL-MCNC: 172 MG/DL (ref 82–115)
GLUCOSE SERPL-MCNC: 242 MG/DL (ref 70–110)
HBA1C MFR BLD: 9 %
HCT VFR BLD AUTO: 34.7 % (ref 42–52)
HDLC SERPL-MCNC: 31 MG/DL (ref 35–60)
HGB BLD-MCNC: 11.3 G/DL (ref 14–18)
IMM GRANULOCYTES # BLD AUTO: 0.04 X10(3)/MCL (ref 0–0.04)
IMM GRANULOCYTES NFR BLD AUTO: 0.4 %
LDLC SERPL CALC-MCNC: 113 MG/DL (ref 50–140)
LYMPHOCYTES # BLD AUTO: 2.7 X10(3)/MCL (ref 0.6–4.6)
LYMPHOCYTES NFR BLD AUTO: 23.8 %
MAGNESIUM SERPL-MCNC: 2.1 MG/DL (ref 1.6–2.6)
MCH RBC QN AUTO: 28.5 PG (ref 27–31)
MCHC RBC AUTO-ENTMCNC: 32.6 G/DL (ref 33–36)
MCV RBC AUTO: 87.4 FL (ref 80–94)
MONOCYTES # BLD AUTO: 0.65 X10(3)/MCL (ref 0.1–1.3)
MONOCYTES NFR BLD AUTO: 5.7 %
NEUTROPHILS # BLD AUTO: 7.48 X10(3)/MCL (ref 2.1–9.2)
NEUTROPHILS NFR BLD AUTO: 65.9 %
NRBC BLD AUTO-RTO: 0 %
PHOSPHATE SERPL-MCNC: 3 MG/DL (ref 2.3–4.7)
PLATELET # BLD AUTO: 272 X10(3)/MCL (ref 130–400)
PMV BLD AUTO: 11.6 FL (ref 7.4–10.4)
POCT GLUCOSE: 183 MG/DL (ref 70–110)
POCT GLUCOSE: 216 MG/DL (ref 70–110)
POCT GLUCOSE: 239 MG/DL (ref 70–110)
POCT GLUCOSE: 242 MG/DL (ref 70–110)
POTASSIUM SERPL-SCNC: 3.5 MMOL/L (ref 3.5–5.1)
PROT SERPL-MCNC: 6.1 GM/DL (ref 5.8–7.6)
RBC # BLD AUTO: 3.97 X10(6)/MCL (ref 4.7–6.1)
SODIUM SERPL-SCNC: 142 MMOL/L (ref 136–145)
TRIGL SERPL-MCNC: 258 MG/DL (ref 34–140)
VLDLC SERPL CALC-MCNC: 52 MG/DL
WBC # SPEC AUTO: 11.35 X10(3)/MCL (ref 4.5–11.5)

## 2024-05-18 PROCEDURE — 80061 LIPID PANEL: CPT

## 2024-05-18 PROCEDURE — 96372 THER/PROPH/DIAG INJ SC/IM: CPT

## 2024-05-18 PROCEDURE — 94760 N-INVAS EAR/PLS OXIMETRY 1: CPT

## 2024-05-18 PROCEDURE — 25000003 PHARM REV CODE 250

## 2024-05-18 PROCEDURE — 84100 ASSAY OF PHOSPHORUS: CPT

## 2024-05-18 PROCEDURE — 83036 HEMOGLOBIN GLYCOSYLATED A1C: CPT

## 2024-05-18 PROCEDURE — 63600175 PHARM REV CODE 636 W HCPCS

## 2024-05-18 PROCEDURE — G0378 HOSPITAL OBSERVATION PER HR: HCPCS

## 2024-05-18 PROCEDURE — 83735 ASSAY OF MAGNESIUM: CPT

## 2024-05-18 PROCEDURE — 36415 COLL VENOUS BLD VENIPUNCTURE: CPT

## 2024-05-18 PROCEDURE — 21400001 HC TELEMETRY ROOM

## 2024-05-18 PROCEDURE — 97162 PT EVAL MOD COMPLEX 30 MIN: CPT

## 2024-05-18 PROCEDURE — 80053 COMPREHEN METABOLIC PANEL: CPT

## 2024-05-18 PROCEDURE — 86318 IA INFECTIOUS AGENT ANTIBODY: CPT

## 2024-05-18 PROCEDURE — 85025 COMPLETE CBC W/AUTO DIFF WBC: CPT

## 2024-05-18 RX ORDER — ATORVASTATIN CALCIUM 20 MG/1
20 TABLET, FILM COATED ORAL NIGHTLY
Status: DISCONTINUED | OUTPATIENT
Start: 2024-05-18 | End: 2024-05-18

## 2024-05-18 RX ORDER — NIFEDIPINE 30 MG/1
30 TABLET, EXTENDED RELEASE ORAL DAILY
Status: DISCONTINUED | OUTPATIENT
Start: 2024-05-18 | End: 2024-05-18

## 2024-05-18 RX ORDER — CARVEDILOL 3.12 MG/1
6.25 TABLET ORAL 2 TIMES DAILY
Status: DISCONTINUED | OUTPATIENT
Start: 2024-05-18 | End: 2024-05-22

## 2024-05-18 RX ORDER — LISINOPRIL 10 MG/1
20 TABLET ORAL DAILY
Status: DISCONTINUED | OUTPATIENT
Start: 2024-05-18 | End: 2024-05-22

## 2024-05-18 RX ORDER — LABETALOL HCL 20 MG/4 ML
10 SYRINGE (ML) INTRAVENOUS EVERY 4 HOURS PRN
Status: DISCONTINUED | OUTPATIENT
Start: 2024-05-18 | End: 2024-05-24 | Stop reason: HOSPADM

## 2024-05-18 RX ORDER — ATORVASTATIN CALCIUM 40 MG/1
40 TABLET, FILM COATED ORAL NIGHTLY
Status: DISCONTINUED | OUTPATIENT
Start: 2024-05-18 | End: 2024-05-24 | Stop reason: HOSPADM

## 2024-05-18 RX ORDER — LOPERAMIDE HYDROCHLORIDE 2 MG/1
2 CAPSULE ORAL 4 TIMES DAILY PRN
Status: DISCONTINUED | OUTPATIENT
Start: 2024-05-18 | End: 2024-05-19

## 2024-05-18 RX ORDER — CARVEDILOL 3.12 MG/1
6.25 TABLET ORAL 2 TIMES DAILY
Status: DISCONTINUED | OUTPATIENT
Start: 2024-05-18 | End: 2024-05-18

## 2024-05-18 RX ORDER — NIFEDIPINE 30 MG/1
30 TABLET, EXTENDED RELEASE ORAL DAILY
Status: DISCONTINUED | OUTPATIENT
Start: 2024-05-18 | End: 2024-05-19

## 2024-05-18 RX ORDER — HYDRALAZINE HYDROCHLORIDE 20 MG/ML
10 INJECTION INTRAMUSCULAR; INTRAVENOUS
Status: DISCONTINUED | OUTPATIENT
Start: 2024-05-18 | End: 2024-05-24 | Stop reason: HOSPADM

## 2024-05-18 RX ORDER — LISINOPRIL 10 MG/1
20 TABLET ORAL DAILY
Status: DISCONTINUED | OUTPATIENT
Start: 2024-05-19 | End: 2024-05-18

## 2024-05-18 RX ORDER — NIFEDIPINE 30 MG/1
30 TABLET, EXTENDED RELEASE ORAL DAILY
Status: DISCONTINUED | OUTPATIENT
Start: 2024-05-19 | End: 2024-05-18

## 2024-05-18 RX ADMIN — ATORVASTATIN CALCIUM 40 MG: 40 TABLET, FILM COATED ORAL at 08:05

## 2024-05-18 RX ADMIN — LOPERAMIDE HYDROCHLORIDE 2 MG: 2 CAPSULE ORAL at 03:05

## 2024-05-18 RX ADMIN — ASPIRIN 81 MG CHEWABLE TABLET 81 MG: 81 TABLET CHEWABLE at 09:05

## 2024-05-18 RX ADMIN — HYDRALAZINE HYDROCHLORIDE 10 MG: 20 INJECTION INTRAMUSCULAR; INTRAVENOUS at 06:05

## 2024-05-18 RX ADMIN — CLOPIDOGREL BISULFATE 75 MG: 75 TABLET ORAL at 09:05

## 2024-05-18 RX ADMIN — LABETALOL HYDROCHLORIDE 10 MG: 5 INJECTION, SOLUTION INTRAVENOUS at 04:05

## 2024-05-18 RX ADMIN — LISINOPRIL 20 MG: 10 TABLET ORAL at 06:05

## 2024-05-18 RX ADMIN — LABETALOL HYDROCHLORIDE 10 MG: 5 INJECTION, SOLUTION INTRAVENOUS at 11:05

## 2024-05-18 RX ADMIN — INSULIN ASPART 2 UNITS: 100 INJECTION, SOLUTION INTRAVENOUS; SUBCUTANEOUS at 05:05

## 2024-05-18 RX ADMIN — ACETAMINOPHEN 650 MG: 325 TABLET, FILM COATED ORAL at 07:05

## 2024-05-18 RX ADMIN — ATORVASTATIN CALCIUM 20 MG: 20 TABLET, FILM COATED ORAL at 05:05

## 2024-05-18 RX ADMIN — INSULIN ASPART 2 UNITS: 100 INJECTION, SOLUTION INTRAVENOUS; SUBCUTANEOUS at 12:05

## 2024-05-18 RX ADMIN — CARVEDILOL 6.25 MG: 3.12 TABLET, FILM COATED ORAL at 08:05

## 2024-05-18 RX ADMIN — NIFEDIPINE 30 MG: 30 TABLET, FILM COATED, EXTENDED RELEASE ORAL at 12:05

## 2024-05-18 RX ADMIN — ACETAMINOPHEN 650 MG: 325 TABLET, FILM COATED ORAL at 03:05

## 2024-05-18 RX ADMIN — ACETAMINOPHEN 650 MG: 325 TABLET, FILM COATED ORAL at 12:05

## 2024-05-18 NOTE — PT/OT/SLP EVAL
Physical Therapy Evaluation    Patient Name:  Suleiman Beck   MRN:  [id number]    Recommendations:     Therapy Intensity Recommendations at Discharge:  (To be determined based on pt's progress)  Discharge Equipment Recommendations: walker, rolling   Equipment to be obtained for discharge:  To be determined base on pt's progress .  Barriers to discharge: level of skilled assistance required and time since onset    Assessment:     Suleiman Beck is a 74 y.o. male admitted with a medical diagnosis of  Ischemic stroke.  He presents with the following impairments/functional limitations:  impaired endurance, weakness, impaired functional mobility, impaired balance, pain, impaired cardiopulmonary response to activity.    Rehab Prognosis: TBD pending progress.    Patient would benefit from continued skilled acute PT services to: address above listed impairments/functional limitations; receive patient/caregiver education; reduce fall risk; and maximize independency/safety with functional mobility.    Recent Surgery: * No surgery found *      Plan:     During this hospitalization, patient to be seen 5 x/week to address the identified impairments/functional limitations via gait training, therapeutic activities, therapeutic exercises, neuromuscular re-education and progress toward the established goals.    Plan of Care Expires:  06/15/24    Subjective     Communicated with patient's nurse Delmar prior to session.    Patient agreeable to participate in evaluation.     Chief Complaint: R shoulder pain  Patient/Family Comments/goals: return to PLOF  Pain/Comfort:  Pain Rating 1: 5/10  Location - Side 1: Right  Location 1: shoulder  Pain Rating Post-Intervention 1: 5/10    Patients cultural, spiritual, Religion conflicts given the current situation: no    Social History  Living Environment: Patient lives with their family in a single level home, with no steps  Functional Level: Prior to admission patient  Pt's grandson states that  pt occasionally required assist for bed mobility, transfers, and giait .  Equipment Used at Home:  None  Equipment owned (not currently used):  Unknown .  Assistance Upon Discharge: family.    Objective:     Patient found with HOB elevated with peripheral IV  upon PT entry to room.    General Precautions: Standard, fall , permissive HTN, see nursing orders for permissive HTN parameters   Orthopedic Precautions:    Braces:    Respiratory Status: room air    Vitals   At Rest (pre-session)  BP  219/88   HR  NT   O2 Sat %  100      With Activity (post-session) seated - RN notified immediately 2/2 outside of parameters   BP  236/110   HR  NT   O2 Sat %  100     Exams:  Orientation: Patient is oriented to person, place, time, situation  Commands: Patient follows commands consistently if inerpreter present   BILAT UE ROM/strength - defer to OT - see OT note for details  RLE ROM:  unable to assess  RLE Strength:  unable to assess  LLE ROM:  unable to assess  LLE Strength:  unable to assess     Functional Mobility:    Bed Mobility:  Scooting: moderate assistance  Supine to Sit: moderate assistance  Sit to Supine: moderate assistance    Transfers:  Not assessed 2/2 BP    Gait:  Not assessed 2/2 BP     Other Mobility:  Therapeutic Activities performed:        -sitting balance activities:              scooting:                   -forward                 -backward                 -laterally (left)                 -laterally (right)            repositioning at edge of bed    Balance:  Sit  Static: FAIR+: Able to take MINIMAL challenges from all directions  Dynamic: FAIR+: Maintains balance through MINIMAL excursions of active trunk motion  Stand  Did not assess     Additional Treatment Session  n/a    Patient left with HOB elevated with patient' nurse notified of elevated BP.    Education     Patient was instructed to utilize staff assistance for mobility/transfers.  White board updated regarding patient's safest level of  mobility with staff assistance.    Goals     Multidisciplinary Problems       Physical Therapy Goals          Problem: Physical Therapy    Goal Priority Disciplines Outcome Goal Variances Interventions   Physical Therapy Goal     PT, PT/OT Progressing     Description: Goals to be met by: Discharge     Patient will increase functional independence with mobility by performin. Supine to sit with Jameson  2. Sit to supine with Jameson  3. Sit to stand transfer with Jameson  4. Bed to chair transfer with Supervision using Rolling Walker  5. Gait  x 130 feet with Modified Jameson using Rolling Walker.                        History:     Past Medical History:   Diagnosis Date    Arthritis     Essential (primary) hypertension     Unspecified chronic bronchitis      Past Surgical History:   Procedure Laterality Date    CYSTOSCOPY W/ URETERAL STENT PLACEMENT Left 2022    Procedure: CYSTOSCOPY, WITH URETERAL STENT INSERTION;  Surgeon: Mar Fernandez MD;  Location: [facility];  Service: Urology;  Laterality: Left;     Time Tracking:     PT Received On: 24  PT Start Time: 1100     PT Stop Time: 1125  PT Total Time (min): 25 min     Billable Minutes: Evaluation MOD 25 mins.    2024

## 2024-05-18 NOTE — PLAN OF CARE
Problem: Physical Therapy  Goal: Physical Therapy Goal  Description: Goals to be met by: Discharge     Patient will increase functional independence with mobility by performin. Supine to sit with [place]  2. Sit to supine with [place]  3. Sit to stand transfer with [place]  4. Bed to chair transfer with Supervision using Rolling Walker  5. Gait  x 130 feet with Modified [place] using Rolling Walker.     Outcome: Progressing

## 2024-05-18 NOTE — PROGRESS NOTES
[facility] Medicine Wards   Progress Note     Resident Team: [facility] Medicine List 1  Attending Physician: Joe Posey MD  Resident: [place]  Intern: [facility] Length of Stay: 0 days    Subjective:      Brief HPI:  Suleiman Beck is a 74 y.o. [place] American male with PMH of hypertension and osteoarthritis of multiple joints (right shoulder and right knee predominant), who presented to [facility] ED (5/17/2024) due to dizziness and falls x 3 days. Patient does not speak English and no  available who speaks dialect. History provided by son. Patient apparently has severe osteoarthritis affecting multiple joints but most severe in right shoulder and right knee which has impaired ambulation causing him to have unsteady gait/wobbling resulting in falls in past. Son also reports patient has on multiple occasions in past reported bilateral lower extremity weakness resulting in inability to ambulate or lift legs against gravity but would later self resolve. Patient brought to ED multiple times when symptoms presented in past with negative work ups per son. Episodes of dizziness, weakness, and falls began approximately two days prior presentation (05/15/2024) when patient apparently suffered a fall onto his right resulting in right arm pain. Concerned for right arm injury patient was brought to ED by son. X-rays performed at that time were negative and patient was discharged to home. Over the next several days patient continued to report dizziness to son but without episodes of falls. Patient son was not overly concerned about symptoms at that time due to patient history of similar symptoms in past. This a.m. patient was attempting to ambulate to rest room when he felt lightheadedness/dizziness without room spinning sensation prompting him to cease ambulation, sit down, and call for help from his wife. Wife attempted to help him stand up and get to rest room but upon standing patient lost all strength in lower  extremities and fell into wife's arms. Son immediately brought patient back to ED for evaluation. Denies fever, chills, sweats. Denies headache, eye pain, blurry vision. Denies vertigo, syncope, seizures. Denies chest pain, cough, hemoptysis, shortness of breath. Denies abdominal pain, nausea, vomiting, hematemesis, constipation, diarrhea, melena, hematochezia. Denies increased or decreased urinary frequency, dysuria, hematuria. Endorses painful bilateral upper and lower extremities which patient son states is his baseline 2/2 osteoarthritis. Denies weakness, numbness, or tingling to bilateral extremities.     ED course: Vitals show patient hypertensive (max /81) but remaining vital signs stable. CBC showed leukocytosis (WBC 14.08) but otherwise unremarkable. CMP showed hyperglycemia (Gluc 405), TINO (BUN 46.4; Cr 2.19), and normal anion gap acidosis (bicarb 17). Troponin 0.048. EKG showed normal sinus rhythm with right bundle branch block, bifasicular block, and new T wave inversions V4-V6 when compared to prior EKG. CT head without contrast (05/17/2024) showed focal hypoattenuation in high left frontal lobe concerning for acute infarct. MRI brain without contrast (50/17/2024) showed acute centrum semiovale nonhemorrhagic infarcts and chronic diffuse microangiopathic ischemia and atrophy. Internal medicine consulted for admission due to acute ischemic stroke.    Interval History: NAEON. Complaining of knee pain and left hand pain where IV site is. Blood pressure remains elevated, will give dose of IV labetalol. In the interim, will restart home meds, and will start lisinopril given hx of diabetes.      Review of Systems:  Pertinent items are noted in HPI.     Objective:     Vital Signs (Most Recent):  Temp: 97.8 °F (36.6 °C) (05/18/24 0727)  Pulse: 94 (05/18/24 1100)  Resp: 18 (05/18/24 0727)  BP: (!) 236/110 (05/18/24 1100)  SpO2: 97 % (05/18/24 0727) Vital Signs (24h Range):  Temp:  [97.2 °F (36.2 °C)-98.2  °F (36.8 °C)] 97.8 °F (36.6 °C)  Pulse:  [68-99] 94  Resp:  [18] 18  SpO2:  [97 %-99 %] 97 %  BP: (166-238)/() 236/110       Physical Examination:  Physical Exam  Neurological:      Mental Status: He is alert.           Laboratory:  Most Recent Data:  All pertinent lab results from the last 24 hours have been reviewed.      Microbiology Data Reviewed: yes  Pertinent Findings:  none    Other Results:      Radiology:  Imaging Results              MRI Brain Without Contrast (Final result)  Result time 05/17/24 14:18:29      Final result by Иван Saleh MD (05/17/24 14:18:29)                   Impression:      1.  Left centrum semiovale acute nonhemorrhagic infarcts.    2.  Chronic microangiopathic ischemia and atrophy.      Electronically signed by: Иван Saleh  Date:    05/17/2024  Time:    14:18               Narrative:    EXAMINATION:  MRI BRAIN WITHOUT CONTRAST    CLINICAL HISTORY:  Stroke, follow up;    TECHNIQUE:  Multiplanar MRI sequences were performed of the brain without contrast.    COMPARISON:  CT brain without contrast May 17, 2014    FINDINGS:  The left centrum semiovale is remarkable for two adjacent acute nonhemorrhagic infarcts with diffusion weighted restrictions, signal dropout on ADC map and T2 FLAIR hyperintensities on image 27 series 6.  Chronic microvascular ischemic changes are mild with periventricular and deep white matter T2 FLAIR hyperintense signals.  Generalized cerebral and cerebellar cortical volume loss. Gradient echo sequences demonstrate no evidence of de phasing artifact to suggest hemorrhagic byproducts.  The sella and suprasellar areas are unremarkable.    The cerebellar tonsils are normally positioned. There is no acute intracranial hemorrhage, hydrocephalus, midline shift or mass effect. No acute extra axial fluid collections identified. The mastoid air cells are clear.                                       X-Ray Chest AP Portable (Final result)  Result time 05/17/24  12:24:01      Final result by Joaquim Luis MD (05/17/24 12:24:01)                   Impression:      No acute cardiopulmonary process.      Electronically signed by: Joaquim Luis  Date:    05/17/2024  Time:    12:24               Narrative:    EXAMINATION:  XR CHEST AP PORTABLE    CLINICAL HISTORY:  Weakness;    TECHNIQUE:  Single view of the chest    COMPARISON:  10/25/2023    FINDINGS:  No focal opacification, pleural effusion, or pneumothorax.    The cardiomediastinal silhouette is within normal limits.    No acute osseous abnormality.                                       X-Ray Femur 2 View Right (Final result)  Result time 05/17/24 12:25:48      Final result by Joaquim Luis MD (05/17/24 12:25:48)                   Impression:      As above.  Prior imaging from 2 days prior.  If continued pain recommend further evaluation.      Electronically signed by: Joaquim Luis  Date:    05/17/2024  Time:    12:25               Narrative:    EXAMINATION:  XR FEMUR 2 VIEW RIGHT    CLINICAL HISTORY:  Pain;    TECHNIQUE:  AP and lateral views of the right femur were performed.    COMPARISON:  05/15/2024    FINDINGS:  no displaced fracture. Mild degenerative changes with acetabular and knee tricompartmental osteophytes.                                       X-Ray Knee 3 View Right (Final result)  Result time 05/17/24 12:26:26      Final result by Joaquim Luis MD (05/17/24 12:26:26)                   Impression:      As above.      Electronically signed by: Joaquim Luis  Date:    05/17/2024  Time:    12:26               Narrative:    EXAMINATION:  XR KNEE 3 VIEW RIGHT    CLINICAL HISTORY:  Pain, unspecified    TECHNIQUE:  AP, lateral, and Merchant views of the right knee were performed.    COMPARISON:  None    FINDINGS:  Degenerative changes with tricompartmental osteophytes.  Joint spaces relatively well maintained.  Vascular atherosclerotic disease is noted.  Small suprapatellar effusion.                                         CT Head Without Contrast (Final result)  Result time 05/17/24 12:00:22      Final result by Noelle Felix MD (05/17/24 12:00:22)                   Impression:      Focal area of hypoattenuation in the cerebral convexity on the left side in the high left frontal lobe.  Findings are concerning for acute infarct.  MRI correlation would be beneficial.    Other chronic age related changes seen as outlined above    This report was flagged in Epic as abnormal.      Electronically signed by: Colt Felix  Date:    05/17/2024  Time:    12:00               Narrative:    EXAMINATION:  CT HEAD WITHOUT CONTRAST    CLINICAL HISTORY:  Neuro deficit, acute, stroke suspected;    TECHNIQUE:  Multiple axial images were obtained from the base of the brain to the vertex without contrast administration.  Sagittal and coronal reconstructions were performed..Automatic exposure control is utilized to reduce patient radiation exposure.    COMPARISON:  12/11/2022    FINDINGS:  There is no intracranial mass or lesion seen.  No hemorrhage is seen.  There is a focal area of hypoattenuation at the cerebral convexity on the left side in the left frontal region.  This is seen on images number 26 through 28 series 2.  Findings are concerning for acute infarct.  No other areas acute ischemia seen..  There is some cerebral atrophy seen.  There is some compensatory ventricular dilatation and periventricular white matter changes consistent with the patient's age.  Calvarium is intact.  The posterior fossa is unremarkable..  The visualized portions of the paranasal sinuses show no acute abnormality.                                       CT Pelvis Without Contrast (Final result)  Result time 05/17/24 12:04:11      Final result by Navi Dorado MD (05/17/24 12:04:11)                   Impression:      No acute findings.      Electronically signed by: Navi Dorado  Date:    05/17/2024  Time:    12:04                Narrative:    EXAMINATION:  CT PELVIS WITHOUT CONTRAST    CLINICAL HISTORY:  Pelvis pain, stress fracture suspected, neg xray;    TECHNIQUE:  Helical acquisition through the pelvis without IV contrast.  Three plane reconstructions were provided for review.  mGycm. Automatic exposure control, adjustment of mA/kV or iterative reconstruction technique was used to reduce radiation.    COMPARISON:  9 December 2022    FINDINGS:  There is no fracture or dislocation.  Within the pelvis there is no free fluid or mass.  Moderate atherosclerotic disease.                                      Current Medications:     Infusions:       Scheduled:   aspirin  81 mg Oral Daily    atorvastatin  20 mg Oral QHS    clopidogreL  75 mg Oral Daily        PRN:    Current Facility-Administered Medications:     acetaminophen, 650 mg, Oral, Q4H PRN    cetirizine, 10 mg, Oral, Daily PRN    dextrose 10%, 12.5 g, Intravenous, PRN    dextrose 10%, 25 g, Intravenous, PRN    glucagon (human recombinant), 1 mg, Intramuscular, PRN    glucose, 16 g, Oral, PRN    glucose, 24 g, Oral, PRN    hydrALAZINE, 10 mg, Intravenous, Q2H PRN    insulin aspart U-100, 0-5 Units, Subcutaneous, QID (AC + HS) PRN    labetalol, 10 mg, Intravenous, Q4H PRN    melatonin, 6 mg, Oral, Nightly PRN    naloxone, 0.02 mg, Intravenous, PRN    nicotine, 1 patch, Transdermal, Daily PRN    ondansetron, 8 mg, Oral, Q8H PRN    polyethylene glycol, 17 g, Oral, Daily PRN    prochlorperazine, 5 mg, Intravenous, Q6H PRN    senna-docusate 8.6-50 mg, 1 tablet, Oral, BID PRN    simethicone, 1 tablet, Oral, TID PRN    sodium chloride 0.9%, 10 mL, Intravenous, PRN    Antibiotics and Day Number of Therapy:  None      Intake/Output Summary (Last 24 hours) at 5/18/2024 1120  Last data filed at 5/18/2024 0608  Gross per 24 hour   Intake 2404.98 ml   Output 1100 ml   Net 1304.98 ml       Lines/Drains/Airways       Peripheral Intravenous Line  Duration                  Peripheral IV - Single  Lumen 05/17/24 1258 20 G Left;Posterior Hand <1 day                      Assessment & Plan:     Acute ischemic stroke  Hypertension  -NIHSS 0  -physical exam unremarkable for neurologic deficits  -CT head without contrast (05/17/2024) showed focal hypoattenuation in high left frontal lobe concerning for acute infarct  -MRI brain without contrast (50/17/2024) showed acute centrum semiovale nonhemorrhagic infarcts and chronic diffuse microangiopathic ischemia and atrophy  -past 24 hour permissive hypertension period, will give push of labetalol to lower bp, and stagger oral BP meds  -continue aspirin 81 mg qd and plavix 75 mg  -Start lipitor 20 mg qd  -will uptitrate antihypertensive as indicated     Prerenal acute kidney injury- improved  -baseline renal indices approximately: BUN 30, creatinine 1.65, eGFR 38  -renal indices back at baseline, 32/1.52, eGFR 48     Hyperglycemia  -patient son reports patient was never a diagnosed diabetic  -goal cbg < 180  -initiated low dose ssi  -A1C 9.0  -will continue to titrate antihyperglycemic regimen prn     Code Status: Full code  GI Access: PO  Feeding: Diabetic diet  IV Access: PIV  Fluids: None  Analgesia: Acetaminophen 650 mg q6h prn mild pain  Thromboprophylaxis: SCDs    Disposition: Continue inpatient services for blood pressure management. Pending PT/OT eval, may need placement.      Elpidio Armendariz DO  [facility], PGY-II

## 2024-05-19 LAB
ALBUMIN SERPL-MCNC: 2.8 G/DL (ref 3.4–4.8)
ALBUMIN/GLOB SERPL: 0.9 RATIO (ref 1.1–2)
ALP SERPL-CCNC: 55 UNIT/L (ref 40–150)
ALT SERPL-CCNC: 16 UNIT/L (ref 0–55)
ANION GAP SERPL CALC-SCNC: 8 MEQ/L
AST SERPL-CCNC: 17 UNIT/L (ref 5–34)
BASOPHILS # BLD AUTO: 0.04 X10(3)/MCL
BASOPHILS NFR BLD AUTO: 0.3 %
BILIRUB SERPL-MCNC: 0.3 MG/DL
BUN SERPL-MCNC: 24.8 MG/DL (ref 8.4–25.7)
CALCIUM SERPL-MCNC: 9.3 MG/DL (ref 8.8–10)
CHLORIDE SERPL-SCNC: 111 MMOL/L (ref 98–107)
CO2 SERPL-SCNC: 19 MMOL/L (ref 23–31)
CREAT SERPL-MCNC: 1.42 MG/DL (ref 0.73–1.18)
CREAT/UREA NIT SERPL: 17
EOSINOPHIL # BLD AUTO: 0.48 X10(3)/MCL (ref 0–0.9)
EOSINOPHIL NFR BLD AUTO: 3.9 %
ERYTHROCYTE [DISTWIDTH] IN BLOOD BY AUTOMATED COUNT: 13.2 % (ref 11.5–17)
GFR SERPLBLD CREATININE-BSD FMLA CKD-EPI: 52 ML/MIN/1.73/M2
GLOBULIN SER-MCNC: 3.2 GM/DL (ref 2.4–3.5)
GLUCOSE SERPL-MCNC: 330 MG/DL (ref 82–115)
HCT VFR BLD AUTO: 35.9 % (ref 42–52)
HGB BLD-MCNC: 11.6 G/DL (ref 14–18)
IMM GRANULOCYTES # BLD AUTO: 0.05 X10(3)/MCL (ref 0–0.04)
IMM GRANULOCYTES NFR BLD AUTO: 0.4 %
LYMPHOCYTES # BLD AUTO: 1.97 X10(3)/MCL (ref 0.6–4.6)
LYMPHOCYTES NFR BLD AUTO: 15.8 %
MAGNESIUM SERPL-MCNC: 1.7 MG/DL (ref 1.6–2.6)
MCH RBC QN AUTO: 28.4 PG (ref 27–31)
MCHC RBC AUTO-ENTMCNC: 32.3 G/DL (ref 33–36)
MCV RBC AUTO: 87.8 FL (ref 80–94)
MONOCYTES # BLD AUTO: 0.73 X10(3)/MCL (ref 0.1–1.3)
MONOCYTES NFR BLD AUTO: 5.9 %
NEUTROPHILS # BLD AUTO: 9.18 X10(3)/MCL (ref 2.1–9.2)
NEUTROPHILS NFR BLD AUTO: 73.7 %
NRBC BLD AUTO-RTO: 0 %
PHOSPHATE SERPL-MCNC: 2.8 MG/DL (ref 2.3–4.7)
PLATELET # BLD AUTO: 278 X10(3)/MCL (ref 130–400)
PMV BLD AUTO: 11.6 FL (ref 7.4–10.4)
POCT GLUCOSE: 167 MG/DL (ref 70–110)
POCT GLUCOSE: 222 MG/DL (ref 70–110)
POCT GLUCOSE: 251 MG/DL (ref 70–110)
POCT GLUCOSE: 334 MG/DL (ref 70–110)
POTASSIUM SERPL-SCNC: 3.5 MMOL/L (ref 3.5–5.1)
PROT SERPL-MCNC: 6 GM/DL (ref 5.8–7.6)
RBC # BLD AUTO: 4.09 X10(6)/MCL (ref 4.7–6.1)
SODIUM SERPL-SCNC: 138 MMOL/L (ref 136–145)
WBC # SPEC AUTO: 12.45 X10(3)/MCL (ref 4.5–11.5)

## 2024-05-19 PROCEDURE — 83735 ASSAY OF MAGNESIUM: CPT

## 2024-05-19 PROCEDURE — 36415 COLL VENOUS BLD VENIPUNCTURE: CPT

## 2024-05-19 PROCEDURE — 84100 ASSAY OF PHOSPHORUS: CPT

## 2024-05-19 PROCEDURE — 25000003 PHARM REV CODE 250

## 2024-05-19 PROCEDURE — 96372 THER/PROPH/DIAG INJ SC/IM: CPT

## 2024-05-19 PROCEDURE — 80053 COMPREHEN METABOLIC PANEL: CPT

## 2024-05-19 PROCEDURE — G0378 HOSPITAL OBSERVATION PER HR: HCPCS

## 2024-05-19 PROCEDURE — 21400001 HC TELEMETRY ROOM

## 2024-05-19 PROCEDURE — 97530 THERAPEUTIC ACTIVITIES: CPT

## 2024-05-19 PROCEDURE — 94760 N-INVAS EAR/PLS OXIMETRY 1: CPT

## 2024-05-19 PROCEDURE — 63600175 PHARM REV CODE 636 W HCPCS: Mod: JZ,JG

## 2024-05-19 PROCEDURE — 63600175 PHARM REV CODE 636 W HCPCS

## 2024-05-19 PROCEDURE — 85025 COMPLETE CBC W/AUTO DIFF WBC: CPT

## 2024-05-19 RX ORDER — TRAMADOL HYDROCHLORIDE 50 MG/1
50 TABLET ORAL EVERY 6 HOURS PRN
Status: DISCONTINUED | OUTPATIENT
Start: 2024-05-19 | End: 2024-05-24 | Stop reason: HOSPADM

## 2024-05-19 RX ORDER — INSULIN GLARGINE 100 [IU]/ML
10 INJECTION, SOLUTION SUBCUTANEOUS NIGHTLY
Status: DISCONTINUED | OUTPATIENT
Start: 2024-05-19 | End: 2024-05-19

## 2024-05-19 RX ORDER — NIFEDIPINE 30 MG/1
60 TABLET, EXTENDED RELEASE ORAL DAILY
Status: DISCONTINUED | OUTPATIENT
Start: 2024-05-19 | End: 2024-05-22

## 2024-05-19 RX ORDER — MAGNESIUM SULFATE HEPTAHYDRATE 40 MG/ML
2 INJECTION, SOLUTION INTRAVENOUS ONCE
Status: COMPLETED | OUTPATIENT
Start: 2024-05-19 | End: 2024-05-19

## 2024-05-19 RX ORDER — DOCUSATE SODIUM 50 MG/5ML
50 LIQUID ORAL ONCE
Status: COMPLETED | OUTPATIENT
Start: 2024-05-19 | End: 2024-05-19

## 2024-05-19 RX ORDER — INSULIN GLARGINE 100 [IU]/ML
10 INJECTION, SOLUTION SUBCUTANEOUS DAILY
Status: DISCONTINUED | OUTPATIENT
Start: 2024-05-19 | End: 2024-05-24 | Stop reason: HOSPADM

## 2024-05-19 RX ADMIN — INSULIN ASPART 2 UNITS: 100 INJECTION, SOLUTION INTRAVENOUS; SUBCUTANEOUS at 08:05

## 2024-05-19 RX ADMIN — ATORVASTATIN CALCIUM 40 MG: 40 TABLET, FILM COATED ORAL at 09:05

## 2024-05-19 RX ADMIN — CARVEDILOL 6.25 MG: 3.12 TABLET, FILM COATED ORAL at 09:05

## 2024-05-19 RX ADMIN — LABETALOL HYDROCHLORIDE 10 MG: 5 INJECTION, SOLUTION INTRAVENOUS at 04:05

## 2024-05-19 RX ADMIN — CLOPIDOGREL BISULFATE 75 MG: 75 TABLET ORAL at 08:05

## 2024-05-19 RX ADMIN — CARVEDILOL 6.25 MG: 3.12 TABLET, FILM COATED ORAL at 08:05

## 2024-05-19 RX ADMIN — MAGNESIUM SULFATE HEPTAHYDRATE 2 G: 40 INJECTION, SOLUTION INTRAVENOUS at 08:05

## 2024-05-19 RX ADMIN — TRAMADOL HYDROCHLORIDE 50 MG: 50 TABLET, COATED ORAL at 07:05

## 2024-05-19 RX ADMIN — NIFEDIPINE 60 MG: 30 TABLET, FILM COATED, EXTENDED RELEASE ORAL at 08:05

## 2024-05-19 RX ADMIN — LISINOPRIL 20 MG: 10 TABLET ORAL at 08:05

## 2024-05-19 RX ADMIN — INSULIN GLARGINE 10 UNITS: 100 INJECTION, SOLUTION SUBCUTANEOUS at 08:05

## 2024-05-19 RX ADMIN — INSULIN ASPART 4 UNITS: 100 INJECTION, SOLUTION INTRAVENOUS; SUBCUTANEOUS at 12:05

## 2024-05-19 RX ADMIN — ASPIRIN 81 MG CHEWABLE TABLET 81 MG: 81 TABLET CHEWABLE at 08:05

## 2024-05-19 RX ADMIN — DOCUSATE SODIUM 50 MG: 50 LIQUID ORAL at 05:05

## 2024-05-19 NOTE — PT/OT/SLP PROGRESS
Physical Therapy Treatment    Patient Name:  Suleiman Beck   MRN:  [id number]    Recommendations     Therapy Intensity Recommendations at Discharge: Moderate Intensity Therapy  Discharge Equipment Recommendations:  (To be determined by next level of care.)   Barriers to discharge: fall risk, severity of deficits, time since onset, and medical diagnosis    Assessment     Suleiman Beck is a 74 y.o. male admitted with a medical diagnosis of Ischemic stroke.    He presents with the following impairments/functional limitations:  weakness, impaired endurance, impaired functional mobility, impaired balance, decreased coordination, decreased upper extremity function, decreased lower extremity function, decreased safety awareness, impaired coordination.    Rehab Prognosis: TBD pending progress.    Patient would benefit from continued skilled acute PT services to: address above listed impairments/functional limitations; receive patient/caregiver education; reduce fall risk; and maximize independency/safety with functional mobility.    Recent Surgery: * No surgery found *      Plan     During this hospitalization, patient to be seen 5 x/week to address the identified impairments/functional limitations via gait training, therapeutic activities, therapeutic exercises, neuromuscular re-education and progress toward the established goals.    Plan of Care Expires:  06/15/24    Subjective     Communicated with patient's nurse Kailyn prior to session.    Patient agreeable to participate in treatment session.    Chief Complaint: Difficulty lifting R leg, R shoulder pain  Patient/Family Comments/goals: Return to PLOF  Pain/Comfort:  Pain Rating 1:  (Pt does not verbalze rating number)  Location - Side 1: Right  Location 1: shoulder  Pain Addressed 1: Cessation of Activity    Objective     Patient found with HOB elevated with telemetry  upon PT entry to room.    General Precautions: Standard, fall   Orthopedic Precautions:    Braces:     Respiratory Status: room air    Functional Mobility: Increased time taken to perform activities 2/2 use of  and difficulty with pt responding to cues.     Bed Mobility:  Rolling Right: moderate assistance  Supine to Sit: moderate assistance  Max cues     Transfers:  Sit to Stand: moderate assistance with rolling walker. Increased use of BLE on bed for standing and balance.  Posterior lean present with difficulty remaining upright (Mod A). Max cues    Gait:  Cues for gait given.  Pt unable to weight shift to LLE and lift RLE for side steps or walking in place with RW.     Other Mobility:  Therapeutic Activities performed:        -sitting balance activities:              weight shifting:                   -laterally (left)                 -laterally (right)            scooting:                   -laterally (left)                 -laterally (right)            repositioning at edge of bed    Patient left with HOB elevated with call button in reach, patient' nurse notified, and PT spoke with MD about pt performance .    Goals     Multidisciplinary Problems       Physical Therapy Goals          Problem: Physical Therapy    Goal Priority Disciplines Outcome Goal Variances Interventions   Physical Therapy Goal     PT, PT/OT Progressing     Description: Goals to be met by: Discharge     Patient will increase functional independence with mobility by performin. Supine to sit with [place]  2. Sit to supine with [place]  3. Sit to stand transfer with [place]  4. Bed to chair transfer with Supervision using Rolling Walker  5. Gait  x 130 feet with Modified [place] using Rolling Walker.                        Time Tracking     PT Received On: 24  PT Start Time: 0810     PT Stop Time: 08  PT Total Time (min): 45 min     Billable Minutes: Therapeutic Activity 45 mins     2024

## 2024-05-19 NOTE — PLAN OF CARE
Problem: Adult Inpatient Plan of Care  Goal: Plan of Care Review  Outcome: Progressing  Goal: Patient-Specific Goal (Individualized)  Outcome: Progressing  Goal: Absence of Hospital-Acquired Illness or Injury  Outcome: Progressing  Goal: Optimal Comfort and Wellbeing  Outcome: Progressing  Goal: Readiness for Transition of Care  Outcome: Progressing     Problem: Diabetes Comorbidity  Goal: Blood Glucose Level Within Targeted Range  Outcome: Progressing     Problem: Skin Injury Risk Increased  Goal: Skin Health and Integrity  Outcome: Progressing     Problem: Fall Injury Risk  Goal: Absence of Fall and Fall-Related Injury  Outcome: Progressing     Problem: Pain Acute  Goal: Optimal Pain Control and Function  Outcome: Progressing     Problem: Infection  Goal: Absence of Infection Signs and Symptoms  Outcome: Progressing

## 2024-05-19 NOTE — PROGRESS NOTES
[facility] Internal Medicine Wards Progress Note     Resident Team: [facility]  Attending Physician: Joe Posey MD  Resident: Elpidio Armendariz DO  Intern: Rasheed Carey MD     Subjective:      Brief HPI:  Suleiman Beck is a 74 y.o. [place] American male with PMH of hypertension and osteoarthritis of multiple joints (right shoulder and right knee predominant), who presented to [facility] ED (5/17/2024) due to dizziness and falls x 3 days. Patient does not speak English and no  available who speaks dialect. History provided by son. Patient apparently has severe osteoarthritis affecting multiple joints but most severe in right shoulder and right knee which has impaired ambulation causing him to have unsteady gait/wobbling resulting in falls in past. Son also reports patient has on multiple occasions in past reported bilateral lower extremity weakness resulting in inability to ambulate or lift legs against gravity but would later self resolve. Patient brought to ED multiple times when symptoms presented in past with negative work ups per son. Episodes of dizziness, weakness, and falls began approximately two days prior presentation (05/15/2024) when patient apparently suffered a fall onto his right resulting in right arm pain. Concerned for right arm injury patient was brought to ED by son. X-rays performed at that time were negative and patient was discharged to home. Over the next several days patient continued to report dizziness to son but without episodes of falls. Patient son was not overly concerned about symptoms at that time due to patient history of similar symptoms in past. This a.m. patient was attempting to ambulate to rest room when he felt lightheadedness/dizziness without room spinning sensation prompting him to cease ambulation, sit down, and call for help from his wife. Wife attempted to help him stand up and get to rest room but upon standing patient lost all strength in lower extremities  and fell into wife's arms. Son immediately brought patient back to ED for evaluation. Denies fever, chills, sweats. Denies headache, eye pain, blurry vision. Denies vertigo, syncope, seizures. Denies chest pain, cough, hemoptysis, shortness of breath. Denies abdominal pain, nausea, vomiting, hematemesis, constipation, diarrhea, melena, hematochezia. Denies increased or decreased urinary frequency, dysuria, hematuria. Endorses painful bilateral upper and lower extremities which patient son states is his baseline 2/2 osteoarthritis. Denies weakness, numbness, or tingling to bilateral extremities.     ED course: Vitals show patient hypertensive (max /81) but remaining vital signs stable. CBC showed leukocytosis (WBC 14.08) but otherwise unremarkable. CMP showed hyperglycemia (Gluc 405), TINO (BUN 46.4; Cr 2.19), and normal anion gap acidosis (bicarb 17). Troponin 0.048. EKG showed normal sinus rhythm with right bundle branch block, bifasicular block, and new T wave inversions V4-V6 when compared to prior EKG. CT head without contrast (05/17/2024) showed focal hypoattenuation in high left frontal lobe concerning for acute infarct. MRI brain without contrast (50/17/2024) showed acute centrum semiovale nonhemorrhagic infarcts and chronic diffuse microangiopathic ischemia and atrophy. Internal medicine consulted for admission due to acute ischemic stroke.    Interval History:   NAEON. Vitals show patient hypertensive and tachycardic but remaining vitals stable. Patient continues to endorse joint pain affecting multiple joints which he states is due to chronic arthritis and is unchanged from baseline. He otherwise denies any other acute complaints.    Review of Systems:  Review of Systems   Constitutional:  Negative for chills, diaphoresis, fatigue and fever.   HENT:  Negative for ear pain, hearing loss, rhinorrhea, sore throat, tinnitus and trouble swallowing.    Eyes:  Negative for pain, redness and visual  disturbance.   Respiratory:  Negative for cough, chest tightness and shortness of breath.    Cardiovascular:  Negative for chest pain and palpitations.   Gastrointestinal:  Negative for abdominal pain, constipation, diarrhea, nausea and vomiting.   Genitourinary:  Negative for difficulty urinating, dysuria, frequency, hematuria and urgency.   Musculoskeletal:  Positive for arthralgias, back pain and gait problem. Negative for myalgias.   Skin:  Negative for rash and wound.   Neurological:  Negative for dizziness, seizures, syncope, weakness, light-headedness, numbness and headaches.   Psychiatric/Behavioral:  Negative for confusion.         Objective:     Last 24 Hour Vital Signs:  BP  Min: 130/69  Max: 236/110  Temp  Av.1 °F (36.7 °C)  Min: 97.5 °F (36.4 °C)  Max: 98.9 °F (37.2 °C)  Pulse  Av.4  Min: 73  Max: 102  Resp  Av  Min: 18  Max: 18  SpO2  Av %  Min: 95 %  Max: 97 %  I/O last 3 completed shifts:  In: 2525 [P.O.:660; IV Piggyback:]  Out: 0 [Urine:1900]    Physical Examination:  Physical Exam  Vitals reviewed.   Constitutional:       General: He is not in acute distress.     Appearance: Normal appearance. He is normal weight. He is not ill-appearing.   HENT:      Head: Normocephalic and atraumatic.      Right Ear: External ear normal.      Left Ear: External ear normal.   Eyes:      General: No scleral icterus.        Right eye: No discharge.         Left eye: No discharge.      Extraocular Movements: Extraocular movements intact.      Conjunctiva/sclera: Conjunctivae normal.      Pupils: Pupils are equal, round, and reactive to light.   Cardiovascular:      Rate and Rhythm: Normal rate and regular rhythm.      Pulses: Normal pulses.      Heart sounds: Normal heart sounds. No murmur heard.     No friction rub. No gallop.   Pulmonary:      Effort: Pulmonary effort is normal. No respiratory distress.      Breath sounds: Normal breath sounds. No stridor. No wheezing, rhonchi or rales.    Abdominal:      General: Abdomen is flat. Bowel sounds are normal. There is no distension.      Palpations: Abdomen is soft.      Tenderness: There is no abdominal tenderness. There is no guarding.   Musculoskeletal:         General: No swelling, tenderness, deformity or signs of injury. Normal range of motion.      Right lower leg: No edema.      Left lower leg: No edema.   Skin:     General: Skin is warm and dry.      Capillary Refill: Capillary refill takes less than 2 seconds.      Coloration: Skin is not jaundiced.      Findings: No bruising, erythema or rash.   Neurological:      Mental Status: He is alert.      Comments: AAO to person, place, time, and situation; CN II-XII grossly intact         Laboratory:  Most Recent Data:  CBC:   Lab Results   Component Value Date    WBC 12.45 (H) 05/19/2024    HGB 11.6 (L) 05/19/2024    HCT 35.9 (L) 05/19/2024     05/19/2024    MCV 87.8 05/19/2024    RDW 13.2 05/19/2024     WBC Differential:   Recent Labs   Lab 05/15/24  2027 05/17/24  1058 05/18/24  0320 05/19/24  0320   WBC 11.70* 14.08* 11.35 12.45*   HGB 12.6* 11.8* 11.3* 11.6*   HCT 38.4* 36.6* 34.7* 35.9*    288 272 278   MCV 87.1 88.0 87.4 87.8     BMP:   Lab Results   Component Value Date     05/19/2024    K 3.5 05/19/2024     (H) 03/20/2023    CO2 19 (L) 05/19/2024    BUN 24.8 05/19/2024    CREATININE 1.42 (H) 05/19/2024     (H) 03/20/2023    CALCIUM 9.3 05/19/2024    MG 1.70 05/19/2024    PHOS 2.8 05/19/2024     LFTs:   Lab Results   Component Value Date    ALBUMIN 2.8 (L) 05/19/2024    BILITOT 0.3 05/19/2024    AST 17 05/19/2024    ALKPHOS 55 05/19/2024    ALT 16 05/19/2024     Coags:   Lab Results   Component Value Date    INR 1.0 05/17/2024    PROTIME 13.7 05/17/2024     FLP:   Lab Results   Component Value Date    CHOL 196 05/18/2024    HDL 31 (L) 05/18/2024    TRIG 258 (H) 05/18/2024     DM:   Lab Results   Component Value Date    HGBA1C 9.0 (H) 05/18/2024    HGBA1C 9.1  (H) 05/17/2024    HGBA1C 7.4 (H) 03/20/2023    CREATININE 1.42 (H) 05/19/2024     Thyroid:   Lab Results   Component Value Date    TSH 0.962 05/17/2024     Anemia:   Lab Results   Component Value Date    IRON 45 (L) 12/14/2022    TIBC 130 (L) 12/14/2022    FERRITIN 261.54 12/14/2022     Cardiac:   Lab Results   Component Value Date    TROPONINI 0.048 (H) 05/17/2024    .0 (H) 12/11/2022     Urinalysis:   Lab Results   Component Value Date    LABURIN No Growth 12/12/2022    PHUA 5.5 05/17/2024    UROBILINOGEN Normal 05/17/2024    WBCUA 0-5 05/17/2024       Radiology:  Imaging Results              MRI Brain Without Contrast (Final result)  Result time 05/17/24 14:18:29      Final result by Иван Saleh MD (05/17/24 14:18:29)                   Impression:      1.  Left centrum semiovale acute nonhemorrhagic infarcts.    2.  Chronic microangiopathic ischemia and atrophy.      Electronically signed by: Иван Saleh  Date:    05/17/2024  Time:    14:18               Narrative:    EXAMINATION:  MRI BRAIN WITHOUT CONTRAST    CLINICAL HISTORY:  Stroke, follow up;    TECHNIQUE:  Multiplanar MRI sequences were performed of the brain without contrast.    COMPARISON:  CT brain without contrast May 17, 2014    FINDINGS:  The left centrum semiovale is remarkable for two adjacent acute nonhemorrhagic infarcts with diffusion weighted restrictions, signal dropout on ADC map and T2 FLAIR hyperintensities on image 27 series 6.  Chronic microvascular ischemic changes are mild with periventricular and deep white matter T2 FLAIR hyperintense signals.  Generalized cerebral and cerebellar cortical volume loss. Gradient echo sequences demonstrate no evidence of de phasing artifact to suggest hemorrhagic byproducts.  The sella and suprasellar areas are unremarkable.    The cerebellar tonsils are normally positioned. There is no acute intracranial hemorrhage, hydrocephalus, midline shift or mass effect. No acute extra axial fluid  collections identified. The mastoid air cells are clear.                                       X-Ray Chest AP Portable (Final result)  Result time 05/17/24 12:24:01      Final result by Joaquim Luis MD (05/17/24 12:24:01)                   Impression:      No acute cardiopulmonary process.      Electronically signed by: Joaquim Lusi  Date:    05/17/2024  Time:    12:24               Narrative:    EXAMINATION:  XR CHEST AP PORTABLE    CLINICAL HISTORY:  Weakness;    TECHNIQUE:  Single view of the chest    COMPARISON:  10/25/2023    FINDINGS:  No focal opacification, pleural effusion, or pneumothorax.    The cardiomediastinal silhouette is within normal limits.    No acute osseous abnormality.                                       X-Ray Femur 2 View Right (Final result)  Result time 05/17/24 12:25:48      Final result by Joaquim Luis MD (05/17/24 12:25:48)                   Impression:      As above.  Prior imaging from 2 days prior.  If continued pain recommend further evaluation.      Electronically signed by: Joaquim Luis  Date:    05/17/2024  Time:    12:25               Narrative:    EXAMINATION:  XR FEMUR 2 VIEW RIGHT    CLINICAL HISTORY:  Pain;    TECHNIQUE:  AP and lateral views of the right femur were performed.    COMPARISON:  05/15/2024    FINDINGS:  no displaced fracture. Mild degenerative changes with acetabular and knee tricompartmental osteophytes.                                       X-Ray Knee 3 View Right (Final result)  Result time 05/17/24 12:26:26      Final result by Joaquim Luis MD (05/17/24 12:26:26)                   Impression:      As above.      Electronically signed by: Joaquim Luis  Date:    05/17/2024  Time:    12:26               Narrative:    EXAMINATION:  XR KNEE 3 VIEW RIGHT    CLINICAL HISTORY:  Pain, unspecified    TECHNIQUE:  AP, lateral, and Merchant views of the right knee were performed.    COMPARISON:  None    FINDINGS:  Degenerative changes with  tricompartmental osteophytes.  Joint spaces relatively well maintained.  Vascular atherosclerotic disease is noted.  Small suprapatellar effusion.                                        CT Head Without Contrast (Final result)  Result time 05/17/24 12:00:22      Final result by Noelle Felix MD (05/17/24 12:00:22)                   Impression:      Focal area of hypoattenuation in the cerebral convexity on the left side in the high left frontal lobe.  Findings are concerning for acute infarct.  MRI correlation would be beneficial.    Other chronic age related changes seen as outlined above    This report was flagged in Epic as abnormal.      Electronically signed by: Colt Felix  Date:    05/17/2024  Time:    12:00               Narrative:    EXAMINATION:  CT HEAD WITHOUT CONTRAST    CLINICAL HISTORY:  Neuro deficit, acute, stroke suspected;    TECHNIQUE:  Multiple axial images were obtained from the base of the brain to the vertex without contrast administration.  Sagittal and coronal reconstructions were performed..Automatic exposure control is utilized to reduce patient radiation exposure.    COMPARISON:  12/11/2022    FINDINGS:  There is no intracranial mass or lesion seen.  No hemorrhage is seen.  There is a focal area of hypoattenuation at the cerebral convexity on the left side in the left frontal region.  This is seen on images number 26 through 28 series 2.  Findings are concerning for acute infarct.  No other areas acute ischemia seen..  There is some cerebral atrophy seen.  There is some compensatory ventricular dilatation and periventricular white matter changes consistent with the patient's age.  Calvarium is intact.  The posterior fossa is unremarkable..  The visualized portions of the paranasal sinuses show no acute abnormality.                                       CT Pelvis Without Contrast (Final result)  Result time 05/17/24 12:04:11      Final result by Navi Dorado MD (05/17/24  12:04:11)                   Impression:      No acute findings.      Electronically signed by: Navi Dorado  Date:    05/17/2024  Time:    12:04               Narrative:    EXAMINATION:  CT PELVIS WITHOUT CONTRAST    CLINICAL HISTORY:  Pelvis pain, stress fracture suspected, neg xray;    TECHNIQUE:  Helical acquisition through the pelvis without IV contrast.  Three plane reconstructions were provided for review.  mGycm. Automatic exposure control, adjustment of mA/kV or iterative reconstruction technique was used to reduce radiation.    COMPARISON:  9 December 2022    FINDINGS:  There is no fracture or dislocation.  Within the pelvis there is no free fluid or mass.  Moderate atherosclerotic disease.                                    Current Medications:     Infusions:       Scheduled:   aspirin  81 mg Oral Daily    atorvastatin  40 mg Oral QHS    carvediloL  6.25 mg Oral BID    clopidogreL  75 mg Oral Daily    lisinopriL  20 mg Oral Daily    NIFEdipine  30 mg Oral Daily        PRN:    Current Facility-Administered Medications:     acetaminophen, 650 mg, Oral, Q4H PRN    cetirizine, 10 mg, Oral, Daily PRN    dextrose 10%, 12.5 g, Intravenous, PRN    dextrose 10%, 25 g, Intravenous, PRN    glucagon (human recombinant), 1 mg, Intramuscular, PRN    glucose, 16 g, Oral, PRN    glucose, 24 g, Oral, PRN    hydrALAZINE, 10 mg, Intravenous, Q2H PRN    insulin aspart U-100, 0-5 Units, Subcutaneous, QID (AC + HS) PRN    labetalol, 10 mg, Intravenous, Q4H PRN    loperamide, 2 mg, Oral, QID PRN    melatonin, 6 mg, Oral, Nightly PRN    naloxone, 0.02 mg, Intravenous, PRN    nicotine, 1 patch, Transdermal, Daily PRN    ondansetron, 8 mg, Oral, Q8H PRN    polyethylene glycol, 17 g, Oral, Daily PRN    prochlorperazine, 5 mg, Intravenous, Q6H PRN    senna-docusate 8.6-50 mg, 1 tablet, Oral, BID PRN    simethicone, 1 tablet, Oral, TID PRN    sodium chloride 0.9%, 10 mL, Intravenous, PRN  Assessment & Plan:     Acute ischemic  stroke  Hypertension  -NIHSS 0  -physical exam unremarkable for neurologic deficits  -CT head without contrast (05/17/2024) showed focal hypoattenuation in high left frontal lobe concerning for acute infarct  -MRI brain without contrast (50/17/2024) showed acute centrum semiovale nonhemorrhagic infarcts and chronic diffuse microangiopathic ischemia and atrophy  -past 24 hour permissive hypertension period  -continue aspirin 81 mg qd and plavix 75 mg, atorvastatin 20 mg qd, and lisinopril 20 mg qd  -increased nifedipine from 30 to 60 mg qd  -will continue to uptitrate antihypertensive as indicated     Prerenal acute kidney injury- improved  -baseline renal indices approximately: BUN 30, creatinine 1.65, eGFR 38  -renal indices at baseline  -continue to monitor  -continue IVF resuscitation prn     Hyperglycemia  -patient son reports patient was never a diagnosed diabetic  -hemoglobin A1c 9.0  -goal cbg < 180  -initiated lantus 10 units qhs with first dose this a.m.  -continue low dose ssi  -will continue to titrate antihyperglycemic regimen prn     Code Status: Full code  GI Access: PO  Feeding: Diabetic diet  IV Access: PIV  Fluids: None  Analgesia: Acetaminophen 650 mg q6h prn mild pain  Thromboprophylaxis: SCDs    Disposition: Continue inpatient services for blood pressure management and PT/OT eval for dispo planning.    Rasheed Carey MD  [facility], [facility]

## 2024-05-20 LAB
ALBUMIN SERPL-MCNC: 2.7 G/DL (ref 3.4–4.8)
ALBUMIN/GLOB SERPL: 0.9 RATIO (ref 1.1–2)
ALP SERPL-CCNC: 56 UNIT/L (ref 40–150)
ALT SERPL-CCNC: 14 UNIT/L (ref 0–55)
ANION GAP SERPL CALC-SCNC: 8 MEQ/L
AST SERPL-CCNC: 12 UNIT/L (ref 5–34)
BASOPHILS # BLD AUTO: 0.04 X10(3)/MCL
BASOPHILS NFR BLD AUTO: 0.3 %
BILIRUB SERPL-MCNC: 0.4 MG/DL
BUN SERPL-MCNC: 31.7 MG/DL (ref 8.4–25.7)
CALCIUM SERPL-MCNC: 9 MG/DL (ref 8.8–10)
CHLORIDE SERPL-SCNC: 110 MMOL/L (ref 98–107)
CO2 SERPL-SCNC: 20 MMOL/L (ref 23–31)
CREAT SERPL-MCNC: 1.64 MG/DL (ref 0.73–1.18)
CREAT/UREA NIT SERPL: 19
EOSINOPHIL # BLD AUTO: 0.58 X10(3)/MCL (ref 0–0.9)
EOSINOPHIL NFR BLD AUTO: 4.7 %
ERYTHROCYTE [DISTWIDTH] IN BLOOD BY AUTOMATED COUNT: 13.2 % (ref 11.5–17)
GFR SERPLBLD CREATININE-BSD FMLA CKD-EPI: 44 ML/MIN/1.73/M2
GLOBULIN SER-MCNC: 2.9 GM/DL (ref 2.4–3.5)
GLUCOSE SERPL-MCNC: 188 MG/DL (ref 82–115)
HCT VFR BLD AUTO: 33.9 % (ref 42–52)
HGB BLD-MCNC: 10.8 G/DL (ref 14–18)
HOLD SPECIMEN: NORMAL
IMM GRANULOCYTES # BLD AUTO: 0.03 X10(3)/MCL (ref 0–0.04)
IMM GRANULOCYTES NFR BLD AUTO: 0.2 %
LYMPHOCYTES # BLD AUTO: 3.25 X10(3)/MCL (ref 0.6–4.6)
LYMPHOCYTES NFR BLD AUTO: 26.5 %
MAGNESIUM SERPL-MCNC: 2.4 MG/DL (ref 1.6–2.6)
MCH RBC QN AUTO: 28.2 PG (ref 27–31)
MCHC RBC AUTO-ENTMCNC: 31.9 G/DL (ref 33–36)
MCV RBC AUTO: 88.5 FL (ref 80–94)
MONOCYTES # BLD AUTO: 0.83 X10(3)/MCL (ref 0.1–1.3)
MONOCYTES NFR BLD AUTO: 6.8 %
NEUTROPHILS # BLD AUTO: 7.55 X10(3)/MCL (ref 2.1–9.2)
NEUTROPHILS NFR BLD AUTO: 61.5 %
NRBC BLD AUTO-RTO: 0 %
PHOSPHATE SERPL-MCNC: 3.1 MG/DL (ref 2.3–4.7)
PLATELET # BLD AUTO: 255 X10(3)/MCL (ref 130–400)
PMV BLD AUTO: 11.7 FL (ref 7.4–10.4)
POCT GLUCOSE: 162 MG/DL (ref 70–110)
POCT GLUCOSE: 189 MG/DL (ref 70–110)
POCT GLUCOSE: 222 MG/DL (ref 70–110)
POCT GLUCOSE: 324 MG/DL (ref 70–110)
POTASSIUM SERPL-SCNC: 3.6 MMOL/L (ref 3.5–5.1)
PROT SERPL-MCNC: 5.6 GM/DL (ref 5.8–7.6)
RBC # BLD AUTO: 3.83 X10(6)/MCL (ref 4.7–6.1)
SODIUM SERPL-SCNC: 138 MMOL/L (ref 136–145)
WBC # SPEC AUTO: 12.28 X10(3)/MCL (ref 4.5–11.5)

## 2024-05-20 PROCEDURE — 80053 COMPREHEN METABOLIC PANEL: CPT

## 2024-05-20 PROCEDURE — 21400001 HC TELEMETRY ROOM

## 2024-05-20 PROCEDURE — 85025 COMPLETE CBC W/AUTO DIFF WBC: CPT

## 2024-05-20 PROCEDURE — 84100 ASSAY OF PHOSPHORUS: CPT

## 2024-05-20 PROCEDURE — 25000003 PHARM REV CODE 250

## 2024-05-20 PROCEDURE — 63600175 PHARM REV CODE 636 W HCPCS

## 2024-05-20 PROCEDURE — 36415 COLL VENOUS BLD VENIPUNCTURE: CPT

## 2024-05-20 PROCEDURE — 94760 N-INVAS EAR/PLS OXIMETRY 1: CPT

## 2024-05-20 PROCEDURE — 97167 OT EVAL HIGH COMPLEX 60 MIN: CPT

## 2024-05-20 PROCEDURE — 36415 COLL VENOUS BLD VENIPUNCTURE: CPT | Performed by: STUDENT IN AN ORGANIZED HEALTH CARE EDUCATION/TRAINING PROGRAM

## 2024-05-20 PROCEDURE — 96372 THER/PROPH/DIAG INJ SC/IM: CPT

## 2024-05-20 PROCEDURE — 83735 ASSAY OF MAGNESIUM: CPT

## 2024-05-20 PROCEDURE — 25500020 PHARM REV CODE 255: Performed by: STUDENT IN AN ORGANIZED HEALTH CARE EDUCATION/TRAINING PROGRAM

## 2024-05-20 RX ORDER — INSULIN ASPART 100 [IU]/ML
3 INJECTION, SOLUTION INTRAVENOUS; SUBCUTANEOUS
Status: DISCONTINUED | OUTPATIENT
Start: 2024-05-20 | End: 2024-05-24 | Stop reason: HOSPADM

## 2024-05-20 RX ADMIN — INSULIN ASPART 4 UNITS: 100 INJECTION, SOLUTION INTRAVENOUS; SUBCUTANEOUS at 12:05

## 2024-05-20 RX ADMIN — CARVEDILOL 6.25 MG: 3.12 TABLET, FILM COATED ORAL at 08:05

## 2024-05-20 RX ADMIN — INSULIN ASPART 3 UNITS: 100 INJECTION, SOLUTION INTRAVENOUS; SUBCUTANEOUS at 08:05

## 2024-05-20 RX ADMIN — INSULIN ASPART 2 UNITS: 100 INJECTION, SOLUTION INTRAVENOUS; SUBCUTANEOUS at 05:05

## 2024-05-20 RX ADMIN — CLOPIDOGREL BISULFATE 75 MG: 75 TABLET ORAL at 08:05

## 2024-05-20 RX ADMIN — NIFEDIPINE 60 MG: 30 TABLET, FILM COATED, EXTENDED RELEASE ORAL at 08:05

## 2024-05-20 RX ADMIN — ACETAMINOPHEN 650 MG: 325 TABLET, FILM COATED ORAL at 08:05

## 2024-05-20 RX ADMIN — TRAMADOL HYDROCHLORIDE 50 MG: 50 TABLET, COATED ORAL at 05:05

## 2024-05-20 RX ADMIN — ASPIRIN 81 MG CHEWABLE TABLET 81 MG: 81 TABLET CHEWABLE at 08:05

## 2024-05-20 RX ADMIN — INSULIN ASPART 3 UNITS: 100 INJECTION, SOLUTION INTRAVENOUS; SUBCUTANEOUS at 05:05

## 2024-05-20 RX ADMIN — INSULIN ASPART 3 UNITS: 100 INJECTION, SOLUTION INTRAVENOUS; SUBCUTANEOUS at 12:05

## 2024-05-20 RX ADMIN — SODIUM CHLORIDE 1000 ML: 9 INJECTION, SOLUTION INTRAVENOUS at 09:05

## 2024-05-20 RX ADMIN — IOHEXOL 80 ML: 350 INJECTION, SOLUTION INTRAVENOUS at 08:05

## 2024-05-20 RX ADMIN — SENNOSIDES AND DOCUSATE SODIUM 1 TABLET: 50; 8.6 TABLET ORAL at 05:05

## 2024-05-20 RX ADMIN — ATORVASTATIN CALCIUM 40 MG: 40 TABLET, FILM COATED ORAL at 08:05

## 2024-05-20 RX ADMIN — LISINOPRIL 20 MG: 10 TABLET ORAL at 08:05

## 2024-05-20 RX ADMIN — INSULIN GLARGINE 10 UNITS: 100 INJECTION, SOLUTION SUBCUTANEOUS at 08:05

## 2024-05-20 NOTE — PT/OT/SLP EVAL
Occupational Therapy   Evaluation    Name: Suleiman Beck  MRN: [id number]  Admitting Diagnosis: Ischemic stroke  Recent Surgery: * No surgery found *      Recommendations:     Discharge Recommendations: High Intensity Therapy  Discharge Equipment Recommendations:  to be determined by next level of care  Barriers to discharge:  None    Assessment:     Suleiman Beck is a 74 y.o. male with a medical diagnosis of Ischemic stroke.  Pt's grandson reported at least 5-year history of R shoulder pain d/t arthritis, which is generally well managed at home with Ibuprofen or Tramadol.  He presents with grandson (with whom he lives) in room acting as  d/t speaking a dialect of [place] not spoken by our  service.  Pt required step by step, basic commands d/t difficulty with sequencing tasks, along with deficits in coordination and problem solving.  Pt was cooperative and attempted all tasks through evaluation, with improvement today in sit to stand from EOB compared to PT eval and notes from this weekend (2 days ago).     Performance deficits affecting function: weakness, impaired self care skills, impaired functional mobility, gait instability, impaired balance, decreased coordination, decreased upper extremity function, decreased lower extremity function, decreased safety awareness, pain, decreased ROM, impaired fine motor, edema.      Rehab Prognosis: Good; patient would benefit from acute skilled OT services to address these deficits and reach maximum level of function.       Plan:     Patient to be seen 4 x/week to address the above listed problems via self-care/home management, therapeutic activities, therapeutic exercises, neuromuscular re-education  Plan of Care Expires:  (DC)  Plan of Care Reviewed with: patient, grandchild(rush)    Subjective     Chief Complaint: R shoulder chronic pain, exacerbated by decreased activity since hospitalization per patient  Patient/Family Comments/goals: return to normal, to  walk again, to go back home with family    Occupational Profile:  Living Environment: [facility] with family (7 members), no steps to enter  Previous level of function: I/mod I for mobility and ADLs per pt's grandson in room, until increasing weakness over past few days  Roles and Routines: family assists with IADLs.  Note per PT eval, pt's family member reported pt did need some assist with Adls at times.  Equipment Used at Home: none  Assistance upon Discharge: family at home    Pain/Comfort:  Pain Rating 1: 5/10  Location - Side 1: Right  Location 1: shoulder  Pain Addressed 1: Reposition, Cessation of Activity, Nurse notified  Pain Rating Post-Intervention 1: 5/10    Patients cultural, spiritual, Sabianism conflicts given the current situation: no    Objective:     Communicated with: nurse Kailyn prior to session.  Patient found HOB elevated with telemetry, PureWick, peripheral IV upon OT entry to room.    General Precautions: Standard, fall  Orthopedic Precautions: N/A  Braces: N/A  Respiratory Status: Room air    Occupational Performance:    Bed Mobility:    Patient completed Scooting/Bridging with maximal assistance and to scoot to EOB in sitting position  Patient completed Supine to Sit with maximal assistance  Patient completed Sit to Supine with moderate assistance and assist to bring trunk to middle of bed once in supine and assist to bring RLE into bed d/t weakness    Functional Mobility/Transfers:  Patient completed Sit <> Stand Transfer with minimum assistance  with  rolling walker   Functional Mobility: pt required multiple attempts and mobility training, training for technique with RW, and single -step commands to attempt sidestepping, with noted difficulty stepping with RLE d/t c/o weakness.  After training pt able to better utilize RW to shift weight onto LLE and demonstrate marching in place with RLE x4 times, but then was again unable to coordinate use of RW during attempts to sidestep with RLE.  OT  provided tactile assist at R foot with facilitation of weight shift to LLE in order to provide min assist with sidestepping R foot, then verbal/visual cues to step toward R with LLE for roughly 6 steps.    Activities of Daily Living:  Feeding:  maximal assistance with phillip feeding pt his lunch; phillip reported pt attempts to use L hand but with excess spillage on attempts to feed himself using L (nondominant) hand in bed and required grandson to feed him.  Grooming: maximal assistance using R hand; min using L hand for gross movement of wiping face with washcloth while seated EOB  Upper Body Dressing: maximal assistance seated EOB with max cuing for comprehension of task  Lower Body Dressing: total assistance .  Toileting: total assistance with Purewick in place and pt currently not safe to attempt transfer to [facility]    Cognitive/Visual Perceptual:  Cognitive/Psychosocial Skills:     -       Oriented to: Person, Place, and difficult to assess awareness of situation and time d/t language barrier despite ; pt easily confused and during evaluation OT omitted some aspects/questions in favor of more functional tasks; will continue to assess orientation and cognition during treatment session   -       Follows Commands/attention:Easily distracted, Follows one-step commands, and required repetition  -       Safety awareness/insight to disability: impaired   -       Mood/Affect/Coping skills/emotional control: Cooperative, Happy, and Pleasant  Visual/Perceptual:      -Impaired  tracking, visual field, motor planning praxis, and note difficulty with formal assessment d/t cognition, language barrier, and difficulty comprehending instructions for visual assessment even with multiple, basic commands from pt's grandson interpreting .    Physical Exam:  R shoulder AROM limited by c/o chronic arthritic pain to less than 30 degrees flexion, R shoulder PROM to roughly 100 degrees and limited by c/o pain, elbow PROM WFL,  R hand with edema (also note peripheral IV placement dorsally) noted, R  strength 3+/5    RUE coordination (gross and fine motor) impaired    LUE AROM WFL, strength roughly 4/5 with mild deconditioning noted      Treatment & Education:  Pt. And grandson educated on OT goals, POC, orientation to environment, use of call bell for assist with any other needs due to fall risk.  Pt and grandson verbalized understanding.    OT positioned pillow under R shoulder to help improve comfort at rest.      Patient left HOB elevated with all lines intact, call button in reach, nurse notified, and pt's grandson present    GOALS:   Multidisciplinary Problems       Occupational Therapy Goals          Problem: Occupational Therapy    Goal Priority Disciplines Outcome Interventions   Occupational Therapy Goal     OT, PT/OT Progressing    Description: Patient will perform feeding with min A using his R hand, while seated up at chair at bedside or EOB.     Patient will perform grooming with min assist while seated EOB.    Patient will perform toileting with mod assist using BSC.    Patient will perform toilet transfer with mod assist with use of RW.    Patient will perform upper body dressing with mod assist while seated EOB.     Patient will perform lower body dressing with max assist while seated EOB.                          History:     Past Medical History:   Diagnosis Date    Arthritis     Essential (primary) hypertension     Unspecified chronic bronchitis          Past Surgical History:   Procedure Laterality Date    CYSTOSCOPY W/ URETERAL STENT PLACEMENT Left 12/11/2022    Procedure: CYSTOSCOPY, WITH URETERAL STENT INSERTION;  Surgeon: Mar Fernandez MD;  Location: [facility];  Service: Urology;  Laterality: Left;       Time Tracking:     OT Date of Treatment: 05/20/24  OT Start Time: 1306  OT Stop Time: 1350  OT Total Time (min): 44 min    Billable Minutes:Evaluation 44, high    5/20/2024

## 2024-05-20 NOTE — PROGRESS NOTES
[facility] Internal Medicine Wards Progress Note     Resident Team: [facility] Medicine List 1  Attending Physician: Joe Posey MD  Resident: Elpidio Armendariz DO  Intern: Rasheed Carey MD     Subjective:      Brief HPI:  Suleiman Beck is a 74 y.o. [place] American male with PMH of hypertension and osteoarthritis of multiple joints (right shoulder and right knee predominant), who presented to [facility] ED (5/17/2024) due to dizziness and falls x 3 days. Patient does not speak English and no  available who speaks dialect. History provided by son. Patient apparently has severe osteoarthritis affecting multiple joints but most severe in right shoulder and right knee which has impaired ambulation causing him to have unsteady gait/wobbling resulting in falls in past. Son also reports patient has on multiple occasions in past reported bilateral lower extremity weakness resulting in inability to ambulate or lift legs against gravity but would later self resolve. Patient brought to ED multiple times when symptoms presented in past with negative work ups per son. Episodes of dizziness, weakness, and falls began approximately two days prior presentation (05/15/2024) when patient apparently suffered a fall onto his right resulting in right arm pain. Concerned for right arm injury patient was brought to ED by son. X-rays performed at that time were negative and patient was discharged to home. Over the next several days patient continued to report dizziness to son but without episodes of falls. Patient son was not overly concerned about symptoms at that time due to patient history of similar symptoms in past. This a.m. patient was attempting to ambulate to rest room when he felt lightheadedness/dizziness without room spinning sensation prompting him to cease ambulation, sit down, and call for help from his wife. Wife attempted to help him stand up and get to rest room but upon standing patient lost all strength in lower extremities  and fell into wife's arms. Son immediately brought patient back to ED for evaluation. Denies fever, chills, sweats. Denies headache, eye pain, blurry vision. Denies vertigo, syncope, seizures. Denies chest pain, cough, hemoptysis, shortness of breath. Denies abdominal pain, nausea, vomiting, hematemesis, constipation, diarrhea, melena, hematochezia. Denies increased or decreased urinary frequency, dysuria, hematuria. Endorses painful bilateral upper and lower extremities which patient son states is his baseline 2/2 osteoarthritis. Denies weakness, numbness, or tingling to bilateral extremities.     ED course: Vitals show patient hypertensive (max /81) but remaining vital signs stable. CBC showed leukocytosis (WBC 14.08) but otherwise unremarkable. CMP showed hyperglycemia (Gluc 405), TINO (BUN 46.4; Cr 2.19), and normal anion gap acidosis (bicarb 17). Troponin 0.048. EKG showed normal sinus rhythm with right bundle branch block, bifasicular block, and new T wave inversions V4-V6 when compared to prior EKG. CT head without contrast (05/17/2024) showed focal hypoattenuation in high left frontal lobe concerning for acute infarct. MRI brain without contrast (50/17/2024) showed acute centrum semiovale nonhemorrhagic infarcts and chronic diffuse microangiopathic ischemia and atrophy. Internal medicine consulted for admission due to acute ischemic stroke.    Interval History:   NAEON. Vitals show patient hypertensive but remaining vitals stable. Patient continues to endorse joint pain affecting multiple joints which he states is due to chronic arthritis and is unchanged from baseline. He otherwise denies any other acute complaints. PT recommending moderate intensity therapy. Discussed dispo with son who was amenable to SNF placement. Consulted case management for SNF placement.    Review of Systems:  Review of Systems   Constitutional:  Negative for chills, diaphoresis, fatigue and fever.   HENT:  Negative for ear  pain, hearing loss, rhinorrhea, sore throat, tinnitus and trouble swallowing.    Eyes:  Negative for pain, redness and visual disturbance.   Respiratory:  Negative for cough, chest tightness and shortness of breath.    Cardiovascular:  Negative for chest pain and palpitations.   Gastrointestinal:  Negative for abdominal pain, constipation, diarrhea, nausea and vomiting.   Genitourinary:  Negative for difficulty urinating, dysuria, frequency, hematuria and urgency.   Musculoskeletal:  Positive for arthralgias, back pain and gait problem. Negative for myalgias.   Skin:  Negative for rash and wound.   Neurological:  Negative for dizziness, seizures, syncope, weakness, light-headedness, numbness and headaches.   Psychiatric/Behavioral:  Negative for confusion.         Objective:     Last 24 Hour Vital Signs:  BP  Min: 129/65  Max: 159/81  Temp  Av.8 °F (36.6 °C)  Min: 97.3 °F (36.3 °C)  Max: 98 °F (36.7 °C)  Pulse  Av.2  Min: 71  Max: 88  Resp  Av  Min: 18  Max: 18  SpO2  Av.3 %  Min: 95 %  Max: 99 %  I/O last 3 completed shifts:  In: 356 [P.O.:356]  Out: 800 [Urine:800]    Physical Examination:  Physical Exam  Vitals reviewed.   Constitutional:       General: He is not in acute distress.     Appearance: Normal appearance. He is normal weight. He is not ill-appearing.   HENT:      Head: Normocephalic and atraumatic.      Right Ear: External ear normal.      Left Ear: External ear normal.   Eyes:      General: No scleral icterus.        Right eye: No discharge.         Left eye: No discharge.      Extraocular Movements: Extraocular movements intact.      Conjunctiva/sclera: Conjunctivae normal.      Pupils: Pupils are equal, round, and reactive to light.   Cardiovascular:      Rate and Rhythm: Normal rate and regular rhythm.      Pulses: Normal pulses.      Heart sounds: Normal heart sounds. No murmur heard.     No friction rub. No gallop.   Pulmonary:      Effort: Pulmonary effort is normal. No  respiratory distress.      Breath sounds: Normal breath sounds. No stridor. No wheezing, rhonchi or rales.   Abdominal:      General: Abdomen is flat. Bowel sounds are normal. There is no distension.      Palpations: Abdomen is soft.      Tenderness: There is no abdominal tenderness. There is no guarding.   Musculoskeletal:         General: No swelling, tenderness, deformity or signs of injury. Normal range of motion.      Right lower leg: No edema.      Left lower leg: No edema.   Skin:     General: Skin is warm and dry.      Capillary Refill: Capillary refill takes less than 2 seconds.      Coloration: Skin is not jaundiced.      Findings: No bruising, erythema or rash.   Neurological:      Mental Status: He is alert.      Comments: AAO to person, place, time, and situation; CN II-XII grossly intact         Laboratory:  Most Recent Data:  CBC:   Lab Results   Component Value Date    WBC 12.28 (H) 05/20/2024    HGB 10.8 (L) 05/20/2024    HCT 33.9 (L) 05/20/2024     05/20/2024    MCV 88.5 05/20/2024    RDW 13.2 05/20/2024     WBC Differential:   Recent Labs   Lab 05/15/24  2027 05/17/24  1058 05/18/24  0320 05/19/24  0320 05/20/24  0328   WBC 11.70* 14.08* 11.35 12.45* 12.28*   HGB 12.6* 11.8* 11.3* 11.6* 10.8*   HCT 38.4* 36.6* 34.7* 35.9* 33.9*    288 272 278 255   MCV 87.1 88.0 87.4 87.8 88.5     BMP:   Lab Results   Component Value Date     05/20/2024    K 3.6 05/20/2024     (H) 03/20/2023    CO2 20 (L) 05/20/2024    BUN 31.7 (H) 05/20/2024    CREATININE 1.64 (H) 05/20/2024     (H) 03/20/2023    CALCIUM 9.0 05/20/2024    MG 2.40 05/20/2024    PHOS 3.1 05/20/2024     LFTs:   Lab Results   Component Value Date    ALBUMIN 2.7 (L) 05/20/2024    BILITOT 0.4 05/20/2024    AST 12 05/20/2024    ALKPHOS 56 05/20/2024    ALT 14 05/20/2024     Coags:   Lab Results   Component Value Date    INR 1.0 05/17/2024    PROTIME 13.7 05/17/2024     FLP:   Lab Results   Component Value Date    CHOL  196 05/18/2024    HDL 31 (L) 05/18/2024    TRIG 258 (H) 05/18/2024     DM:   Lab Results   Component Value Date    HGBA1C 9.0 (H) 05/18/2024    HGBA1C 9.1 (H) 05/17/2024    HGBA1C 7.4 (H) 03/20/2023    CREATININE 1.64 (H) 05/20/2024     Thyroid:   Lab Results   Component Value Date    TSH 0.962 05/17/2024     Anemia:   Lab Results   Component Value Date    IRON 45 (L) 12/14/2022    TIBC 130 (L) 12/14/2022    FERRITIN 261.54 12/14/2022     Cardiac:   Lab Results   Component Value Date    TROPONINI 0.048 (H) 05/17/2024    .0 (H) 12/11/2022     Urinalysis:   Lab Results   Component Value Date    LABURIN No Growth 12/12/2022    PHUA 5.5 05/17/2024    UROBILINOGEN Normal 05/17/2024    WBCUA 0-5 05/17/2024       Radiology:  Imaging Results              MRI Brain Without Contrast (Final result)  Result time 05/17/24 14:18:29      Final result by Иван Saleh MD (05/17/24 14:18:29)                   Impression:      1.  Left centrum semiovale acute nonhemorrhagic infarcts.    2.  Chronic microangiopathic ischemia and atrophy.      Electronically signed by: Иван Saleh  Date:    05/17/2024  Time:    14:18               Narrative:    EXAMINATION:  MRI BRAIN WITHOUT CONTRAST    CLINICAL HISTORY:  Stroke, follow up;    TECHNIQUE:  Multiplanar MRI sequences were performed of the brain without contrast.    COMPARISON:  CT brain without contrast May 17, 2014    FINDINGS:  The left centrum semiovale is remarkable for two adjacent acute nonhemorrhagic infarcts with diffusion weighted restrictions, signal dropout on ADC map and T2 FLAIR hyperintensities on image 27 series 6.  Chronic microvascular ischemic changes are mild with periventricular and deep white matter T2 FLAIR hyperintense signals.  Generalized cerebral and cerebellar cortical volume loss. Gradient echo sequences demonstrate no evidence of de phasing artifact to suggest hemorrhagic byproducts.  The sella and suprasellar areas are unremarkable.    The  cerebellar tonsils are normally positioned. There is no acute intracranial hemorrhage, hydrocephalus, midline shift or mass effect. No acute extra axial fluid collections identified. The mastoid air cells are clear.                                       X-Ray Chest AP Portable (Final result)  Result time 05/17/24 12:24:01      Final result by Joaquim Luis MD (05/17/24 12:24:01)                   Impression:      No acute cardiopulmonary process.      Electronically signed by: Joaquim Luis  Date:    05/17/2024  Time:    12:24               Narrative:    EXAMINATION:  XR CHEST AP PORTABLE    CLINICAL HISTORY:  Weakness;    TECHNIQUE:  Single view of the chest    COMPARISON:  10/25/2023    FINDINGS:  No focal opacification, pleural effusion, or pneumothorax.    The cardiomediastinal silhouette is within normal limits.    No acute osseous abnormality.                                       X-Ray Femur 2 View Right (Final result)  Result time 05/17/24 12:25:48      Final result by Joaquim Luis MD (05/17/24 12:25:48)                   Impression:      As above.  Prior imaging from 2 days prior.  If continued pain recommend further evaluation.      Electronically signed by: Joaquim Luis  Date:    05/17/2024  Time:    12:25               Narrative:    EXAMINATION:  XR FEMUR 2 VIEW RIGHT    CLINICAL HISTORY:  Pain;    TECHNIQUE:  AP and lateral views of the right femur were performed.    COMPARISON:  05/15/2024    FINDINGS:  no displaced fracture. Mild degenerative changes with acetabular and knee tricompartmental osteophytes.                                       X-Ray Knee 3 View Right (Final result)  Result time 05/17/24 12:26:26      Final result by Joaquim Luis MD (05/17/24 12:26:26)                   Impression:      As above.      Electronically signed by: Joaquim Luis  Date:    05/17/2024  Time:    12:26               Narrative:    EXAMINATION:  XR KNEE 3 VIEW RIGHT    CLINICAL HISTORY:  Pain,  unspecified    TECHNIQUE:  AP, lateral, and Merchant views of the right knee were performed.    COMPARISON:  None    FINDINGS:  Degenerative changes with tricompartmental osteophytes.  Joint spaces relatively well maintained.  Vascular atherosclerotic disease is noted.  Small suprapatellar effusion.                                        CT Head Without Contrast (Final result)  Result time 05/17/24 12:00:22      Final result by Noelle Felix MD (05/17/24 12:00:22)                   Impression:      Focal area of hypoattenuation in the cerebral convexity on the left side in the high left frontal lobe.  Findings are concerning for acute infarct.  MRI correlation would be beneficial.    Other chronic age related changes seen as outlined above    This report was flagged in Epic as abnormal.      Electronically signed by: Colt Felix  Date:    05/17/2024  Time:    12:00               Narrative:    EXAMINATION:  CT HEAD WITHOUT CONTRAST    CLINICAL HISTORY:  Neuro deficit, acute, stroke suspected;    TECHNIQUE:  Multiple axial images were obtained from the base of the brain to the vertex without contrast administration.  Sagittal and coronal reconstructions were performed..Automatic exposure control is utilized to reduce patient radiation exposure.    COMPARISON:  12/11/2022    FINDINGS:  There is no intracranial mass or lesion seen.  No hemorrhage is seen.  There is a focal area of hypoattenuation at the cerebral convexity on the left side in the left frontal region.  This is seen on images number 26 through 28 series 2.  Findings are concerning for acute infarct.  No other areas acute ischemia seen..  There is some cerebral atrophy seen.  There is some compensatory ventricular dilatation and periventricular white matter changes consistent with the patient's age.  Calvarium is intact.  The posterior fossa is unremarkable..  The visualized portions of the paranasal sinuses show no acute abnormality.                                        CT Pelvis Without Contrast (Final result)  Result time 05/17/24 12:04:11      Final result by Navi Dorado MD (05/17/24 12:04:11)                   Impression:      No acute findings.      Electronically signed by: Navi Dorado  Date:    05/17/2024  Time:    12:04               Narrative:    EXAMINATION:  CT PELVIS WITHOUT CONTRAST    CLINICAL HISTORY:  Pelvis pain, stress fracture suspected, neg xray;    TECHNIQUE:  Helical acquisition through the pelvis without IV contrast.  Three plane reconstructions were provided for review.  mGycm. Automatic exposure control, adjustment of mA/kV or iterative reconstruction technique was used to reduce radiation.    COMPARISON:  9 December 2022    FINDINGS:  There is no fracture or dislocation.  Within the pelvis there is no free fluid or mass.  Moderate atherosclerotic disease.                                    Current Medications:     Infusions:       Scheduled:   aspirin  81 mg Oral Daily    atorvastatin  40 mg Oral QHS    carvediloL  6.25 mg Oral BID    clopidogreL  75 mg Oral Daily    insulin glargine U-100  10 Units Subcutaneous Daily    lisinopriL  20 mg Oral Daily    NIFEdipine  60 mg Oral Daily        PRN:    Current Facility-Administered Medications:     acetaminophen, 650 mg, Oral, Q4H PRN    cetirizine, 10 mg, Oral, Daily PRN    dextrose 10%, 12.5 g, Intravenous, PRN    dextrose 10%, 25 g, Intravenous, PRN    glucagon (human recombinant), 1 mg, Intramuscular, PRN    glucose, 16 g, Oral, PRN    glucose, 24 g, Oral, PRN    hydrALAZINE, 10 mg, Intravenous, Q2H PRN    insulin aspart U-100, 0-5 Units, Subcutaneous, QID (AC + HS) PRN    labetalol, 10 mg, Intravenous, Q4H PRN    melatonin, 6 mg, Oral, Nightly PRN    naloxone, 0.02 mg, Intravenous, PRN    nicotine, 1 patch, Transdermal, Daily PRN    ondansetron, 8 mg, Oral, Q8H PRN    polyethylene glycol, 17 g, Oral, Daily PRN    prochlorperazine, 5 mg, Intravenous, Q6H PRN     senna-docusate 8.6-50 mg, 1 tablet, Oral, BID PRN    simethicone, 1 tablet, Oral, TID PRN    sodium chloride 0.9%, 10 mL, Intravenous, PRN    traMADoL, 50 mg, Oral, Q6H PRN  Assessment & Plan:     Acute ischemic stroke  Hypertension  -NIHSS 0  -physical exam unremarkable for neurologic deficits  -CT head without contrast (05/17/2024) showed focal hypoattenuation in high left frontal lobe concerning for acute infarct  -MRI brain without contrast (50/17/2024) showed acute centrum semiovale nonhemorrhagic infarcts and chronic diffuse microangiopathic ischemia and atrophy  -past 24 hour permissive hypertension period  -continue aspirin 81 mg qd and plavix 75 mg, atorvastatin 20 mg qd, nifedipine 60 mg qd, and lisinopril 20 mg qd  -will continue to uptitrate antihypertensive as indicated     Prerenal acute kidney injury- improved  -baseline renal indices approximately: BUN 30, creatinine 1.65, eGFR 38  -renal indices at baseline  -continue to monitor  -continue IVF resuscitation prn     Hyperglycemia  -patient son reports patient was never a diagnosed diabetic  -hemoglobin A1c 9.0  -goal cbg < 180  -continue lantus 10 units qhs  -initiated scheduled aspart 3 units tidwm  -continue low dose ssi  -will continue to titrate antihyperglycemic regimen prn     Code Status: Full code  GI Access: PO  Feeding: Diabetic diet  IV Access: PIV  Fluids: None  Analgesia: Acetaminophen 650 mg q6h prn mild pain  Thromboprophylaxis: SCDs    Disposition: Continue inpatient services for blood pressure management and SNF placement.    Rasheed Carey MD  [facility], -[facility]

## 2024-05-20 NOTE — PLAN OF CARE
Problem: Occupational Therapy  Goal: Occupational Therapy Goal  Description: Patient will perform feeding with min A using his R hand, while seated up at chair at bedside or EOB.     Patient will perform grooming with min assist while seated EOB.    Patient will perform toileting with mod assist using BSC.    Patient will perform toilet transfer with mod assist with use of RW.    Patient will perform upper body dressing with mod assist while seated EOB.     Patient will perform lower body dressing with max assist while seated EOB.     Outcome: Progressing

## 2024-05-20 NOTE — CONSULTS
Spoke with pt's son Anabel, [phone], agrees with SNF, would like [facility] but ok with any facility covered by insurance. [facility] submitted [facility] via [facility]. Acceptance pending. Will follow.

## 2024-05-20 NOTE — PLAN OF CARE
Spoke with PT, pt is more rehab appropriate. Referral submitted to [facility] via [facility]. Acceptance pending. Will follow.

## 2024-05-20 NOTE — PLAN OF CARE
05/20/24 1110   Discharge Assessment   Assessment Type Discharge Planning Assessment   Confirmed/corrected address, phone number and insurance Yes   Confirmed Demographics Correct on Facesheet   Source of Information   (Anabel Sims (Son)  [phone])   When was your last doctors appointment?   (Hipolito Winn)   Reason For Admission weakness, pain, Stroke   People in Home child(rush), adult;other relative(s);spouse   Facility Arrived From: Home   Do you expect to return to your current living situation? Yes   Do you have help at home or someone to help you manage your care at home? Yes   Who are your caregiver(s) and their phone number(s)? Anabel Sims (Son)  [phone]   Prior to hospitilization cognitive status: Unable to Assess   Current cognitive status: Unable to Assess   Walking or Climbing Stairs Difficulty no   Dressing/Bathing Difficulty no   Equipment Currently Used at Home none   Readmission within 30 days? No   Patient currently being followed by outpatient case management? No   Do you currently have service(s) that help you manage your care at home? No   Do you take prescription medications? Yes   Do you have prescription coverage? Yes   Coverage [facility] medicaid   Do you have any problems affording any of your prescribed medications? No   Is the patient taking medications as prescribed? yes   Who is going to help you get home at discharge? Anabel Sims (Son)  [phone]   How do you get to doctors appointments? family or friend will provide   Are you on dialysis? No   Do you take coumadin? No   Discharge Plan A Skilled Nursing Facility   DME Needed Upon Discharge    (PT eval)   Discharge Plan discussed with: Adult children   Name(s) and Number(s) Anabel Sims (Son)  [phone]   Physical Activity   On average, how many days per week do you engage in moderate to strenuous exercise (like a brisk walk)? 0 days   On average, how many minutes do you engage in exercise at this level? 0 min   Financial  Resource Strain   How hard is it for you to pay for the very basics like food, housing, medical care, and heating? Not hard   Housing Stability   In the last 12 months, was there a time when you were not able to pay the mortgage or rent on time? N   At any time in the past 12 months, were you homeless or living in a shelter (including now)? N   Transportation Needs   Has the lack of transportation kept you from medical appointments, meetings, work or from getting things needed for daily living? No   Food Insecurity   Within the past 12 months, you worried that your food would run out before you got the money to buy more. Never true   Within the past 12 months, the food you bought just didn't last and you didn't have money to get more. Never true   Stress   Do you feel stress - tense, restless, nervous, or anxious, or unable to sleep at night because your mind is troubled all the time - these days? Not at all   Social Isolation   How often do you feel lonely or isolated from those around you?  Never   Alcohol Use   Q1: How often do you have a drink containing alcohol? Never   Q2: How many drinks containing alcohol do you have on a typical day when you are drinking? None   Q3: How often do you have six or more drinks on one occasion? Never   Utilities   In the past 12 months has the electric, gas, oil, or water company threatened to shut off services in your home? No   Health Literacy   How often do you need to have someone help you when you read instructions, pamphlets, or other written material from your doctor or pharmacy? Never   OTHER   Name(s) of People in Home Anabel Sims (Son)  [phone]

## 2024-05-21 LAB
ALBUMIN SERPL-MCNC: 2.8 G/DL (ref 3.4–4.8)
ALBUMIN/GLOB SERPL: 0.9 RATIO (ref 1.1–2)
ALP SERPL-CCNC: 57 UNIT/L (ref 40–150)
ALT SERPL-CCNC: 14 UNIT/L (ref 0–55)
ANION GAP SERPL CALC-SCNC: 7 MEQ/L
AST SERPL-CCNC: 11 UNIT/L (ref 5–34)
BASOPHILS # BLD AUTO: 0.04 X10(3)/MCL
BASOPHILS NFR BLD AUTO: 0.3 %
BILIRUB SERPL-MCNC: 0.3 MG/DL
BUN SERPL-MCNC: 36.1 MG/DL (ref 8.4–25.7)
CALCIUM SERPL-MCNC: 9 MG/DL (ref 8.8–10)
CHLORIDE SERPL-SCNC: 109 MMOL/L (ref 98–107)
CO2 SERPL-SCNC: 20 MMOL/L (ref 23–31)
CREAT SERPL-MCNC: 1.53 MG/DL (ref 0.73–1.18)
CREAT/UREA NIT SERPL: 24
EOSINOPHIL # BLD AUTO: 0.75 X10(3)/MCL (ref 0–0.9)
EOSINOPHIL NFR BLD AUTO: 5.6 %
ERYTHROCYTE [DISTWIDTH] IN BLOOD BY AUTOMATED COUNT: 13.2 % (ref 11.5–17)
GFR SERPLBLD CREATININE-BSD FMLA CKD-EPI: 48 ML/MIN/1.73/M2
GLOBULIN SER-MCNC: 3.1 GM/DL (ref 2.4–3.5)
GLUCOSE SERPL-MCNC: 97 MG/DL (ref 82–115)
HCT VFR BLD AUTO: 34.4 % (ref 42–52)
HGB BLD-MCNC: 10.9 G/DL (ref 14–18)
HOLD SPECIMEN: NORMAL
IMM GRANULOCYTES # BLD AUTO: 0.04 X10(3)/MCL (ref 0–0.04)
IMM GRANULOCYTES NFR BLD AUTO: 0.3 %
LYMPHOCYTES # BLD AUTO: 3.52 X10(3)/MCL (ref 0.6–4.6)
LYMPHOCYTES NFR BLD AUTO: 26.5 %
MAGNESIUM SERPL-MCNC: 2.2 MG/DL (ref 1.6–2.6)
MCH RBC QN AUTO: 28.3 PG (ref 27–31)
MCHC RBC AUTO-ENTMCNC: 31.7 G/DL (ref 33–36)
MCV RBC AUTO: 89.4 FL (ref 80–94)
MONOCYTES # BLD AUTO: 0.77 X10(3)/MCL (ref 0.1–1.3)
MONOCYTES NFR BLD AUTO: 5.8 %
NEUTROPHILS # BLD AUTO: 8.17 X10(3)/MCL (ref 2.1–9.2)
NEUTROPHILS NFR BLD AUTO: 61.5 %
NRBC BLD AUTO-RTO: 0 %
PHOSPHATE SERPL-MCNC: 3.6 MG/DL (ref 2.3–4.7)
PLATELET # BLD AUTO: 274 X10(3)/MCL (ref 130–400)
PMV BLD AUTO: 11.8 FL (ref 7.4–10.4)
POCT GLUCOSE: 120 MG/DL (ref 70–110)
POCT GLUCOSE: 198 MG/DL (ref 70–110)
POCT GLUCOSE: 229 MG/DL (ref 70–110)
POCT GLUCOSE: 233 MG/DL (ref 70–110)
POTASSIUM SERPL-SCNC: 3.8 MMOL/L (ref 3.5–5.1)
PROT SERPL-MCNC: 5.9 GM/DL (ref 5.8–7.6)
RBC # BLD AUTO: 3.85 X10(6)/MCL (ref 4.7–6.1)
SODIUM SERPL-SCNC: 136 MMOL/L (ref 136–145)
T4 FREE SERPL-MCNC: 1.22 NG/DL (ref 0.7–1.48)
TSH SERPL-ACNC: 1.75 UIU/ML (ref 0.35–4.94)
WBC # SPEC AUTO: 13.29 X10(3)/MCL (ref 4.5–11.5)

## 2024-05-21 PROCEDURE — 25000003 PHARM REV CODE 250

## 2024-05-21 PROCEDURE — 21400001 HC TELEMETRY ROOM

## 2024-05-21 PROCEDURE — 97116 GAIT TRAINING THERAPY: CPT

## 2024-05-21 PROCEDURE — 63600175 PHARM REV CODE 636 W HCPCS

## 2024-05-21 PROCEDURE — 94761 N-INVAS EAR/PLS OXIMETRY MLT: CPT

## 2024-05-21 PROCEDURE — 84439 ASSAY OF FREE THYROXINE: CPT

## 2024-05-21 PROCEDURE — 36415 COLL VENOUS BLD VENIPUNCTURE: CPT

## 2024-05-21 PROCEDURE — 97535 SELF CARE MNGMENT TRAINING: CPT

## 2024-05-21 PROCEDURE — 97112 NEUROMUSCULAR REEDUCATION: CPT

## 2024-05-21 PROCEDURE — 85025 COMPLETE CBC W/AUTO DIFF WBC: CPT

## 2024-05-21 PROCEDURE — 36415 COLL VENOUS BLD VENIPUNCTURE: CPT | Performed by: STUDENT IN AN ORGANIZED HEALTH CARE EDUCATION/TRAINING PROGRAM

## 2024-05-21 PROCEDURE — 84443 ASSAY THYROID STIM HORMONE: CPT

## 2024-05-21 PROCEDURE — 97530 THERAPEUTIC ACTIVITIES: CPT

## 2024-05-21 PROCEDURE — 83735 ASSAY OF MAGNESIUM: CPT

## 2024-05-21 PROCEDURE — 80053 COMPREHEN METABOLIC PANEL: CPT

## 2024-05-21 PROCEDURE — 84100 ASSAY OF PHOSPHORUS: CPT

## 2024-05-21 RX ADMIN — ATORVASTATIN CALCIUM 40 MG: 40 TABLET, FILM COATED ORAL at 09:05

## 2024-05-21 RX ADMIN — LISINOPRIL 20 MG: 10 TABLET ORAL at 08:05

## 2024-05-21 RX ADMIN — INSULIN ASPART 2 UNITS: 100 INJECTION, SOLUTION INTRAVENOUS; SUBCUTANEOUS at 12:05

## 2024-05-21 RX ADMIN — INSULIN ASPART 2 UNITS: 100 INJECTION, SOLUTION INTRAVENOUS; SUBCUTANEOUS at 05:05

## 2024-05-21 RX ADMIN — CLOPIDOGREL BISULFATE 75 MG: 75 TABLET ORAL at 08:05

## 2024-05-21 RX ADMIN — ACETAMINOPHEN 650 MG: 325 TABLET, FILM COATED ORAL at 06:05

## 2024-05-21 RX ADMIN — INSULIN GLARGINE 10 UNITS: 100 INJECTION, SOLUTION SUBCUTANEOUS at 09:05

## 2024-05-21 RX ADMIN — SIMETHICONE 80 MG: 80 TABLET, CHEWABLE ORAL at 09:05

## 2024-05-21 RX ADMIN — TRAMADOL HYDROCHLORIDE 50 MG: 50 TABLET, COATED ORAL at 09:05

## 2024-05-21 RX ADMIN — Medication 6 MG: at 09:05

## 2024-05-21 RX ADMIN — NIFEDIPINE 60 MG: 30 TABLET, FILM COATED, EXTENDED RELEASE ORAL at 08:05

## 2024-05-21 RX ADMIN — INSULIN ASPART 3 UNITS: 100 INJECTION, SOLUTION INTRAVENOUS; SUBCUTANEOUS at 05:05

## 2024-05-21 RX ADMIN — ASPIRIN 81 MG CHEWABLE TABLET 81 MG: 81 TABLET CHEWABLE at 08:05

## 2024-05-21 RX ADMIN — TRAMADOL HYDROCHLORIDE 50 MG: 50 TABLET, COATED ORAL at 06:05

## 2024-05-21 RX ADMIN — CARVEDILOL 6.25 MG: 3.12 TABLET, FILM COATED ORAL at 09:05

## 2024-05-21 RX ADMIN — INSULIN ASPART 3 UNITS: 100 INJECTION, SOLUTION INTRAVENOUS; SUBCUTANEOUS at 09:05

## 2024-05-21 RX ADMIN — CARVEDILOL 6.25 MG: 3.12 TABLET, FILM COATED ORAL at 08:05

## 2024-05-21 RX ADMIN — INSULIN ASPART 3 UNITS: 100 INJECTION, SOLUTION INTRAVENOUS; SUBCUTANEOUS at 12:05

## 2024-05-21 RX ADMIN — POLYETHYLENE GLYCOL 3350 17 G: 17 POWDER, FOR SOLUTION ORAL at 09:05

## 2024-05-21 NOTE — PT/OT/SLP PROGRESS
Occupational Therapy   Treatment    Name: Suleiman Beck  MRN: [id number]  Admitting Diagnosis:  Ischemic stroke       Recommendations:     Discharge Recommendations: High Intensity Therapy  Discharge Equipment Recommendations:  to be determined by next level of care  Barriers to discharge:  None    Assessment:     Suleiman Beck is a 74 y.o. male with a medical diagnosis of Ischemic stroke.  He presents with chronic R shoulder pain, improved functional mobility, grandson in room acting as  d/t rare dialect of [place] spoken by patient, and pt's son also joined during therapy session to meet with liaison from [facility].     Performance deficits affecting function are weakness, impaired self care skills, impaired functional mobility, gait instability, impaired balance, decreased coordination, decreased upper extremity function, decreased lower extremity function, decreased safety awareness, pain, decreased ROM, impaired fine motor, edema.     Rehab Prognosis:  Good; patient would benefit from acute skilled OT services to address these deficits and reach maximum level of function.       Plan:     Patient to be seen 4 x/week to address the above listed problems via self-care/home management, therapeutic activities, therapeutic exercises, neuromuscular re-education  Plan of Care Expires:  (DC)  Plan of Care Reviewed with: patient, grandchild(rush), son    Subjective     Chief Complaint: R shoulder pain  Patient/Family Comments/goals: return to [facility] for ultimate DC home with family  Pain/Comfort:  Pain Rating 1: 5/10  Location - Side 1: Right  Location 1: shoulder  Pain Addressed 1: Reposition, Cessation of Activity  Pain Rating Post-Intervention 1: 4/10    Objective:     Communicated with: nurse Smith prior to session.  Patient found supine with telemetry, PureWick, peripheral IV upon OT entry to room.    General Precautions: Standard, fall    Orthopedic Precautions:N/A  Braces: N/A  Respiratory Status:  "Room air   BP assessed seated EOB:  137/70   BP assessed after ambulating 65 ft:  148/84    Occupational Performance:     Bed Mobility:    Patient completed Supine to Sit with minimum assistance and step by step cues for sequencing and technique, with noted difficulty pushing up from right sidelying with RUE d/t shoulder pain and coordination deficits      Functional Mobility/Transfers:  Patient completed Sit <> Stand Transfer with fluctuation from mod A to min A, depending on surface and effectiveness of technique for sit to stand (extensive training completed from EOB, from recliner chair, for BUE placement and "nose over toes" form to improve independence with transition  with  rolling walker   AT end of session, pt had shifted hips forward in recliner chair and OT re-educated pt for sit to stand techniques, with visual demonstration, verbal training via family interpreting, followed by pt return demonstration x3 reps with min A from recliner chair and improving independence/technique noted  Functional Mobility: min A for gait in edward with PT, with noted dragging of R LE in external rotation and shortened step pattern    Activities of Daily Living:  Grooming: minimum assistance with touching assist to min assist for all activities using dominant R hand to apply toothpaste to toothbrush, to brush teeth seated at chair with setup at rolling table, with noted limitation d/t R hand incoordination (difficulty manipulating small items, translating objects from palm to fingertip, maintaining tripod grasp, and with noted spillage without close-at times hand over hand--assist)  Upper Body Dressing: minimum assistance to don gown seated EOB d/t limited R shoulder mobility  Lower Body Dressing: maximal assistance in sitting and standing EOB; pt required BUE support at RW at all times when standing  Toileting: dependence with incontinent bladder noted; pt in urine-soaked bedding with Purewick in place but poorly positioned " and pt required change of brief while standing EOB at RW with max A for clothing management and hygiene and min A standing balance at RW)      Treatment & Education:  Cotx with PT for bed mobility in preparation for ADL training and ADL training seated EOB and in standing at RW (PT facilitating improved sitting balance during dynamic activity and static standing balance at RW during LB dressing).    Patient left up in chair with all lines intact, call button in reach, nurse notified, and pt's grandson and son present    GOALS:   Multidisciplinary Problems       Occupational Therapy Goals          Problem: Occupational Therapy    Goal Priority Disciplines Outcome Interventions   Occupational Therapy Goal     OT, PT/OT Progressing    Description: Patient will perform feeding with min A using his R hand, while seated up at chair at bedside or EOB.     Patient will perform grooming with min assist while seated EOB.    Patient will perform toileting with mod assist using BS.    Patient will perform toilet transfer with mod assist with use of RW.    Patient will perform upper body dressing with mod assist while seated EOB.     Patient will perform lower body dressing with max assist while seated EOB.                          Time Tracking:     OT Date of Treatment: 05/21/24  OT Start Time: 0935  OT Stop Time: 1033  OT Total Time (min): 58 min    Billable Minutes:Self Care/Home Management 45  Total Time 58, including 1 unit of assist to PT during gait training, and 1 unit of cotreat with PT as noted above during bedside ADLs/balance training requiring assist of 2 skilled therapists to maximize pt's ability to participate to his highest ability    OT/REMI: OT          5/21/2024

## 2024-05-21 NOTE — PROGRESS NOTES
[facility] Internal Medicine Wards Progress Note     Resident Team: [facility] Medicine List 1  Attending Physician: Joe Posey MD  Resident: Elpidio Armendariz DO  Intern: Rasheed Carey MD     Subjective:      Brief HPI:  Suleiman Beck is a 74 y.o. [place] American male with PMH of hypertension and osteoarthritis of multiple joints (right shoulder and right knee predominant), who presented to [facility] ED (5/17/2024) due to dizziness and falls x 3 days. Patient does not speak English and no  available who speaks dialect. History provided by son. Patient apparently has severe osteoarthritis affecting multiple joints but most severe in right shoulder and right knee which has impaired ambulation causing him to have unsteady gait/wobbling resulting in falls in past. Son also reports patient has on multiple occasions in past reported bilateral lower extremity weakness resulting in inability to ambulate or lift legs against gravity but would later self resolve. Patient brought to ED multiple times when symptoms presented in past with negative work ups per son. Episodes of dizziness, weakness, and falls began approximately two days prior presentation (05/15/2024) when patient apparently suffered a fall onto his right resulting in right arm pain. Concerned for right arm injury patient was brought to ED by son. X-rays performed at that time were negative and patient was discharged to home. Over the next several days patient continued to report dizziness to son but without episodes of falls. Patient son was not overly concerned about symptoms at that time due to patient history of similar symptoms in past. This a.m. patient was attempting to ambulate to rest room when he felt lightheadedness/dizziness without room spinning sensation prompting him to cease ambulation, sit down, and call for help from his wife. Wife attempted to help him stand up and get to rest room but upon standing patient lost all strength in lower extremities  and fell into wife's arms. Son immediately brought patient back to ED for evaluation. Denies fever, chills, sweats. Denies headache, eye pain, blurry vision. Denies vertigo, syncope, seizures. Denies chest pain, cough, hemoptysis, shortness of breath. Denies abdominal pain, nausea, vomiting, hematemesis, constipation, diarrhea, melena, hematochezia. Denies increased or decreased urinary frequency, dysuria, hematuria. Endorses painful bilateral upper and lower extremities which patient son states is his baseline 2/2 osteoarthritis. Denies weakness, numbness, or tingling to bilateral extremities.     ED course: Vitals show patient hypertensive (max /81) but remaining vital signs stable. CBC showed leukocytosis (WBC 14.08) but otherwise unremarkable. CMP showed hyperglycemia (Gluc 405), TINO (BUN 46.4; Cr 2.19), and normal anion gap acidosis (bicarb 17). Troponin 0.048. EKG showed normal sinus rhythm with right bundle branch block, bifasicular block, and new T wave inversions V4-V6 when compared to prior EKG. CT head without contrast (05/17/2024) showed focal hypoattenuation in high left frontal lobe concerning for acute infarct. MRI brain without contrast (50/17/2024) showed acute centrum semiovale nonhemorrhagic infarcts and chronic diffuse microangiopathic ischemia and atrophy. Internal medicine consulted for admission due to acute ischemic stroke.    Interval History:   NAEON. Vitals show patient hypertensive but remaining vitals stable. Patient continues to endorse joint pain affecting multiple joints which he states is due to chronic arthritis and is unchanged from baseline. He otherwise denies any other acute complaints. PT recommending moderate intensity therapy. Discussed dispo with son who was amenable to inpatient rehab placement. Consulted case management for inpatient rehab placement. Placement pending.    Review of Systems:  Review of Systems   Constitutional:  Negative for chills, diaphoresis,  fatigue and fever.   HENT:  Negative for ear pain, hearing loss, rhinorrhea, sore throat, tinnitus and trouble swallowing.    Eyes:  Negative for pain, redness and visual disturbance.   Respiratory:  Negative for cough, chest tightness and shortness of breath.    Cardiovascular:  Negative for chest pain and palpitations.   Gastrointestinal:  Negative for abdominal pain, constipation, diarrhea, nausea and vomiting.   Genitourinary:  Negative for difficulty urinating, dysuria, frequency, hematuria and urgency.   Musculoskeletal:  Positive for arthralgias, back pain and gait problem. Negative for myalgias.   Skin:  Negative for rash and wound.   Neurological:  Negative for dizziness, seizures, syncope, weakness, light-headedness, numbness and headaches.   Psychiatric/Behavioral:  Negative for confusion.         Objective:     Last 24 Hour Vital Signs:  BP  Min: 113/64  Max: 161/78  Temp  Av.8 °F (36.6 °C)  Min: 97.5 °F (36.4 °C)  Max: 98.3 °F (36.8 °C)  Pulse  Av.3  Min: 72  Max: 85  Resp  Av.8  Min: 16  Max: 23  SpO2  Av.1 %  Min: 94 %  Max: 97 %  I/O last 3 completed shifts:  In: 468.6 [P.O.:444; I.V.:24.6]  Out: -     Physical Examination:  Physical Exam  Vitals reviewed.   Constitutional:       General: He is not in acute distress.     Appearance: Normal appearance. He is normal weight. He is not ill-appearing.   HENT:      Head: Normocephalic and atraumatic.      Right Ear: External ear normal.      Left Ear: External ear normal.   Eyes:      General: No scleral icterus.        Right eye: No discharge.         Left eye: No discharge.      Extraocular Movements: Extraocular movements intact.      Conjunctiva/sclera: Conjunctivae normal.      Pupils: Pupils are equal, round, and reactive to light.   Cardiovascular:      Rate and Rhythm: Normal rate and regular rhythm.      Pulses: Normal pulses.      Heart sounds: Normal heart sounds. No murmur heard.     No friction rub. No gallop.   Pulmonary:       Effort: Pulmonary effort is normal. No respiratory distress.      Breath sounds: Normal breath sounds. No stridor. No wheezing, rhonchi or rales.   Abdominal:      General: Abdomen is flat. Bowel sounds are normal. There is no distension.      Palpations: Abdomen is soft.      Tenderness: There is no abdominal tenderness. There is no guarding.   Musculoskeletal:         General: No swelling, tenderness, deformity or signs of injury. Normal range of motion.      Right lower leg: No edema.      Left lower leg: No edema.   Skin:     General: Skin is warm and dry.      Capillary Refill: Capillary refill takes less than 2 seconds.      Coloration: Skin is not jaundiced.      Findings: No bruising, erythema or rash.   Neurological:      Mental Status: He is alert.      Comments: AAO to person, place, time, and situation; CN II-XII grossly intact         Laboratory:  Most Recent Data:  CBC:   Lab Results   Component Value Date    WBC 13.29 (H) 05/21/2024    HGB 10.9 (L) 05/21/2024    HCT 34.4 (L) 05/21/2024     05/21/2024    MCV 89.4 05/21/2024    RDW 13.2 05/21/2024     WBC Differential:   Recent Labs   Lab 05/17/24  1058 05/18/24  0320 05/19/24  0320 05/20/24  0328 05/21/24  0339   WBC 14.08* 11.35 12.45* 12.28* 13.29*   HGB 11.8* 11.3* 11.6* 10.8* 10.9*   HCT 36.6* 34.7* 35.9* 33.9* 34.4*    272 278 255 274   MCV 88.0 87.4 87.8 88.5 89.4     BMP:   Lab Results   Component Value Date     05/21/2024    K 3.8 05/21/2024     (H) 03/20/2023    CO2 20 (L) 05/21/2024    BUN 36.1 (H) 05/21/2024    CREATININE 1.53 (H) 05/21/2024     (H) 03/20/2023    CALCIUM 9.0 05/21/2024    MG 2.20 05/21/2024    PHOS 3.6 05/21/2024     LFTs:   Lab Results   Component Value Date    ALBUMIN 2.8 (L) 05/21/2024    BILITOT 0.3 05/21/2024    AST 11 05/21/2024    ALKPHOS 57 05/21/2024    ALT 14 05/21/2024     Coags:   Lab Results   Component Value Date    INR 1.0 05/17/2024    PROTIME 13.7 05/17/2024     FLP:    Lab Results   Component Value Date    CHOL 196 05/18/2024    HDL 31 (L) 05/18/2024    TRIG 258 (H) 05/18/2024     DM:   Lab Results   Component Value Date    HGBA1C 9.0 (H) 05/18/2024    HGBA1C 9.1 (H) 05/17/2024    HGBA1C 7.4 (H) 03/20/2023    CREATININE 1.53 (H) 05/21/2024     Thyroid:   Lab Results   Component Value Date    TSH 0.962 05/17/2024     Anemia:   Lab Results   Component Value Date    IRON 45 (L) 12/14/2022    TIBC 130 (L) 12/14/2022    FERRITIN 261.54 12/14/2022     Cardiac:   Lab Results   Component Value Date    TROPONINI 0.048 (H) 05/17/2024    .0 (H) 12/11/2022     Urinalysis:   Lab Results   Component Value Date    LABURIN No Growth 12/12/2022    PHUA 5.5 05/17/2024    UROBILINOGEN Normal 05/17/2024    WBCUA 0-5 05/17/2024       Radiology:  Imaging Results              MRI Brain Without Contrast (Final result)  Result time 05/17/24 14:18:29      Final result by Иван Saleh MD (05/17/24 14:18:29)                   Impression:      1.  Left centrum semiovale acute nonhemorrhagic infarcts.    2.  Chronic microangiopathic ischemia and atrophy.      Electronically signed by: Иван Saleh  Date:    05/17/2024  Time:    14:18               Narrative:    EXAMINATION:  MRI BRAIN WITHOUT CONTRAST    CLINICAL HISTORY:  Stroke, follow up;    TECHNIQUE:  Multiplanar MRI sequences were performed of the brain without contrast.    COMPARISON:  CT brain without contrast May 17, 2014    FINDINGS:  The left centrum semiovale is remarkable for two adjacent acute nonhemorrhagic infarcts with diffusion weighted restrictions, signal dropout on ADC map and T2 FLAIR hyperintensities on image 27 series 6.  Chronic microvascular ischemic changes are mild with periventricular and deep white matter T2 FLAIR hyperintense signals.  Generalized cerebral and cerebellar cortical volume loss. Gradient echo sequences demonstrate no evidence of de phasing artifact to suggest hemorrhagic byproducts.  The sella and  suprasellar areas are unremarkable.    The cerebellar tonsils are normally positioned. There is no acute intracranial hemorrhage, hydrocephalus, midline shift or mass effect. No acute extra axial fluid collections identified. The mastoid air cells are clear.                                       X-Ray Chest AP Portable (Final result)  Result time 05/17/24 12:24:01      Final result by Joaquim Luis MD (05/17/24 12:24:01)                   Impression:      No acute cardiopulmonary process.      Electronically signed by: Joaquim Luis  Date:    05/17/2024  Time:    12:24               Narrative:    EXAMINATION:  XR CHEST AP PORTABLE    CLINICAL HISTORY:  Weakness;    TECHNIQUE:  Single view of the chest    COMPARISON:  10/25/2023    FINDINGS:  No focal opacification, pleural effusion, or pneumothorax.    The cardiomediastinal silhouette is within normal limits.    No acute osseous abnormality.                                       X-Ray Femur 2 View Right (Final result)  Result time 05/17/24 12:25:48      Final result by Joaquim Luis MD (05/17/24 12:25:48)                   Impression:      As above.  Prior imaging from 2 days prior.  If continued pain recommend further evaluation.      Electronically signed by: Joaquim Luis  Date:    05/17/2024  Time:    12:25               Narrative:    EXAMINATION:  XR FEMUR 2 VIEW RIGHT    CLINICAL HISTORY:  Pain;    TECHNIQUE:  AP and lateral views of the right femur were performed.    COMPARISON:  05/15/2024    FINDINGS:  no displaced fracture. Mild degenerative changes with acetabular and knee tricompartmental osteophytes.                                       X-Ray Knee 3 View Right (Final result)  Result time 05/17/24 12:26:26      Final result by Joaquim Luis MD (05/17/24 12:26:26)                   Impression:      As above.      Electronically signed by: Joaquim Luis  Date:    05/17/2024  Time:    12:26               Narrative:    EXAMINATION:  XR  KNEE 3 VIEW RIGHT    CLINICAL HISTORY:  Pain, unspecified    TECHNIQUE:  AP, lateral, and Merchant views of the right knee were performed.    COMPARISON:  None    FINDINGS:  Degenerative changes with tricompartmental osteophytes.  Joint spaces relatively well maintained.  Vascular atherosclerotic disease is noted.  Small suprapatellar effusion.                                        CT Head Without Contrast (Final result)  Result time 05/17/24 12:00:22      Final result by Noelle Felix MD (05/17/24 12:00:22)                   Impression:      Focal area of hypoattenuation in the cerebral convexity on the left side in the high left frontal lobe.  Findings are concerning for acute infarct.  MRI correlation would be beneficial.    Other chronic age related changes seen as outlined above    This report was flagged in Epic as abnormal.      Electronically signed by: Colt Felix  Date:    05/17/2024  Time:    12:00               Narrative:    EXAMINATION:  CT HEAD WITHOUT CONTRAST    CLINICAL HISTORY:  Neuro deficit, acute, stroke suspected;    TECHNIQUE:  Multiple axial images were obtained from the base of the brain to the vertex without contrast administration.  Sagittal and coronal reconstructions were performed..Automatic exposure control is utilized to reduce patient radiation exposure.    COMPARISON:  12/11/2022    FINDINGS:  There is no intracranial mass or lesion seen.  No hemorrhage is seen.  There is a focal area of hypoattenuation at the cerebral convexity on the left side in the left frontal region.  This is seen on images number 26 through 28 series 2.  Findings are concerning for acute infarct.  No other areas acute ischemia seen..  There is some cerebral atrophy seen.  There is some compensatory ventricular dilatation and periventricular white matter changes consistent with the patient's age.  Calvarium is intact.  The posterior fossa is unremarkable..  The visualized portions of the paranasal  sinuses show no acute abnormality.                                       CT Pelvis Without Contrast (Final result)  Result time 05/17/24 12:04:11      Final result by Navi Dorado MD (05/17/24 12:04:11)                   Impression:      No acute findings.      Electronically signed by: Navi Dorado  Date:    05/17/2024  Time:    12:04               Narrative:    EXAMINATION:  CT PELVIS WITHOUT CONTRAST    CLINICAL HISTORY:  Pelvis pain, stress fracture suspected, neg xray;    TECHNIQUE:  Helical acquisition through the pelvis without IV contrast.  Three plane reconstructions were provided for review.  mGycm. Automatic exposure control, adjustment of mA/kV or iterative reconstruction technique was used to reduce radiation.    COMPARISON:  9 December 2022    FINDINGS:  There is no fracture or dislocation.  Within the pelvis there is no free fluid or mass.  Moderate atherosclerotic disease.                                    Current Medications:     Infusions:       Scheduled:   aspirin  81 mg Oral Daily    atorvastatin  40 mg Oral QHS    carvediloL  6.25 mg Oral BID    clopidogreL  75 mg Oral Daily    insulin aspart U-100  3 Units Subcutaneous TIDWM    insulin glargine U-100  10 Units Subcutaneous Daily    lisinopriL  20 mg Oral Daily    NIFEdipine  60 mg Oral Daily        PRN:    Current Facility-Administered Medications:     acetaminophen, 650 mg, Oral, Q4H PRN    cetirizine, 10 mg, Oral, Daily PRN    dextrose 10%, 12.5 g, Intravenous, PRN    dextrose 10%, 25 g, Intravenous, PRN    glucagon (human recombinant), 1 mg, Intramuscular, PRN    glucose, 16 g, Oral, PRN    glucose, 24 g, Oral, PRN    hydrALAZINE, 10 mg, Intravenous, Q2H PRN    insulin aspart U-100, 0-5 Units, Subcutaneous, QID (AC + HS) PRN    labetalol, 10 mg, Intravenous, Q4H PRN    melatonin, 6 mg, Oral, Nightly PRN    naloxone, 0.02 mg, Intravenous, PRN    nicotine, 1 patch, Transdermal, Daily PRN    ondansetron, 8 mg, Oral, Q8H PRN     polyethylene glycol, 17 g, Oral, Daily PRN    prochlorperazine, 5 mg, Intravenous, Q6H PRN    senna-docusate 8.6-50 mg, 1 tablet, Oral, BID PRN    simethicone, 1 tablet, Oral, TID PRN    sodium chloride 0.9%, 10 mL, Intravenous, PRN    traMADoL, 50 mg, Oral, Q6H PRN  Assessment & Plan:     Acute ischemic stroke  Hypertension  -NIHSS 0  -physical exam unremarkable for neurologic deficits  -CT head without contrast (05/17/2024) showed focal hypoattenuation in high left frontal lobe concerning for acute infarct  -MRI brain without contrast (50/17/2024) showed acute centrum semiovale nonhemorrhagic infarcts and chronic diffuse microangiopathic ischemia and atrophy  -past 24 hour permissive hypertension period  -continue aspirin 81 mg qd and plavix 75 mg, atorvastatin 20 mg qd, nifedipine 60 mg qd, and lisinopril 20 mg qd  -will continue to uptitrate antihypertensive as indicated     Prerenal acute kidney injury- improved  -baseline renal indices approximately: BUN 30, creatinine 1.65, eGFR 38  -renal indices at baseline  -continue to monitor  -continue IVF resuscitation prn     Hyperglycemia  -patient son reports patient was never a diagnosed diabetic  -hemoglobin A1c 9.0  -goal cbg < 180  -continue lantus 10 units qhs  -initiated scheduled aspart 3 units tidwm  -continue low dose ssi  -will continue to titrate antihyperglycemic regimen prn     Code Status: Full code  GI Access: PO  Feeding: Diabetic diet  IV Access: PIV  Fluids: None  Analgesia: Acetaminophen 650 mg q6h prn mild pain  Thromboprophylaxis: SCDs    Disposition: Continue inpatient services for blood pressure management and inpatient rehab placement.    Rasheed Carey MD  [facility] Internal Medicine, -I   No Repair - Repaired With Adjacent Surgical Defect Text (Leave Blank If You Do Not Want): After obtaining clear surgical margins the defect was repaired concurrently with another surgical defect which was in close approximation.

## 2024-05-21 NOTE — PLAN OF CARE
Spoke to Raiza with [facility]. She stated that patient has been accepted, and she is submitting to patient's insurance for authorization today.

## 2024-05-21 NOTE — PT/OT/SLP PROGRESS
Physical Therapy Treatment    Patient Name:  Suleiman Beck   MRN:  [id number]    Recommendations     Therapy Intensity Recommendations at Discharge: High Intensity Therapy  Discharge Equipment Recommendations:  (To be determined by next level of care.)   Barriers to discharge: fall risk, severity of deficits, level of skilled assistance required, and decreased endurance    Assessment     Suleiman Beck is a 74 y.o. male admitted with a medical diagnosis of  Ischemic stroke.    He presents with the following impairments/functional limitations:  weakness, impaired endurance, impaired functional mobility, gait instability, impaired balance, decreased lower extremity function, decreased coordination.    Rehab Prognosis: Good.    Patient would benefit from continued skilled acute PT services to: address above listed impairments/functional limitations; receive patient/caregiver education; reduce fall risk; and maximize independency/safety with functional mobility.    Recent Surgery: * No surgery found *      Plan     During this hospitalization, patient to be seen 5 x/week to address the identified impairments/functional limitations via gait training, therapeutic activities, therapeutic exercises and progress toward the established goals.    Plan of Care Expires:  06/15/24    Subjective     Communicated with patient's nurse  prior to session.    Patient agreeable to participate in treatment session.    Chief Complaint: NA  Patient/Family Comments/goals: for patient to return to [facility]  Pain/Comfort:  Pain Rating 1: 0/10  Pain Addressed 1: Nurse notified  Pain Rating Post-Intervention 1: 0/10    Objective     Patient found supine in bed and with HOB elevated with peripheral IV  upon PT entry to room.    General Precautions: Standard, fall, SBP <160  Orthopedic Precautions:    Braces:    Respiratory Status: room air    BP of 137/70mmHg and then 148/84mmHg    Functional Mobility:    Bed Mobility:  Scooting: minimum assistance  Supine  to Sit: minimum assistance    Transfers:  Sit to Stand: minimum assistance and moderate assistance with rolling walker and pt completed 3 trials  pt requires verbal cues for proper hand placement    Gait:  Patient ambulated 160ft total with 1 seated rest break with rolling walker and minimum assistance.   Patient demonstrates :       no loss of balance       occasional unsteady gait       decreased tam       decreased step length       decreased foot/floor clearance       inconsistent right foot placement.  Increased external rotation of R LE    Other Mobility:  Therapeutic Activities performed:        -sitting balance activities:              repositioning at edge of bed       -standing balance activities:              weight shifting:                   -forward            Standing to perform diaper change with Zander/modA for balance initially     Patient left sitting in chair with all lines intact, call button in reach, tray table at bedside, patient' nurse notified, and family present.    Goals     Multidisciplinary Problems       Physical Therapy Goals          Problem: Physical Therapy    Goal Priority Disciplines Outcome Goal Variances Interventions   Physical Therapy Goal     PT, PT/OT Progressing     Description: Goals to be met by: Discharge     Patient will increase functional independence with mobility by performin. Supine to sit with [place]  2. Sit to supine with [place]  3. Sit to stand transfer with [place]  4. Bed to chair transfer with Supervision using Rolling Walker  5. Gait  x 130 feet with Modified [place] using Rolling Walker.                        Time Tracking     PT Received On: 24  PT Start Time: 938     PT Stop Time: 1015  PT Total Time (min): 37 min     Billable Minutes: Gait Training -1 unit and Therapeutic Activity -1 unit    2024

## 2024-05-22 LAB
ALBUMIN SERPL-MCNC: 3.1 G/DL (ref 3.4–4.8)
ALBUMIN/GLOB SERPL: 0.9 RATIO (ref 1.1–2)
ALP SERPL-CCNC: 64 UNIT/L (ref 40–150)
ALT SERPL-CCNC: 14 UNIT/L (ref 0–55)
ANION GAP SERPL CALC-SCNC: 9 MEQ/L
AST SERPL-CCNC: 11 UNIT/L (ref 5–34)
BASOPHILS # BLD AUTO: 0.04 X10(3)/MCL
BASOPHILS NFR BLD AUTO: 0.3 %
BILIRUB SERPL-MCNC: 0.4 MG/DL
BUN SERPL-MCNC: 35.8 MG/DL (ref 8.4–25.7)
CALCIUM SERPL-MCNC: 9.5 MG/DL (ref 8.8–10)
CHLORIDE SERPL-SCNC: 109 MMOL/L (ref 98–107)
CO2 SERPL-SCNC: 22 MMOL/L (ref 23–31)
CREAT SERPL-MCNC: 1.72 MG/DL (ref 0.73–1.18)
CREAT/UREA NIT SERPL: 21
EOSINOPHIL # BLD AUTO: 0.74 X10(3)/MCL (ref 0–0.9)
EOSINOPHIL NFR BLD AUTO: 5.7 %
ERYTHROCYTE [DISTWIDTH] IN BLOOD BY AUTOMATED COUNT: 13.3 % (ref 11.5–17)
GFR SERPLBLD CREATININE-BSD FMLA CKD-EPI: 41 ML/MIN/1.73/M2
GLOBULIN SER-MCNC: 3.4 GM/DL (ref 2.4–3.5)
GLUCOSE SERPL-MCNC: 139 MG/DL (ref 82–115)
HCT VFR BLD AUTO: 37 % (ref 42–52)
HGB BLD-MCNC: 11.9 G/DL (ref 14–18)
HOLD SPECIMEN: NORMAL
IMM GRANULOCYTES # BLD AUTO: 0.06 X10(3)/MCL (ref 0–0.04)
IMM GRANULOCYTES NFR BLD AUTO: 0.5 %
LYMPHOCYTES # BLD AUTO: 2.89 X10(3)/MCL (ref 0.6–4.6)
LYMPHOCYTES NFR BLD AUTO: 22.2 %
MAGNESIUM SERPL-MCNC: 2.3 MG/DL (ref 1.6–2.6)
MCH RBC QN AUTO: 28.5 PG (ref 27–31)
MCHC RBC AUTO-ENTMCNC: 32.2 G/DL (ref 33–36)
MCV RBC AUTO: 88.5 FL (ref 80–94)
MONOCYTES # BLD AUTO: 0.66 X10(3)/MCL (ref 0.1–1.3)
MONOCYTES NFR BLD AUTO: 5.1 %
NEUTROPHILS # BLD AUTO: 8.65 X10(3)/MCL (ref 2.1–9.2)
NEUTROPHILS NFR BLD AUTO: 66.2 %
NRBC BLD AUTO-RTO: 0 %
PHOSPHATE SERPL-MCNC: 3.9 MG/DL (ref 2.3–4.7)
PLATELET # BLD AUTO: 297 X10(3)/MCL (ref 130–400)
PMV BLD AUTO: 11.5 FL (ref 7.4–10.4)
POCT GLUCOSE: 153 MG/DL (ref 70–110)
POCT GLUCOSE: 171 MG/DL (ref 70–110)
POCT GLUCOSE: 211 MG/DL (ref 70–110)
POCT GLUCOSE: 221 MG/DL (ref 70–110)
POTASSIUM SERPL-SCNC: 4.1 MMOL/L (ref 3.5–5.1)
PROT SERPL-MCNC: 6.5 GM/DL (ref 5.8–7.6)
RBC # BLD AUTO: 4.18 X10(6)/MCL (ref 4.7–6.1)
SODIUM SERPL-SCNC: 140 MMOL/L (ref 136–145)
WBC # SPEC AUTO: 13.04 X10(3)/MCL (ref 4.5–11.5)

## 2024-05-22 PROCEDURE — 97530 THERAPEUTIC ACTIVITIES: CPT

## 2024-05-22 PROCEDURE — 25000003 PHARM REV CODE 250

## 2024-05-22 PROCEDURE — 36415 COLL VENOUS BLD VENIPUNCTURE: CPT

## 2024-05-22 PROCEDURE — 97116 GAIT TRAINING THERAPY: CPT

## 2024-05-22 PROCEDURE — 97535 SELF CARE MNGMENT TRAINING: CPT

## 2024-05-22 PROCEDURE — 84100 ASSAY OF PHOSPHORUS: CPT

## 2024-05-22 PROCEDURE — 63600175 PHARM REV CODE 636 W HCPCS

## 2024-05-22 PROCEDURE — 94761 N-INVAS EAR/PLS OXIMETRY MLT: CPT

## 2024-05-22 PROCEDURE — 83735 ASSAY OF MAGNESIUM: CPT

## 2024-05-22 PROCEDURE — 85025 COMPLETE CBC W/AUTO DIFF WBC: CPT

## 2024-05-22 PROCEDURE — 21400001 HC TELEMETRY ROOM

## 2024-05-22 PROCEDURE — 80053 COMPREHEN METABOLIC PANEL: CPT

## 2024-05-22 RX ORDER — LISINOPRIL 10 MG/1
40 TABLET ORAL DAILY
Status: DISCONTINUED | OUTPATIENT
Start: 2024-05-23 | End: 2024-05-24 | Stop reason: HOSPADM

## 2024-05-22 RX ORDER — POLYETHYLENE GLYCOL 3350 17 G/17G
17 POWDER, FOR SOLUTION ORAL ONCE
Status: COMPLETED | OUTPATIENT
Start: 2024-05-22 | End: 2024-05-22

## 2024-05-22 RX ORDER — CARVEDILOL 3.12 MG/1
6.25 TABLET ORAL 2 TIMES DAILY
Status: DISCONTINUED | OUTPATIENT
Start: 2024-05-22 | End: 2024-05-23

## 2024-05-22 RX ORDER — CARVEDILOL 12.5 MG/1
12.5 TABLET ORAL 2 TIMES DAILY
Status: DISCONTINUED | OUTPATIENT
Start: 2024-05-22 | End: 2024-05-22

## 2024-05-22 RX ORDER — DEXTROMETHORPHAN POLISTIREX 30 MG/5 ML
1 SUSPENSION, EXTENDED RELEASE 12 HR ORAL ONCE
Status: COMPLETED | OUTPATIENT
Start: 2024-05-22 | End: 2024-05-22

## 2024-05-22 RX ORDER — NIFEDIPINE 30 MG/1
90 TABLET, EXTENDED RELEASE ORAL DAILY
Status: DISCONTINUED | OUTPATIENT
Start: 2024-05-22 | End: 2024-05-24 | Stop reason: HOSPADM

## 2024-05-22 RX ADMIN — INSULIN ASPART 3 UNITS: 100 INJECTION, SOLUTION INTRAVENOUS; SUBCUTANEOUS at 08:05

## 2024-05-22 RX ADMIN — MINERAL OIL 1 ENEMA: 100 ENEMA RECTAL at 04:05

## 2024-05-22 RX ADMIN — CLOPIDOGREL BISULFATE 75 MG: 75 TABLET ORAL at 08:05

## 2024-05-22 RX ADMIN — ATORVASTATIN CALCIUM 40 MG: 40 TABLET, FILM COATED ORAL at 08:05

## 2024-05-22 RX ADMIN — CARVEDILOL 6.25 MG: 3.12 TABLET, FILM COATED ORAL at 08:05

## 2024-05-22 RX ADMIN — INSULIN ASPART 2 UNITS: 100 INJECTION, SOLUTION INTRAVENOUS; SUBCUTANEOUS at 11:05

## 2024-05-22 RX ADMIN — TRAMADOL HYDROCHLORIDE 50 MG: 50 TABLET, COATED ORAL at 08:05

## 2024-05-22 RX ADMIN — INSULIN GLARGINE 10 UNITS: 100 INJECTION, SOLUTION SUBCUTANEOUS at 08:05

## 2024-05-22 RX ADMIN — SODIUM CHLORIDE, POTASSIUM CHLORIDE, SODIUM LACTATE AND CALCIUM CHLORIDE 500 ML: 600; 310; 30; 20 INJECTION, SOLUTION INTRAVENOUS at 12:05

## 2024-05-22 RX ADMIN — SIMETHICONE 80 MG: 80 TABLET, CHEWABLE ORAL at 10:05

## 2024-05-22 RX ADMIN — INSULIN ASPART 2 UNITS: 100 INJECTION, SOLUTION INTRAVENOUS; SUBCUTANEOUS at 05:05

## 2024-05-22 RX ADMIN — LISINOPRIL 20 MG: 10 TABLET ORAL at 08:05

## 2024-05-22 RX ADMIN — INSULIN ASPART 3 UNITS: 100 INJECTION, SOLUTION INTRAVENOUS; SUBCUTANEOUS at 12:05

## 2024-05-22 RX ADMIN — NIFEDIPINE 90 MG: 30 TABLET, FILM COATED, EXTENDED RELEASE ORAL at 08:05

## 2024-05-22 RX ADMIN — POLYETHYLENE GLYCOL 3350 17 G: 17 POWDER, FOR SOLUTION ORAL at 02:05

## 2024-05-22 RX ADMIN — Medication 6 MG: at 08:05

## 2024-05-22 RX ADMIN — ASPIRIN 81 MG CHEWABLE TABLET 81 MG: 81 TABLET CHEWABLE at 08:05

## 2024-05-22 RX ADMIN — POLYETHYLENE GLYCOL 3350 17 G: 17 POWDER, FOR SOLUTION ORAL at 08:05

## 2024-05-22 RX ADMIN — SENNOSIDES AND DOCUSATE SODIUM 1 TABLET: 50; 8.6 TABLET ORAL at 11:05

## 2024-05-22 RX ADMIN — INSULIN ASPART 3 UNITS: 100 INJECTION, SOLUTION INTRAVENOUS; SUBCUTANEOUS at 05:05

## 2024-05-22 RX ADMIN — ACETAMINOPHEN 650 MG: 325 TABLET, FILM COATED ORAL at 06:05

## 2024-05-22 NOTE — PT/OT/SLP PROGRESS
Occupational Therapy   Treatment    Name: Suleiman Beck  MRN: [id number]  Admitting Diagnosis:  Ischemic stroke       Recommendations:     Discharge Recommendations: High Intensity Therapy  Discharge Equipment Recommendations:  to be determined by next level of care  Barriers to discharge:  None    Assessment:     Suleiman Beck is a 74 y.o. male with a medical diagnosis of Ischemic stroke.  He presents with moaning and grimacing in bed, indicating abdominal discomfort and pointing to his rectum; nurse notified and stated pt has c/o constipation earlier today.  OT and nurse assisted patient to BS, with small bowel movement noted along with straining and multiple positional change at BSC.  Pt encouraged to remain as long as needed, then he indicated ready to return to bed, nurse gave Miralax and pt more comfortable in bed with HOB elevated at end of session.     Performance deficits affecting function are weakness, impaired self care skills, impaired functional mobility, gait instability, impaired balance, decreased coordination, decreased upper extremity function, decreased lower extremity function, decreased safety awareness, pain, decreased ROM, impaired fine motor, edema.     Rehab Prognosis:  Good; patient would benefit from acute skilled OT services to address these deficits and reach maximum level of function.       Plan:     Patient to be seen 4 x/week to address the above listed problems via self-care/home management, therapeutic activities, therapeutic exercises, neuromuscular re-education  Plan of Care Expires:  (DC)  Plan of Care Reviewed with: patient    Subjective     Chief Complaint: abdominal pain/discomfort  Patient/Family Comments/goals: DC to rehab to return to [facility]  Pain/Comfort:  Pain Rating 1: other (see comments) (pt grimacing and moaning but unable to state a number to rate pain)  Location - Orientation 1: lower  Location 1: abdomen  Pain Addressed 1: Nurse notified, Reposition  Pain Rating  Post-Intervention 1: other (see comments) (pt still indicating discomfort but seems somewhat improved with small bowel movement)    Objective:     Communicated with: nurse Avi prior to session.  Patient found HOB elevated with telemetry, PureWick, peripheral IV upon OT entry to room.    General Precautions: Standard, fall    Orthopedic Precautions:N/A  Braces: N/A  Respiratory Status: Room air     Occupational Performance:     Bed Mobility:    Patient completed Rolling/Turning to Right with minimum assistance  Patient completed Scooting/Bridging with contact guard assistance and to scoot himself to EOB  Patient completed Supine to Sit with minimum assistance  Patient completed Sit to Supine with minimum assistance     Functional Mobility/Transfers:  Patient completed Sit <> Stand Transfer with minimum assistance  with  hand-held assist   Patient completed Toilet Transfer Step Transfer technique with minimum assistance with  hand-held assist  Functional Mobility: NA at this time    Activities of Daily Living:  Toileting: moderate assistance and of 2  persons to help maintain good standing balance during hygiene following bowel movement at [facility], with total A for hygiene and clothing management in standing        Treatment & Education:  Pt. educated on updated OT goals, transfer training to [facility], use of call bell for assist with transfers OOB or for any other needs due to fall risk.  Pt indicated udnerstanding.    Patient left HOB elevated with all lines intact, call button in reach, bed alarm on, and nurse notified    GOALS:   Multidisciplinary Problems       Occupational Therapy Goals          Problem: Occupational Therapy    Goal Priority Disciplines Outcome Interventions   Occupational Therapy Goal     OT, PT/OT Progressing    Description: Patient will perform feeding with min A using his R hand, while seated up at chair at bedside or EOB.     Patient will perform grooming with min assist while seated  EOB.    Patient will perform toileting with mod assist using BSC.    Patient will perform toilet transfer with mod assist with use of RW.    Patient will perform upper body dressing with mod assist while seated EOB.     Patient will perform lower body dressing with max assist while seated EOB.                          Time Tracking:     OT Date of Treatment: 05/22/24  OT Start Time: 1410  OT Stop Time: 1447  OT Total Time (min): 37 min    Billable Minutes:Self Care/Home Management 20  Therapeutic Activity 17    OT/REMI: OT          5/22/2024

## 2024-05-22 NOTE — PROGRESS NOTES
Inpatient Nutrition Evaluation    Admit Date: 5/17/2024   Total duration of encounter: 5 days   Patient Age: 74 y.o.    Nutrition Recommendation/Prescription     Diabetic, Low Na diet  Monitor Weight Weekly     Nutrition Assessment     Chart Review    Reason Seen: length of stay    Malnutrition Screening Tool Results   Have you recently lost weight without trying?: No  Have you been eating poorly because of a decreased appetite?: No   MST Score: 0   Diagnosis:  Acute ischemic stroke, HTN, Pre Renal acute kidney injury, Hyperglycemia    Relevant Medical History: HTN, OA    Scheduled Medications:  aspirin, 81 mg, Daily  atorvastatin, 40 mg, QHS  carvediloL, 6.25 mg, BID  clopidogreL, 75 mg, Daily  insulin aspart U-100, 3 Units, TIDWM  insulin glargine U-100, 10 Units, Daily  lisinopriL, 20 mg, Daily  NIFEdipine, 90 mg, Daily  polyethylene glycol, 17 g, Once    Continuous Infusions:   PRN Medications:   Current Facility-Administered Medications:     acetaminophen, 650 mg, Oral, Q4H PRN    cetirizine, 10 mg, Oral, Daily PRN    dextrose 10%, 12.5 g, Intravenous, PRN    dextrose 10%, 25 g, Intravenous, PRN    glucagon (human recombinant), 1 mg, Intramuscular, PRN    glucose, 16 g, Oral, PRN    glucose, 24 g, Oral, PRN    hydrALAZINE, 10 mg, Intravenous, Q2H PRN    insulin aspart U-100, 0-5 Units, Subcutaneous, QID (AC + HS) PRN    labetalol, 10 mg, Intravenous, Q4H PRN    melatonin, 6 mg, Oral, Nightly PRN    naloxone, 0.02 mg, Intravenous, PRN    nicotine, 1 patch, Transdermal, Daily PRN    ondansetron, 8 mg, Oral, Q8H PRN    polyethylene glycol, 17 g, Oral, Daily PRN    prochlorperazine, 5 mg, Intravenous, Q6H PRN    senna-docusate 8.6-50 mg, 1 tablet, Oral, BID PRN    simethicone, 1 tablet, Oral, TID PRN    sodium chloride 0.9%, 10 mL, Intravenous, PRN    traMADoL, 50 mg, Oral, Q6H PRN    Recent Labs   Lab 05/15/24  2027 05/17/24  1058 05/17/24  1821 05/18/24  0319 05/18/24  0320 05/19/24  0320 05/20/24  0328  "05/21/24  0339 05/22/24  0435    136  --  142  --  138 138 136 140   K 4.3 4.0  --  3.5  --  3.5 3.6 3.8 4.1   CALCIUM 10.7* 9.8  --  9.4  --  9.3 9.0 9.0 9.5   PHOS  --   --   --  3.0  --  2.8 3.1 3.6 3.9   MG  --  2.10  --  2.10  --  1.70 2.40 2.20 2.30   CHLORIDE 110* 108*  --  115*  --  111* 110* 109* 109*   CO2 18* 17*  --  20*  --  19* 20* 20* 22*   BUN 44.5* 46.4*  --  32.0*  --  24.8 31.7* 36.1* 35.8*   CREATININE 1.76* 2.19*  --  1.52*  --  1.42* 1.64* 1.53* 1.72*   EGFRNORACEVR 40 31  --  48  --  52 44 48 41   GLUCOSE 261* 405*  --  172*  --  330* 188* 97 139*   BILITOT 0.2 0.3  --  0.3  --  0.3 0.4 0.3 0.4   ALKPHOS 64 61  --  59  --  55 56 57 64   ALT 14 17  --  17  --  16 14 14 14   AST 12 21  --  19  --  17 12 11 11   ALBUMIN 3.5 3.2*  --  3.0*  --  2.8* 2.7* 2.8* 3.1*   TRIG  --   --   --  258*  --   --   --   --   --    HGBA1C  --   --  9.1*  --  9.0*  --   --   --   --    WBC 11.70* 14.08*  --   --  11.35 12.45* 12.28* 13.29* 13.04*   HGB 12.6* 11.8*  --   --  11.3* 11.6* 10.8* 10.9* 11.9*   HCT 38.4* 36.6*  --   --  34.7* 35.9* 33.9* 34.4* 37.0*     Nutrition Orders:  Diet diabetic 1800 Calorie      Appetite/Oral Intake: good/% of meals  Factors Affecting Nutritional Intake: none identified  Social Needs Impacting Access to Food: unable to assess at this time; will attempt on follow-up  Food/Hoahaoism/Cultural Preferences:  Only chicken, no other meats  Food Allergies: no known food allergies  Last Bowel Movement: 05/21/24  Wound(s):  skin intact    Comments    5/22/24 -- Pt with good po intake, 75 - 100% documented per EMR; no indication of wt loss per EMR wt history; Hyperglycemia/elevated Hgb A1C noted, no history of DM noted, continue diabetic diet; discharge pending transfer to physical rehab    Anthropometrics    Height: 5' 1" (154.9 cm),    Last Weight: 63.5 kg (140 lb) (05/17/24 1433), Weight Method: Standard Scale  BMI (Calculated): 26.5  BMI Classification: overweight (BMI " 25-29.9)        Ideal Body Weight (IBW), Male: 112 lb     % Ideal Body Weight, Male (lb): 125 %                 Usual Body Weight (UBW), k kg  % Usual Body Weight: 101.01     Usual Weight Provided By: EMR weight history    Wt Readings from Last 5 Encounters:   24 63.5 kg (140 lb)   05/15/24 63.5 kg (140 lb)   24 64.4 kg (142 lb)   23 64.4 kg (142 lb)   10/25/23 64 kg (141 lb)     Weight Change(s) Since Admission: Stable  Wt Readings from Last 1 Encounters:   24 1433 63.5 kg (140 lb)   24 1013 63.5 kg (140 lb)   Admit Weight: 63.5 kg (140 lb) (24 1013), Weight Method: Standard Scale    Patient Education     Not applicable.    Nutrition Goals & Monitoring     Dietitian will monitor: food and beverage intake and weight change  Discharge planning: continue Diabetic, Low Na diet    Nutrition Risk/Follow-Up: low (follow-up in 5-7 days)  Patients assigned 'low nutrition risk' status do not qualify for a full nutritional assessment but will be monitored and re-evaluated in a 5-7 day time period. Please consult if re-evaluation needed sooner.

## 2024-05-22 NOTE — PT/OT/SLP PROGRESS
Physical Therapy Treatment    Patient Name:  Suleiman Beck   MRN:  [id number]    Recommendations     Therapy Intensity Recommendations at Discharge: High Intensity Therapy  Discharge Equipment Recommendations:  (To be determined by next level of care.)   Barriers to discharge: fall risk, severity of deficits, level of skilled assistance required, and decreased endurance    Assessment     Suleiman Beck is a 74 y.o. male admitted with a medical diagnosis of  Ischemic stroke.    He presents with the following impairments/functional limitations:  weakness, impaired endurance, impaired functional mobility, gait instability, impaired balance, decreased lower extremity function, decreased coordination.    Rehab Prognosis: Good.    Patient would benefit from continued skilled acute PT services to: address above listed impairments/functional limitations; receive patient/caregiver education; reduce fall risk; and maximize independency/safety with functional mobility.    Recent Surgery: * No surgery found *      Plan     During this hospitalization, patient to be seen 5 x/week to address the identified impairments/functional limitations via gait training, therapeutic activities, therapeutic exercises and progress toward the established goals.    Plan of Care Expires:  06/15/24    Subjective     Communicated with patient's nurse  prior to session.    Patient agreeable to participate in treatment session.    Chief Complaint: stomachache   Patient/Family Comments/goals: to return to PLOF  Pain/Comfort:  Location 1:  (stomachache)    Objective     Patient found supine in bed and with HOB elevated with peripheral IV  upon PT entry to room.    General Precautions: Standard, fall   Orthopedic Precautions:    Braces:    Respiratory Status: room air    Functional Mobility:    Bed Mobility:  Scooting: minimum assistance  Supine to Sit: minimum assistance  Sit to Supine: moderate assistance    Transfers:  Sit to Stand: minimum assistance and  moderate assistance with rolling walker    Gait:  Patient ambulated 100ft with 2 seated rest with rolling walker and minimum assistance.  Patient demonstrates :       no loss of balance       unsteady gait       decreased tam       decreased step length       ambulates outside [place] of RW       decreased foot/floor clearance       inconsistent right foot placement.    Other Mobility:  Therapeutic Activities performed:        -sitting balance activities:              repositioning at edge of bed       -standing balance activities:              side steps    Patient left supine in bed and with HOB elevated with all lines intact, call button in reach, tray table at bedside, and patient' nurse notified.    Goals     Multidisciplinary Problems       Physical Therapy Goals          Problem: Physical Therapy    Goal Priority Disciplines Outcome Goal Variances Interventions   Physical Therapy Goal     PT, PT/OT Progressing     Description: Goals to be met by: Discharge     Patient will increase functional independence with mobility by performin. Supine to sit with [place]  2. Sit to supine with [place]  3. Sit to stand transfer with [place]  4. Bed to chair transfer with Supervision using Rolling Walker  5. Gait  x 130 feet with Modified [place] using Rolling Walker.                        Time Tracking     PT Received On: 24  PT Start Time: 1044     PT Stop Time: 1110  PT Total Time (min): 26 min     Billable Minutes: Gait Training -1 unit and Therapeutic Activity -1 unit    2024

## 2024-05-22 NOTE — PROGRESS NOTES
[facility] Internal Medicine Wards Progress Note     Resident Team: [facility] Medicine List 1  Attending Physician: Joe Posey MD  Resident: Elpidio Armendariz DO  Intern: Rasheed Carey MD     Subjective:      Brief HPI:  Suleiman Beck is a 74 y.o. [place] American male with PMH of hypertension and osteoarthritis of multiple joints (right shoulder and right knee predominant), who presented to [facility] ED (5/17/2024) due to dizziness and falls x 3 days. Patient does not speak English and no  available who speaks dialect. History provided by son. Patient apparently has severe osteoarthritis affecting multiple joints but most severe in right shoulder and right knee which has impaired ambulation causing him to have unsteady gait/wobbling resulting in falls in past. Son also reports patient has on multiple occasions in past reported bilateral lower extremity weakness resulting in inability to ambulate or lift legs against gravity but would later self resolve. Patient brought to ED multiple times when symptoms presented in past with negative work ups per son. Episodes of dizziness, weakness, and falls began approximately two days prior presentation (05/15/2024) when patient apparently suffered a fall onto his right resulting in right arm pain. Concerned for right arm injury patient was brought to ED by son. X-rays performed at that time were negative and patient was discharged to home. Over the next several days patient continued to report dizziness to son but without episodes of falls. Patient son was not overly concerned about symptoms at that time due to patient history of similar symptoms in past. This a.m. patient was attempting to ambulate to rest room when he felt lightheadedness/dizziness without room spinning sensation prompting him to cease ambulation, sit down, and call for help from his wife. Wife attempted to help him stand up and get to rest room but upon standing patient lost all strength in lower extremities  and fell into wife's arms. Son immediately brought patient back to ED for evaluation. Denies fever, chills, sweats. Denies headache, eye pain, blurry vision. Denies vertigo, syncope, seizures. Denies chest pain, cough, hemoptysis, shortness of breath. Denies abdominal pain, nausea, vomiting, hematemesis, constipation, diarrhea, melena, hematochezia. Denies increased or decreased urinary frequency, dysuria, hematuria. Endorses painful bilateral upper and lower extremities which patient son states is his baseline 2/2 osteoarthritis. Denies weakness, numbness, or tingling to bilateral extremities.     ED course: Vitals show patient hypertensive (max /81) but remaining vital signs stable. CBC showed leukocytosis (WBC 14.08) but otherwise unremarkable. CMP showed hyperglycemia (Gluc 405), TINO (BUN 46.4; Cr 2.19), and normal anion gap acidosis (bicarb 17). Troponin 0.048. EKG showed normal sinus rhythm with right bundle branch block, bifasicular block, and new T wave inversions V4-V6 when compared to prior EKG. CT head without contrast (05/17/2024) showed focal hypoattenuation in high left frontal lobe concerning for acute infarct. MRI brain without contrast (50/17/2024) showed acute centrum semiovale nonhemorrhagic infarcts and chronic diffuse microangiopathic ischemia and atrophy. Internal medicine consulted for admission due to acute ischemic stroke.    Interval History:   NAEON. Remains hypertensive, but otherwise vital signs stable. Patient continues to have R shoulder and R knee pain. Pending placement for inpatient rehab. Otherwise no acute complaints.     Review of Systems:  Review of Systems   Constitutional:  Negative for chills, diaphoresis, fatigue and fever.   HENT:  Negative for ear pain, hearing loss, rhinorrhea, sore throat, tinnitus and trouble swallowing.    Eyes:  Negative for pain, redness and visual disturbance.   Respiratory:  Negative for cough, chest tightness and shortness of breath.     Cardiovascular:  Negative for chest pain and palpitations.   Gastrointestinal:  Negative for abdominal pain, constipation, diarrhea, nausea and vomiting.   Genitourinary:  Negative for difficulty urinating, dysuria, frequency, hematuria and urgency.   Musculoskeletal:  Positive for arthralgias, back pain and gait problem. Negative for myalgias.   Skin:  Negative for rash and wound.   Neurological:  Negative for dizziness, seizures, syncope, weakness, light-headedness, numbness and headaches.   Psychiatric/Behavioral:  Negative for confusion.         Objective:     Last 24 Hour Vital Signs:  BP  Min: 127/68  Max: 187/72  Temp  Av.6 °F (36.4 °C)  Min: 97.4 °F (36.3 °C)  Max: 97.7 °F (36.5 °C)  Pulse  Av.5  Min: 74  Max: 88  Resp  Av  Min: 16  Max: 27  SpO2  Av.4 %  Min: 94 %  Max: 100 %  I/O last 3 completed shifts:  In: -   Out: 300 [Urine:300]    Physical Examination:  Physical Exam  Vitals reviewed.   Constitutional:       General: He is not in acute distress.     Appearance: Normal appearance. He is normal weight. He is not ill-appearing.   HENT:      Head: Normocephalic and atraumatic.      Right Ear: External ear normal.      Left Ear: External ear normal.   Eyes:      General: No scleral icterus.        Right eye: No discharge.         Left eye: No discharge.      Extraocular Movements: Extraocular movements intact.      Conjunctiva/sclera: Conjunctivae normal.      Pupils: Pupils are equal, round, and reactive to light.   Cardiovascular:      Rate and Rhythm: Normal rate and regular rhythm.      Pulses: Normal pulses.      Heart sounds: Normal heart sounds. No murmur heard.     No friction rub. No gallop.   Pulmonary:      Effort: Pulmonary effort is normal. No respiratory distress.      Breath sounds: Normal breath sounds. No stridor. No wheezing, rhonchi or rales.   Abdominal:      General: Abdomen is flat. Bowel sounds are normal. There is no distension.      Palpations: Abdomen is  soft.      Tenderness: There is no abdominal tenderness. There is no guarding.   Musculoskeletal:         General: No swelling, tenderness, deformity or signs of injury. Normal range of motion.      Right lower leg: No edema.      Left lower leg: No edema.   Skin:     General: Skin is warm and dry.      Capillary Refill: Capillary refill takes less than 2 seconds.      Coloration: Skin is not jaundiced.      Findings: No bruising, erythema or rash.   Neurological:      Mental Status: He is alert.      Comments: AAO to person, place, time, and situation; CN II-XII grossly intact         Laboratory:  Most Recent Data:  CBC:   Lab Results   Component Value Date    WBC 13.04 (H) 05/22/2024    HGB 11.9 (L) 05/22/2024    HCT 37.0 (L) 05/22/2024     05/22/2024    MCV 88.5 05/22/2024    RDW 13.3 05/22/2024     WBC Differential:   Recent Labs   Lab 05/18/24  0320 05/19/24  0320 05/20/24  0328 05/21/24  0339 05/22/24  0435   WBC 11.35 12.45* 12.28* 13.29* 13.04*   HGB 11.3* 11.6* 10.8* 10.9* 11.9*   HCT 34.7* 35.9* 33.9* 34.4* 37.0*    278 255 274 297   MCV 87.4 87.8 88.5 89.4 88.5     BMP:   Lab Results   Component Value Date     05/22/2024    K 4.1 05/22/2024     (H) 03/20/2023    CO2 22 (L) 05/22/2024    BUN 35.8 (H) 05/22/2024    CREATININE 1.72 (H) 05/22/2024     (H) 03/20/2023    CALCIUM 9.5 05/22/2024    MG 2.30 05/22/2024    PHOS 3.9 05/22/2024     LFTs:   Lab Results   Component Value Date    ALBUMIN 3.1 (L) 05/22/2024    BILITOT 0.4 05/22/2024    AST 11 05/22/2024    ALKPHOS 64 05/22/2024    ALT 14 05/22/2024     Coags:   Lab Results   Component Value Date    INR 1.0 05/17/2024    PROTIME 13.7 05/17/2024     FLP:   Lab Results   Component Value Date    CHOL 196 05/18/2024    HDL 31 (L) 05/18/2024    TRIG 258 (H) 05/18/2024     DM:   Lab Results   Component Value Date    HGBA1C 9.0 (H) 05/18/2024    HGBA1C 9.1 (H) 05/17/2024    HGBA1C 7.4 (H) 03/20/2023    CREATININE 1.72 (H)  05/22/2024     Thyroid:   Lab Results   Component Value Date    TSH 1.751 05/21/2024     Anemia:   Lab Results   Component Value Date    IRON 45 (L) 12/14/2022    TIBC 130 (L) 12/14/2022    FERRITIN 261.54 12/14/2022     Cardiac:   Lab Results   Component Value Date    TROPONINI 0.048 (H) 05/17/2024    .0 (H) 12/11/2022     Urinalysis:   Lab Results   Component Value Date    LABURIN No Growth 12/12/2022    PHUA 5.5 05/17/2024    UROBILINOGEN Normal 05/17/2024    WBCUA 0-5 05/17/2024       Radiology:  Imaging Results              MRI Brain Without Contrast (Final result)  Result time 05/17/24 14:18:29      Final result by Иван Saleh MD (05/17/24 14:18:29)                   Impression:      1.  Left centrum semiovale acute nonhemorrhagic infarcts.    2.  Chronic microangiopathic ischemia and atrophy.      Electronically signed by: Иван Saleh  Date:    05/17/2024  Time:    14:18               Narrative:    EXAMINATION:  MRI BRAIN WITHOUT CONTRAST    CLINICAL HISTORY:  Stroke, follow up;    TECHNIQUE:  Multiplanar MRI sequences were performed of the brain without contrast.    COMPARISON:  CT brain without contrast May 17, 2014    FINDINGS:  The left centrum semiovale is remarkable for two adjacent acute nonhemorrhagic infarcts with diffusion weighted restrictions, signal dropout on ADC map and T2 FLAIR hyperintensities on image 27 series 6.  Chronic microvascular ischemic changes are mild with periventricular and deep white matter T2 FLAIR hyperintense signals.  Generalized cerebral and cerebellar cortical volume loss. Gradient echo sequences demonstrate no evidence of de phasing artifact to suggest hemorrhagic byproducts.  The sella and suprasellar areas are unremarkable.    The cerebellar tonsils are normally positioned. There is no acute intracranial hemorrhage, hydrocephalus, midline shift or mass effect. No acute extra axial fluid collections identified. The mastoid air cells are clear.                                        X-Ray Chest AP Portable (Final result)  Result time 05/17/24 12:24:01      Final result by Joaquim Luis MD (05/17/24 12:24:01)                   Impression:      No acute cardiopulmonary process.      Electronically signed by: Joaquim Luis  Date:    05/17/2024  Time:    12:24               Narrative:    EXAMINATION:  XR CHEST AP PORTABLE    CLINICAL HISTORY:  Weakness;    TECHNIQUE:  Single view of the chest    COMPARISON:  10/25/2023    FINDINGS:  No focal opacification, pleural effusion, or pneumothorax.    The cardiomediastinal silhouette is within normal limits.    No acute osseous abnormality.                                       X-Ray Femur 2 View Right (Final result)  Result time 05/17/24 12:25:48      Final result by Joaquim Luis MD (05/17/24 12:25:48)                   Impression:      As above.  Prior imaging from 2 days prior.  If continued pain recommend further evaluation.      Electronically signed by: Joaquim Luis  Date:    05/17/2024  Time:    12:25               Narrative:    EXAMINATION:  XR FEMUR 2 VIEW RIGHT    CLINICAL HISTORY:  Pain;    TECHNIQUE:  AP and lateral views of the right femur were performed.    COMPARISON:  05/15/2024    FINDINGS:  no displaced fracture. Mild degenerative changes with acetabular and knee tricompartmental osteophytes.                                       X-Ray Knee 3 View Right (Final result)  Result time 05/17/24 12:26:26      Final result by Joaquim Luis MD (05/17/24 12:26:26)                   Impression:      As above.      Electronically signed by: Joaquim Luis  Date:    05/17/2024  Time:    12:26               Narrative:    EXAMINATION:  XR KNEE 3 VIEW RIGHT    CLINICAL HISTORY:  Pain, unspecified    TECHNIQUE:  AP, lateral, and Merchant views of the right knee were performed.    COMPARISON:  None    FINDINGS:  Degenerative changes with tricompartmental osteophytes.  Joint spaces relatively well maintained.   Vascular atherosclerotic disease is noted.  Small suprapatellar effusion.                                        CT Head Without Contrast (Final result)  Result time 05/17/24 12:00:22      Final result by Noelle Felix MD (05/17/24 12:00:22)                   Impression:      Focal area of hypoattenuation in the cerebral convexity on the left side in the high left frontal lobe.  Findings are concerning for acute infarct.  MRI correlation would be beneficial.    Other chronic age related changes seen as outlined above    This report was flagged in Epic as abnormal.      Electronically signed by: Colt Felix  Date:    05/17/2024  Time:    12:00               Narrative:    EXAMINATION:  CT HEAD WITHOUT CONTRAST    CLINICAL HISTORY:  Neuro deficit, acute, stroke suspected;    TECHNIQUE:  Multiple axial images were obtained from the base of the brain to the vertex without contrast administration.  Sagittal and coronal reconstructions were performed..Automatic exposure control is utilized to reduce patient radiation exposure.    COMPARISON:  12/11/2022    FINDINGS:  There is no intracranial mass or lesion seen.  No hemorrhage is seen.  There is a focal area of hypoattenuation at the cerebral convexity on the left side in the left frontal region.  This is seen on images number 26 through 28 series 2.  Findings are concerning for acute infarct.  No other areas acute ischemia seen..  There is some cerebral atrophy seen.  There is some compensatory ventricular dilatation and periventricular white matter changes consistent with the patient's age.  Calvarium is intact.  The posterior fossa is unremarkable..  The visualized portions of the paranasal sinuses show no acute abnormality.                                       CT Pelvis Without Contrast (Final result)  Result time 05/17/24 12:04:11      Final result by Navi Dorado MD (05/17/24 12:04:11)                   Impression:      No acute  findings.      Electronically signed by: Navi Dorado  Date:    05/17/2024  Time:    12:04               Narrative:    EXAMINATION:  CT PELVIS WITHOUT CONTRAST    CLINICAL HISTORY:  Pelvis pain, stress fracture suspected, neg xray;    TECHNIQUE:  Helical acquisition through the pelvis without IV contrast.  Three plane reconstructions were provided for review.  mGycm. Automatic exposure control, adjustment of mA/kV or iterative reconstruction technique was used to reduce radiation.    COMPARISON:  9 December 2022    FINDINGS:  There is no fracture or dislocation.  Within the pelvis there is no free fluid or mass.  Moderate atherosclerotic disease.                                    Current Medications:     Infusions:       Scheduled:   aspirin  81 mg Oral Daily    atorvastatin  40 mg Oral QHS    carvediloL  6.25 mg Oral BID    clopidogreL  75 mg Oral Daily    insulin aspart U-100  3 Units Subcutaneous TIDWM    insulin glargine U-100  10 Units Subcutaneous Daily    lisinopriL  20 mg Oral Daily    NIFEdipine  90 mg Oral Daily    polyethylene glycol  17 g Oral Once        PRN:    Current Facility-Administered Medications:     acetaminophen, 650 mg, Oral, Q4H PRN    cetirizine, 10 mg, Oral, Daily PRN    dextrose 10%, 12.5 g, Intravenous, PRN    dextrose 10%, 25 g, Intravenous, PRN    glucagon (human recombinant), 1 mg, Intramuscular, PRN    glucose, 16 g, Oral, PRN    glucose, 24 g, Oral, PRN    hydrALAZINE, 10 mg, Intravenous, Q2H PRN    insulin aspart U-100, 0-5 Units, Subcutaneous, QID (AC + HS) PRN    labetalol, 10 mg, Intravenous, Q4H PRN    melatonin, 6 mg, Oral, Nightly PRN    naloxone, 0.02 mg, Intravenous, PRN    nicotine, 1 patch, Transdermal, Daily PRN    ondansetron, 8 mg, Oral, Q8H PRN    polyethylene glycol, 17 g, Oral, Daily PRN    prochlorperazine, 5 mg, Intravenous, Q6H PRN    senna-docusate 8.6-50 mg, 1 tablet, Oral, BID PRN    simethicone, 1 tablet, Oral, TID PRN    sodium chloride 0.9%, 10 mL,  Intravenous, PRN    traMADoL, 50 mg, Oral, Q6H PRN  Assessment & Plan:     Acute ischemic stroke  Hypertension  -NIHSS 0  -physical exam unremarkable for neurologic deficits  -CT head without contrast (05/17/2024) showed focal hypoattenuation in high left frontal lobe concerning for acute infarct  -MRI brain without contrast (50/17/2024) showed acute centrum semiovale nonhemorrhagic infarcts and chronic diffuse microangiopathic ischemia and atrophy  -past 24 hour permissive hypertension period  -continue aspirin 81 mg qd and plavix 75 mg, atorvastatin 20 mg qd, nifedipine 90 mg qd, lisinopril 20 mg qd, coreg 12.5 mg BID  -will continue to uptitrate antihypertensive as indicated     Prerenal acute kidney injury- improved  -baseline renal indices approximately: BUN 30, creatinine 1.65, eGFR 38  -renal indices at baseline  -continue to monitor     Hyperglycemia  -patient son reports patient was never a diagnosed diabetic  -hemoglobin A1c 9.0  -goal cbg < 180  -continue lantus 10 units qhs  -initiated scheduled aspart 3 units tidwm  -continue low dose ssi  -will continue to titrate antihyperglycemic regimen prn     Code Status: Full code  GI Access: PO  Feeding: Diabetic diet  IV Access: PIV  Fluids: None  Analgesia: Acetaminophen 650 mg q6h prn mild pain  Thromboprophylaxis: SCDs    Disposition: Continue inpatient services for blood pressure management and inpatient rehab placement.    Elpidio Armendariz DO  [facility] Internal Medicine, [facility]

## 2024-05-23 PROBLEM — E04.1 THYROID NODULE: Status: ACTIVE | Noted: 2024-05-23

## 2024-05-23 LAB
ALBUMIN SERPL-MCNC: 3 G/DL (ref 3.4–4.8)
ALBUMIN/GLOB SERPL: 0.9 RATIO (ref 1.1–2)
ALP SERPL-CCNC: 64 UNIT/L (ref 40–150)
ALT SERPL-CCNC: 12 UNIT/L (ref 0–55)
ANION GAP SERPL CALC-SCNC: 10 MEQ/L
AST SERPL-CCNC: 12 UNIT/L (ref 5–34)
BASOPHILS # BLD AUTO: 0.03 X10(3)/MCL
BASOPHILS NFR BLD AUTO: 0.2 %
BILIRUB SERPL-MCNC: 0.5 MG/DL
BUN SERPL-MCNC: 37.8 MG/DL (ref 8.4–25.7)
CALCIUM SERPL-MCNC: 9.7 MG/DL (ref 8.8–10)
CHLORIDE SERPL-SCNC: 108 MMOL/L (ref 98–107)
CO2 SERPL-SCNC: 22 MMOL/L (ref 23–31)
CREAT SERPL-MCNC: 1.74 MG/DL (ref 0.73–1.18)
CREAT/UREA NIT SERPL: 22
EOSINOPHIL # BLD AUTO: 0.54 X10(3)/MCL (ref 0–0.9)
EOSINOPHIL NFR BLD AUTO: 4.2 %
ERYTHROCYTE [DISTWIDTH] IN BLOOD BY AUTOMATED COUNT: 13.2 % (ref 11.5–17)
GFR SERPLBLD CREATININE-BSD FMLA CKD-EPI: 41 ML/MIN/1.73/M2
GLOBULIN SER-MCNC: 3.3 GM/DL (ref 2.4–3.5)
GLUCOSE SERPL-MCNC: 128 MG/DL (ref 82–115)
HCT VFR BLD AUTO: 34.6 % (ref 42–52)
HGB BLD-MCNC: 11.2 G/DL (ref 14–18)
IMM GRANULOCYTES # BLD AUTO: 0.04 X10(3)/MCL (ref 0–0.04)
IMM GRANULOCYTES NFR BLD AUTO: 0.3 %
LYMPHOCYTES # BLD AUTO: 2.81 X10(3)/MCL (ref 0.6–4.6)
LYMPHOCYTES NFR BLD AUTO: 21.8 %
MAGNESIUM SERPL-MCNC: 2.1 MG/DL (ref 1.6–2.6)
MCH RBC QN AUTO: 28.1 PG (ref 27–31)
MCHC RBC AUTO-ENTMCNC: 32.4 G/DL (ref 33–36)
MCV RBC AUTO: 86.9 FL (ref 80–94)
MONOCYTES # BLD AUTO: 0.9 X10(3)/MCL (ref 0.1–1.3)
MONOCYTES NFR BLD AUTO: 7 %
NEUTROPHILS # BLD AUTO: 8.58 X10(3)/MCL (ref 2.1–9.2)
NEUTROPHILS NFR BLD AUTO: 66.5 %
NRBC BLD AUTO-RTO: 0 %
PHOSPHATE SERPL-MCNC: 3.7 MG/DL (ref 2.3–4.7)
PLATELET # BLD AUTO: 290 X10(3)/MCL (ref 130–400)
PMV BLD AUTO: 11.7 FL (ref 7.4–10.4)
POCT GLUCOSE: 136 MG/DL (ref 70–110)
POCT GLUCOSE: 198 MG/DL (ref 70–110)
POCT GLUCOSE: 208 MG/DL (ref 70–110)
POCT GLUCOSE: 208 MG/DL (ref 70–110)
POTASSIUM SERPL-SCNC: 4.5 MMOL/L (ref 3.5–5.1)
PROT SERPL-MCNC: 6.3 GM/DL (ref 5.8–7.6)
RBC # BLD AUTO: 3.98 X10(6)/MCL (ref 4.7–6.1)
SODIUM SERPL-SCNC: 140 MMOL/L (ref 136–145)
WBC # SPEC AUTO: 12.9 X10(3)/MCL (ref 4.5–11.5)

## 2024-05-23 PROCEDURE — 83735 ASSAY OF MAGNESIUM: CPT

## 2024-05-23 PROCEDURE — 97535 SELF CARE MNGMENT TRAINING: CPT

## 2024-05-23 PROCEDURE — 25000003 PHARM REV CODE 250

## 2024-05-23 PROCEDURE — 36415 COLL VENOUS BLD VENIPUNCTURE: CPT

## 2024-05-23 PROCEDURE — 21400001 HC TELEMETRY ROOM

## 2024-05-23 PROCEDURE — 85025 COMPLETE CBC W/AUTO DIFF WBC: CPT

## 2024-05-23 PROCEDURE — 84100 ASSAY OF PHOSPHORUS: CPT

## 2024-05-23 PROCEDURE — 94761 N-INVAS EAR/PLS OXIMETRY MLT: CPT

## 2024-05-23 PROCEDURE — 97116 GAIT TRAINING THERAPY: CPT

## 2024-05-23 PROCEDURE — 63600175 PHARM REV CODE 636 W HCPCS

## 2024-05-23 PROCEDURE — 97112 NEUROMUSCULAR REEDUCATION: CPT

## 2024-05-23 PROCEDURE — 80053 COMPREHEN METABOLIC PANEL: CPT

## 2024-05-23 RX ORDER — CARVEDILOL 12.5 MG/1
12.5 TABLET ORAL 2 TIMES DAILY
Status: DISCONTINUED | OUTPATIENT
Start: 2024-05-23 | End: 2024-05-24

## 2024-05-23 RX ORDER — CARVEDILOL 3.12 MG/1
6.25 TABLET ORAL ONCE
Status: COMPLETED | OUTPATIENT
Start: 2024-05-23 | End: 2024-05-23

## 2024-05-23 RX ADMIN — SODIUM CHLORIDE, POTASSIUM CHLORIDE, SODIUM LACTATE AND CALCIUM CHLORIDE 1000 ML: 600; 310; 30; 20 INJECTION, SOLUTION INTRAVENOUS at 09:05

## 2024-05-23 RX ADMIN — INSULIN ASPART 3 UNITS: 100 INJECTION, SOLUTION INTRAVENOUS; SUBCUTANEOUS at 05:05

## 2024-05-23 RX ADMIN — INSULIN ASPART 3 UNITS: 100 INJECTION, SOLUTION INTRAVENOUS; SUBCUTANEOUS at 08:05

## 2024-05-23 RX ADMIN — CARVEDILOL 6.25 MG: 3.12 TABLET, FILM COATED ORAL at 08:05

## 2024-05-23 RX ADMIN — INSULIN GLARGINE 10 UNITS: 100 INJECTION, SOLUTION SUBCUTANEOUS at 08:05

## 2024-05-23 RX ADMIN — LISINOPRIL 40 MG: 10 TABLET ORAL at 08:05

## 2024-05-23 RX ADMIN — ASPIRIN 81 MG CHEWABLE TABLET 81 MG: 81 TABLET CHEWABLE at 08:05

## 2024-05-23 RX ADMIN — NIFEDIPINE 90 MG: 30 TABLET, FILM COATED, EXTENDED RELEASE ORAL at 08:05

## 2024-05-23 RX ADMIN — TRAMADOL HYDROCHLORIDE 50 MG: 50 TABLET, COATED ORAL at 08:05

## 2024-05-23 RX ADMIN — CARVEDILOL 12.5 MG: 12.5 TABLET, FILM COATED ORAL at 08:05

## 2024-05-23 RX ADMIN — ATORVASTATIN CALCIUM 40 MG: 40 TABLET, FILM COATED ORAL at 08:05

## 2024-05-23 RX ADMIN — TRAMADOL HYDROCHLORIDE 50 MG: 50 TABLET, COATED ORAL at 05:05

## 2024-05-23 RX ADMIN — INSULIN ASPART 2 UNITS: 100 INJECTION, SOLUTION INTRAVENOUS; SUBCUTANEOUS at 05:05

## 2024-05-23 RX ADMIN — CARVEDILOL 6.25 MG: 3.12 TABLET, FILM COATED ORAL at 12:05

## 2024-05-23 RX ADMIN — CLOPIDOGREL BISULFATE 75 MG: 75 TABLET ORAL at 08:05

## 2024-05-23 RX ADMIN — INSULIN ASPART 3 UNITS: 100 INJECTION, SOLUTION INTRAVENOUS; SUBCUTANEOUS at 12:05

## 2024-05-23 RX ADMIN — ACETAMINOPHEN 650 MG: 325 TABLET, FILM COATED ORAL at 12:05

## 2024-05-23 NOTE — PT/OT/SLP PROGRESS
Occupational Therapy   Treatment    Name: Suleiman Beck  MRN: [id number]  Admitting Diagnosis:  Ischemic stroke       Recommendations:     Discharge Recommendations: High Intensity Therapy  Discharge Equipment Recommendations:  to be determined by next level of care  Barriers to discharge:  None    Assessment:     Suleiman Beck is a 74 y.o. male with a medical diagnosis of Ischemic stroke.  He presents with improved demeanor (nurse stated pt had multiple bowel movements overnight and this morning, and pt indicating lingering abdominal discomfort but better than yesterday).  Pt continues to require visual and tactile cues for transfer techniques including forward weight shift during sit to stand and completing all turns with stepping until square to surface behind him prior to descent. Pt extremely pleasant and cooperative, but limited by poor carryover, language barrier, and R neglect especially RLE.    Performance deficits affecting function are weakness, impaired sensation, impaired self care skills, impaired functional mobility, gait instability, impaired balance, visual deficits, decreased upper extremity function, decreased lower extremity function, decreased safety awareness, pain, abnormal tone, decreased ROM, impaired coordination, impaired fine motor.     Rehab Prognosis:  Good; patient would benefit from acute skilled OT services to address these deficits and reach maximum level of function.       Plan:     Patient to be seen 4 x/week to address the above listed problems via self-care/home management, neuromuscular re-education, therapeutic activities, therapeutic exercises  Plan of Care Expires:  (DC)  Plan of Care Reviewed with: patient    Subjective     Chief Complaint: R upper arm pain  Patient/Family Comments/goals: DC to rehab for aggressive therapy to return to independent level of function and DC home with family  Pain/Comfort:  Pain Rating 1: 3/10  Location 1: abdomen  Pain Addressed 1: Reposition,  Other (see comments) (assisted to BSC)  Pain Rating Post-Intervention 1: 0/10  Pain Rating 2: 5/10  Location - Side 2: Right  Location - Orientation 2: upper  Location 2: arm  Pain Addressed 2: Reposition  Pain Rating Post-Intervention 2: 4/10    Objective:     Communicated with: nurse Cárdenas prior to session.  Patient found HOB elevated with PureWick, telemetry, peripheral IV upon OT entry to room.    General Precautions: Standard, fall    Orthopedic Precautions:N/A  Braces: N/A  Respiratory Status: Room air     Occupational Performance:     Bed Mobility:    Patient completed Supine to Sit with min/mod A, with cues for sequencing and technique, limited mobility of RLE especially bringing LE from sidelying to sitting EOB, using L hand to pull himself up with OT assist d/t unable to push himself up from R sidelying with RUE      Functional Mobility/Transfers:  Patient completed Sit <> Stand Transfer with minimum assistance and from EOB or from recliner chair, with max tactile and visual cues for BUE placement and positioning with forward weight shift during transition from sit to stand d/t pt with tendency to push himself backwards on initial attempts to stand  with  rolling walker   Patient completed Bed <> Chair Transfer using Step Transfer technique with min/mod assist d/t required frequent verbal and tactile cues to complete stepping until square to surface prior to decent with rolling walker  Patient completed Toilet Transfer Step Transfer technique with min/mod A and frequent cuing, with  rolling walker  Functional Mobility: min A to ambulate with PT in edward using RW, 130 ft    Activities of Daily Living:  Grooming: minimum assistance for standing balance at sink during brushing teeth using R hand, with SBA for some setup of items (pt able to open toothpaste using R hand but required assist to apply to toothbrush for BUE activity d/t decreased R hand coordination and use of L nondominant hand--note setup of  brushing teeth completed in sitting to improve pt's ability to focus on task and problem solve setup), then pt brushed teeth in standing using R hand but required tactile cuing for complete forward lean over sink when expectorating.  Pt also wiped face with washcloth in standing.    Pt sat at chair to initiate shaving task in front of sink and mirror; pt able to perform first 25% of task using R hand but limited by R hand incoordination for fine manipulation of electric razor along facial surfaces and OT completed last 75% of task for safety and thoroughness.    Toileting: maximal assistance for hygiene following bowel movement at [facility], in standing at  with CGA for standing balance, and max A for clothing management in standing      Treatment & Neuromuscular Education:  Pt participated in alternating UE reaching while seated EOB, to facilitate improved postural control in unsupported sitting, dynamic balance, and motor control of RUE , with support at R elbow required for full forward reach to chest height d/t shoulder weakness.  After ADL training, returned to neuromuscular reeducation seated at recliner chair to facilitate isolated motor control at R hand, gross motor control and strength throughout RUE.  Pt required extra time to complete all FM coordination activities, with significant difficulty isolating digital movement.    Patient left up in chair with all lines intact, call button in reach, chair alarm on, and nurse notified    GOALS:   Multidisciplinary Problems       Occupational Therapy Goals          Problem: Occupational Therapy    Goal Priority Disciplines Outcome Interventions   Occupational Therapy Goal     OT, PT/OT Progressing    Description: Patient will perform feeding with min A using his R hand, while seated up at chair at bedside or EOB. Met 5/22 per nurse, pt able to feed himself using R hand with setup of all items but with noted spillage d/t R hand coordination deficits    Patient will  perform grooming with min assist while seated EOB.  Met 5/23, upgrade goal to grooming with CGA while standing at sink.    Patient will perform toileting with mod assist using BSC.    Patient will perform toilet transfer with mod assist with use of RW.  Met 5/23, upgrade goal to min A transfer to BSC with RW.    Patient will perform upper body dressing with mod assist while seated EOB.     Patient will perform lower body dressing with max assist while seated EOB.                          Time Tracking:     OT Date of Treatment: 05/23/24  OT Start Time: 0919  OT Stop Time: 1027  OT Total Time (min): 68 min    Billable Minutes:Self Care/Home Management 38  Neuromuscular Re-education 18  Total Time 12 min assist to PT during gait training    OT/REMI: OT          5/23/2024

## 2024-05-23 NOTE — PROGRESS NOTES
\[facility]\"" Internal Medicine Wards Progress Note     Resident Team: [facility] Medicine List 1  Attending Physician: Joe Posey MD  Resident: Elpidio Armendariz DO  Intern: Rasheed Carey MD     Subjective:      Brief HPI:  Suleiman Beck is a 74 y.o. [place] American male with PMH of hypertension and osteoarthritis of multiple joints (right shoulder and right knee predominant), who presented to [facility] ED (5/17/2024) due to dizziness and falls x 3 days. Patient does not speak English and no  available who speaks dialect. History provided by son. Patient apparently has severe osteoarthritis affecting multiple joints but most severe in right shoulder and right knee which has impaired ambulation causing him to have unsteady gait/wobbling resulting in falls in past. Son also reports patient has on multiple occasions in past reported bilateral lower extremity weakness resulting in inability to ambulate or lift legs against gravity but would later self resolve. Patient brought to ED multiple times when symptoms presented in past with negative work ups per son. Episodes of dizziness, weakness, and falls began approximately two days prior presentation (05/15/2024) when patient apparently suffered a fall onto his right resulting in right arm pain. Concerned for right arm injury patient was brought to ED by son. X-rays performed at that time were negative and patient was discharged to home. Over the next several days patient continued to report dizziness to son but without episodes of falls. Patient son was not overly concerned about symptoms at that time due to patient history of similar symptoms in past. This a.m. patient was attempting to ambulate to rest room when he felt lightheadedness/dizziness without room spinning sensation prompting him to cease ambulation, sit down, and call for help from his wife. Wife attempted to help him stand up and get to rest room but upon standing patient lost all strength in lower extremities  and fell into wife's arms. Son immediately brought patient back to ED for evaluation. Denies fever, chills, sweats. Denies headache, eye pain, blurry vision. Denies vertigo, syncope, seizures. Denies chest pain, cough, hemoptysis, shortness of breath. Denies abdominal pain, nausea, vomiting, hematemesis, constipation, diarrhea, melena, hematochezia. Denies increased or decreased urinary frequency, dysuria, hematuria. Endorses painful bilateral upper and lower extremities which patient son states is his baseline 2/2 osteoarthritis. Denies weakness, numbness, or tingling to bilateral extremities.     ED course: Vitals show patient hypertensive (max /81) but remaining vital signs stable. CBC showed leukocytosis (WBC 14.08) but otherwise unremarkable. CMP showed hyperglycemia (Gluc 405), TINO (BUN 46.4; Cr 2.19), and normal anion gap acidosis (bicarb 17). Troponin 0.048. EKG showed normal sinus rhythm with right bundle branch block, bifasicular block, and new T wave inversions V4-V6 when compared to prior EKG. CT head without contrast (2024) showed focal hypoattenuation in high left frontal lobe concerning for acute infarct. MRI brain without contrast () showed acute centrum semiovale nonhemorrhagic infarcts and chronic diffuse microangiopathic ischemia and atrophy. Internal medicine consulted for admission due to acute ischemic stroke.    Interval History:   NAEON.   Notably remains hypertensive  Continues to have pain with right shoulder and knee. Unchanged  Working with physical therapy  Pending placement.     Objective:     Last 24 Hour Vital Signs:  BP  Min: 128/72  Max: 168/77  Temp  Av.7 °F (36.5 °C)  Min: 97.3 °F (36.3 °C)  Max: 98.5 °F (36.9 °C)  Pulse  Av  Min: 76  Max: 92  Resp  Av.8  Min: 17  Max: 18  SpO2  Av.1 %  Min: 91 %  Max: 100 %  I/O last 3 completed shifts:  In: 483.3 [IV Piggyback:483.3]  Out: 1300 [Urine:1300]    Physical Examination:  Physical Exam  Vitals  reviewed.   Constitutional:       General: He is not in acute distress.     Appearance: Normal appearance. He is normal weight. He is not ill-appearing.   HENT:      Head: Normocephalic and atraumatic.      Right Ear: External ear normal.      Left Ear: External ear normal.   Eyes:      General: No scleral icterus.        Right eye: No discharge.         Left eye: No discharge.      Extraocular Movements: Extraocular movements intact.      Conjunctiva/sclera: Conjunctivae normal.      Pupils: Pupils are equal, round, and reactive to light.   Cardiovascular:      Rate and Rhythm: Normal rate and regular rhythm.      Pulses: Normal pulses.      Heart sounds: Normal heart sounds. No murmur heard.     No friction rub. No gallop.   Pulmonary:      Effort: Pulmonary effort is normal. No respiratory distress.      Breath sounds: Normal breath sounds. No stridor. No wheezing, rhonchi or rales.   Abdominal:      General: Abdomen is flat. Bowel sounds are normal. There is no distension.      Palpations: Abdomen is soft.      Tenderness: There is no abdominal tenderness. There is no guarding.   Musculoskeletal:         General: No swelling, tenderness, deformity or signs of injury. Normal range of motion.      Right lower leg: No edema.      Left lower leg: No edema.   Skin:     General: Skin is warm and dry.      Capillary Refill: Capillary refill takes less than 2 seconds.      Coloration: Skin is not jaundiced.      Findings: No bruising, erythema or rash.   Neurological:      Mental Status: He is alert.      Comments: AAO to person, place, time, and situation; CN II-XII grossly intact         Laboratory:  Most Recent Data:  CBC:   Lab Results   Component Value Date    WBC 12.90 (H) 05/23/2024    HGB 11.2 (L) 05/23/2024    HCT 34.6 (L) 05/23/2024     05/23/2024    MCV 86.9 05/23/2024    RDW 13.2 05/23/2024     WBC Differential:   Recent Labs   Lab 05/19/24  0320 05/20/24  0328 05/21/24  0339 05/22/24  0435  05/23/24  0402   WBC 12.45* 12.28* 13.29* 13.04* 12.90*   HGB 11.6* 10.8* 10.9* 11.9* 11.2*   HCT 35.9* 33.9* 34.4* 37.0* 34.6*    255 274 297 290   MCV 87.8 88.5 89.4 88.5 86.9     BMP:   Lab Results   Component Value Date     05/23/2024    K 4.5 05/23/2024     (H) 03/20/2023    CO2 22 (L) 05/23/2024    BUN 37.8 (H) 05/23/2024    CREATININE 1.74 (H) 05/23/2024     (H) 03/20/2023    CALCIUM 9.7 05/23/2024    MG 2.10 05/23/2024    PHOS 3.7 05/23/2024     LFTs:   Lab Results   Component Value Date    ALBUMIN 3.0 (L) 05/23/2024    BILITOT 0.5 05/23/2024    AST 12 05/23/2024    ALKPHOS 64 05/23/2024    ALT 12 05/23/2024     Coags:   Lab Results   Component Value Date    INR 1.0 05/17/2024    PROTIME 13.7 05/17/2024     FLP:   Lab Results   Component Value Date    CHOL 196 05/18/2024    HDL 31 (L) 05/18/2024    TRIG 258 (H) 05/18/2024     DM:   Lab Results   Component Value Date    HGBA1C 9.0 (H) 05/18/2024    HGBA1C 9.1 (H) 05/17/2024    HGBA1C 7.4 (H) 03/20/2023    CREATININE 1.74 (H) 05/23/2024     Thyroid:   Lab Results   Component Value Date    TSH 1.751 05/21/2024     Anemia:   Lab Results   Component Value Date    IRON 45 (L) 12/14/2022    TIBC 130 (L) 12/14/2022    FERRITIN 261.54 12/14/2022     Cardiac:   Lab Results   Component Value Date    TROPONINI 0.048 (H) 05/17/2024    .0 (H) 12/11/2022     Urinalysis:   Lab Results   Component Value Date    LABURIN No Growth 12/12/2022    PHUA 5.5 05/17/2024    UROBILINOGEN Normal 05/17/2024    WBCUA 0-5 05/17/2024       Radiology:  Imaging Results              MRI Brain Without Contrast (Final result)  Result time 05/17/24 14:18:29      Final result by Иван Saleh MD (05/17/24 14:18:29)                   Impression:      1.  Left centrum semiovale acute nonhemorrhagic infarcts.    2.  Chronic microangiopathic ischemia and atrophy.      Electronically signed by: Иван Saleh  Date:    05/17/2024  Time:    14:18                Narrative:    EXAMINATION:  MRI BRAIN WITHOUT CONTRAST    CLINICAL HISTORY:  Stroke, follow up;    TECHNIQUE:  Multiplanar MRI sequences were performed of the brain without contrast.    COMPARISON:  CT brain without contrast May 17, 2014    FINDINGS:  The left centrum semiovale is remarkable for two adjacent acute nonhemorrhagic infarcts with diffusion weighted restrictions, signal dropout on ADC map and T2 FLAIR hyperintensities on image 27 series 6.  Chronic microvascular ischemic changes are mild with periventricular and deep white matter T2 FLAIR hyperintense signals.  Generalized cerebral and cerebellar cortical volume loss. Gradient echo sequences demonstrate no evidence of de phasing artifact to suggest hemorrhagic byproducts.  The sella and suprasellar areas are unremarkable.    The cerebellar tonsils are normally positioned. There is no acute intracranial hemorrhage, hydrocephalus, midline shift or mass effect. No acute extra axial fluid collections identified. The mastoid air cells are clear.                                       X-Ray Chest AP Portable (Final result)  Result time 05/17/24 12:24:01      Final result by Joaquim Luis MD (05/17/24 12:24:01)                   Impression:      No acute cardiopulmonary process.      Electronically signed by: Joaquim Luis  Date:    05/17/2024  Time:    12:24               Narrative:    EXAMINATION:  XR CHEST AP PORTABLE    CLINICAL HISTORY:  Weakness;    TECHNIQUE:  Single view of the chest    COMPARISON:  10/25/2023    FINDINGS:  No focal opacification, pleural effusion, or pneumothorax.    The cardiomediastinal silhouette is within normal limits.    No acute osseous abnormality.                                       X-Ray Femur 2 View Right (Final result)  Result time 05/17/24 12:25:48      Final result by Joaquim Luis MD (05/17/24 12:25:48)                   Impression:      As above.  Prior imaging from 2 days prior.  If continued pain recommend  further evaluation.      Electronically signed by: Joaquim Luis  Date:    05/17/2024  Time:    12:25               Narrative:    EXAMINATION:  XR FEMUR 2 VIEW RIGHT    CLINICAL HISTORY:  Pain;    TECHNIQUE:  AP and lateral views of the right femur were performed.    COMPARISON:  05/15/2024    FINDINGS:  no displaced fracture. Mild degenerative changes with acetabular and knee tricompartmental osteophytes.                                       X-Ray Knee 3 View Right (Final result)  Result time 05/17/24 12:26:26      Final result by Joaquim Luis MD (05/17/24 12:26:26)                   Impression:      As above.      Electronically signed by: Joaquim Luis  Date:    05/17/2024  Time:    12:26               Narrative:    EXAMINATION:  XR KNEE 3 VIEW RIGHT    CLINICAL HISTORY:  Pain, unspecified    TECHNIQUE:  AP, lateral, and Merchant views of the right knee were performed.    COMPARISON:  None    FINDINGS:  Degenerative changes with tricompartmental osteophytes.  Joint spaces relatively well maintained.  Vascular atherosclerotic disease is noted.  Small suprapatellar effusion.                                        CT Head Without Contrast (Final result)  Result time 05/17/24 12:00:22      Final result by Noelle Felix MD (05/17/24 12:00:22)                   Impression:      Focal area of hypoattenuation in the cerebral convexity on the left side in the high left frontal lobe.  Findings are concerning for acute infarct.  MRI correlation would be beneficial.    Other chronic age related changes seen as outlined above    This report was flagged in Epic as abnormal.      Electronically signed by: Colt Felix  Date:    05/17/2024  Time:    12:00               Narrative:    EXAMINATION:  CT HEAD WITHOUT CONTRAST    CLINICAL HISTORY:  Neuro deficit, acute, stroke suspected;    TECHNIQUE:  Multiple axial images were obtained from the base of the brain to the vertex without contrast administration.   Sagittal and coronal reconstructions were performed..Automatic exposure control is utilized to reduce patient radiation exposure.    COMPARISON:  12/11/2022    FINDINGS:  There is no intracranial mass or lesion seen.  No hemorrhage is seen.  There is a focal area of hypoattenuation at the cerebral convexity on the left side in the left frontal region.  This is seen on images number 26 through 28 series 2.  Findings are concerning for acute infarct.  No other areas acute ischemia seen..  There is some cerebral atrophy seen.  There is some compensatory ventricular dilatation and periventricular white matter changes consistent with the patient's age.  Calvarium is intact.  The posterior fossa is unremarkable..  The visualized portions of the paranasal sinuses show no acute abnormality.                                       CT Pelvis Without Contrast (Final result)  Result time 05/17/24 12:04:11      Final result by Navi Dorado MD (05/17/24 12:04:11)                   Impression:      No acute findings.      Electronically signed by: Navi Dorado  Date:    05/17/2024  Time:    12:04               Narrative:    EXAMINATION:  CT PELVIS WITHOUT CONTRAST    CLINICAL HISTORY:  Pelvis pain, stress fracture suspected, neg xray;    TECHNIQUE:  Helical acquisition through the pelvis without IV contrast.  Three plane reconstructions were provided for review.  mGycm. Automatic exposure control, adjustment of mA/kV or iterative reconstruction technique was used to reduce radiation.    COMPARISON:  9 December 2022    FINDINGS:  There is no fracture or dislocation.  Within the pelvis there is no free fluid or mass.  Moderate atherosclerotic disease.                                    Current Medications:     Infusions:       Scheduled:   aspirin  81 mg Oral Daily    atorvastatin  40 mg Oral QHS    carvediloL  6.25 mg Oral BID    clopidogreL  75 mg Oral Daily    insulin aspart U-100  3 Units Subcutaneous TIDWM    insulin  glargine U-100  10 Units Subcutaneous Daily    lisinopriL  40 mg Oral Daily    NIFEdipine  90 mg Oral Daily        PRN:    Current Facility-Administered Medications:     acetaminophen, 650 mg, Oral, Q4H PRN    cetirizine, 10 mg, Oral, Daily PRN    dextrose 10%, 12.5 g, Intravenous, PRN    dextrose 10%, 25 g, Intravenous, PRN    glucagon (human recombinant), 1 mg, Intramuscular, PRN    glucose, 16 g, Oral, PRN    glucose, 24 g, Oral, PRN    hydrALAZINE, 10 mg, Intravenous, Q2H PRN    insulin aspart U-100, 0-5 Units, Subcutaneous, QID (AC + HS) PRN    labetalol, 10 mg, Intravenous, Q4H PRN    melatonin, 6 mg, Oral, Nightly PRN    naloxone, 0.02 mg, Intravenous, PRN    nicotine, 1 patch, Transdermal, Daily PRN    ondansetron, 8 mg, Oral, Q8H PRN    polyethylene glycol, 17 g, Oral, Daily PRN    prochlorperazine, 5 mg, Intravenous, Q6H PRN    senna-docusate 8.6-50 mg, 1 tablet, Oral, BID PRN    simethicone, 1 tablet, Oral, TID PRN    sodium chloride 0.9%, 10 mL, Intravenous, PRN    traMADoL, 50 mg, Oral, Q6H PRN  Assessment & Plan:     Acute ischemic stroke  Hypertension  -NIHSS 0  -physical exam unremarkable for neurologic deficits  -CT head without contrast (05/17/2024) showed focal hypoattenuation in high left frontal lobe concerning for acute infarct  -MRI brain without contrast (50/17/2024) showed acute centrum semiovale nonhemorrhagic infarcts and chronic diffuse microangiopathic ischemia and atrophy  -past 24 hour permissive hypertension period  -continue aspirin 81 mg qd and plavix 75 mg, atorvastatin 20 mg qd, nifedipine 90 mg qd, lisinopril 20 mg qd, coreg 12.5 mg BID  -will continue to uptitrate antihypertensive as indicated     Prerenal acute kidney injury- improved  -baseline renal indices approximately: BUN 30, creatinine 1.65, eGFR 38  -renal indices at baseline  -continue to monitor     Hyperglycemia  -patient son reports patient was never a diagnosed diabetic  -hemoglobin A1c 9.0  -goal cbg <  180  -continue lantus 10 units qhs  -initiated scheduled aspart 3 units tidwm  -continue low dose ssi  -will continue to titrate antihyperglycemic regimen prn     Code Status: Full code  GI Access: PO  Feeding: Diabetic diet  IV Access: PIV  Fluids: None  Analgesia: Acetaminophen 650 mg q6h prn mild pain  Thromboprophylaxis: SCDs    Disposition: Continue inpatient services for blood pressure management and inpatient rehab placement.    Bijan Singh, DO

## 2024-05-23 NOTE — DISCHARGE SUMMARY
[facility] Internal Medicine Discharge Summary    Admitting Physician: Joe Posey MD  Attending Physician: Joe Posey MD  Date of Admit: 5/17/2024  Date of Discharge: 5/24/2024    Condition: Stable  Outcome: Condition has improved and patient is now ready for discharge.  DISPOSITION:  Inpatient Rehab        Discharge Diagnoses:     Patient Active Problem List   Diagnosis    Ureteral obstruction    Type 2 diabetes mellitus    Hypertension    Ischemic stroke    Thyroid nodule       Principal Problem:  Ischemic stroke    Consultants and Procedures:     Consultants:  IP CONSULT TO INTERNAL MEDICINE  IP CONSULT TO SOCIAL WORK/CASE MANAGEMENT  IP CONSULT TO SOCIAL WORK/CASE MANAGEMENT    Procedures:   * No surgery found *      Brief Admission History:      Suleiman Beck is a 74 y.o. [place] American male with PMH of hypertension and osteoarthritis of multiple joints (right shoulder and right knee predominant), who presented to [facility] ED (5/17/2024) due to dizziness and falls x 3 days. Patient does not speak English and no  available who speaks dialect. History provided by son. Patient apparently has severe osteoarthritis affecting multiple joints but most severe in right shoulder and right knee which has impaired ambulation causing him to have unsteady gait/wobbling resulting in falls in past. Son also reports patient has on multiple occasions in past reported bilateral lower extremity weakness resulting in inability to ambulate or lift legs against gravity but would later self resolve. Patient brought to ED multiple times when symptoms presented in past with negative work ups per son. Episodes of dizziness, weakness, and falls began approximately two days prior presentation (05/15/2024) when patient apparently suffered a fall onto his right resulting in right arm pain. Concerned for right arm injury patient was brought to ED by son. X-rays performed at that time were negative and patient was  discharged to home. Over the next several days patient continued to report dizziness to son but without episodes of falls. Patient son was not overly concerned about symptoms at that time due to patient history of similar symptoms in past. This a.m. patient was attempting to ambulate to rest room when he felt lightheadedness/dizziness without room spinning sensation prompting him to cease ambulation, sit down, and call for help from his wife. Wife attempted to help him stand up and get to rest room but upon standing patient lost all strength in lower extremities and fell into wife's arms. Son immediately brought patient back to ED for evaluation. Denies fever, chills, sweats. Denies headache, eye pain, blurry vision. Denies vertigo, syncope, seizures. Denies chest pain, cough, hemoptysis, shortness of breath. Denies abdominal pain, nausea, vomiting, hematemesis, constipation, diarrhea, melena, hematochezia. Denies increased or decreased urinary frequency, dysuria, hematuria. Endorses painful bilateral upper and lower extremities which patient son states is his baseline 2/2 osteoarthritis. Denies weakness, numbness, or tingling to bilateral extremities.     ED course: Vitals show patient hypertensive (max /81) but remaining vital signs stable. CBC showed leukocytosis (WBC 14.08) but otherwise unremarkable. CMP showed hyperglycemia (Gluc 405), TINO (BUN 46.4; Cr 2.19), and normal anion gap acidosis (bicarb 17). Troponin 0.048. EKG showed normal sinus rhythm with right bundle branch block, bifasicular block, and new T wave inversions V4-V6 when compared to prior EKG. CT head without contrast (05/17/2024) showed focal hypoattenuation in high left frontal lobe concerning for acute infarct. MRI brain without contrast (50/17/2024) showed acute centrum semiovale nonhemorrhagic infarcts and chronic diffuse microangiopathic ischemia and atrophy. Internal medicine consulted for admission due to acute ischemic  "stroke.    Hospital Course with Pertinent Findings:      Patient admitted to internal medicine for management of acute ischemic stroke. NIHSS score was 0, but also limited by osteoarthritis in R shoulder and R knee. CTA H&N noted revealed occlusion of left proximal ICA at origin, with reconstitution intracranially.  He was allowed for permissive hypertension, and loaded with aspirin and plavix. Additionally, he was fluid resuscitated for his TINO. On admission, he also had hyperglycemia, and was found to have an A1C of 9. He was initiated on lantus 10U daily and aspart 3U TIDWM. BP regimen optimized for the time being, but may need further adjustment. He also had a mild leukocytosis, but remained afebrile, which was believed to be reactive to his acute infarct. Neurology consulted who recommended DAPT, 21 days of plavix and indefinite aspirin. Patient was reported to have chronic R shoulder and R knee pain due to arthritis. Additionally, he was evaluated by PT and recommended to go to inpatient rehab. Cardiac event monitor ordered, will be read by [facility] cardiology. Vitals stable upon discharge. Patient will need follow up with Interventional radiology for fine needle aspiration of thyroid.     Discharge physical exam:  Vitals  BP: 123/68  Temp: 98.6 °F (37 °C)  Temp Source: Oral  Pulse: 67  Resp: 16  SpO2: (!) 94 %  Height: 5' 1" (154.9 cm)  Weight: 63.5 kg (140 lb)    Physical Exam  Constitutional:       Appearance: Normal appearance.   HENT:      Mouth/Throat:      Mouth: Mucous membranes are moist.   Eyes:      Extraocular Movements: Extraocular movements intact.   Cardiovascular:      Rate and Rhythm: Normal rate and regular rhythm.      Pulses: Normal pulses.      Heart sounds: Normal heart sounds. No murmur heard.     No friction rub. No gallop.   Pulmonary:      Effort: Pulmonary effort is normal. No respiratory distress.      Breath sounds: Normal breath sounds. No stridor. No wheezing.   Abdominal:      " General: There is no distension.      Palpations: Abdomen is soft. There is no mass.      Tenderness: There is no abdominal tenderness.   Musculoskeletal:         General: Normal range of motion.      Right lower leg: No edema.      Left lower leg: No edema.   Skin:     General: Skin is warm and dry.      Capillary Refill: Capillary refill takes less than 2 seconds.   Neurological:      General: No focal deficit present.      Mental Status: He is alert. Mental status is at baseline.      Cranial Nerves: No cranial nerve deficit.      Sensory: No sensory deficit.      Motor: Weakness present.      Gait: Gait abnormal.           TIME SPENT ON DISCHARGE: 35 minutes    Discharge Medications:         Medication List        START taking these medications      atorvastatin 40 MG tablet  Commonly known as: LIPITOR  Take 1 tablet (40 mg total) by mouth every evening.     clopidogreL 75 mg tablet  Commonly known as: PLAVIX  Take 1 tablet (75 mg total) by mouth once daily. for 13 days  Start taking on: May 25, 2024     insulin aspart U-100 100 unit/mL injection  Commonly known as: NovoLOG  Inject 3 Units into the skin 3 (three) times daily.     insulin glargine U-100 (Lantus) 100 unit/mL injection  Inject 10 Units into the skin once daily.  Start taking on: May 25, 2024     lisinopriL 40 MG tablet  Commonly known as: PRINIVIL,ZESTRIL  Take 1 tablet (40 mg total) by mouth once daily.  Start taking on: May 25, 2024     NIFEdipine 90 MG (OSM) 24 hr tablet  Commonly known as: PROCARDIA-XL  Take 1 tablet (90 mg total) by mouth once daily.  Start taking on: May 25, 2024  Replaces: NIFEdipine 30 MG Tbsr            CHANGE how you take these medications      carvediloL 6.25 MG tablet  Commonly known as: COREG  Take 4 tablets (25 mg total) by mouth 2 (two) times daily.  What changed: how much to take            CONTINUE taking these medications      albuterol 2.5 mg /3 mL (0.083 %) nebulizer solution  Commonly known as: PROVENTIL  Take  3 mLs (2.5 mg total) by nebulization every 4 (four) hours as needed for Wheezing. Rescue     cyclobenzaprine 5 MG tablet  Commonly known as: FLEXERIL  Take 1 Tab Oral at Bedtime as needed for Muscle Spasm     traMADoL 50 mg tablet  Commonly known as: ULTRAM  Take 1 tablet (50 mg total) by mouth every 6 (six) hours as needed for Pain.            STOP taking these medications      NIFEdipine 30 MG Tbsr  Commonly known as: ADALAT CC  Replaced by: NIFEdipine 90 MG (OSM) 24 hr tablet               Where to Get Your Medications        These medications were sent to [place] [id number] [facility] [id number]      Phone: [phone]   clopidogreL 75 mg tablet       Information about where to get these medications is not yet available    Ask your nurse or doctor about these medications  atorvastatin 40 MG tablet  carvediloL 6.25 MG tablet  insulin aspart U-100 100 unit/mL injection  insulin glargine U-100 (Lantus) 100 unit/mL injection  lisinopriL 40 MG tablet  NIFEdipine 90 MG (OSM) 24 hr tablet         Discharge Instructions:         Suleiman Beck is being discharged Home or Self Care.    Discharge Procedure Orders   Ambulatory referral/consult to Interventional Radiology   Standing Status: Future   Referral Priority: Routine Referral Type: Consultation   Referral Reason: Specialty Services Required   Requested Specialty: Interventional Radiology   Number of Visits Requested: 1     Cardiac event monitor   Standing Status: Future Standing Exp. Date: 05/24/25     Order Specific Question Answer Comments   Physician to read study: CLARA STARK [[id number]]    Cardiac Event Monitor Auto Trigger    Release to patient Immediate         Follow-Up Appointments:   Follow-up Information       NATALY Winn Jr., MD. Schedule an appointment as soon as possible for a visit.    Specialty: Family Medicine  Contact information:  [address] [place] [address]  [id number]  [phone]               [facility]  Emergency Dept Follow up.    Specialty: Emergency Medicine  Contact information:  [address]  [facility] [address] [phone]                         Patient to follow up with PCP as scheduled.  Will give paperwork for cardiac event monitor    To address at follow-up:  Patient to follow up with PCP    At this time, Suleiman Beck is determined to have maximized benefits of IP hospitalization. he is discharged in stable condition with OP f/u recommendations and instructions. All questions answered, and family verbalized agreement with the POC. They were given return precautions prior to d/c including symptoms that should prompt return to ED or to call PCP. Total time spent of DC of 35 minutes.     Elpidio Armendariz DO  [facility] Internal Medicine, PGY-II

## 2024-05-23 NOTE — PT/OT/SLP PROGRESS
Physical Therapy Treatment    Patient Name:  Suleiman Beck   MRN:  [id number]    Recommendations     Therapy Intensity Recommendations at Discharge: High Intensity Therapy  Discharge Equipment Recommendations:  (To be determined by next level of care.)   Barriers to discharge: fall risk, severity of deficits, level of skilled assistance required, and decreased endurance    Assessment     Suleiman Beck is a 74 y.o. male admitted with a medical diagnosis of  Ischemic stroke.    He presents with the following impairments/functional limitations:  weakness, impaired endurance, impaired functional mobility, gait instability, impaired balance, decreased lower extremity function, decreased coordination.    Rehab Prognosis: Good.    Patient would benefit from continued skilled acute PT services to: address above listed impairments/functional limitations; receive patient/caregiver education; reduce fall risk; and maximize independency/safety with functional mobility.    Recent Surgery: * No surgery found *      Plan     During this hospitalization, patient to be seen 5 x/week to address the identified impairments/functional limitations via gait training, therapeutic activities, therapeutic exercises and progress toward the established goals.    Plan of Care Expires:  06/15/24    Subjective     Communicated with patient's nurse  prior to session.    Patient agreeable to participate in treatment session.    Chief Complaint: none  Patient/Family Comments/goals: family wants patient to return to PLOF  Pain/Comfort:  Pain Rating 1: 0/10    Objective     Patient found supine in bed and with HOB elevated with peripheral IV  upon PT entry to room.    General Precautions: Standard, fall   Orthopedic Precautions:    Braces:    Respiratory Status: room air    Functional Mobility:    Bed Mobility:  Seated in chair at start of session and returned to chair at end of session    Transfers:  Sit to Stand: minimum assistance and verbal cues for  hand placement with rolling walker    Gait:  Patient ambulated 130ft with rolling walker and minimum assistance and help to steer RW .  Patient demonstrates :       Veering toward the left       unsteady gait       decreased tam       decreased step length       ambulates outside HERNAN of RW       decreased foot/floor clearance       inconsistent right foot placement.    Other Mobility:  Therapeutic Activities performed:        -standing balance activities:              Pt required CGA for safety with standing at sink to perform grooming.     Patient left sitting in chair with all lines intact, call button in reach, tray table at bedside, patient' nurse notified, and OT present.    Goals     Multidisciplinary Problems       Physical Therapy Goals          Problem: Physical Therapy    Goal Priority Disciplines Outcome Goal Variances Interventions   Physical Therapy Goal     PT, PT/OT Progressing     Description: Goals to be met by: Discharge     Patient will increase functional independence with mobility by performin. Supine to sit with [place]  2. Sit to supine with [place]  3. Sit to stand transfer with [place]  4. Bed to chair transfer with [place] using Rolling Walker  5. Gait  x 130 feet with Modified [place] using Rolling Walker.                        Time Tracking     PT Received On: 24  PT Start Time: 931     PT Stop Time: 956  PT Total Time (min): 25 min     Billable Minutes: Gait Training -1; OT assist-1    2024

## 2024-05-24 VITALS
BODY MASS INDEX: 26.43 KG/M2 | RESPIRATION RATE: 16 BRPM | SYSTOLIC BLOOD PRESSURE: 123 MMHG | OXYGEN SATURATION: 94 % | TEMPERATURE: 99 F | HEIGHT: 61 IN | HEART RATE: 67 BPM | WEIGHT: 140 LBS | DIASTOLIC BLOOD PRESSURE: 68 MMHG

## 2024-05-24 LAB
ALBUMIN SERPL-MCNC: 2.9 G/DL (ref 3.4–4.8)
ALBUMIN/GLOB SERPL: 0.9 RATIO (ref 1.1–2)
ALP SERPL-CCNC: 58 UNIT/L (ref 40–150)
ALT SERPL-CCNC: 12 UNIT/L (ref 0–55)
ANION GAP SERPL CALC-SCNC: 9 MEQ/L
AST SERPL-CCNC: 11 UNIT/L (ref 5–34)
BASOPHILS # BLD AUTO: 0.03 X10(3)/MCL
BASOPHILS NFR BLD AUTO: 0.3 %
BILIRUB SERPL-MCNC: 0.4 MG/DL
BUN SERPL-MCNC: 36.1 MG/DL (ref 8.4–25.7)
CALCIUM SERPL-MCNC: 9.4 MG/DL (ref 8.8–10)
CHLORIDE SERPL-SCNC: 108 MMOL/L (ref 98–107)
CO2 SERPL-SCNC: 25 MMOL/L (ref 23–31)
CREAT SERPL-MCNC: 1.62 MG/DL (ref 0.73–1.18)
CREAT/UREA NIT SERPL: 22
EOSINOPHIL # BLD AUTO: 0.59 X10(3)/MCL (ref 0–0.9)
EOSINOPHIL NFR BLD AUTO: 5.8 %
ERYTHROCYTE [DISTWIDTH] IN BLOOD BY AUTOMATED COUNT: 13.2 % (ref 11.5–17)
GFR SERPLBLD CREATININE-BSD FMLA CKD-EPI: 45 ML/MIN/1.73/M2
GLOBULIN SER-MCNC: 3.1 GM/DL (ref 2.4–3.5)
GLUCOSE SERPL-MCNC: 112 MG/DL (ref 82–115)
HCT VFR BLD AUTO: 32.6 % (ref 42–52)
HGB BLD-MCNC: 10.7 G/DL (ref 14–18)
HOLD SPECIMEN: NORMAL
IMM GRANULOCYTES # BLD AUTO: 0.03 X10(3)/MCL (ref 0–0.04)
IMM GRANULOCYTES NFR BLD AUTO: 0.3 %
LYMPHOCYTES # BLD AUTO: 2.77 X10(3)/MCL (ref 0.6–4.6)
LYMPHOCYTES NFR BLD AUTO: 27.2 %
MAGNESIUM SERPL-MCNC: 2.1 MG/DL (ref 1.6–2.6)
MCH RBC QN AUTO: 29 PG (ref 27–31)
MCHC RBC AUTO-ENTMCNC: 32.8 G/DL (ref 33–36)
MCV RBC AUTO: 88.3 FL (ref 80–94)
MONOCYTES # BLD AUTO: 0.72 X10(3)/MCL (ref 0.1–1.3)
MONOCYTES NFR BLD AUTO: 7.1 %
NEUTROPHILS # BLD AUTO: 6.04 X10(3)/MCL (ref 2.1–9.2)
NEUTROPHILS NFR BLD AUTO: 59.3 %
NRBC BLD AUTO-RTO: 0 %
PHOSPHATE SERPL-MCNC: 3.5 MG/DL (ref 2.3–4.7)
PLATELET # BLD AUTO: 274 X10(3)/MCL (ref 130–400)
PMV BLD AUTO: 11.5 FL (ref 7.4–10.4)
POCT GLUCOSE: 216 MG/DL (ref 70–110)
POTASSIUM SERPL-SCNC: 3.9 MMOL/L (ref 3.5–5.1)
PROT SERPL-MCNC: 6 GM/DL (ref 5.8–7.6)
RBC # BLD AUTO: 3.69 X10(6)/MCL (ref 4.7–6.1)
SODIUM SERPL-SCNC: 142 MMOL/L (ref 136–145)
WBC # SPEC AUTO: 10.18 X10(3)/MCL (ref 4.5–11.5)

## 2024-05-24 PROCEDURE — 94761 N-INVAS EAR/PLS OXIMETRY MLT: CPT

## 2024-05-24 PROCEDURE — 97530 THERAPEUTIC ACTIVITIES: CPT

## 2024-05-24 PROCEDURE — 97535 SELF CARE MNGMENT TRAINING: CPT

## 2024-05-24 PROCEDURE — 25000003 PHARM REV CODE 250

## 2024-05-24 PROCEDURE — 83735 ASSAY OF MAGNESIUM: CPT

## 2024-05-24 PROCEDURE — 85025 COMPLETE CBC W/AUTO DIFF WBC: CPT

## 2024-05-24 PROCEDURE — 80053 COMPREHEN METABOLIC PANEL: CPT

## 2024-05-24 PROCEDURE — 36415 COLL VENOUS BLD VENIPUNCTURE: CPT

## 2024-05-24 PROCEDURE — 36415 COLL VENOUS BLD VENIPUNCTURE: CPT | Performed by: STUDENT IN AN ORGANIZED HEALTH CARE EDUCATION/TRAINING PROGRAM

## 2024-05-24 PROCEDURE — 93005 ELECTROCARDIOGRAM TRACING: CPT

## 2024-05-24 PROCEDURE — 97112 NEUROMUSCULAR REEDUCATION: CPT

## 2024-05-24 PROCEDURE — 97116 GAIT TRAINING THERAPY: CPT

## 2024-05-24 PROCEDURE — 84100 ASSAY OF PHOSPHORUS: CPT

## 2024-05-24 PROCEDURE — 63600175 PHARM REV CODE 636 W HCPCS

## 2024-05-24 RX ORDER — INSULIN ASPART 100 [IU]/ML
3 INJECTION, SOLUTION INTRAVENOUS; SUBCUTANEOUS 3 TIMES DAILY
Start: 2024-05-24 | End: 2025-05-24

## 2024-05-24 RX ORDER — HEPARIN SODIUM 5000 [USP'U]/ML
5000 INJECTION, SOLUTION INTRAVENOUS; SUBCUTANEOUS EVERY 12 HOURS
Status: DISCONTINUED | OUTPATIENT
Start: 2024-05-24 | End: 2024-05-24 | Stop reason: HOSPADM

## 2024-05-24 RX ORDER — CARVEDILOL 12.5 MG/1
25 TABLET ORAL 2 TIMES DAILY
Status: DISCONTINUED | OUTPATIENT
Start: 2024-05-24 | End: 2024-05-24

## 2024-05-24 RX ORDER — INSULIN GLARGINE 100 [IU]/ML
10 INJECTION, SOLUTION SUBCUTANEOUS DAILY
Start: 2024-05-25 | End: 2025-05-25

## 2024-05-24 RX ORDER — CARVEDILOL 6.25 MG/1
25 TABLET ORAL 2 TIMES DAILY
Start: 2024-05-24

## 2024-05-24 RX ORDER — LISINOPRIL 40 MG/1
40 TABLET ORAL DAILY
Start: 2024-05-25 | End: 2025-05-25

## 2024-05-24 RX ORDER — CARVEDILOL 12.5 MG/1
12.5 TABLET ORAL 2 TIMES DAILY
Status: DISCONTINUED | OUTPATIENT
Start: 2024-05-24 | End: 2024-05-24

## 2024-05-24 RX ORDER — NIFEDIPINE 90 MG/1
90 TABLET, EXTENDED RELEASE ORAL DAILY
Start: 2024-05-25 | End: 2025-05-25

## 2024-05-24 RX ORDER — ATORVASTATIN CALCIUM 40 MG/1
40 TABLET, FILM COATED ORAL NIGHTLY
Start: 2024-05-24 | End: 2025-05-24

## 2024-05-24 RX ORDER — CLOPIDOGREL BISULFATE 75 MG/1
75 TABLET ORAL DAILY
Qty: 13 TABLET | Refills: 0 | Status: SHIPPED | OUTPATIENT
Start: 2024-05-25 | End: 2024-06-07

## 2024-05-24 RX ADMIN — INSULIN ASPART 3 UNITS: 100 INJECTION, SOLUTION INTRAVENOUS; SUBCUTANEOUS at 01:05

## 2024-05-24 RX ADMIN — ACETAMINOPHEN 650 MG: 325 TABLET, FILM COATED ORAL at 09:05

## 2024-05-24 RX ADMIN — CLOPIDOGREL BISULFATE 75 MG: 75 TABLET ORAL at 08:05

## 2024-05-24 RX ADMIN — INSULIN GLARGINE 10 UNITS: 100 INJECTION, SOLUTION SUBCUTANEOUS at 08:05

## 2024-05-24 RX ADMIN — ASPIRIN 81 MG CHEWABLE TABLET 81 MG: 81 TABLET CHEWABLE at 08:05

## 2024-05-24 RX ADMIN — NIFEDIPINE 90 MG: 30 TABLET, FILM COATED, EXTENDED RELEASE ORAL at 08:05

## 2024-05-24 RX ADMIN — LISINOPRIL 40 MG: 10 TABLET ORAL at 08:05

## 2024-05-24 RX ADMIN — HEPARIN SODIUM 5000 UNITS: 5000 INJECTION, SOLUTION INTRAVENOUS; SUBCUTANEOUS at 01:05

## 2024-05-24 RX ADMIN — TRAMADOL HYDROCHLORIDE 50 MG: 50 TABLET, COATED ORAL at 05:05

## 2024-05-24 NOTE — DISCHARGE INSTRUCTIONS
Follow up with cardiac event monitor. Please see paperwork with discharge summary, call [phone] to set up cardiac event monitor.

## 2024-05-24 NOTE — CARE UPDATE
D/C to [facility] via w/c transport in stable condition. D/c tele and IV on LFA.

## 2024-05-24 NOTE — PT/OT/SLP PROGRESS
Physical Therapy Treatment    Patient Name:  Suleiman Beck   MRN:  [id number]    Recommendations     Therapy Intensity Recommendations at Discharge: High Intensity Therapy  Discharge Equipment Recommendations:  (To be determined by next level of care.)   Barriers to discharge: fall risk, level of skilled assistance required, decreased endurance, and decreased safety awareness    Assessment     Suleiman Beck is a 74 y.o. male admitted with a medical diagnosis of  Ischemic stroke.    He presents with the following impairments/functional limitations:  weakness, impaired endurance, impaired functional mobility, gait instability, impaired balance, decreased lower extremity function, decreased coordination.    Rehab Prognosis: Good.    Patient would benefit from continued skilled acute PT services to: address above listed impairments/functional limitations; receive patient/caregiver education; reduce fall risk; and maximize independency/safety with functional mobility.    Recent Surgery: * No surgery found *      Plan     During this hospitalization, patient to be seen 5 x/week to address the identified impairments/functional limitations via gait training, therapeutic activities, therapeutic exercises and progress toward the established goals.    Plan of Care Expires:  06/15/24    Subjective     Communicated with patient's nurse  prior to session.    Patient agreeable to participate in treatment session.    Chief Complaint: none  Patient/Family Comments/goals: to go home  Pain/Comfort:  Pain Rating 1: 0/10  Pain Rating Post-Intervention 1: 0/10    Objective     Patient found sitting in chair with chair check, peripheral IV  upon PT entry to room.    General Precautions: Standard, fall   Orthopedic Precautions:    Braces:    Respiratory Status: room air    Functional Mobility:    Bed Mobility:  Seated in chair at start of session and returned to chair at end of session    Transfers:  Sit to Stand: minimum assistance with rolling  walker and pt completed 6 trials total with verbal cues for forward weight shift.     Gait:  Patient ambulated 260ft with rolling walker and minimum assistance and verbal cues to stay within base of RW .  Patient demonstrates :       unsteady gait       decreased tam       decreased step length       wide base of support       ambulates outside HERNAN of RW       decreased foot/floor clearance       inconsistent right foot placement.    Other Mobility:  not assessed    Patient left sitting in chair with all lines intact, call button in reach, tray table at bedside, patient' nurse notified, and chair alarm on.    Goals     Multidisciplinary Problems       Physical Therapy Goals          Problem: Physical Therapy    Goal Priority Disciplines Outcome Goal Variances Interventions   Physical Therapy Goal     PT, PT/OT Progressing     Description: Goals to be met by: Discharge     Patient will increase functional independence with mobility by performin. Supine to sit with [place]  2. Sit to supine with [place]  3. Sit to stand transfer with [place]  4. Bed to chair transfer with Supervision using Rolling Walker  5. Gait  x 130 feet with Modified [place] using Rolling Walker.                        Time Tracking     PT Received On: 24  PT Start Time: 924     PT Stop Time: 950  PT Total Time (min): 26 min     Billable Minutes: Gait Training 26 mins    2024

## 2024-05-24 NOTE — CARE UPDATE
Report called to Nurse Givens at [facility]. She states facility transport will be picking him up.

## 2024-05-24 NOTE — PT/OT/SLP PROGRESS
Occupational Therapy   Treatment and Discharge Note    Name: Suleiman Beck  MRN: [id number]  Admitting Diagnosis:  Ischemic stroke       Recommendations:     Discharge Recommendations: High Intensity Therapy, DC to [facility] today per 's note  Discharge Equipment Recommendations:  to be determined by next level of care  Barriers to discharge:  None    Assessment:     Suleiman Beck is a 74 y.o. male with a medical diagnosis of Ischemic stroke.  He presents with cooperative and happy, with improved carryover noted for bed mobility and transfer training. Performance deficits affecting function are weakness, impaired sensation, impaired self care skills, impaired functional mobility, gait instability, impaired balance, visual deficits, decreased upper extremity function, decreased lower extremity function, decreased safety awareness, pain, abnormal tone, decreased ROM, impaired coordination, impaired fine motor.     Rehab Prognosis:  Good; patient would benefit from acute skilled OT services to address these deficits and reach maximum level of function.       Plan:     DC to [facility] today.    Subjective     Chief Complaint: R shoulder pain  Patient/Family Comments/goals: DC to rehab and ultimately back home with family at [facility]  Pain/Comfort:  Pain Rating 1: 6/10  Location - Side 1: Right  Location 1: shoulder  Pain Addressed 1: Reposition, Distraction, Nurse notified  Pain Rating Post-Intervention 1: 6/10    Objective:     Communicated with: nurse Mai prior to session.  Patient found HOB elevated with PureWick, telemetry, peripheral IV upon OT entry to room.    General Precautions: Standard, fall    Orthopedic Precautions:N/A  Braces: N/A  Respiratory Status: Room air     Occupational Performance:     Bed Mobility:    Patient completed Supine to Sit with minimum assistance and with improved carryover and ability to follow cues for using LLE to roll to R side, and increased  "strength/function for pushing himself up into sitting EOB (continues to require assist to manage RLE out of bed)      Functional Mobility/Transfers:  Patient completed Sit <> Stand Transfer with contact guard assistance  with  rolling walker and from EOB, then required tactile cues on next 2 attempts from recliner chair, to recall training to place LUE at armrest and RUE at RW for sit to stand.  Note improvement with carryover for "nose over toes" technique.   Patient completed Bed <> Chair Transfer using Step Transfer technique with minimum assistance with rolling walker and cues to turn completely square to chair with RW prior to descent  Functional Mobility: min A with RW, short distances in room    Activities of Daily Living:  Grooming: contact guard assistance standing at sink, with occasional cues for initiation of sequencing of tasks, and cuing to decrease spillage of water onto his gown and floor during brushing teeth  Upper Body Dressing: minimum assistance seated Eob to help manage threading RUE  Lower Body Dressing: minimum assistance seated EOB, for assist to bring each LE into figure 4 position       Treatment & Education:  Pt participated in alternating UE reaching while seated EOB, to facilitate improved postural control in unsupported sitting, dynamic balance, and motor control of RUE , with support at R elbow required for full forward reach to chest height d/t shoulder weakness.  Pt participated in neuromuscular reeducation seated at recliner chair to facilitate isolated motor control at R hand, gross motor control and strength throughout RUE.  Pt required extra time to complete all FM coordination activities, with significant difficulty isolating digital movement.    Patient left up in chair with all lines intact, call button in reach, chair alarm on, and nurse notified    GOALS:   Multidisciplinary Problems       Occupational Therapy Goals          Problem: Occupational Therapy    Goal Priority " Disciplines Outcome Interventions   Occupational Therapy Goal     OT, PT/OT Adequate for Care Transition    Description: Patient will perform feeding with min A using his R hand, while seated up at chair at bedside or EOB. Met 5/22 per nurse, pt able to feed himself using R hand with setup of all items but with noted spillage d/t R hand coordination deficits    Patient will perform grooming with min assist while seated EOB.  Met 5/23, upgrade goal to grooming with CGA while standing at sink.    Patient will perform toileting with mod assist using BSC.    Patient will perform toilet transfer with mod assist with use of RW.  Met 5/23, upgrade goal to min A transfer to BSC with RW.    Patient will perform upper body dressing with mod assist while seated EOB.     Patient will perform lower body dressing with max assist while seated EOB.                          Time Tracking:     OT Date of Treatment: 05/24/24  OT Start Time: 0844  OT Stop Time: 0923  OT Total Time (min): 39 min    Billable Minutes:Self Care/Home Management 15  Therapeutic Activity 10  Neuromuscular Re-education 14    OT/REIM: OT          5/24/2024

## 2024-05-24 NOTE — PLAN OF CARE
Problem: Adult Inpatient Plan of Care  Goal: Plan of Care Review  Outcome: Adequate for Care Transition  Goal: Patient-Specific Goal (Individualized)  Outcome: Adequate for Care Transition  Goal: Absence of Hospital-Acquired Illness or Injury  Outcome: Adequate for Care Transition  Goal: Optimal Comfort and Wellbeing  Outcome: Adequate for Care Transition  Goal: Readiness for Transition of Care  Outcome: Adequate for Care Transition     Problem: Diabetes Comorbidity  Goal: Blood Glucose Level Within Targeted Range  Outcome: Adequate for Care Transition     Problem: Skin Injury Risk Increased  Goal: Skin Health and Integrity  Outcome: Adequate for Care Transition     Problem: Fall Injury Risk  Goal: Absence of Fall and Fall-Related Injury  Outcome: Adequate for Care Transition     Problem: Pain Acute  Goal: Optimal Pain Control and Function  Outcome: Adequate for Care Transition     Problem: Infection  Goal: Absence of Infection Signs and Symptoms  Outcome: Adequate for Care Transition

## 2024-05-24 NOTE — PROGRESS NOTES
Pt has been approved for admission to [facility] by insurance. They can accept him today.

## 2024-05-27 ENCOUNTER — TELEPHONE (OUTPATIENT)
Dept: INTERVENTIONAL RADIOLOGY/VASCULAR | Facility: HOSPITAL | Age: 74
End: 2024-05-27

## 2024-05-27 LAB
OHS QRS DURATION: 142 MS
OHS QRS DURATION: 152 MS
OHS QTC CALCULATION: 466 MS
OHS QTC CALCULATION: 466 MS

## 2024-05-27 NOTE — PT/OT/SLP DISCHARGE
POST DISCHARGE DOCUMENTATION - 2024 3:05 PM    Physical Therapy Discharge Summary    Name: Suleiman Beck  MRN: [id number]   Principal Problem: Ischemic stroke   1. Cerebrovascular accident (CVA), unspecified mechanism    2. Pain    3. Weakness    4. Stroke    5. Chest pain    6. Diarrhea, unspecified type    7. Thyroid nodule    8. Hypertensive urgency    9. Tachycardia    10. Arrhythmia       Patient Active Problem List   Diagnosis    Ureteral obstruction    Type 2 diabetes mellitus    Hypertension    Ischemic stroke    Thyroid nodule      Recommendations - per last treatment session     Therapy Intensity Recommendations at Discharge: High Intensity Therapy  Discharge Equipment Recommendations:  (To be determined by next level of care.)     Assessment:     Refer to prior Physical Therapy note for last known functional status of patient.    Patient was unexpectedly discharged from hospital.  Refer to therapy's initial evaluation or last treatment note for patient's most recent functional status and goal achievement and therapists' recommendations.    Objective     GOALS:  Multidisciplinary Problems       Physical Therapy Goals          Problem: Physical Therapy    Goal Priority Disciplines Outcome Goal Variances Interventions   Physical Therapy Goal     PT, PT/OT Progressing     Description: Goals to be met by: Discharge     Patient will increase functional independence with mobility by performin. Supine to sit with [place]  2. Sit to supine with [place]  3. Sit to stand transfer with [place]  4. Bed to chair transfer with Supervision using Rolling Walker  5. Gait  x 130 feet with Modified [place] using Rolling Walker.                        Plan     Patient Discharged to: inpatient rehab facility per chart.    2024

## 2024-05-30 ENCOUNTER — TELEPHONE (OUTPATIENT)
Dept: INTERVENTIONAL RADIOLOGY/VASCULAR | Facility: HOSPITAL | Age: 74
End: 2024-05-30
Payer: MEDICAID

## 2024-05-30 NOTE — TELEPHONE ENCOUNTER
Called patient to schedule his IR FNA and his son informed me that his dad was in a rehab recovering from a stroke. He also stated that his father would be there for a couple of weeks to a month. I passed this information to Dr. Carey and said its not of an emergency and can be done once the patient is discharged from the rehab center. I called the patients son and informed him of my conversation with Dr. Carey. And the son agreed.

## 2024-06-12 ENCOUNTER — HOSPITAL ENCOUNTER (OUTPATIENT)
Dept: CARDIOLOGY | Facility: HOSPITAL | Age: 74
Discharge: HOME | End: 2024-06-12
Payer: MEDICAID

## 2024-06-12 DIAGNOSIS — I63.9 STROKE: ICD-10-CM

## 2024-06-12 DIAGNOSIS — I63.9 CEREBROVASCULAR ACCIDENT (CVA), UNSPECIFIED MECHANISM: ICD-10-CM

## 2024-06-12 PROCEDURE — 93271 ECG/MONITORING AND ANALYSIS: CPT

## 2024-08-19 PROBLEM — I63.9 ISCHEMIC STROKE: Status: RESOLVED | Noted: 2024-05-17 | Resolved: 2024-08-19

## 2024-10-29 PROBLEM — M19.011 PRIMARY OSTEOARTHRITIS OF RIGHT SHOULDER: Status: ACTIVE | Noted: 2024-10-29

## 2024-12-12 ENCOUNTER — HOSPITAL ENCOUNTER (EMERGENCY)
Facility: HOSPITAL | Age: 74
Discharge: ANOTHER HOSPITAL | End: 2024-12-12
Attending: EMERGENCY MEDICINE
Payer: MEDICAID

## 2024-12-12 ENCOUNTER — HOSPITAL ENCOUNTER (EMERGENCY)
Facility: HOSPITAL | Age: 74
Discharge: HOME | End: 2024-12-12
Attending: EMERGENCY MEDICINE
Payer: MEDICAID

## 2024-12-12 VITALS
HEIGHT: 60 IN | OXYGEN SATURATION: 95 % | DIASTOLIC BLOOD PRESSURE: 80 MMHG | BODY MASS INDEX: 24.76 KG/M2 | WEIGHT: 126.13 LBS | SYSTOLIC BLOOD PRESSURE: 139 MMHG | RESPIRATION RATE: 20 BRPM | TEMPERATURE: 98 F | HEART RATE: 67 BPM

## 2024-12-12 VITALS
TEMPERATURE: 98 F | BODY MASS INDEX: 23.56 KG/M2 | OXYGEN SATURATION: 97 % | DIASTOLIC BLOOD PRESSURE: 101 MMHG | HEART RATE: 67 BPM | HEIGHT: 60 IN | SYSTOLIC BLOOD PRESSURE: 147 MMHG | RESPIRATION RATE: 21 BRPM | WEIGHT: 120 LBS

## 2024-12-12 DIAGNOSIS — M25.532 ACUTE PAIN OF LEFT WRIST: ICD-10-CM

## 2024-12-12 DIAGNOSIS — S06.5XAA SUBDURAL HEMATOMA: Primary | ICD-10-CM

## 2024-12-12 DIAGNOSIS — S09.90XA CLOSED HEAD INJURY, INITIAL ENCOUNTER: Primary | ICD-10-CM

## 2024-12-12 DIAGNOSIS — R52 PAIN: ICD-10-CM

## 2024-12-12 LAB
ALBUMIN SERPL-MCNC: 3.6 G/DL (ref 3.4–4.8)
ALBUMIN/GLOB SERPL: 1 RATIO (ref 1.1–2)
ALP SERPL-CCNC: 62 UNIT/L (ref 40–150)
ALT SERPL-CCNC: 8 UNIT/L (ref 0–55)
ANION GAP SERPL CALC-SCNC: 6 MEQ/L
APTT PPP: 30.4 SECONDS (ref 23.2–33.7)
AST SERPL-CCNC: 15 UNIT/L (ref 5–34)
BASOPHILS # BLD AUTO: 0.04 X10(3)/MCL
BASOPHILS NFR BLD AUTO: 0.4 %
BILIRUB SERPL-MCNC: 0.2 MG/DL
BUN SERPL-MCNC: 53.9 MG/DL (ref 8.4–25.7)
CALCIUM SERPL-MCNC: 9.2 MG/DL (ref 8.8–10)
CHLORIDE SERPL-SCNC: 116 MMOL/L (ref 98–107)
CO2 SERPL-SCNC: 17 MMOL/L (ref 23–31)
CREAT SERPL-MCNC: 1.92 MG/DL (ref 0.72–1.25)
CREAT/UREA NIT SERPL: 28
EOSINOPHIL # BLD AUTO: 0.35 X10(3)/MCL (ref 0–0.9)
EOSINOPHIL NFR BLD AUTO: 3.2 %
ERYTHROCYTE [DISTWIDTH] IN BLOOD BY AUTOMATED COUNT: 15 % (ref 11.5–17)
GFR SERPLBLD CREATININE-BSD FMLA CKD-EPI: 36 ML/MIN/1.73/M2
GLOBULIN SER-MCNC: 3.5 GM/DL (ref 2.4–3.5)
GLUCOSE SERPL-MCNC: 140 MG/DL (ref 82–115)
HCT VFR BLD AUTO: 30.2 % (ref 42–52)
HGB BLD-MCNC: 9.4 G/DL (ref 14–18)
IMM GRANULOCYTES # BLD AUTO: 0.02 X10(3)/MCL (ref 0–0.04)
IMM GRANULOCYTES NFR BLD AUTO: 0.2 %
INR PPP: 1.1
LYMPHOCYTES # BLD AUTO: 2.8 X10(3)/MCL (ref 0.6–4.6)
LYMPHOCYTES NFR BLD AUTO: 25.6 %
MCH RBC QN AUTO: 27.9 PG (ref 27–31)
MCHC RBC AUTO-ENTMCNC: 31.1 G/DL (ref 33–36)
MCV RBC AUTO: 89.6 FL (ref 80–94)
MONOCYTES # BLD AUTO: 0.81 X10(3)/MCL (ref 0.1–1.3)
MONOCYTES NFR BLD AUTO: 7.4 %
NEUTROPHILS # BLD AUTO: 6.91 X10(3)/MCL (ref 2.1–9.2)
NEUTROPHILS NFR BLD AUTO: 63.2 %
NRBC BLD AUTO-RTO: 0 %
PLATELET # BLD AUTO: 225 X10(3)/MCL (ref 130–400)
PMV BLD AUTO: 10.6 FL (ref 7.4–10.4)
POCT GLUCOSE: 141 MG/DL (ref 70–110)
POTASSIUM SERPL-SCNC: 5.3 MMOL/L (ref 3.5–5.1)
PROT SERPL-MCNC: 7.1 GM/DL (ref 5.8–7.6)
PROTHROMBIN TIME: 14.4 SECONDS (ref 12.5–14.5)
RBC # BLD AUTO: 3.37 X10(6)/MCL (ref 4.7–6.1)
SODIUM SERPL-SCNC: 139 MMOL/L (ref 136–145)
WBC # BLD AUTO: 10.93 X10(3)/MCL (ref 4.5–11.5)

## 2024-12-12 PROCEDURE — 80053 COMPREHEN METABOLIC PANEL: CPT | Performed by: EMERGENCY MEDICINE

## 2024-12-12 PROCEDURE — 85730 THROMBOPLASTIN TIME PARTIAL: CPT | Performed by: EMERGENCY MEDICINE

## 2024-12-12 PROCEDURE — 25000003 PHARM REV CODE 250: Performed by: EMERGENCY MEDICINE

## 2024-12-12 PROCEDURE — 99285 EMERGENCY DEPT VISIT HI MDM: CPT | Mod: 25

## 2024-12-12 PROCEDURE — 99284 EMERGENCY DEPT VISIT MOD MDM: CPT | Mod: 25,27

## 2024-12-12 PROCEDURE — 82962 GLUCOSE BLOOD TEST: CPT

## 2024-12-12 PROCEDURE — 96365 THER/PROPH/DIAG IV INF INIT: CPT

## 2024-12-12 PROCEDURE — 85610 PROTHROMBIN TIME: CPT | Performed by: EMERGENCY MEDICINE

## 2024-12-12 PROCEDURE — 85025 COMPLETE CBC W/AUTO DIFF WBC: CPT | Performed by: EMERGENCY MEDICINE

## 2024-12-12 PROCEDURE — 96375 TX/PRO/DX INJ NEW DRUG ADDON: CPT

## 2024-12-12 PROCEDURE — 63600175 PHARM REV CODE 636 W HCPCS: Performed by: EMERGENCY MEDICINE

## 2024-12-12 RX ORDER — LABETALOL HCL 20 MG/4 ML
20 SYRINGE (ML) INTRAVENOUS
Status: DISCONTINUED | OUTPATIENT
Start: 2024-12-12 | End: 2024-12-12

## 2024-12-12 RX ORDER — NICARDIPINE HYDROCHLORIDE 0.2 MG/ML
0-15 INJECTION INTRAVENOUS CONTINUOUS
Status: DISCONTINUED | OUTPATIENT
Start: 2024-12-12 | End: 2024-12-12 | Stop reason: HOSPADM

## 2024-12-12 RX ORDER — LABETALOL HCL 20 MG/4 ML
10 SYRINGE (ML) INTRAVENOUS
Status: COMPLETED | OUTPATIENT
Start: 2024-12-12 | End: 2024-12-12

## 2024-12-12 RX ORDER — ACETAMINOPHEN 325 MG/1
650 TABLET ORAL
Status: COMPLETED | OUTPATIENT
Start: 2024-12-12 | End: 2024-12-12

## 2024-12-12 RX ORDER — LEVETIRACETAM 10 MG/ML
1000 INJECTION INTRAVASCULAR
Status: COMPLETED | OUTPATIENT
Start: 2024-12-12 | End: 2024-12-12

## 2024-12-12 RX ADMIN — LABETALOL HYDROCHLORIDE 10 MG: 5 INJECTION, SOLUTION INTRAVENOUS at 12:12

## 2024-12-12 RX ADMIN — LEVETIRACETAM INJECTION 1000 MG: 10 INJECTION INTRAVENOUS at 12:12

## 2024-12-12 RX ADMIN — ACETAMINOPHEN 650 MG: 325 TABLET, FILM COATED ORAL at 11:12

## 2024-12-12 RX ADMIN — NICARDIPINE HYDROCHLORIDE 5 MG/HR: 0.2 INJECTION, SOLUTION INTRAVENOUS at 01:12

## 2024-12-12 NOTE — ED PROVIDER NOTES
Encounter Date: 12/12/2024    SCRIBE #1 NOTE: I, Aguilar Posada, am scribing for, and in the presence of,  Sarah Li MD. I have scribed the following portions of the note - Other sections scribed: HPI, ROS, PE.       History     Chief Complaint   Patient presents with    Fall     [facility] tx for neuro services. Possible SDH d/t fall     Patient is a 74 y.o. male with a PMHx of DM, HTN, and CVA presenting to the ED as a transfer from [facility] for neurosurgical services after a fall. Patient's son is present and translates for him per patient's request ( service offered). Per patient's son, the patient was wearing slippers, while standing up last night around 20:15, he slipped on his silk sheets causing him to fall and hit his head against the night stand. He states the patient initially complained of pain in his left hand so the son put icy hot on it and gave him tylenol. In the morning, he reports the patient had swelling and pain in his left hand so he brought him to [facility] to be evaluated. He adds that the patient did not complain of any pain in his head, nausea, or blurry vision this morning, and the patient's son is adamant that the patient is not acting different in any way since the fall. The patient is on plavix and does not have any complaints currently.   He was evaluated at [facility] with CT of the head considering minor head trauma on Plavix.  There was concern for a possible subdural hematoma, therefore the patient was sent here for evaluation.  Patient still has no complaints at this time other than left hand pain with swelling.  X-rays were performed at the outside hospital and negative for fracture.    The history is provided by the patient. The history is limited by a language barrier. A  was used.     Review of patient's allergies indicates:   Allergen Reactions    Opioids - morphine analogues     Dilaudid [hydromorphone] Anxiety     Past Medical History:   Diagnosis Date     Arthritis     Diabetes mellitus     Essential (primary) hypertension     Unspecified chronic bronchitis      Past Surgical History:   Procedure Laterality Date    CYSTOSCOPY W/ URETERAL STENT PLACEMENT Left 2022    Procedure: CYSTOSCOPY, WITH URETERAL STENT INSERTION;  Surgeon: Mar Fernandez MD;  Location: [facility];  Service: Urology;  Laterality: Left;    WRIST SURGERY Left      No family history on file.  Social History     Tobacco Use    Smoking status: Former     Current packs/day: 0.00     Types: Cigarettes     Quit date:      Years since quittin.9    Smokeless tobacco: Never   Substance Use Topics    Alcohol use: Not Currently    Drug use: Never     Review of Systems   Constitutional:  Negative for fever.   HENT:  Negative for nosebleeds.    Eyes:  Negative for photophobia and visual disturbance.   Respiratory:  Negative for cough and shortness of breath.    Cardiovascular:  Negative for chest pain.   Gastrointestinal:  Negative for abdominal pain, nausea and vomiting.   Genitourinary:  Negative for difficulty urinating.   Musculoskeletal:  Positive for arthralgias.        Left hand/wrist pain   Skin:  Negative for rash.   Neurological:  Negative for dizziness, syncope, weakness, light-headedness, numbness and headaches.   All other systems reviewed and are negative.      Physical Exam     Initial Vitals [24 1501]   BP Pulse Resp Temp SpO2   (!) 130/54 64 18 98 °F (36.7 °C) 100 %      MAP       --         Physical Exam    Nursing note and vitals reviewed.  Constitutional: He appears well-developed and well-nourished. No distress.   HENT:   Head: Normocephalic and atraumatic. Mouth/Throat: Oropharynx is clear and moist.   Eyes: Conjunctivae and EOM are normal. Pupils are equal, round, and reactive to light.   Neck: Neck supple.   Normal range of motion.  Cardiovascular:  Normal rate and regular rhythm.           Pulses:       Radial pulses are 2+ on the right side and 2+ on the  left side.        Dorsalis pedis pulses are 2+ on the right side and 2+ on the left side.   Pulmonary/Chest: Breath sounds normal. No respiratory distress.   Abdominal: Abdomen is soft. Bowel sounds are normal. There is no abdominal tenderness.   Musculoskeletal:         General: Normal range of motion.      Cervical back: Normal range of motion and neck supple.      Lumbar back: Normal.      Comments: No C/T/L spinal tenderness, step offs, or deformities   Dorsum of left hand and wrist tender and swollen with no erythema or obvious deformity  Pelvis stable, nontender     Neurological: He is alert. He has normal strength. No cranial nerve deficit or sensory deficit.   Generally weak, but strength is intact and symmetric x 4 extremities.  Per son, patient is answering all questions appropriately and at his baseline mental status.   Skin: Skin is warm, dry and intact. Capillary refill takes less than 2 seconds.   Psychiatric: He has a normal mood and affect.         ED Course   Procedures  Labs Reviewed   COMPREHENSIVE METABOLIC PANEL - Abnormal       Result Value    Sodium 139      Potassium 5.3 (*)     Chloride 116 (*)     CO2 17 (*)     Glucose 140 (*)     Blood Urea Nitrogen 53.9 (*)     Creatinine 1.92 (*)     Calcium 9.2      Protein Total 7.1      Albumin 3.6      Globulin 3.5      Albumin/Globulin Ratio 1.0 (*)     Bilirubin Total 0.2      ALP 62      ALT 8      AST 15      eGFR 36      Anion Gap 6.0      BUN/Creatinine Ratio 28     CBC WITH DIFFERENTIAL - Abnormal    WBC 10.93      RBC 3.37 (*)     Hgb 9.4 (*)     Hct 30.2 (*)     MCV 89.6      MCH 27.9      MCHC 31.1 (*)     RDW 15.0      Platelet 225      MPV 10.6 (*)     Neut % 63.2      Lymph % 25.6      Mono % 7.4      Eos % 3.2      Basophil % 0.4      Lymph # 2.80      Neut # 6.91      Mono # 0.81      Eos # 0.35      Baso # 0.04      IG# 0.02      IG% 0.2      NRBC% 0.0     POCT GLUCOSE - Abnormal    POCT Glucose 141 (*)    APTT - Normal    PTT 30.4      PROTIME-INR - Normal    PT 14.4      INR 1.1     CBC W/ AUTO DIFFERENTIAL    Narrative:     The following orders were created for panel order CBC Auto Differential.  Procedure                               Abnormality         Status                     ---------                               -----------         ------                     CBC with Differential[[id number]]       Abnormal            Final result                 Please view results for these tests on the individual orders.          Imaging Results              X-Ray Wrist Complete Left (Final result)  Result time 12/12/24 17:28:03      Final result by Navi Dorado MD (12/12/24 17:28:03)                   Impression:      No acute findings.      Electronically signed by: Navi Dorado  Date:    12/12/2024  Time:    17:28               Narrative:    EXAMINATION:  XR WRIST COMPLETE 3 VIEWS LEFT    CLINICAL HISTORY:  Pain, unspecified    COMPARISON:  None    FINDINGS:  Three views of the left wrist.  No fracture or dislocation is evident.  There are degenerative changes with prominent positive ulnar variance.  There are vascular calcifications.                                    X-Rays:   Independently Interpreted Readings:   Other Readings:  X-ray left wrist without acute fracture/dislocation on my review      Medications - No data to display    Medical Decision Making  74-year-old male sent in transfer for neurosurgical evaluation after a fall last night on Plavix.  CT scan concerning for a possible subdural hematoma.  On my review, this is not appear to be acute hemorrhage but rather blood in a venous sinus.  Labs ordered, x-ray of the wrist will be added to assess for any occult fracture there, and I will consult Neurosurgery for review of imaging and further recommendations.    Differential diagnosis includes but is not limited to ICH, fracture, and others.    Problems Addressed:  Acute pain of left wrist: acute illness or injury  Closed head  injury, initial encounter: acute illness or injury    Amount and/or Complexity of Data Reviewed  Labs: ordered. Decision-making details documented in ED Course.  Radiology: ordered and independent interpretation performed. Decision-making details documented in ED Course.    Risk  OTC drugs.  Prescription drug management.            Scribe Attestation:   Scribe #1: I performed the above scribed service and the documentation accurately describes the services I performed. I attest to the accuracy of the note.    Attending Attestation:           Physician Attestation for Scribe:  Physician Attestation Statement for Scribe #1: I, Sarah Li MD, reviewed documentation, as scribed by Aguilar Posada in my presence, and it is both accurate and complete.             ED Course as of 12/14/24 0811   Thu Dec 12, 2024   1657 [facility] has evaluated the patient and reviewed the imaging. OK to discharge the patient from their standpoint. Follow-up in clinic in 2 weeks with repeat CT head. Son is aware of the plan of care. Return precautions discussed with the son at bedside.  Waiting x-ray of the left wrist.  Notably his labs were obtained, bicarb is 17.  It is typically around 20.  He has no complaints at this time.  He has been advised to hydrate.  Follow up with his primary care provider regarding repeat lab evaluation.  Labs otherwise at baseline. [RB]   1748 Wrist xray negative for fracture.  [RB]      ED Course User Index  [RB] Sarah Li MD                             Clinical Impression:  Final diagnoses:  [S09.90XA] Closed head injury, initial encounter (Primary)  [R52] Pain  [M25.532] Acute pain of left wrist          ED Disposition Condition    Discharge Stable          ED Prescriptions    None       Follow-up Information       Follow up With Specialties Details Why Contact Info    [facility] - Emergency Dept Emergency Medicine  As needed, If symptoms worsen [address] [facility]  [address]  [phone]    Halie Caldwell MD Neurosurgery Schedule an appointment as soon as possible for a visit in 2 weeks You will have an appointment with Dr. Caldwell in 2 weeks [address] [place] [address] [phone]      NATALY Winn Jr., MD Family Medicine Schedule an appointment as soon as possible for a visit  follow-up next week for reevaluation [address] [place], [place] [address]  [facility] [id number]  [phone]               Sarah Li MD  12/14/24 0809       Sarah Li MD  12/14/24 0811

## 2024-12-12 NOTE — CONSULTS
[facility] - Emergency Dept  Neurosurgery  Consult Note    Inpatient consult to Neurosurgery  Consult performed by: Queenie Almaraz, AGACNP-BC  Consult ordered by: Sarah Li MD        Subjective:     Chief Complaint/Reason for Admission:  Fell last night around 8:00 p.m..  Lives with son.  Struck head.    History of Present Illness:  This is a 74-year-old male from [place] that is speaks a mix of [place]- is son who is primary caregiver who presented after he had a fall striking his head yesterday evening around 8:00 p.m..  No LOC. He used his wrist to help break his fall.  His son is his primary caregiver in his very knowledgeable of all the medications the patient takes.  Son states that patient had a previous CVA and has a baseline GCS of 14 with some confusion.  Also he states that the patient is not on Plavix.    CT head on 12/12/2024:  Thin Eugene density over the right occipital lobe measuring 3 mm favored to reflect transverse venous sinus but in the setting of trauma can not rule out trace subdural hematoma.    On physical exam the son is at the bedside and assist with translation due to use of mixed language.  Patient answered all orientation correctly according to son.  He does track me and he does say yes and no.  He does follow commands.  Does not have a headache or visual disturbances.  He has a baseline GCS of 14.  He is moving all extremities.  He appears to be at his baseline.  His son states he does not take Plavix and has a history of  CVA back in May.    (Not in a hospital admission)      Review of patient's allergies indicates:   Allergen Reactions    Opioids - morphine analogues     Dilaudid [hydromorphone] Anxiety       Past Medical History:   Diagnosis Date    Arthritis     Diabetes mellitus     Essential (primary) hypertension     Unspecified chronic bronchitis      Past Surgical History:   Procedure Laterality Date    CYSTOSCOPY W/ URETERAL STENT  PLACEMENT Left 2022    Procedure: CYSTOSCOPY, WITH URETERAL STENT INSERTION;  Surgeon: Mar Fernandez MD;  Location: [facility];  Service: Urology;  Laterality: Left;    WRIST SURGERY Left      Family History    None       Tobacco Use    Smoking status: Former     Current packs/day: 0.00     Types: Cigarettes     Quit date:      Years since quittin.9    Smokeless tobacco: Never   Substance and Sexual Activity    Alcohol use: Not Currently    Drug use: Never    Sexual activity: Not on file     Review of Systems   All other systems reviewed and are negative.    Objective:     Weight: 54.4 kg (120 lb)  Body mass index is 23.44 kg/m².  Vital Signs (Most Recent):  Temp: 98 °F (36.7 °C) (24 1501)  Pulse: (!) 59 (24 1601)  Resp: 17 (24 1601)  BP: 134/79 (24 1601)  SpO2: 99 % (24 1601) Vital Signs (24h Range):  Temp:  [98 °F (36.7 °C)-98.1 °F (36.7 °C)] 98 °F (36.7 °C)  Pulse:  [58-77] 59  Resp:  [14-23] 17  SpO2:  [95 %-100 %] 99 %  BP: (123-173)/() 134/79                              Physical Exam:  Nursing note and vitals reviewed.    Constitutional: He appears well-developed and well-nourished. He is not diaphoretic.     Eyes: Pupils are equal, round, and reactive to light. Conjunctivae and EOM are normal.     Cardiovascular: Normal rate.     Abdominal: Soft. Bowel sounds are normal.     Skin: Skin displays no rash on trunk. Skin displays no lesions on trunk.     Psych/Behavior: He is alert. He is oriented to person, place, and time. He has a normal mood and affect.     Musculoskeletal:        Right Upper Extremities: Muscle strength is 4/5.        Left Upper Extremities: Range of motion is limited. Muscle strength is 4/5.       Right Lower Extremities: Muscle strength is 4/5.        Left Lower Extremities: Muscle strength is 4/5.     Neurological:        Sensory: There is no sensory deficit in the trunk. There is no sensory deficit in the extremities.         DTRs: He displays no Babinski's sign on the right side. He displays no Babinski's sign on the left side.        Cranial nerves: Cranial nerve(s) II, III, IV, V, VI, VII, VIII, IX, X, XI and XII are intact.   GCS 14 which is patient's baseline   According to son answers all questions correctly   He follows simple commands for me nods yes and no   Moving all extremities   No pronator drift   Speech is at baseline   No facial droop   No headache   No visual disturbances         Significant Labs:  Recent Labs   Lab 12/12/24  1550      K 5.3*   *   CO2 17*   BUN 53.9*   CREATININE 1.92*   CALCIUM 9.2     Recent Labs   Lab 12/12/24  1550   WBC 10.93   HGB 9.4*   HCT 30.2*        Recent Labs   Lab 12/12/24  1550   INR 1.1   APTT 30.4     Microbiology Results (last 7 days)       ** No results found for the last 168 hours. **              Assessment/Plan:    Okay from our standpoint for patient to follow up in clinic in 2 weeks with CT head without contrast.  Spoke to patient and son about safety.    Son agrees to monitor patient closely and seek out treatment should the patient worsen.    No further workup from neurosurgery indicated.     There are no hospital problems to display for this patient.      Thank you for your consult. I will follow-up with patient. Please contact us if you have any additional questions.    Queenie Almaraz, [facility]-BC  Neurosurgery  [facility] - Emergency Dept

## 2024-12-12 NOTE — ED PROVIDER NOTES
ED PROVIDER NOTE  2024    CHIEF COMPLAINT:   Chief Complaint   Patient presents with    Fall     Slipped while sitting on edge of the bed yesterday. Hit head, no loc. +left hand and wrist pain with swelling. On plavix for previous stroke.        HISTORY OF PRESENT ILLNESS:   Suleiman Beck is a 74 y.o. male who presents with chief complaint Fall.  Onset was yesterday whenever he slipped off the bed and fell striking his head, denies any loss of consciousness.  He does report having some pain to his left wrist aggravated with movement and improved with rest.  Denies any numbness or tingling in extremity.  He has right hand dominance.  He reports breaking his left wrist in the past and having deformity since the previous fracture.  Denies neck pain or back pain.    The history is provided by the patient. The history is limited by a language barrier. A  was used.         REVIEW OF SYSTEMS: as noted in the HPI.  NURSING NOTES REVIEWED      PAST MEDICAL/SURGICAL HISTORY:   Past Medical History:   Diagnosis Date    Arthritis     Diabetes mellitus     Essential (primary) hypertension     Unspecified chronic bronchitis       Past Surgical History:   Procedure Laterality Date    CYSTOSCOPY W/ URETERAL STENT PLACEMENT Left 2022    Procedure: CYSTOSCOPY, WITH URETERAL STENT INSERTION;  Surgeon: Mar Fernandez MD;  Location: [facility];  Service: Urology;  Laterality: Left;    WRIST SURGERY Left        FAMILY HISTORY: No family history on file.    SOCIAL HISTORY:   Social History     Tobacco Use    Smoking status: Former     Current packs/day: 0.00     Types: Cigarettes     Quit date:      Years since quittin.9    Smokeless tobacco: Never   Substance Use Topics    Alcohol use: Not Currently    Drug use: Never       ALLERGIES:   Review of patient's allergies indicates:   Allergen Reactions    Opioids - morphine analogues     Dilaudid [hydromorphone] Anxiety       PHYSICAL  EXAM:  Initial Vitals [12/12/24 1008]   BP Pulse Resp Temp SpO2   (!) 160/69 77 20 98.1 °F (36.7 °C) 98 %      MAP       --         Physical Exam    Nursing note and vitals reviewed.  Constitutional: He appears well-developed and well-nourished. No distress.   HENT:   Nose: Nose normal. Mouth/Throat: Oropharynx is clear and moist and mucous membranes are normal.   Eyes: Conjunctivae and EOM are normal. Pupils are equal, round, and reactive to light.   Neck: Neck supple. No tracheal deviation present.   Cardiovascular:  Normal rate, regular rhythm, normal heart sounds, intact distal pulses and normal pulses.           Pulmonary/Chest: Effort normal and breath sounds normal. No respiratory distress.   Abdominal: Abdomen is soft. There is no abdominal tenderness. There is no rebound and no guarding.   Musculoskeletal:      Left wrist: Deformity and tenderness present. No crepitus. Decreased range of motion.      Cervical back: Neck supple.     Neurological: He is alert and oriented to person, place, and time. GCS eye subscore is 4. GCS verbal subscore is 4. GCS motor subscore is 6.   CN II-XII intact. Moves all extremities. No gross sensory or motor deficits.   Skin: Skin is warm and dry.   Small abrasion with scab to left forehead   Psychiatric: He has a normal mood and affect. His speech is normal and behavior is normal. Judgment and thought content normal. Cognition and memory are normal.         RESULTS:  Labs Reviewed - No data to display  Imaging Results              CT Cervical Spine Without Contrast (Final result)  Result time 12/12/24 11:08:05      Final result by Rashawn Isaacs MD (12/12/24 11:08:05)                   Impression:      No CT evidence of acute cervical spine injury      Electronically signed by: Rashawn Isaacs MD  Date:    12/12/2024  Time:    11:08               Narrative:    EXAMINATION:  CT CERVICAL SPINE WITHOUT CONTRAST    CLINICAL HISTORY:  Trauma    TECHNIQUE:  Axial CT images were  obtained through the cervical spine without contrast.    Coronal and sagittal reconstructions submitted and interpreted.  Total .  Automated exposure control utilized.    COMPARISON:  None    FINDINGS:  No acute fracture or traumatic subluxation.  Straightening of the normal cervical lordosis is likely positional and/or degenerative.  Multilevel disc space narrowing and facet hypertrophy is present.    Prevertebral soft tissues are normal.  Lung apices are clear.                                       CT Head Without Contrast (Edited Result - FINAL)  Result time 12/12/24 11:27:16      Addendum (preliminary) 1 of 1 by Jeremie Sorenson MD (12/12/24 11:27:16)      There is a thin crescentic density over the right occipital lobe measuring approximately 3 mm best visualized on series 2, images 30 and 31.  This is favored to reflect the transverse venous sinus, however in the setting of trauma a trace subdural hematoma cannot be entirely excluded.  MRI would be beneficial if there is clinical concern for intracranial hemorrhage.      Electronically signed by: Jeremie Sorenson MD  Date:    12/12/2024  Time:    11:27                 Final result by Jeremie Sorenson MD (12/12/24 11:06:53)                   Impression:      No acute intracranial abnormality.      Electronically signed by: Jeremie Sorenson MD  Date:    12/12/2024  Time:    11:06               Narrative:    EXAMINATION:  CT HEAD WITHOUT CONTRAST    CLINICAL HISTORY:  Head trauma, minor (Age >= 65y);    TECHNIQUE:  Axial images of the head were obtained without IV contrast administration.  Coronal and sagittal reconstructions were provided.  Three dimensional and MIP images were obtained and evaluated.  Total DLP was 1066.73 mGy-cm. Dose lowering technique and automated exposure control were utilized for this exam.    COMPARISON:  MRI of the brain 05/17/2024.    FINDINGS:  There is normal brain formation.  There is normal gray-white matter differentiation.  There is a small  area of chronic encephalomalacia in the left frontal high convexity corresponding to the acute infarction seen on the prior study.  There is no hemorrhage, hydrocephalus, or midline shift.  There is no cytotoxic or vasogenic edema.  There is no intra or extra-axial fluid collection.  There is no herniation.    The calvarium is intact.  There is no fracture.  The bilateral orbits are normal.  The paranasal sinuses and mastoid air cells are normally developed and free of disease.                                       X-Ray Hand 3 view Left (Final result)  Result time 12/12/24 11:05:23      Final result by Rashawn Isaacs MD (12/12/24 11:05:23)                   Impression:      Degenerative changes without acute osseous findings      Electronically signed by: Rashawn Isaacs MD  Date:    12/12/2024  Time:    11:05               Narrative:    EXAMINATION:  Four views left hand    CLINICAL HISTORY:  Pain    COMPARISON:  None    FINDINGS:  No displaced fracture or dislocation.  Bones are demineralized.  Interphalangeal joint space narrowing is present throughout the left hand without periosteal formation or erosive osseous changes.                                      PROCEDURES:  Critical Care    Date/Time: 12/12/2024 11:43 AM    Performed by: Fady Swift DO  Authorized by: Fady Swift DO  Direct patient critical care time: 20 minutes  Ordering / reviewing critical care time: 10 minutes  Documentation critical care time: 5 minutes  Consulting other physicians critical care time: 2 minutes  Total critical care time (exclusive of procedural time) : 37 minutes  Critical care time was exclusive of separately billable procedures and treating other patients.  Critical care was necessary to treat or prevent imminent or life-threatening deterioration of the following conditions: CNS failure or compromise.  Critical care was time spent personally by me on the following activities: development of treatment plan with  patient or surrogate, interpretation of cardiac output measurements, evaluation of patient's response to treatment, examination of patient, obtaining history from patient or surrogate, ordering and performing treatments and interventions, ordering and review of laboratory studies, ordering and review of radiographic studies, re-evaluation of patient's condition and discussions with consultants.          ECG:       ED COURSE AND MEDICAL DECISION MAKING:  Medications   niCARdipine 40 mg/200 mL (0.2 mg/mL) infusion (5 mg/hr Intravenous New Bag 12/12/24 1322)   acetaminophen tablet 650 mg (650 mg Oral Given 12/12/24 1111)   levETIRAcetam in NaCl (iso-os) IVPB 1,000 mg (0 mg Intravenous Stopped 12/12/24 1244)   labetalol 20 mg/4 mL (5 mg/mL) IV syring (10 mg Intravenous Given 12/12/24 1208)     ED Course as of 12/12/24 1435   Thu Dec 12, 2024   1116 I spoke with radiology who agrees findings consistent with small subdural hematoma, recommends MRI to further evaluate if needed. [IB]   1308 BP actually went up after labetalol so will start him on nicardipine in order to maintain systolic blood pressure less than or equal to 140 mmHg   [IB]      ED Course User Index  [IB] Fady Swift, DO        Medical Decision Making  74-year-old male presents with son with left wrist pain after he slipped off the side of the bed yesterday and struck his head, no loss of consciousness.  He is confused which son states is normal for him and is not new.  GCS 14.  CT head shows small right subdural hematoma, this finding was discussed with radiologist.  CT C-spine shows no acute bony abnormality.  X-ray of the left hand is unremarkable.  Transfer process initiated for neurosurgical evaluation for his subdural hematoma, he is on Plavix for prior stroke back in May of this year that affected his right side, although I do not appreciate any residual deficits on my exam.  Case discussed with Dr. Solorzano at [facility] who has accepted the  patient for transfer, recommends giving Keppra and treating blood pressure with a goal systolic less than 140 mm Hg.  We was given 1 g of Keppra along with IV labetalol as his current blood pressure is 150/83, blood pressure when even higher to 170 systolic so he was then started on nicardipine drip.  I have spoken with the patient and/or caregivers. I have explained the patient's condition, diagnoses and treatment plan based on the information available to me at this time. I have answered the patient's and/or caregiver's questions and addressed any concerns. The patient and/or caregivers have as good an understanding of the patient's diagnosis, condition and treatment plan as can be expected at this point. The patient has been stabilized within the capability of the emergency department. The patient will be transported for further care and management or will be moved to an observation or inpatient service. I have communicated with the staff or medical practitioner taking over this patient's care.    Amount and/or Complexity of Data Reviewed  Independent Historian: caregiver  Radiology: ordered and independent interpretation performed.  Discussion of management or test interpretation with external provider(s): Case discussed with Dr. Solorzano at [facility] who recommends giving Keppra and antihypertensive with goal systolic blood pressure less than 140 mm Hg, and has accepted the patient for transfer.    Risk  OTC drugs.  Prescription drug management.  Drug therapy requiring intensive monitoring for toxicity.  Decision regarding hospitalization.        CLINICAL IMPRESSION:  1. Subdural hematoma        DISPOSITION:   ED Disposition Condition    Transfer to Another Facility Fady Bryant DO  12/12/24 [id number]

## 2024-12-13 LAB — POCT GLUCOSE: 151 MG/DL (ref 70–110)

## 2025-02-04 ENCOUNTER — OFFICE VISIT (OUTPATIENT)
Dept: INTERNAL MEDICINE | Facility: CLINIC | Age: 75
End: 2025-02-04
Payer: MEDICAID

## 2025-02-04 VITALS
DIASTOLIC BLOOD PRESSURE: 60 MMHG | HEART RATE: 68 BPM | BODY MASS INDEX: 25.2 KG/M2 | HEIGHT: 60 IN | WEIGHT: 128.38 LBS | SYSTOLIC BLOOD PRESSURE: 136 MMHG | OXYGEN SATURATION: 99 % | RESPIRATION RATE: 18 BRPM | TEMPERATURE: 98 F

## 2025-02-04 DIAGNOSIS — Z12.11 COLON CANCER SCREENING: ICD-10-CM

## 2025-02-04 DIAGNOSIS — I10 PRIMARY HYPERTENSION: Primary | ICD-10-CM

## 2025-02-04 DIAGNOSIS — E11.9 CONTROLLED TYPE 2 DIABETES MELLITUS WITHOUT COMPLICATION, UNSPECIFIED WHETHER LONG TERM INSULIN USE: Primary | ICD-10-CM

## 2025-02-04 DIAGNOSIS — N18.31 STAGE 3A CHRONIC KIDNEY DISEASE: ICD-10-CM

## 2025-02-04 DIAGNOSIS — Z86.73 HISTORY OF STROKE: ICD-10-CM

## 2025-02-04 DIAGNOSIS — N13.5 OBSTRUCTION OF URETER, UNSPECIFIED LATERALITY: ICD-10-CM

## 2025-02-04 DIAGNOSIS — J42 CHRONIC BRONCHITIS, UNSPECIFIED CHRONIC BRONCHITIS TYPE: ICD-10-CM

## 2025-02-04 DIAGNOSIS — E11.9 CONTROLLED TYPE 2 DIABETES MELLITUS WITHOUT COMPLICATION, UNSPECIFIED WHETHER LONG TERM INSULIN USE: ICD-10-CM

## 2025-02-04 DIAGNOSIS — Z87.891 EX-CIGARETTE SMOKER: ICD-10-CM

## 2025-02-04 DIAGNOSIS — M19.011 PRIMARY OSTEOARTHRITIS OF RIGHT SHOULDER: ICD-10-CM

## 2025-02-04 DIAGNOSIS — E04.1 THYROID NODULE: ICD-10-CM

## 2025-02-04 DIAGNOSIS — Z23 NEED FOR INFLUENZA VACCINATION: ICD-10-CM

## 2025-02-04 DIAGNOSIS — I45.2 BIFASCICULAR BLOCK: ICD-10-CM

## 2025-02-04 LAB — HBA1C MFR BLD: 6.8 %

## 2025-02-04 PROCEDURE — 83036 HEMOGLOBIN GLYCOSYLATED A1C: CPT | Mod: PBBFAC | Performed by: INTERNAL MEDICINE

## 2025-02-04 PROCEDURE — 90653 IIV ADJUVANT VACCINE IM: CPT | Mod: PBBFAC

## 2025-02-04 PROCEDURE — 90471 IMMUNIZATION ADMIN: CPT | Mod: PBBFAC

## 2025-02-04 PROCEDURE — 99215 OFFICE O/P EST HI 40 MIN: CPT | Mod: PBBFAC | Performed by: INTERNAL MEDICINE

## 2025-02-04 RX ORDER — TRAMADOL HYDROCHLORIDE 50 MG/1
50 TABLET ORAL EVERY 6 HOURS PRN
Status: ON HOLD | COMMUNITY
End: 2025-02-18 | Stop reason: HOSPADM

## 2025-02-04 RX ORDER — CARVEDILOL 25 MG/1
25 TABLET ORAL 2 TIMES DAILY
COMMUNITY
Start: 2025-01-28

## 2025-02-04 RX ADMIN — INFLUENZA A VIRUS A/VICTORIA/4897/2022 IVR-238 (H1N1) ANTIGEN (FORMALDEHYDE INACTIVATED), INFLUENZA A VIRUS A/THAILAND/8/2022 IVR-237 (H3N2) ANTIGEN (FORMALDEHYDE INACTIVATED), INFLUENZA B VIRUS B/AUSTRIA/1359417/2021 BVR-26 ANTIGEN (FORMALDEHYDE INACTIVATED) 0.5 ML: 15; 15; 15 INJECTION, SUSPENSION INTRAMUSCULAR at 01:02

## 2025-02-04 NOTE — PROGRESS NOTES
Subjective     Patient ID: Suleiman Beck is a 75 y.o. male.    Chief Complaint: Establish Care    HPI  New patient 75-year-old  male history of strokes admitted within the last year to this hospital multiple falls history of diabetes hypertension chronic bronchitis ex cigarette smoker quitting in 2003 thyroid nodule with recommendation for a needle biopsy which has never been performed ureteral obstruction status post stent right bundle-branch block and bifascicular block on EKG chronic kidney disease.  He is here for initial visit foot exam was normal A1c was 6.8 eye exam was ordered flu vaccine given he has requested a colonoscopy in Cologuard testing his asked to defer his shingles wishes to have further evaluation of his chronic right shoulder pain we will begin with an x-ray probably proceed to MRI and physical therapy exact examine him refer to endocrine clinic for thyroid needle biopsy is recommended on radiology ultrasound exam previously pre request for colonoscopy referred to renal clinic for chronic kidney disease laboratories on return in 3 months    Currently he takes Lantus 10 units q.h.s. short-acting insulin 3 units a.c. t.i.d. with sliding scale the predominant glucose level is in the mid 100s  Review of Systems   All other systems reviewed and are negative.         Objective     Physical Exam  Vitals and nursing note reviewed.   Constitutional:       Appearance: Normal appearance.   HENT:      Head: Normocephalic and atraumatic.      Right Ear: Tympanic membrane, ear canal and external ear normal.      Left Ear: Tympanic membrane, ear canal and external ear normal.      Nose: Nose normal.      Mouth/Throat:      Mouth: Mucous membranes are moist.      Pharynx: Oropharynx is clear.   Eyes:      Extraocular Movements: Extraocular movements intact.      Conjunctiva/sclera: Conjunctivae normal.      Pupils: Pupils are equal, round, and reactive to light.   Cardiovascular:      Rate and Rhythm:  Normal rate.      Pulses: Normal pulses.      Heart sounds: Normal heart sounds.   Pulmonary:      Effort: Pulmonary effort is normal.      Breath sounds: Normal breath sounds.   Abdominal:      General: Bowel sounds are normal.      Palpations: Abdomen is soft.   Musculoskeletal:      Cervical back: Normal range of motion and neck supple.      Comments: Right shoulder pain with range of motion   Skin:     General: Skin is warm and dry.   Neurological:      General: No focal deficit present.      Mental Status: He is alert and oriented to person, place, and time. Mental status is at baseline.   Psychiatric:         Mood and Affect: Mood normal.         Behavior: Behavior normal.         Thought Content: Thought content normal.         Judgment: Judgment normal.            Assessment and Plan     1. Primary hypertension    2. Obstruction of ureter, unspecified laterality    3. Controlled type 2 diabetes mellitus without complication, unspecified whether long term insulin use  -     POCT HEMOGLOBIN A1C    4. Thyroid nodule  -     Ambulatory referral/consult to Endocrinology; Future; Expected date: 03/04/2025    5. Primary osteoarthritis of right shoulder  -     X-ray Shoulder 2 or More Views Right; Future; Expected date: 02/04/2025  -     Ambulatory Referral/Consult to Physical/Occupational Therapy; Future; Expected date: 02/18/2025  -     Ambulatory referral/consult to Nephrology; Future; Expected date: 05/04/2025  -     CBC Auto Differential; Future; Expected date: 05/04/2025  -     Comprehensive Metabolic Panel; Future; Expected date: 05/04/2025  -     Sedimentation rate; Future; Expected date: 05/04/2025    6. History of stroke    7. Chronic bronchitis, unspecified chronic bronchitis type    8. Ex-cigarette smoker    9. Bifascicular block    10. Need for influenza vaccination  -     influenza (adjuvanted) (Fluad) 45 mcg/0.5 mL IM vaccine (> or = 66 yo) 0.5 mL    11. Stage 3a chronic kidney disease    12. Colon  cancer screening  -     Ambulatory referral/consult to Gastroenterology; Future; Expected date: 05/04/2025        As above follow up in 3 months         Follow up in about 3 months (around 5/4/2025).

## 2025-02-05 DIAGNOSIS — E11.9 CONTROLLED TYPE 2 DIABETES MELLITUS WITHOUT COMPLICATION, UNSPECIFIED WHETHER LONG TERM INSULIN USE: Primary | ICD-10-CM

## 2025-02-07 ENCOUNTER — HOSPITAL ENCOUNTER (OUTPATIENT)
Dept: RADIOLOGY | Facility: HOSPITAL | Age: 75
Discharge: HOME | End: 2025-02-07
Attending: INTERNAL MEDICINE
Payer: MEDICAID

## 2025-02-07 DIAGNOSIS — M19.011 PRIMARY OSTEOARTHRITIS OF RIGHT SHOULDER: ICD-10-CM

## 2025-02-07 PROCEDURE — 73030 X-RAY EXAM OF SHOULDER: CPT | Mod: TC,RT

## 2025-02-13 ENCOUNTER — HOSPITAL ENCOUNTER (INPATIENT)
Facility: HOSPITAL | Age: 75
LOS: 5 days | Discharge: NURSING, REHAB OR LONG-TERM CARE | DRG: 871 | End: 2025-02-18
Attending: INTERNAL MEDICINE | Admitting: INTERNAL MEDICINE
Payer: MEDICAID

## 2025-02-13 DIAGNOSIS — J18.9 COMMUNITY ACQUIRED PNEUMONIA OF RIGHT LOWER LOBE OF LUNG: ICD-10-CM

## 2025-02-13 DIAGNOSIS — E87.70 VOLUME OVERLOAD: ICD-10-CM

## 2025-02-13 DIAGNOSIS — J18.9 PNEUMONIA OF RIGHT LOWER LOBE DUE TO INFECTIOUS ORGANISM: ICD-10-CM

## 2025-02-13 DIAGNOSIS — J44.1 COPD EXACERBATION: ICD-10-CM

## 2025-02-13 DIAGNOSIS — R06.02 SHORTNESS OF BREATH: ICD-10-CM

## 2025-02-13 DIAGNOSIS — R07.9 CHEST PAIN: ICD-10-CM

## 2025-02-13 DIAGNOSIS — A41.9 SEPSIS, DUE TO UNSPECIFIED ORGANISM, UNSPECIFIED WHETHER ACUTE ORGAN DYSFUNCTION PRESENT: Primary | ICD-10-CM

## 2025-02-13 DIAGNOSIS — I82.409 DVT (DEEP VENOUS THROMBOSIS): ICD-10-CM

## 2025-02-13 LAB
A-ADO2 BLOOD GAS (OHS): 124 MMHG
ABORH RETYPE: NORMAL
ALBUMIN SERPL-MCNC: 3.2 G/DL (ref 3.4–4.8)
ALBUMIN/GLOB SERPL: 0.6 RATIO (ref 1.1–2)
ALLENS TEST BLOOD GAS (OHS): YES
ALP SERPL-CCNC: 66 UNIT/L (ref 40–150)
ALT SERPL-CCNC: 7 UNIT/L (ref 0–55)
ANION GAP SERPL CALC-SCNC: 9 MEQ/L
AST SERPL-CCNC: 8 UNIT/L (ref 5–34)
B PERT.PT PRMT NPH QL NAA+NON-PROBE: NOT DETECTED
BASE EXCESS BLD CALC-SCNC: -11 MMOL/L (ref -2–2)
BASOPHILS # BLD AUTO: 0.06 X10(3)/MCL
BASOPHILS NFR BLD AUTO: 0.4 %
BILIRUB SERPL-MCNC: 0.3 MG/DL
BLOOD GAS SAMPLE TYPE (OHS): ABNORMAL
BNP BLD-MCNC: 245.9 PG/ML
BUN SERPL-MCNC: 55.9 MG/DL (ref 8.4–25.7)
C PNEUM DNA NPH QL NAA+NON-PROBE: NOT DETECTED
CALCIUM SERPL-MCNC: 9.4 MG/DL (ref 8.8–10)
CHLORIDE SERPL-SCNC: 114 MMOL/L (ref 98–107)
CO2 BLDA-SCNC: 15.2 MMOL/L (ref 22–26)
CO2 SERPL-SCNC: 14 MMOL/L (ref 23–31)
COHGB MFR BLDA: 0 % (ref 0.5–1.5)
CREAT SERPL-MCNC: 2.36 MG/DL (ref 0.72–1.25)
CREAT/UREA NIT SERPL: 24
DRAWN BY BLOOD GAS (OHS): ABNORMAL
EOSINOPHIL # BLD AUTO: 0.18 X10(3)/MCL (ref 0–0.9)
EOSINOPHIL NFR BLD AUTO: 1.1 %
ERYTHROCYTE [DISTWIDTH] IN BLOOD BY AUTOMATED COUNT: 14.9 % (ref 11.5–17)
EST. AVERAGE GLUCOSE BLD GHB EST-MCNC: 145.6 MG/DL
FLUAV AG UPPER RESP QL IA.RAPID: NOT DETECTED
FLUBV AG UPPER RESP QL IA.RAPID: NOT DETECTED
GAS PNL BLD: 192 MMHG
GFR SERPLBLD CREATININE-BSD FMLA CKD-EPI: 28 ML/MIN/1.73/M2
GLOBULIN SER-MCNC: 5.1 GM/DL (ref 2.4–3.5)
GLUCOSE SERPL-MCNC: 198 MG/DL (ref 82–115)
GROUP & RH: NORMAL
HADV DNA NPH QL NAA+NON-PROBE: NOT DETECTED
HBA1C MFR BLD: 6.7 %
HCO3 BLDA-SCNC: 14.3 MMOL/L (ref 22–26)
HCOV 229E RNA NPH QL NAA+NON-PROBE: NOT DETECTED
HCOV HKU1 RNA NPH QL NAA+NON-PROBE: NOT DETECTED
HCOV NL63 RNA NPH QL NAA+NON-PROBE: NOT DETECTED
HCOV OC43 RNA NPH QL NAA+NON-PROBE: NOT DETECTED
HCT VFR BLD AUTO: 25.9 % (ref 42–52)
HGB BLD-MCNC: 7.7 G/DL (ref 14–18)
HMPV RNA NPH QL NAA+NON-PROBE: NOT DETECTED
HPIV1 RNA NPH QL NAA+NON-PROBE: NOT DETECTED
HPIV2 RNA NPH QL NAA+NON-PROBE: NOT DETECTED
HPIV3 RNA NPH QL NAA+NON-PROBE: NOT DETECTED
HPIV4 RNA NPH QL NAA+NON-PROBE: NOT DETECTED
IMM GRANULOCYTES # BLD AUTO: 0.07 X10(3)/MCL (ref 0–0.04)
IMM GRANULOCYTES NFR BLD AUTO: 0.4 %
INDIRECT COOMBS: NORMAL
INHALED O2 CONCENTRATION: 32 %
LACTATE SERPL-SCNC: 0.6 MMOL/L (ref 0.5–2.2)
LPM (OHS): 3
LYMPHOCYTES # BLD AUTO: 2.35 X10(3)/MCL (ref 0.6–4.6)
LYMPHOCYTES NFR BLD AUTO: 13.8 %
M PNEUMO DNA NPH QL NAA+NON-PROBE: NOT DETECTED
MAGNESIUM SERPL-MCNC: 2.2 MG/DL (ref 1.6–2.6)
MCH RBC QN AUTO: 26.2 PG (ref 27–31)
MCHC RBC AUTO-ENTMCNC: 29.7 G/DL (ref 33–36)
MCV RBC AUTO: 88.1 FL (ref 80–94)
METHGB MFR BLDA: 0.6 % (ref 0–1.5)
MONOCYTES # BLD AUTO: 0.83 X10(3)/MCL (ref 0.1–1.3)
MONOCYTES NFR BLD AUTO: 4.9 %
NEUTROPHILS # BLD AUTO: 13.54 X10(3)/MCL (ref 2.1–9.2)
NEUTROPHILS NFR BLD AUTO: 79.4 %
NRBC BLD AUTO-RTO: 0 %
O2 HB BLOOD GAS (OHS): 92 % (ref 94–100)
OHS QRS DURATION: 136 MS
OHS QTC CALCULATION: 461 MS
OXYGEN DEVICE BLOOD GAS (OHS): ABNORMAL
OXYHGB MFR BLDA: 8.1 G/DL (ref 12–18)
PCO2 BLDA: 29 MMHG (ref 35–45)
PH BLDA: 7.3 [PH] (ref 7.35–7.45)
PLATELET # BLD AUTO: 414 X10(3)/MCL (ref 130–400)
PMV BLD AUTO: 11.2 FL (ref 7.4–10.4)
PO2 BLDA: 68 MMHG (ref 75–100)
POCT GLUCOSE: 175 MG/DL (ref 70–110)
POCT GLUCOSE: 328 MG/DL (ref 70–110)
POTASSIUM SERPL-SCNC: 5.3 MMOL/L (ref 3.5–5.1)
PROT SERPL-MCNC: 8.3 GM/DL (ref 5.8–7.6)
RBC # BLD AUTO: 2.94 X10(6)/MCL (ref 4.7–6.1)
RSV A 5' UTR RNA NPH QL NAA+PROBE: NOT DETECTED
RSV RNA NPH QL NAA+NON-PROBE: NOT DETECTED
RV+EV RNA NPH QL NAA+NON-PROBE: NOT DETECTED
SAMPLE SITE BLOOD GAS (OHS): ABNORMAL
SAO2 % BLDA: 92.6 %
SARS-COV-2 RNA RESP QL NAA+PROBE: NOT DETECTED
SODIUM SERPL-SCNC: 137 MMOL/L (ref 136–145)
SPECIMEN OUTDATE: NORMAL
TROPONIN I SERPL-MCNC: 0.04 NG/ML (ref 0–0.04)
WBC # BLD AUTO: 17.03 X10(3)/MCL (ref 4.5–11.5)

## 2025-02-13 PROCEDURE — 25000003 PHARM REV CODE 250: Performed by: INTERNAL MEDICINE

## 2025-02-13 PROCEDURE — 96365 THER/PROPH/DIAG IV INF INIT: CPT

## 2025-02-13 PROCEDURE — 99900035 HC TECH TIME PER 15 MIN (STAT)

## 2025-02-13 PROCEDURE — 83880 ASSAY OF NATRIURETIC PEPTIDE: CPT | Performed by: INTERNAL MEDICINE

## 2025-02-13 PROCEDURE — 25000242 PHARM REV CODE 250 ALT 637 W/ HCPCS: Performed by: INTERNAL MEDICINE

## 2025-02-13 PROCEDURE — 63600175 PHARM REV CODE 636 W HCPCS: Performed by: INTERNAL MEDICINE

## 2025-02-13 PROCEDURE — 63600175 PHARM REV CODE 636 W HCPCS

## 2025-02-13 PROCEDURE — 21400001 HC TELEMETRY ROOM

## 2025-02-13 PROCEDURE — 80053 COMPREHEN METABOLIC PANEL: CPT | Performed by: INTERNAL MEDICINE

## 2025-02-13 PROCEDURE — 84484 ASSAY OF TROPONIN QUANT: CPT | Performed by: INTERNAL MEDICINE

## 2025-02-13 PROCEDURE — 87798 DETECT AGENT NOS DNA AMP: CPT | Performed by: INTERNAL MEDICINE

## 2025-02-13 PROCEDURE — 83735 ASSAY OF MAGNESIUM: CPT | Performed by: INTERNAL MEDICINE

## 2025-02-13 PROCEDURE — 25000242 PHARM REV CODE 250 ALT 637 W/ HCPCS

## 2025-02-13 PROCEDURE — 93005 ELECTROCARDIOGRAM TRACING: CPT

## 2025-02-13 PROCEDURE — 82803 BLOOD GASES ANY COMBINATION: CPT

## 2025-02-13 PROCEDURE — 83036 HEMOGLOBIN GLYCOSYLATED A1C: CPT

## 2025-02-13 PROCEDURE — 11000001 HC ACUTE MED/SURG PRIVATE ROOM

## 2025-02-13 PROCEDURE — 83605 ASSAY OF LACTIC ACID: CPT | Performed by: INTERNAL MEDICINE

## 2025-02-13 PROCEDURE — 85025 COMPLETE CBC W/AUTO DIFF WBC: CPT | Performed by: INTERNAL MEDICINE

## 2025-02-13 PROCEDURE — 36600 WITHDRAWAL OF ARTERIAL BLOOD: CPT

## 2025-02-13 PROCEDURE — 25000003 PHARM REV CODE 250

## 2025-02-13 PROCEDURE — 96372 THER/PROPH/DIAG INJ SC/IM: CPT | Performed by: INTERNAL MEDICINE

## 2025-02-13 PROCEDURE — 27000221 HC OXYGEN, UP TO 24 HOURS

## 2025-02-13 PROCEDURE — 0241U COVID/RSV/FLU A&B PCR: CPT | Performed by: INTERNAL MEDICINE

## 2025-02-13 PROCEDURE — 94640 AIRWAY INHALATION TREATMENT: CPT | Mod: XB

## 2025-02-13 PROCEDURE — 99285 EMERGENCY DEPT VISIT HI MDM: CPT | Mod: 25

## 2025-02-13 PROCEDURE — 96375 TX/PRO/DX INJ NEW DRUG ADDON: CPT

## 2025-02-13 PROCEDURE — 94640 AIRWAY INHALATION TREATMENT: CPT

## 2025-02-13 PROCEDURE — 86900 BLOOD TYPING SEROLOGIC ABO: CPT | Performed by: INTERNAL MEDICINE

## 2025-02-13 PROCEDURE — 87040 BLOOD CULTURE FOR BACTERIA: CPT | Performed by: INTERNAL MEDICINE

## 2025-02-13 PROCEDURE — 84100 ASSAY OF PHOSPHORUS: CPT

## 2025-02-13 RX ORDER — IBUPROFEN 200 MG
16 TABLET ORAL
Status: DISCONTINUED | OUTPATIENT
Start: 2025-02-13 | End: 2025-02-16

## 2025-02-13 RX ORDER — SODIUM BICARBONATE 650 MG/1
1300 TABLET ORAL 2 TIMES DAILY
Status: DISCONTINUED | OUTPATIENT
Start: 2025-02-13 | End: 2025-02-13

## 2025-02-13 RX ORDER — IPRATROPIUM BROMIDE AND ALBUTEROL SULFATE 2.5; .5 MG/3ML; MG/3ML
3 SOLUTION RESPIRATORY (INHALATION)
Status: DISCONTINUED | OUTPATIENT
Start: 2025-02-13 | End: 2025-02-14

## 2025-02-13 RX ORDER — INSULIN ASPART 100 [IU]/ML
0-5 INJECTION, SOLUTION INTRAVENOUS; SUBCUTANEOUS
Status: DISCONTINUED | OUTPATIENT
Start: 2025-02-13 | End: 2025-02-16

## 2025-02-13 RX ORDER — NALOXONE HCL 0.4 MG/ML
0.02 VIAL (ML) INJECTION
Status: DISCONTINUED | OUTPATIENT
Start: 2025-02-13 | End: 2025-02-18 | Stop reason: HOSPADM

## 2025-02-13 RX ORDER — IPRATROPIUM BROMIDE AND ALBUTEROL SULFATE 2.5; .5 MG/3ML; MG/3ML
3 SOLUTION RESPIRATORY (INHALATION)
Status: COMPLETED | OUTPATIENT
Start: 2025-02-13 | End: 2025-02-13

## 2025-02-13 RX ORDER — CARVEDILOL 12.5 MG/1
25 TABLET ORAL 2 TIMES DAILY
Status: DISCONTINUED | OUTPATIENT
Start: 2025-02-13 | End: 2025-02-18 | Stop reason: HOSPADM

## 2025-02-13 RX ORDER — NIFEDIPINE 30 MG/1
90 TABLET, EXTENDED RELEASE ORAL DAILY
Status: DISCONTINUED | OUTPATIENT
Start: 2025-02-13 | End: 2025-02-18 | Stop reason: HOSPADM

## 2025-02-13 RX ORDER — IPRATROPIUM BROMIDE AND ALBUTEROL SULFATE 2.5; .5 MG/3ML; MG/3ML
SOLUTION RESPIRATORY (INHALATION)
Status: COMPLETED
Start: 2025-02-13 | End: 2025-02-13

## 2025-02-13 RX ORDER — TERBUTALINE SULFATE 1 MG/ML
0.25 INJECTION SUBCUTANEOUS ONCE
Status: COMPLETED | OUTPATIENT
Start: 2025-02-13 | End: 2025-02-13

## 2025-02-13 RX ORDER — GLUCAGON 1 MG
1 KIT INJECTION
Status: DISCONTINUED | OUTPATIENT
Start: 2025-02-13 | End: 2025-02-16

## 2025-02-13 RX ORDER — INSULIN GLARGINE 100 [IU]/ML
5 INJECTION, SOLUTION SUBCUTANEOUS NIGHTLY
Status: DISCONTINUED | OUTPATIENT
Start: 2025-02-13 | End: 2025-02-16

## 2025-02-13 RX ORDER — ACETAMINOPHEN 325 MG/1
650 TABLET ORAL
Status: COMPLETED | OUTPATIENT
Start: 2025-02-13 | End: 2025-02-13

## 2025-02-13 RX ORDER — ATORVASTATIN CALCIUM 40 MG/1
40 TABLET, FILM COATED ORAL NIGHTLY
Status: DISCONTINUED | OUTPATIENT
Start: 2025-02-13 | End: 2025-02-18 | Stop reason: HOSPADM

## 2025-02-13 RX ORDER — CEFTRIAXONE 2 G/1
2 INJECTION, POWDER, FOR SOLUTION INTRAMUSCULAR; INTRAVENOUS
Status: COMPLETED | OUTPATIENT
Start: 2025-02-13 | End: 2025-02-13

## 2025-02-13 RX ORDER — HYDROXYZINE PAMOATE 25 MG/1
25 CAPSULE ORAL DAILY PRN
Status: DISCONTINUED | OUTPATIENT
Start: 2025-02-13 | End: 2025-02-18 | Stop reason: HOSPADM

## 2025-02-13 RX ORDER — AZITHROMYCIN 250 MG/1
250 TABLET, FILM COATED ORAL DAILY
Status: DISCONTINUED | OUTPATIENT
Start: 2025-02-14 | End: 2025-02-18

## 2025-02-13 RX ORDER — PREDNISONE 20 MG/1
40 TABLET ORAL DAILY
Status: DISCONTINUED | OUTPATIENT
Start: 2025-02-13 | End: 2025-02-18

## 2025-02-13 RX ORDER — IPRATROPIUM BROMIDE AND ALBUTEROL SULFATE 2.5; .5 MG/3ML; MG/3ML
3 SOLUTION RESPIRATORY (INHALATION) EVERY 4 HOURS PRN
Status: DISCONTINUED | OUTPATIENT
Start: 2025-02-13 | End: 2025-02-14

## 2025-02-13 RX ORDER — SODIUM CHLORIDE 0.9 % (FLUSH) 0.9 %
10 SYRINGE (ML) INJECTION EVERY 12 HOURS PRN
Status: DISCONTINUED | OUTPATIENT
Start: 2025-02-13 | End: 2025-02-18 | Stop reason: HOSPADM

## 2025-02-13 RX ORDER — SODIUM BICARBONATE 650 MG/1
1300 TABLET ORAL 2 TIMES DAILY
Status: DISCONTINUED | OUTPATIENT
Start: 2025-02-13 | End: 2025-02-18

## 2025-02-13 RX ORDER — DEXAMETHASONE SODIUM PHOSPHATE 4 MG/ML
8 INJECTION, SOLUTION INTRA-ARTICULAR; INTRALESIONAL; INTRAMUSCULAR; INTRAVENOUS; SOFT TISSUE
Status: COMPLETED | OUTPATIENT
Start: 2025-02-13 | End: 2025-02-13

## 2025-02-13 RX ORDER — HYDROCODONE BITARTRATE AND ACETAMINOPHEN 5; 325 MG/1; MG/1
1 TABLET ORAL EVERY 6 HOURS PRN
Status: DISCONTINUED | OUTPATIENT
Start: 2025-02-13 | End: 2025-02-18 | Stop reason: HOSPADM

## 2025-02-13 RX ORDER — CEFTRIAXONE 2 G/1
2 INJECTION, POWDER, FOR SOLUTION INTRAMUSCULAR; INTRAVENOUS
Status: DISCONTINUED | OUTPATIENT
Start: 2025-02-14 | End: 2025-02-17

## 2025-02-13 RX ORDER — HEPARIN SODIUM 5000 [USP'U]/ML
5000 INJECTION, SOLUTION INTRAVENOUS; SUBCUTANEOUS EVERY 8 HOURS
Status: DISCONTINUED | OUTPATIENT
Start: 2025-02-13 | End: 2025-02-18 | Stop reason: HOSPADM

## 2025-02-13 RX ORDER — IBUPROFEN 200 MG
24 TABLET ORAL
Status: DISCONTINUED | OUTPATIENT
Start: 2025-02-13 | End: 2025-02-16

## 2025-02-13 RX ADMIN — IPRATROPIUM BROMIDE AND ALBUTEROL SULFATE 3 ML: .5; 3 SOLUTION RESPIRATORY (INHALATION) at 03:02

## 2025-02-13 RX ADMIN — INSULIN GLARGINE 5 UNITS: 100 INJECTION, SOLUTION SUBCUTANEOUS at 08:02

## 2025-02-13 RX ADMIN — TERBUTALINE SULFATE 0.25 MG: 1 INJECTION SUBCUTANEOUS at 06:02

## 2025-02-13 RX ADMIN — HYDROCODONE BITARTRATE AND ACETAMINOPHEN 1 TABLET: 5; 325 TABLET ORAL at 06:02

## 2025-02-13 RX ADMIN — HYDROCODONE BITARTRATE AND ACETAMINOPHEN 1 TABLET: 5; 325 TABLET ORAL at 11:02

## 2025-02-13 RX ADMIN — AZITHROMYCIN MONOHYDRATE 500 MG: 500 INJECTION, POWDER, LYOPHILIZED, FOR SOLUTION INTRAVENOUS at 05:02

## 2025-02-13 RX ADMIN — IPRATROPIUM BROMIDE AND ALBUTEROL SULFATE 3 ML: .5; 3 SOLUTION RESPIRATORY (INHALATION) at 05:02

## 2025-02-13 RX ADMIN — CEFTRIAXONE SODIUM 2 G: 2 INJECTION, POWDER, FOR SOLUTION INTRAMUSCULAR; INTRAVENOUS at 11:02

## 2025-02-13 RX ADMIN — HEPARIN SODIUM 5000 UNITS: 5000 INJECTION INTRAVENOUS; SUBCUTANEOUS at 02:02

## 2025-02-13 RX ADMIN — CEFTRIAXONE SODIUM 2 G: 2 INJECTION, POWDER, FOR SOLUTION INTRAMUSCULAR; INTRAVENOUS at 05:02

## 2025-02-13 RX ADMIN — INSULIN ASPART 2 UNITS: 100 INJECTION, SOLUTION INTRAVENOUS; SUBCUTANEOUS at 08:02

## 2025-02-13 RX ADMIN — IPRATROPIUM BROMIDE AND ALBUTEROL SULFATE 3 ML: .5; 3 SOLUTION RESPIRATORY (INHALATION) at 11:02

## 2025-02-13 RX ADMIN — HYDROXYZINE PAMOATE 25 MG: 25 CAPSULE ORAL at 08:02

## 2025-02-13 RX ADMIN — IPRATROPIUM BROMIDE AND ALBUTEROL SULFATE: 2.5; .5 SOLUTION RESPIRATORY (INHALATION) at 08:02

## 2025-02-13 RX ADMIN — SODIUM BICARBONATE 650 MG TABLET 1300 MG: at 08:02

## 2025-02-13 RX ADMIN — SODIUM BICARBONATE 650 MG TABLET 1300 MG: at 05:02

## 2025-02-13 RX ADMIN — CARVEDILOL 25 MG: 12.5 TABLET, FILM COATED ORAL at 08:02

## 2025-02-13 RX ADMIN — PREDNISONE 40 MG: 20 TABLET ORAL at 05:02

## 2025-02-13 RX ADMIN — HEPARIN SODIUM 5000 UNITS: 5000 INJECTION INTRAVENOUS; SUBCUTANEOUS at 09:02

## 2025-02-13 RX ADMIN — ACETAMINOPHEN 650 MG: 325 TABLET, FILM COATED ORAL at 06:02

## 2025-02-13 RX ADMIN — IPRATROPIUM BROMIDE AND ALBUTEROL SULFATE 3 ML: .5; 3 SOLUTION RESPIRATORY (INHALATION) at 04:02

## 2025-02-13 RX ADMIN — IPRATROPIUM BROMIDE AND ALBUTEROL SULFATE 3 ML: .5; 3 SOLUTION RESPIRATORY (INHALATION) at 07:02

## 2025-02-13 RX ADMIN — IPRATROPIUM BROMIDE AND ALBUTEROL SULFATE 3 ML: .5; 3 SOLUTION RESPIRATORY (INHALATION) at 06:02

## 2025-02-13 RX ADMIN — DEXAMETHASONE SODIUM PHOSPHATE 8 MG: 4 INJECTION, SOLUTION INTRA-ARTICULAR; INTRALESIONAL; INTRAMUSCULAR; INTRAVENOUS; SOFT TISSUE at 06:02

## 2025-02-13 RX ADMIN — CARVEDILOL 25 MG: 12.5 TABLET, FILM COATED ORAL at 09:02

## 2025-02-13 RX ADMIN — NIFEDIPINE 90 MG: 30 TABLET, FILM COATED, EXTENDED RELEASE ORAL at 09:02

## 2025-02-13 RX ADMIN — ATORVASTATIN CALCIUM 40 MG: 40 TABLET, FILM COATED ORAL at 08:02

## 2025-02-13 NOTE — ED PROVIDER NOTES
Encounter Date: 2/13/2025       History     Chief Complaint   Patient presents with    Shortness of Breath     Patient presents to the ER w/ son who reports that the patient has been short of breath since about 1900 yesterday. Son reports at home oxygen saturation was in the 80s on room air so the son put him on oxygen. They deny any known medical hx other than him having a stroke last year. Son does report that his father was a heavy smoker in the past. Diminished lung sounds w/ expiratory wheezing noted during triage.      This is a 75 year old male with PMH of HTN, diabetes, CVA, CKD presented to the ED with the chief complaint of Sob since yesterday night. He was having trouble breathing and son checked his saturations and was in 85 initially but later improved to 93, as the night progressed his symptoms worsened and desaturated to low 80s and his sone put him on oxygen he has at home which is his mothers, the patent was able to breath better with the oxygen. He has been having productive cough since 3 days but no fever, chills. He is an ex- smoker quit smoking in 2003. He also had some very mild non radiating chest pain since last night,no nausea and vomiting. No recent travel history. No recent sick contacts.    The history is provided by the patient and a relative. No  was used (patient is non english speaker, speaks in alysa).     Review of patient's allergies indicates:   Allergen Reactions    Opioids - morphine analogues     Dilaudid [hydromorphone] Anxiety     Past Medical History:   Diagnosis Date    Arthritis     Diabetes mellitus     Essential (primary) hypertension     Unspecified chronic bronchitis      Past Surgical History:   Procedure Laterality Date    CYSTOSCOPY W/ URETERAL STENT PLACEMENT Left 12/11/2022    Procedure: CYSTOSCOPY, WITH URETERAL STENT INSERTION;  Surgeon: Mar Fernandez MD;  Location: [facility];  Service: Urology;  Laterality: Left;    WRIST SURGERY  Left      No family history on file.  Social History     Tobacco Use    Smoking status: Former     Current packs/day: 0.00     Types: Cigarettes     Quit date:      Years since quittin.1    Smokeless tobacco: Never   Substance Use Topics    Alcohol use: Not Currently    Drug use: Never     Review of Systems   Constitutional: Negative.    HENT:  Negative for ear discharge, ear pain, facial swelling, postnasal drip, sore throat and trouble swallowing.    Eyes: Negative.    Respiratory:  Positive for cough (productive), shortness of breath and wheezing.    Cardiovascular:  Positive for chest pain (very mild).   Gastrointestinal:  Negative for abdominal pain, constipation, nausea and vomiting.   Genitourinary: Negative.    Musculoskeletal: Negative.    Neurological: Negative.    Psychiatric/Behavioral: Negative.         Physical Exam     Initial Vitals [25 0332]   BP Pulse Resp Temp SpO2   116/62 77 (!) 24 97.2 °F (36.2 °C) (!) 94 %      MAP       --         Physical Exam    Nursing note and vitals reviewed.  Constitutional: He appears well-developed and well-nourished. He is not diaphoretic.   HENT:   Head: Normocephalic and atraumatic. Mouth/Throat: Oropharynx is clear and moist.   Eyes: Conjunctivae and EOM are normal. Pupils are equal, round, and reactive to light. Right eye exhibits no discharge. Left eye exhibits no discharge. No scleral icterus.   Neck: No thyromegaly present. No tracheal deviation present. No JVD present.   Cardiovascular:  Normal rate and regular rhythm.           Pulmonary/Chest: He is in respiratory distress. He has wheezes. He has no rhonchi. He has no rales.   Abdominal: Abdomen is soft. Bowel sounds are normal.   Musculoskeletal:         General: No tenderness or edema.     Neurological: He is alert and oriented to person, place, and time.   Skin: No erythema. No pallor.   Psychiatric: Thought content normal.         ED Course   Critical Care    Date/Time: 2025 6:36  AM    Performed by: Catracho Stoner MD  Authorized by: Catracho Stoner MD  Direct patient critical care time: 15 minutes  Additional history critical care time: 10 minutes  Ordering / reviewing critical care time: 15 minutes  Documentation critical care time: 5 minutes  Consulting other physicians critical care time: 5 minutes  Total critical care time (exclusive of procedural time) : 50 minutes  Critical care was necessary to treat or prevent imminent or life-threatening deterioration of the following conditions: respiratory failure, sepsis and renal failure.  Critical care was time spent personally by me on the following activities: development of treatment plan with patient or surrogate, discussions with consultants, evaluation of patient's response to treatment, interpretation of cardiac output measurements, examination of patient, obtaining history from patient or surrogate, ordering and performing treatments and interventions, ordering and review of laboratory studies, ordering and review of radiographic studies, re-evaluation of patient's condition, pulse oximetry and review of old charts.        Labs Reviewed   COMPREHENSIVE METABOLIC PANEL - Abnormal       Result Value    Sodium 137      Potassium 5.3 (*)     Chloride 114 (*)     CO2 14 (*)     Glucose 198 (*)     Blood Urea Nitrogen 55.9 (*)     Creatinine 2.36 (*)     Calcium 9.4      Protein Total 8.3 (*)     Albumin 3.2 (*)     Globulin 5.1 (*)     Albumin/Globulin Ratio 0.6 (*)     Bilirubin Total 0.3      ALP 66      ALT 7      AST 8      eGFR 28      Anion Gap 9.0      BUN/Creatinine Ratio 24     B-TYPE NATRIURETIC PEPTIDE - Abnormal    Natriuretic Peptide 245.9 (*)    CBC WITH DIFFERENTIAL - Abnormal    WBC 17.03 (*)     RBC 2.94 (*)     Hgb 7.7 (*)     Hct 25.9 (*)     MCV 88.1      MCH 26.2 (*)     MCHC 29.7 (*)     RDW 14.9      Platelet 414 (*)     MPV 11.2 (*)     Neut % 79.4      Lymph % 13.8      Mono % 4.9       Eos % 1.1      Basophil % 0.4      Imm Grans % 0.4      Neut # 13.54 (*)     Lymph # 2.35      Mono # 0.83      Eos # 0.18      Baso # 0.06      Imm Gran # 0.07 (*)     NRBC% 0.0     TROPONIN I - Normal    Troponin-I 0.042     COVID/RSV/FLU A&B PCR - Normal    Influenza A PCR Not Detected      Influenza B PCR Not Detected      Respiratory Syncytial Virus PCR Not Detected      SARS-CoV-2 PCR Not Detected      Narrative:     The [employer] Xpress SARS-CoV-2/FLU/RSV plus is a rapid, multiplexed real-time PCR test intended for the simultaneous qualitative detection and differentiation of SARS-CoV-2, Influenza A, Influenza B, and respiratory syncytial virus (RSV) viral RNA in either nasopharyngeal swab or nasal swab specimens.         LACTIC ACID, PLASMA - Normal    Lactic Acid Level 0.6     MAGNESIUM - Normal    Magnesium Level 2.20     RESPIRATORY CULTURE ([facility])   BLOOD CULTURE [facility]   BLOOD CULTURE [facility]   CBC W/ AUTO DIFFERENTIAL    Narrative:     The following orders were created for panel order CBC Auto Differential.  Procedure                               Abnormality         Status                     ---------                               -----------         ------                     CBC with Differential[[id number]]       Abnormal            Final result                 Please view results for these tests on the individual orders.   RESPIRATORY PANEL   EXTRA TUBES    Narrative:     The following orders were created for panel order EXTRA TUBES.  Procedure                               Abnormality         Status                     ---------                               -----------         ------                     Light Blue Top Hold[[id number]]                                                        Gold Top Hold[[id number]]                                                                Please view results for these tests on the individual orders.   LIGHT BLUE TOP HOLD   GOLD TOP HOLD   TYPE & SCREEN    Group & Rh AB  POS      Indirect Derrick GEL NEG      Specimen Outdate 02/16/2025 23:59     ABORH RETYPE    ABORH Retype AB POS       EKG Readings: (Independently Interpreted)   Initial Reading: No STEMI. Heart Rate: 77. Conduction: RBBB. Axis: Left Axis Deviation. Clinical Impression: Left Ventricular Hypertrophy (LVH) with Bifasicular Block   Findings consistent with the prior ekg     ECG Results              EKG 12-lead (In process)        Collection Time Result Time QRS Duration OHS QTC Calculation    02/13/25 03:30:46 02/13/25 06:32:57 136 461                     In process by Interface, Lab In [facility] (02/13/25 06:33:01)                   Narrative:    Test Reason : R06.02,    Vent. Rate :  77 BPM     Atrial Rate :  77 BPM     P-R Int : 154 ms          QRS Dur : 136 ms      QT Int : 408 ms       P-R-T Axes :  20 -74  23 degrees    QTcB Int : 461 ms    Normal sinus rhythm  Right bundle branch block  Left anterior fascicular block   Bifascicular block   Minimal voltage criteria for LVH, may be normal variant  Abnormal ECG  When compared with ECG of 24-May-2024 13:57,  Minimal criteria for Septal infarct are no longer Present  Nonspecific T wave abnormality no longer evident in Anterior-lateral leads    Referred By: AAAREFERRAL SELF           Confirmed By:                                   Imaging Results              X-Ray Chest 1 View (In process)                   X-Rays:   Independently Interpreted Readings:   Chest X-Ray: There is an infiltrate in the RLL and Left hilum. Findings suggestive of right sided pneumonia      Medications   albuterol-ipratropium 2.5 mg-0.5 mg/3 mL nebulizer solution 3 mL (3 mLs Nebulization Given 2/13/25 6746)   cefTRIAXone injection 2 g (2 g Intravenous Given 2/13/25 3326)   azithromycin (ZITHROMAX) 500 mg in 0.9% NaCl 250 mL IVPB (admixture device) (0 mg Intravenous Stopped 2/13/25 4334)   albuterol-ipratropium 2.5 mg-0.5 mg/3 mL nebulizer solution 3 mL (3 mLs Nebulization Given 2/13/25 4241)    albuterol-ipratropium 2.5 mg-0.5 mg/3 mL nebulizer solution 3 mL (3 mLs Nebulization Given 2/13/25 0540)   albuterol-ipratropium 2.5 mg-0.5 mg/3 mL nebulizer solution 3 mL (3 mLs Nebulization Given 2/13/25 0609)   terbutaline injection 0.25 mg (0.25 mg Subcutaneous Given 2/13/25 0611)   dexAMETHasone injection 8 mg (8 mg Intravenous Given 2/13/25 0635)   acetaminophen tablet 650 mg (650 mg Oral Given 2/13/25 0631)     Medical Decision Making  Amount and/or Complexity of Data Reviewed  External Data Reviewed: labs and notes.  Labs: ordered. Decision-making details documented in ED Course.     Details: Leukocytosis with left shift, TINO on CKD  Radiology: ordered and independent interpretation performed. Decision-making details documented in ED Course.     Details: Right lower lobe pneumonia   ECG/medicine tests: ordered and independent interpretation performed. Decision-making details documented in ED Course.  Discussion of management or test interpretation with external provider(s): Case discussed with Dr Morrison for admission and further management of sepsis 2/2 pneumonia, COPD exacerbation     Risk  OTC drugs.  Prescription drug management.  Decision regarding hospitalization.      Additional MDM:   Differential Diagnosis:   Other: The following diagnoses were also considered and will be evaluated: sepsis, pneumonia and COPD exacerbation.           Attending Attestation:   Physician Attestation Statement for Resident:  As the supervising MD   Physician Attestation Statement: I have personally seen and examined this patient.   I agree with the above history.  -:   As the supervising MD I agree with the above PE.     As the supervising MD I agree with the above treatment, course, plan, and disposition.   I was personally present during the entire procedure.  I have reviewed and agree with the residents interpretation of the following: lab data, x-rays and EKG.  I have reviewed the following: old records at this  facility.                                       Clinical Impression:  Final diagnoses:  [R06.02] Shortness of breath  [J18.9] Pneumonia of right lower lobe due to infectious organism  [A41.9] Sepsis, due to unspecified organism, unspecified whether acute organ dysfunction present (Primary)  [J44.1] COPD exacerbation          ED Disposition Condition    Admit Serious                Brianne Barbosa MD  Resident  02/13/25 0628       Catracho Stoner MD  02/13/25 0641

## 2025-02-13 NOTE — H&P
[facility] History and Physical     Patient Name: Suleiman Beck  MRN: [id number]  Admission Date: 2/13/2025  Hospital Length of Stay: 0 days  Code Status: Full Code  Attending Provider: Silvia Gutierrez MD  Primary Care Provider: Martin Sears MD     Chief Complaint:   Shortness of Breath (Patient presents to the ER w/ son who reports that the patient has been short of breath since about 1900 yesterday. Son reports at home oxygen saturation was in the 80s on room air so the son put him on oxygen. They deny any known medical hx other than him having a stroke last year. Son does report that his father was a heavy smoker in the past. Diminished lung sounds w/ expiratory wheezing noted during triage. )      Subjective:      Brief HPI:  Suleiman Beck is a 75 y.o. male who has a past medical history of hypertension, diabetes, CVA, CKD.  He presented to [facility] on 2/13/2025  with a primary complaint of shortness of breath.  Patient reports symptoms began 2 days ago while at rest.  Oxygen saturation was checked by son at home and was 85% on room air but later improved to 93%.  As symptoms began to worsen he continued to desaturate into the low 80s and was placed on his wife's home oxygen.  Patient denies cough but reports he feels the need to cough something up but is unable to do so.  He reports chest pain that is worse with inhalation and not present at time of evaluation.  Patient is uncertain of his medical history, reports his only medication at home is pain medicine for osteoarthritis in his right shoulder.  Patient denies use of home inhalers or oxygen.  He denies fevers, chills, chest pain, nausea, vomiting, diarrhea, constipation, dysuria, or fatigue.  Patient denies any recent travel or sick contacts.    ED course:  Patient presented afebrile, tachypneic 24, saturating 94% on 2 L nasal cannula, otherwise VSS.  Patient received 2 g of Rocephin and azithromycin 500 mg in the ED. patient was given  "multiple DuoNeb treatments with improvement in shortness of breath.  Chest x-ray shows pulmonary vascular congestion and cardiac decompensation, possible right lower lobe developing infiltrate.  Internal medicine was consulted for sepsis secondary to pneumonia.    Patient family history is not on file.       Patient  has a past surgical history that includes Cystoscopy w/ ureteral stent placement (Left, 12/11/2022) and Wrist surgery (Left).    Patient is allergic to opioids - morphine analogues and dilaudid [hydromorphone].    Patient  reports that he quit smoking about 22 years ago. His smoking use included cigarettes. He has never used smokeless tobacco. He reports that he does not currently use alcohol. He reports that he does not use drugs.     Current Outpatient Medications   Medication Instructions    albuterol (PROVENTIL) 2.5 mg, Nebulization, Every 4 hours PRN, Rescue    atorvastatin (LIPITOR) 40 mg, Oral, Nightly    blood sugar diagnostic Strp 200 strips, Misc.(Non-Drug; Combo Route), 3 times daily    blood-glucose meter kit Use as instructed    carvediloL (COREG) 25 mg, 2 times daily    clopidogreL (PLAVIX) 75 mg, Oral, Daily    cyclobenzaprine (FLEXERIL) 5 MG tablet Daily PRN    HYDROcodone-acetaminophen (NORCO) 5-325 mg per tablet 1 tablet, Oral, Every 6 hours PRN    insulin aspart U-100 (NOVOLOG) 3 Units, Subcutaneous, 3 times daily    insulin glargine U-100 (Lantus) 10 Units, Subcutaneous, Daily    lancets 30 gauge Misc 200 lancets, Misc.(Non-Drug; Combo Route), 3 times daily    lisinopriL (PRINIVIL,ZESTRIL) 40 mg, Oral    methocarbamoL (ROBAXIN) 500 MG Tab     NIFEdipine (PROCARDIA-XL) 90 mg, Oral, Daily    NOVOLOG MIX 70-30FLEXPEN U-100 100 unit/mL (70-30) InPn pen 3 Units, 3 times daily    pen needle, diabetic 31 gauge x 5/16" Ndle 1 each, 3 times daily    traMADoL (ULTRAM) 50 mg, Every 6 hours PRN          Review of Systems:  The remainder of the 14 point ROS is noncontributory or negative unless " mentioned/reviewed above.     Objective:     Vital Signs:  Vital Signs (Most Recent):  Temp: 97.2 °F (36.2 °C) (02/13/25 0332)  Pulse: 75 (02/13/25 1104)  Resp: 18 (02/13/25 1114)  BP: 139/78 (02/13/25 0949)  SpO2: 96 % (02/13/25 1104)  Body mass index is 24.37 kg/m².  Weight: 56.6 kg (124 lb 12.5 oz) Vital Signs (24h Range):  Temp:  [97.2 °F (36.2 °C)] 97.2 °F (36.2 °C)  Pulse:  [67-88] 75  Resp:  [15-29] 18  SpO2:  [94 %-100 %] 96 %  BP: (115-139)/(62-87) 139/78       Input/output:   No intake or output data in the 24 hours ending 02/13/25 1343    Physical Exam  Constitutional:       Appearance: Normal appearance. He is ill-appearing. He is not diaphoretic.   HENT:      Head: Normocephalic and atraumatic.      Mouth/Throat:      Mouth: Mucous membranes are moist.      Pharynx: Oropharynx is clear.   Eyes:      General: No scleral icterus.     Extraocular Movements: Extraocular movements intact.      Conjunctiva/sclera: Conjunctivae normal.   Cardiovascular:      Rate and Rhythm: Normal rate and regular rhythm.      Pulses: Normal pulses.      Heart sounds: Normal heart sounds. No murmur heard.     No friction rub. No gallop.   Pulmonary:      Effort: Pulmonary effort is normal. No respiratory distress.      Breath sounds: No stridor. Rhonchi (Right lower lobe) present. No wheezing or rales.   Chest:      Chest wall: No tenderness.   Abdominal:      General: Abdomen is flat. Bowel sounds are normal. There is no distension.      Palpations: Abdomen is soft. There is no mass.      Tenderness: There is no abdominal tenderness. There is no guarding or rebound.      Hernia: No hernia is present.   Musculoskeletal:      Cervical back: Normal range of motion and neck supple.      Right lower leg: No edema.      Left lower leg: No edema.   Skin:     General: Skin is warm and dry.      Capillary Refill: Capillary refill takes less than 2 seconds.   Neurological:      General: No focal deficit present.      Mental Status: He  "is alert.          Lines/Drains/Airways       None                    Laboratory:    Recent Labs   Lab 02/13/25  0340   WBC 17.03*   HGB 7.7*   HCT 25.9*   *   MCV 88.1   RDW 14.9     Recent Labs   Lab 02/13/25  0339 02/13/25  0340   TROPONINI 0.042  --    BNP  --  245.9*     Recent Labs   Lab 02/13/25  0339 02/13/25  0340   TROPONINI 0.042  --    BNP  --  245.9*     No results for input(s): "CHOL", "HDL", "LDLCALC", "TRIG", "CHOLHDL" in the last 168 hours. Recent Labs   Lab 02/13/25  0339      K 5.3*   CO2 14*   BUN 55.9*   CREATININE 2.36*   CALCIUM 9.4   MG 2.20     Recent Labs   Lab 02/13/25  0339   ALBUMIN 3.2*   BILITOT 0.3   AST 8   ALKPHOS 66   ALT 7     No results for input(s): "IRON", "TIBC", "FERRITIN", "SATURATEDIRO", "WUMOQBHG45", "FOLATE" in the last 168 hours.  Recent Labs   Lab 02/13/25  0856   HGBA1C 6.7          Other Results:  Estimated Creatinine Clearance: 19.4 mL/min (A) (based on SCr of 2.36 mg/dL (H)).    Current Medications:     Infusions:        Scheduled:   albuterol-ipratropium  3 mL Nebulization Q4H WAKE    atorvastatin  40 mg Oral QHS    [START ON 2/14/2025] azithromycin  250 mg Oral Daily    carvediloL  25 mg Oral BID    [START ON 2/14/2025] cefTRIAXone (Rocephin) IV (PEDS and ADULTS)  2 g Intravenous Q24H    heparin (porcine)  5,000 Units Subcutaneous Q8H    insulin glargine U-100  5 Units Subcutaneous QHS    NIFEdipine  90 mg Oral Daily         PRN:   albuterol-ipratropium 2.5 mg-0.5 mg/3 mL nebulizer solution 3 mL    atorvastatin tablet 40 mg    [START ON 2/14/2025] azithromycin tablet 250 mg    carvediloL tablet 25 mg    [START ON 2/14/2025] cefTRIAXone injection 2 g    heparin (porcine) injection 5,000 Units    insulin glargine U-100 (Lantus) injection 5 Units    NIFEdipine 24 hr tablet 90 mg        Microbiology Data:  Microbiology Results (last 7 days)       Procedure Component Value Units Date/Time    Blood Culture [[id number]] Collected: 02/13/25 0514    Order " Status: Sent Specimen: Blood from Forearm, Left Updated: 02/13/25 0519    Blood Culture [[id number]] Collected: 02/13/25 0515    Order Status: Sent Specimen: Blood from Antecubital, Right Updated: 02/13/25 0519    Respiratory Culture [[id number]]     Order Status: Sent Specimen: Sputum, Expectorated              Antibiotics and Day Number of Therapy:  Antibiotics (From admission, onward)      Start     Stop Route Frequency Ordered    02/14/25 0900  azithromycin tablet 250 mg         -- Oral Daily 02/13/25 0855    02/14/25 0000  cefTRIAXone injection 2 g         -- IV Every 24 hours (non-standard times) 02/13/25 0855             Imaging:  CT Chest Without Contrast  Narrative: EXAMINATION:  CT CHEST WITHOUT CONTRAST    CLINICAL HISTORY:  Sepsis;    TECHNIQUE:  Low dose axial images, sagittal and coronal reformations were obtained from the thoracic inlet to the lung bases. Contrast was not administered.  Automatic exposure control is utilized to reduce patient radiation exposure.    COMPARISON:  02/13/2025 chest x-ray    FINDINGS:  There are changes seen consistent with emphysema and COPD in the lungs bilaterally.  There is a bulla in the right lung apex.  There is some ground-glass edema seen in the lungs bilaterally.  Some interstitial fibrotic are seen the upper and lower lobes with a peripheral distribution.  There is peribronchial cuffing and peribronchial thickening seen in the lower bilaterally.  There is a small right-sided pleural effusion.  There is mild right lower lobe atelectasis    Thoracic aorta is partially calcified.  The heart is upper limits normal size.    There is a hiatal hernia.  Impression: Pulmonary edema and pulmonary venous congestion bilaterally with some peribronchial seen in lower lobes bilaterally.  There is a right-sided pleural effusion.    Findings consistent with COPD and emphysema and chronic lung changes bilaterally    Electronically signed by: Colt  "Isidro  Date:    02/13/2025  Time:    11:07  X-Ray Chest 1 View  Narrative: EXAMINATION:  XR CHEST 1 VIEW    CPT 14212    CLINICAL HISTORY:  shortness of breath;    FINDINGS:  Examination reveals mediastinal silhouette to be within normal limits cardiac silhouette is mildly enlarged there is increase in interstitial and pulmonary vascular markings indicating some degree of pulmonary vascular congestion and cardiac decompensation.    No focal consolidative changes are otherwise identified  Impression: Changes suggestive of a degree of pulmonary vascular congestion and cardiac decompensation    Electronically signed by: Chaim Kamara  Date:    02/13/2025  Time:    09:11        2D ECHO Results    Results for orders placed during the hospital encounter of 05/17/24    Echo Saline Bubble? Yes    Interpretation Summary    Left Ventricle: The left ventricle is smaller than normal. Mildly increased wall thickness. There is normal systolic function. Biplane (2D) method of discs ejection fraction is 60%. There is diastolic dysfunction.    Right Ventricle: Normal right ventricular cavity size. Systolic function is normal.    Aortic Valve: The aortic valve is a trileaflet valve. There is mild aortic valve sclerosis. There is mild annular calcification present.    IVC/SVC: Normal venous pressure at 3 mmHg.        Pulmonary Functions Testing Results:    No results found for: "FEV1", "FVC", "RBJ1VBL", "TLC", "DLCO"    Assessment & Plan:     Possible right lower lobe pneumonia  COPD exacerbation  -respiratory panel negative, lactic acid negative, COVID RSV flu negative  -respiratory culture pending  -received Rocephin 2 g in ED and azithromycin 500 mg, continue Rocephin 2 g q.d. and azithromycin 500 mg q.d.  -p.r.n. oxygen, wean as tolerated, goal saturation 88-92%  -DuoNebs q.4 hours awake  -prednisone 40 mg q.d.  -chest x-ray showed pulmonary vascular congestion  -CT chest showed pulmonary edema, right-sided pleural " effusion, findings consistent with COPD/emphysema and chronic lung changes bilaterally.    TINO on CKD  Metabolic acidosis  Hypertension  -continue Coreg and nifedipine, holding lisinopril in the setting of TINO  -started on sodium bicarb 1300 mg b.i.d., re-evaluate renal function tomorrow    Diabetes  -dispense reports shows on 10 units insulin q.h.s., hold  -started on Lantus 5 units q.h.s. and low-dose sliding scale      CODE STATUS: Full Code   Access: PIV  Antibiotics:  Rocephin and azithromycin  Diet: Diet diabetic Renal Non-Dialysis; 2000 Calories (up to 75 gm per meal)  DVT Prophylaxis: Heparin 5k   GI Prophylaxis: none  Fluids: -      Disposition: Suleiman Beck is a 75 y.o. male who is admitted for sepsis likely secondary to pneumonia, COPD exacerbation, TINO on CKD.  Discharge pending hospital course.      Luis Enrique Barragan DO  [facility] Internal Medicine, PGY-I  02/13/2025

## 2025-02-14 LAB
ALBUMIN SERPL-MCNC: 3.5 G/DL (ref 3.4–4.8)
ALBUMIN/GLOB SERPL: 0.7 RATIO (ref 1.1–2)
ALP SERPL-CCNC: 71 UNIT/L (ref 40–150)
ALT SERPL-CCNC: 7 UNIT/L (ref 0–55)
ANION GAP SERPL CALC-SCNC: 12 MEQ/L
ANION GAP SERPL CALC-SCNC: 8 MEQ/L
APICAL FOUR CHAMBER EJECTION FRACTION: 64 %
APICAL TWO CHAMBER EJECTION FRACTION: 57 %
ASCENDING AORTA: 3.1 CM
AST SERPL-CCNC: 17 UNIT/L (ref 5–34)
AV INDEX (PROSTH): 0.82
AV MEAN GRADIENT: 5 MMHG
AV PEAK GRADIENT: 9 MMHG
AV VALVE AREA BY VELOCITY RATIO: 2.1 CM²
AV VALVE AREA: 2.6 CM²
AV VELOCITY RATIO: 0.67
BASOPHILS # BLD AUTO: 0.01 X10(3)/MCL
BASOPHILS NFR BLD AUTO: 0.1 %
BILIRUB SERPL-MCNC: 0.3 MG/DL
BSA FOR ECHO PROCEDURE: 1.54 M2
BUN SERPL-MCNC: 66.6 MG/DL (ref 8.4–25.7)
BUN SERPL-MCNC: 77.1 MG/DL (ref 8.4–25.7)
CALCIUM SERPL-MCNC: 10.2 MG/DL (ref 8.8–10)
CALCIUM SERPL-MCNC: 9.3 MG/DL (ref 8.8–10)
CHLORIDE SERPL-SCNC: 107 MMOL/L (ref 98–107)
CHLORIDE SERPL-SCNC: 115 MMOL/L (ref 98–107)
CO2 SERPL-SCNC: 18 MMOL/L (ref 23–31)
CO2 SERPL-SCNC: 9 MMOL/L (ref 23–31)
CREAT SERPL-MCNC: 2.21 MG/DL (ref 0.72–1.25)
CREAT SERPL-MCNC: 2.37 MG/DL (ref 0.72–1.25)
CREAT/UREA NIT SERPL: 30
CREAT/UREA NIT SERPL: 33
CV ECHO LV RWT: 0.67 CM
DOP CALC AO PEAK VEL: 1.5 M/S
DOP CALC AO VTI: 34.6 CM
DOP CALC LVOT AREA: 3.1 CM2
DOP CALC LVOT DIAMETER: 2 CM
DOP CALC LVOT PEAK VEL: 1 M/S
DOP CALC LVOT STROKE VOLUME: 89.2 CM3
DOP CALC MV VTI: 33.4 CM
DOP CALCLVOT PEAK VEL VTI: 28.4 CM
E WAVE DECELERATION TIME: 199 MSEC
E/A RATIO: 0.91
E/E' RATIO: 23 M/S
ECHO LV POSTERIOR WALL: 1.3 CM (ref 0.6–1.1)
EOSINOPHIL # BLD AUTO: 0 X10(3)/MCL (ref 0–0.9)
EOSINOPHIL NFR BLD AUTO: 0 %
ERYTHROCYTE [DISTWIDTH] IN BLOOD BY AUTOMATED COUNT: 14.9 % (ref 11.5–17)
FRACTIONAL SHORTENING: 30.8 % (ref 28–44)
GFR SERPLBLD CREATININE-BSD FMLA CKD-EPI: 28 ML/MIN/1.73/M2
GFR SERPLBLD CREATININE-BSD FMLA CKD-EPI: 30 ML/MIN/1.73/M2
GLOBULIN SER-MCNC: 5.3 GM/DL (ref 2.4–3.5)
GLUCOSE SERPL-MCNC: 170 MG/DL (ref 82–115)
GLUCOSE SERPL-MCNC: 315 MG/DL (ref 82–115)
HCT VFR BLD AUTO: 27.1 % (ref 42–52)
HGB BLD-MCNC: 8.1 G/DL (ref 14–18)
HR MV ECHO: 73 BPM
IMM GRANULOCYTES # BLD AUTO: 0.07 X10(3)/MCL (ref 0–0.04)
IMM GRANULOCYTES NFR BLD AUTO: 0.4 %
INTERVENTRICULAR SEPTUM: 1.3 CM (ref 0.6–1.1)
LEFT ATRIUM SIZE: 3.6 CM
LEFT ATRIUM VOLUME INDEX MOD: 39 ML/M2
LEFT ATRIUM VOLUME MOD: 60 ML
LEFT INTERNAL DIMENSION IN SYSTOLE: 2.7 CM (ref 2.1–4)
LEFT VENTRICLE DIASTOLIC VOLUME INDEX: 44.07 ML/M2
LEFT VENTRICLE DIASTOLIC VOLUME: 66.99 ML
LEFT VENTRICLE END DIASTOLIC VOLUME APICAL 2 CHAMBER: 68.81 ML
LEFT VENTRICLE END DIASTOLIC VOLUME APICAL 4 CHAMBER: 113.94 ML
LEFT VENTRICLE MASS INDEX: 118.2 G/M2
LEFT VENTRICLE SYSTOLIC VOLUME INDEX: 17.3 ML/M2
LEFT VENTRICLE SYSTOLIC VOLUME: 26.36 ML
LEFT VENTRICULAR INTERNAL DIMENSION IN DIASTOLE: 3.9 CM (ref 3.5–6)
LEFT VENTRICULAR MASS: 179.7 G
LV LATERAL E/E' RATIO: 17.7 M/S
LV SEPTAL E/E' RATIO: 31 M/S
LVED V (TEICH): 66.99 ML
LVES V (TEICH): 26.36 ML
LVOT MG: 2.02 MMHG
LVOT MV: 0.65 CM/S
LYMPHOCYTES # BLD AUTO: 1.3 X10(3)/MCL (ref 0.6–4.6)
LYMPHOCYTES NFR BLD AUTO: 8.2 %
MAGNESIUM SERPL-MCNC: 2.7 MG/DL (ref 1.6–2.6)
MCH RBC QN AUTO: 26.2 PG (ref 27–31)
MCHC RBC AUTO-ENTMCNC: 29.9 G/DL (ref 33–36)
MCV RBC AUTO: 87.7 FL (ref 80–94)
MONOCYTES # BLD AUTO: 0.39 X10(3)/MCL (ref 0.1–1.3)
MONOCYTES NFR BLD AUTO: 2.5 %
MV MEAN GRADIENT: 2 MMHG
MV PEAK A VEL: 1.36 M/S
MV PEAK E VEL: 1.24 M/S
MV PEAK GRADIENT: 6 MMHG
MV STENOSIS PRESSURE HALF TIME: 57.63 MS
MV VALVE AREA BY CONTINUITY EQUATION: 2.67 CM2
MV VALVE AREA P 1/2 METHOD: 3.82 CM2
NEUTROPHILS # BLD AUTO: 13.99 X10(3)/MCL (ref 2.1–9.2)
NEUTROPHILS NFR BLD AUTO: 88.8 %
NRBC BLD AUTO-RTO: 0 %
OHS CV RV/LV RATIO: 0.72 CM
OHS LV EJECTION FRACTION SIMPSONS BIPLANE MOD: 62 %
PHOSPHATE SERPL-MCNC: 4.5 MG/DL (ref 2.3–4.7)
PISA TR MAX VEL: 3.1 M/S
PLATELET # BLD AUTO: 446 X10(3)/MCL (ref 130–400)
PMV BLD AUTO: 11.2 FL (ref 7.4–10.4)
POCT GLUCOSE: 171 MG/DL (ref 70–110)
POCT GLUCOSE: 265 MG/DL (ref 70–110)
POTASSIUM SERPL-SCNC: 5.2 MMOL/L (ref 3.5–5.1)
POTASSIUM SERPL-SCNC: 6.1 MMOL/L (ref 3.5–5.1)
PROT SERPL-MCNC: 8.8 GM/DL (ref 5.8–7.6)
RA PRESSURE ESTIMATED: 3 MMHG
RBC # BLD AUTO: 3.09 X10(6)/MCL (ref 4.7–6.1)
RIGHT VENTRICLE DIASTOLIC BASEL DIMENSION: 2.8 CM
RIGHT VENTRICULAR END-DIASTOLIC DIMENSION: 2.8 CM
RV TB RVSP: 6 MMHG
SINUS: 2.9 CM
SODIUM SERPL-SCNC: 133 MMOL/L (ref 136–145)
SODIUM SERPL-SCNC: 136 MMOL/L (ref 136–145)
TDI LATERAL: 0.07 M/S
TDI SEPTAL: 0.04 M/S
TDI: 0.06 M/S
TR MAX PG: 38 MMHG
TRICUSPID ANNULAR PLANE SYSTOLIC EXCURSION: 2.05 CM
TV REST PULMONARY ARTERY PRESSURE: 41 MMHG
WBC # BLD AUTO: 15.76 X10(3)/MCL (ref 4.5–11.5)
Z-SCORE OF LEFT VENTRICULAR DIMENSION IN END DIASTOLE: -1.32
Z-SCORE OF LEFT VENTRICULAR DIMENSION IN END SYSTOLE: -0.18

## 2025-02-14 PROCEDURE — 63600175 PHARM REV CODE 636 W HCPCS

## 2025-02-14 PROCEDURE — 25000242 PHARM REV CODE 250 ALT 637 W/ HCPCS: Performed by: INTERNAL MEDICINE

## 2025-02-14 PROCEDURE — 94760 N-INVAS EAR/PLS OXIMETRY 1: CPT

## 2025-02-14 PROCEDURE — 25000003 PHARM REV CODE 250

## 2025-02-14 PROCEDURE — 97162 PT EVAL MOD COMPLEX 30 MIN: CPT

## 2025-02-14 PROCEDURE — 25000242 PHARM REV CODE 250 ALT 637 W/ HCPCS

## 2025-02-14 PROCEDURE — 21400001 HC TELEMETRY ROOM

## 2025-02-14 PROCEDURE — 36415 COLL VENOUS BLD VENIPUNCTURE: CPT

## 2025-02-14 PROCEDURE — 85025 COMPLETE CBC W/AUTO DIFF WBC: CPT

## 2025-02-14 PROCEDURE — 27000221 HC OXYGEN, UP TO 24 HOURS

## 2025-02-14 PROCEDURE — 80053 COMPREHEN METABOLIC PANEL: CPT

## 2025-02-14 PROCEDURE — 97166 OT EVAL MOD COMPLEX 45 MIN: CPT

## 2025-02-14 PROCEDURE — 94640 AIRWAY INHALATION TREATMENT: CPT

## 2025-02-14 PROCEDURE — 63700000 PHARM REV CODE 250 ALT 637 W/O HCPCS

## 2025-02-14 PROCEDURE — 94761 N-INVAS EAR/PLS OXIMETRY MLT: CPT

## 2025-02-14 PROCEDURE — 83735 ASSAY OF MAGNESIUM: CPT

## 2025-02-14 PROCEDURE — 25000003 PHARM REV CODE 250: Performed by: INTERNAL MEDICINE

## 2025-02-14 RX ORDER — IPRATROPIUM BROMIDE AND ALBUTEROL SULFATE 2.5; .5 MG/3ML; MG/3ML
3 SOLUTION RESPIRATORY (INHALATION) EVERY 4 HOURS
Status: DISCONTINUED | OUTPATIENT
Start: 2025-02-14 | End: 2025-02-18 | Stop reason: HOSPADM

## 2025-02-14 RX ORDER — LORAZEPAM 2 MG/ML
1 INJECTION INTRAMUSCULAR ONCE
Status: COMPLETED | OUTPATIENT
Start: 2025-02-14 | End: 2025-02-14

## 2025-02-14 RX ORDER — INSULIN GLARGINE 100 [IU]/ML
10 INJECTION, SOLUTION SUBCUTANEOUS ONCE
Status: DISCONTINUED | OUTPATIENT
Start: 2025-02-14 | End: 2025-02-14

## 2025-02-14 RX ADMIN — DEXTROSE MONOHYDRATE 250 ML: 100 INJECTION, SOLUTION INTRAVENOUS at 01:02

## 2025-02-14 RX ADMIN — IPRATROPIUM BROMIDE AND ALBUTEROL SULFATE 3 ML: 2.5; .5 SOLUTION RESPIRATORY (INHALATION) at 09:02

## 2025-02-14 RX ADMIN — HEPARIN SODIUM 5000 UNITS: 5000 INJECTION INTRAVENOUS; SUBCUTANEOUS at 05:02

## 2025-02-14 RX ADMIN — INSULIN ASPART 3 UNITS: 100 INJECTION, SOLUTION INTRAVENOUS; SUBCUTANEOUS at 05:02

## 2025-02-14 RX ADMIN — PREDNISONE 40 MG: 20 TABLET ORAL at 09:02

## 2025-02-14 RX ADMIN — CEFTRIAXONE SODIUM 2 G: 2 INJECTION, POWDER, FOR SOLUTION INTRAMUSCULAR; INTRAVENOUS at 11:02

## 2025-02-14 RX ADMIN — SODIUM BICARBONATE: 84 INJECTION, SOLUTION INTRAVENOUS at 03:02

## 2025-02-14 RX ADMIN — HUMAN INSULIN 10 UNITS: 100 INJECTION, SOLUTION SUBCUTANEOUS at 02:02

## 2025-02-14 RX ADMIN — IPRATROPIUM BROMIDE AND ALBUTEROL SULFATE 3 ML: 2.5; .5 SOLUTION RESPIRATORY (INHALATION) at 08:02

## 2025-02-14 RX ADMIN — IPRATROPIUM BROMIDE AND ALBUTEROL SULFATE 3 ML: 2.5; .5 SOLUTION RESPIRATORY (INHALATION) at 10:02

## 2025-02-14 RX ADMIN — AZITHROMYCIN DIHYDRATE 250 MG: 250 TABLET, FILM COATED ORAL at 09:02

## 2025-02-14 RX ADMIN — HYDROXYZINE PAMOATE 25 MG: 25 CAPSULE ORAL at 05:02

## 2025-02-14 RX ADMIN — ATORVASTATIN CALCIUM 40 MG: 40 TABLET, FILM COATED ORAL at 09:02

## 2025-02-14 RX ADMIN — IPRATROPIUM BROMIDE AND ALBUTEROL SULFATE 3 ML: 2.5; .5 SOLUTION RESPIRATORY (INHALATION) at 03:02

## 2025-02-14 RX ADMIN — HEPARIN SODIUM 5000 UNITS: 5000 INJECTION INTRAVENOUS; SUBCUTANEOUS at 09:02

## 2025-02-14 RX ADMIN — LORAZEPAM 1 MG: 2 INJECTION INTRAMUSCULAR; INTRAVENOUS at 02:02

## 2025-02-14 RX ADMIN — IPRATROPIUM BROMIDE AND ALBUTEROL SULFATE 3 ML: .5; 3 SOLUTION RESPIRATORY (INHALATION) at 06:02

## 2025-02-14 RX ADMIN — SODIUM BICARBONATE 650 MG TABLET 1300 MG: at 09:02

## 2025-02-14 RX ADMIN — SODIUM ZIRCONIUM CYCLOSILICATE 10 G: 10 POWDER, FOR SUSPENSION ORAL at 11:02

## 2025-02-14 RX ADMIN — IPRATROPIUM BROMIDE AND ALBUTEROL SULFATE 3 ML: .5; 3 SOLUTION RESPIRATORY (INHALATION) at 02:02

## 2025-02-14 RX ADMIN — CARVEDILOL 25 MG: 12.5 TABLET, FILM COATED ORAL at 09:02

## 2025-02-14 RX ADMIN — NIFEDIPINE 90 MG: 30 TABLET, FILM COATED, EXTENDED RELEASE ORAL at 09:02

## 2025-02-14 RX ADMIN — SODIUM ZIRCONIUM CYCLOSILICATE 10 G: 10 POWDER, FOR SUSPENSION ORAL at 02:02

## 2025-02-14 RX ADMIN — INSULIN ASPART 2 UNITS: 100 INJECTION, SOLUTION INTRAVENOUS; SUBCUTANEOUS at 09:02

## 2025-02-14 RX ADMIN — INSULIN GLARGINE 5 UNITS: 100 INJECTION, SOLUTION SUBCUTANEOUS at 09:02

## 2025-02-14 RX ADMIN — HEPARIN SODIUM 5000 UNITS: 5000 INJECTION INTRAVENOUS; SUBCUTANEOUS at 02:02

## 2025-02-14 NOTE — PT/OT/SLP EVAL
Physical Therapy Evaluation    Patient Name:  Suleiman Beck   MRN:  [id number]    Recommendations:     Therapy Intensity Recommendations at Discharge: High Intensity Therapy  Discharge Equipment Recommendations: walker, rolling, bedside commode   Equipment to be obtained for discharge: rolling walker, bedside commode.  Barriers to discharge: fall risk, severity of deficits, level of skilled assistance required, decreased endurance, decreased safety awareness, insight into deficits, and limited family support    Assessment:     Suleiman Beck is a 75 y.o. male admitted with a medical diagnosis of Community acquired pneumonia of right lower lobe of lung.  1. Sepsis, due to unspecified organism, unspecified whether acute organ dysfunction present    2. Shortness of breath    3. Pneumonia of right lower lobe due to infectious organism    4. COPD exacerbation    5. Chest pain    6. Volume overload    7. DVT (deep venous thrombosis)       Patient Active Problem List   Diagnosis    Ureteral obstruction    Controlled type 2 diabetes mellitus without complication    Hypertension    Thyroid nodule    Primary osteoarthritis of right shoulder    History of stroke    Chronic bronchitis    Ex-cigarette smoker    Bifascicular block    Community acquired pneumonia of right lower lobe of lung      He presents with the following impairments/functional limitations:  weakness, impaired endurance, impaired self care skills, impaired functional mobility, gait instability, impaired balance, decreased upper extremity function, decreased lower extremity function, decreased safety awareness, pain, impaired coordination, edema, impaired cardiopulmonary response to activity.    Pt.'s son translating thru phone.  Pt. Exhibited shortness of breath with transfers and gait, requiring verbal cues for proper breathing.  Pt. Requiring significant amount of assistance with transfers and gait, secondary to posterior lean upon standing.     Rehab Prognosis:  Good.    Patient would benefit from continued skilled acute PT services to: address above listed impairments/functional limitations; receive patient/caregiver education; reduce fall risk; and maximize independency/safety with functional mobility.    Recent Surgery: * No surgery found *      Plan:     During this hospitalization, patient to be seen 5 x/week to address the identified impairments/functional limitations via gait training, therapeutic activities, therapeutic exercises and progress toward the established goals.    Plan of Care Expires:  03/16/25    Subjective     Communicated with patient's nurse (Jac) prior to session.    Patient agreeable to participate in evaluation.     Chief Complaint: shortness of breath, R shoulder pain, and B calf pain with ambulation  Patient/Family Comments/goals: family's goal: return to PLOF  Pain/Comfort:  Pain Rating 1: 7/10  Location - Side 1: Right  Location 1: shoulder  Pain Addressed 1: Nurse notified, Distraction, Reposition  Pain Rating Post-Intervention 1: 6/10  Pain Rating 2: 7/10  Location - Side 2: Bilateral  Location - Orientation 2: posterior  Location 2: calf (during ambulation)  Pain Addressed 2: Nurse notified, Reposition, Cessation of Activity  Pain Rating Post-Intervention 2: 3/10    Patients cultural, spiritual, Uatsdin conflicts given the current situation: no    Social History  Living Environment: Patient  lives with wife, son, and daughter in law  in a single level home, with no steps, with walk-in shower.  Functional Level: Prior to admission patient was a passenger, ambulated without assistive device, and required SBA for ADL's .  Equipment Used at Home: shower chair  Equipment owned (not currently used): none.  Assistance Upon Discharge:  limited family support, secondary to son with health issues .    Objective:     Patient found supine in bed, with HOB elevated, and with bed rails up bilateral HOB, left FOB, and right FOB with PureWick,  telemetry, restraints, oxygen, peripheral IV, bed alarm  upon PT entry to room.    General Precautions: Standard, fall   Orthopedic Precautions:N/A   Braces:  N/A  Respiratory Status: 4 liters/min O2 via nasal cannula    Vitals   At Rest (pre-session)  BP  151/72   HR  86   O2 Sat %  95%      With Activity (post-session)  O2 Sat %  Pt. Desaturated to 80% with ambulation     Exams:  Orientation: Patient is oriented to person, place, time, situation  Commands: Patient follows commands consistently  Sensation:    -     Intact: light/touch bilat lower extremity  Skin Integrity/Edema:     -       Skin integrity: Visible skin intact  -       Edema: Mild L LE>R LE (nursing notified)  BILAT UE ROM/strength - defer to OT - see OT note for details  RLE ROM: WFL  RLE Strength: WFL  LLE ROM: WFL  LLE Strength: WFL    Functional Mobility:    Bed Mobility:  Supine to Sit: maximal assistance    Transfers:  Sit to Stand: moderate assistance with rolling walker  Stand to Sit: moderate assistance with rolling walker    Gait:  Patient ambulated 15ft with rolling walker and moderate assistance.  Patient demonstrates :       unsteady gait       decreased tam       decreased bilateral step length       ambulates outside HERNAN of RW       decreased bilateral weight shift       inconsistent bilateral foot-floor clearance and step length       shortened/quickened step on bilateral       shuffle gait.  Posterior lean    Other Mobility:  N/A    Balance:  Sit  Static: FAIR+: Able to take MINIMAL challenges from all directions  Dynamic: FAIR+: Maintains balance through MINIMAL excursions of active trunk motion  Stand  Static: POOR: Needs MODERATE assist to maintain  Dynamic: POOR: Needs MOD (moderate) assist during gait    Additional Treatment Session  N/A    Patient left sitting in chair with all lines intact, call button in reach, tray table at bedside, patient's nurse notified, and chair alarm on.    DME Justifications:   Suleiman's mobility  limitation cannot be sufficiently resolved by the use of a cane. His functional mobility deficit can be sufficiently resolved with the use of a Rolling Walker. Patient's mobility limitation significantly impairs their ability to participate in one of more activities of daily living.  The use of a RW will significantly improve the patient's ability to participate in MRADLS and the patient will use it on regular basis in the home.    Education     Patient educated on the importance of early mobility to prevent functional decline during hospital stay.  Patient educated on and assisted with functional mobility as noted above.  Patient educated on PT Plan of Care and role of PT in acute care.  Patient was instructed to utilize staff assistance for mobility/transfers.  White board updated regarding patient's safest level of mobility with staff assistance    Goals     Multidisciplinary Problems       Physical Therapy Goals          Problem: Physical Therapy    Goal Priority Disciplines Outcome Interventions   Physical Therapy Goal     PT, PT/OT     Description: Goals to be met by: Discharge    Pt. Will increase independence with functional mobility by performin. Supine<>sit with Min A  2. Sit<>stand with RW and Min A  3. Pt. Will ambulate x 130 feet with RW and Min A.                     History:     Past Medical History:   Diagnosis Date    Arthritis     Diabetes mellitus     Essential (primary) hypertension     Unspecified chronic bronchitis      Past Surgical History:   Procedure Laterality Date    CYSTOSCOPY W/ URETERAL STENT PLACEMENT Left 2022    Procedure: CYSTOSCOPY, WITH URETERAL STENT INSERTION;  Surgeon: Mar Fernandez MD;  Location: [facility];  Service: Urology;  Laterality: Left;    WRIST SURGERY Left      Time Tracking:     PT Received On: 25  PT Start Time: 48     PT Stop Time: 922  PT Total Time (min): 34 min     Billable Minutes: Evaluation 34 minutes    2025

## 2025-02-14 NOTE — PLAN OF CARE
02/14/25 1457   Discharge Assessment   Assessment Type Discharge Planning Assessment   Confirmed/corrected address, phone number and insurance Yes   Confirmed Demographics Correct on Facesheet   Source of Information family   When was your last doctors appointment?   (PCP: Martin Sears)   Does patient/caregiver understand observation status   (Inpatient)   Communicated TANYA with patient/caregiver Date not available/Unable to determine   Reason For Admission R06.02 Shortness of breath  J44.1 COPD exacerbation  R07.9 Chest pain  J18.9 Pneumonia of right lower lobe due to infectious organism  A41.9 Sepsis, due to unspecified organism, unspecified whether acute organ dysfunction present   People in Home child(rush), adult;spouse   Facility Arrived From: Home   Do you expect to return to your current living situation? Yes   Do you have help at home or someone to help you manage your care at home? No   Prior to hospitilization cognitive status: Alert/Oriented   Current cognitive status: Alert/Oriented   Walking or Climbing Stairs Difficulty yes   Walking or Climbing Stairs ambulation difficulty, requires equipment   Mobility Management Cane   Dressing/Bathing Difficulty yes   Dressing/Bathing bathing difficulty, requires equipment   Dressing/Bathing Management Shower chair   Home Layout Able to live on 1st floor   Equipment Currently Used at Home cane, quad;cane, straight;shower chair;other (see comments)  (Walk in shower)   Readmission within 30 days? No   Patient currently being followed by outpatient case management? No   Do you currently have service(s) that help you manage your care at home? No   Do you take prescription medications? Yes   Do you have prescription coverage? Yes   Coverage [facility] Medicaid   Do you have any problems affording any of your prescribed medications? No   Is the patient taking medications as prescribed? yes   Who is going to help you get home at discharge? Family   How do you get to doctors  appointments? family or friend will provide   Are you on dialysis? No   Do you take coumadin? No   Discharge Plan A Rehab   Discharge Plan B Skilled Nursing Facility   DME Needed Upon Discharge  other (see comments)  (TBD)   Transition of Care Barriers Underinsured;No family/friends to help   OTHER   Name(s) of People in Home Anabel son, P: [phone]; DIL; Wife     Patient is  and lives with his wife, son, and DIL; wife has COPD and cannot provide much assistance; DIL works; son, Anabel was recently in an accident and has a broken arm/shoulder and will have surgery in a couple of weeks; Dil states that patient will need placement temporarily with a goal to go home; CM will follow for DC planning needs.

## 2025-02-14 NOTE — PROGRESS NOTES
[facility] History and Physical     Patient Name: Suleiman Beck  MRN: [id number]  Admission Date: 2/13/2025  Hospital Length of Stay: 1 days  Code Status: Full Code  Attending Provider: Silvia Gutierrez MD  Primary Care Provider: Martin Sears MD     Chief Complaint:   Shortness of Breath (Patient presents to the ER w/ son who reports that the patient has been short of breath since about 1900 yesterday. Son reports at home oxygen saturation was in the 80s on room air so the son put him on oxygen. They deny any known medical hx other than him having a stroke last year. Son does report that his father was a heavy smoker in the past. Diminished lung sounds w/ expiratory wheezing noted during triage. )      Subjective:      Brief HPI:  Suleiman Beck is a 75 y.o. male who has a past medical history of hypertension, diabetes, CVA, CKD.  He presented to [facility] on 2/13/2025  with a primary complaint of shortness of breath.  Patient reports symptoms began 2 days ago while at rest.  Oxygen saturation was checked by son at home and was 85% on room air but later improved to 93%.  As symptoms began to worsen he continued to desaturate into the low 80s and was placed on his wife's home oxygen.  Patient denies cough but reports he feels the need to cough something up but is unable to do so.  He reports chest pain that is worse with inhalation and not present at time of evaluation.  Patient is uncertain of his medical history, reports his only medication at home is pain medicine for osteoarthritis in his right shoulder.  Patient denies use of home inhalers or oxygen.  He denies fevers, chills, chest pain, nausea, vomiting, diarrhea, constipation, dysuria, or fatigue.  Patient denies any recent travel or sick contacts.    ED course:  Patient presented afebrile, tachypneic 24, saturating 94% on 2 L nasal cannula, otherwise VSS.  Patient received 2 g of Rocephin and azithromycin 500 mg in the ED. patient was given  multiple DuoNeb treatments with improvement in shortness of breath.  Chest x-ray shows pulmonary vascular congestion and cardiac decompensation, possible right lower lobe developing infiltrate.  Internal medicine was consulted for sepsis secondary to pneumonia.    Interval history:  Patient continues to be tachypneic and hypertensive, otherwise vital signs stable saturating 95% on 3 L nasal cannula.  ABG yesterday showed acidosis 7.3 with low bicarb of 14.3.  Started on sodium bicarb t.i.d.  potassium 6.1, bicarb of 9, elevated BUN 66.6, improvement in creatinine 2.21.  Plan to shift potassium and initiate sodium bicarb drip.  Continue treatment for COPD exacerbation/pneumonia.  Patient complained of bilateral lower extremity leg pain    Patient family history is not on file.       Patient  has a past surgical history that includes Cystoscopy w/ ureteral stent placement (Left, 12/11/2022) and Wrist surgery (Left).    Patient is allergic to opioids - morphine analogues and dilaudid [hydromorphone].    Patient  reports that he quit smoking about 22 years ago. His smoking use included cigarettes. He has never used smokeless tobacco. He reports that he does not currently use alcohol. He reports that he does not use drugs.     Current Outpatient Medications   Medication Instructions    albuterol (PROVENTIL) 2.5 mg, Nebulization, Every 4 hours PRN, Rescue    atorvastatin (LIPITOR) 40 mg, Oral, Nightly    blood sugar diagnostic Strp 200 strips, Misc.(Non-Drug; Combo Route), 3 times daily    blood-glucose meter kit Use as instructed    carvediloL (COREG) 25 mg, 2 times daily    clopidogreL (PLAVIX) 75 mg, Oral, Daily    cyclobenzaprine (FLEXERIL) 5 MG tablet Daily PRN    HYDROcodone-acetaminophen (NORCO) 5-325 mg per tablet 1 tablet, Oral, Every 6 hours PRN    insulin aspart U-100 (NOVOLOG) 3 Units, Subcutaneous, 3 times daily    insulin glargine U-100 (Lantus) 10 Units, Subcutaneous, Daily    lancets 30 gauge Misc 200  "lancets, Misc.(Non-Drug; Combo Route), 3 times daily    lisinopriL (PRINIVIL,ZESTRIL) 40 mg, Oral    methocarbamoL (ROBAXIN) 500 MG Tab     NIFEdipine (PROCARDIA-XL) 90 mg, Oral, Daily    NOVOLOG MIX 70-30FLEXPEN U-100 100 unit/mL (70-30) InPn pen 3 Units, 3 times daily    pen needle, diabetic 31 gauge x 5/16" Ndle 1 each, 3 times daily    traMADoL (ULTRAM) 50 mg, Every 6 hours PRN          Review of Systems:  The remainder of the 14 point ROS is noncontributory or negative unless mentioned/reviewed above.     Objective:     Vital Signs:  Vital Signs (Most Recent):  Temp: 97.9 °F (36.6 °C) (02/13/25 2334)  Pulse: 82 (02/14/25 0655)  Resp: 18 (02/14/25 0655)  BP: (!) 142/61 (02/13/25 2334)  SpO2: 95 % (02/14/25 0655)  Body mass index is 24.37 kg/m².  Weight: 56.6 kg (124 lb 12.5 oz) Vital Signs (24h Range):  Temp:  [97.5 °F (36.4 °C)-98.1 °F (36.7 °C)] 97.9 °F (36.6 °C)  Pulse:  [75-92] 82  Resp:  [18-30] 18  SpO2:  [94 %-99 %] 95 %  BP: (115-143)/(61-84) 142/61       Input/output:     Intake/Output Summary (Last 24 hours) at 2/14/2025 0737  Last data filed at 2/14/2025 0132  Gross per 24 hour   Intake --   Output 325 ml   Net -325 ml       Physical Exam  Constitutional:       Appearance: Normal appearance. He is ill-appearing. He is not diaphoretic.   HENT:      Head: Normocephalic and atraumatic.      Mouth/Throat:      Mouth: Mucous membranes are moist.      Pharynx: Oropharynx is clear.   Eyes:      General: No scleral icterus.     Extraocular Movements: Extraocular movements intact.      Conjunctiva/sclera: Conjunctivae normal.   Cardiovascular:      Rate and Rhythm: Normal rate and regular rhythm.      Pulses: Normal pulses.      Heart sounds: Normal heart sounds. No murmur heard.     No friction rub. No gallop.   Pulmonary:      Effort: Pulmonary effort is normal. No respiratory distress.      Breath sounds: No stridor. Wheezing (Bilateral expiratory) present. No rhonchi (Right lower lobe) or rales.   Chest: " "     Chest wall: No tenderness.   Abdominal:      General: Abdomen is flat. Bowel sounds are normal. There is no distension.      Palpations: Abdomen is soft. There is no mass.      Tenderness: There is no abdominal tenderness. There is no guarding or rebound.      Hernia: No hernia is present.   Musculoskeletal:      Cervical back: Normal range of motion and neck supple.      Right lower leg: No edema.      Left lower leg: No edema.   Skin:     General: Skin is warm and dry.      Capillary Refill: Capillary refill takes less than 2 seconds.   Neurological:      General: No focal deficit present.      Mental Status: He is alert.          Lines/Drains/Airways       None                    Laboratory:    No results for input(s): "WBC", "HGB", "HCT", "PLT", "MCV", "RDW" in the last 24 hours.    No results for input(s): "TROPONINI", "CKTOTAL", "CKMB", "BNP" in the last 24 hours.    Recent Labs   Lab 02/13/25  0339 02/13/25  0340   TROPONINI 0.042  --    BNP  --  245.9*     No results for input(s): "CHOL", "HDL", "LDLCALC", "TRIG", "CHOLHDL" in the last 168 hours. No results for input(s): "NA", "K", "CHLORIDE", "CO2", "BUN", "CREATININE", "GLU", "CALCIUM", "MG", "PHOS" in the last 24 hours.    No results for input(s): "PROT", "ALBUMIN", "BILITOT", "AST", "ALKPHOS", "ALT" in the last 24 hours.    No results for input(s): "IRON", "TIBC", "FERRITIN", "SATURATEDIRO", "STUUUMBW28", "FOLATE" in the last 168 hours.  Recent Labs   Lab 02/13/25  0856   HGBA1C 6.7          Other Results:  Estimated Creatinine Clearance: 19.4 mL/min (A) (based on SCr of 2.36 mg/dL (H)).    Current Medications:     Infusions:        Scheduled:   albuterol-ipratropium  3 mL Nebulization Q4H WAKE    atorvastatin  40 mg Oral QHS    azithromycin  250 mg Oral Daily    carvediloL  25 mg Oral BID    cefTRIAXone (Rocephin) IV (PEDS and ADULTS)  2 g Intravenous Q24H    heparin (porcine)  5,000 Units Subcutaneous Q8H    insulin glargine U-100  5 Units " Subcutaneous QHS    NIFEdipine  90 mg Oral Daily    predniSONE  40 mg Oral Daily    sodium bicarbonate  1,300 mg Oral BID         PRN:   albuterol-ipratropium 2.5 mg-0.5 mg/3 mL nebulizer solution 3 mL    atorvastatin tablet 40 mg    azithromycin tablet 250 mg    carvediloL tablet 25 mg    cefTRIAXone injection 2 g    heparin (porcine) injection 5,000 Units    insulin glargine U-100 (Lantus) injection 5 Units    NIFEdipine 24 hr tablet 90 mg    predniSONE tablet 40 mg    sodium bicarbonate tablet 1,300 mg        Microbiology Data:  Microbiology Results (last 7 days)       Procedure Component Value Units Date/Time    Blood Culture [[id number]] Collected: 02/13/25 0514    Order Status: Sent Specimen: Blood from Forearm, Left Updated: 02/13/25 0519    Blood Culture [[id number]] Collected: 02/13/25 0515    Order Status: Sent Specimen: Blood from Antecubital, Right Updated: 02/13/25 0519    Respiratory Culture [[id number]]     Order Status: Sent Specimen: Sputum, Expectorated              Antibiotics and Day Number of Therapy:  Antibiotics (From admission, onward)      Start     Stop Route Frequency Ordered    02/14/25 0900  azithromycin tablet 250 mg         -- Oral Daily 02/13/25 0855    02/14/25 0000  cefTRIAXone injection 2 g         -- IV Every 24 hours (non-standard times) 02/13/25 0855             Imaging:  CT Chest Without Contrast  Narrative: EXAMINATION:  CT CHEST WITHOUT CONTRAST    CLINICAL HISTORY:  Sepsis;    TECHNIQUE:  Low dose axial images, sagittal and coronal reformations were obtained from the thoracic inlet to the lung bases. Contrast was not administered.  Automatic exposure control is utilized to reduce patient radiation exposure.    COMPARISON:  02/13/2025 chest x-ray    FINDINGS:  There are changes seen consistent with emphysema and COPD in the lungs bilaterally.  There is a bulla in the right lung apex.  There is some ground-glass edema seen in the lungs bilaterally.  Some interstitial fibrotic  are seen the upper and lower lobes with a peripheral distribution.  There is peribronchial cuffing and peribronchial thickening seen in the lower bilaterally.  There is a small right-sided pleural effusion.  There is mild right lower lobe atelectasis    Thoracic aorta is partially calcified.  The heart is upper limits normal size.    There is a hiatal hernia.  Impression: Pulmonary edema and pulmonary venous congestion bilaterally with some peribronchial seen in lower lobes bilaterally.  There is a right-sided pleural effusion.    Findings consistent with COPD and emphysema and chronic lung changes bilaterally    Electronically signed by: Colt Felix  Date:    02/13/2025  Time:    11:07  X-Ray Chest 1 View  Narrative: EXAMINATION:  XR CHEST 1 VIEW    CPT 55358    CLINICAL HISTORY:  shortness of breath;    FINDINGS:  Examination reveals mediastinal silhouette to be within normal limits cardiac silhouette is mildly enlarged there is increase in interstitial and pulmonary vascular markings indicating some degree of pulmonary vascular congestion and cardiac decompensation.    No focal consolidative changes are otherwise identified  Impression: Changes suggestive of a degree of pulmonary vascular congestion and cardiac decompensation    Electronically signed by: Chaim Kamara  Date:    02/13/2025  Time:    09:11        2D ECHO Results    Results for orders placed during the hospital encounter of 05/17/24    Echo Saline Bubble? Yes    Interpretation Summary    Left Ventricle: The left ventricle is smaller than normal. Mildly increased wall thickness. There is normal systolic function. Biplane (2D) method of discs ejection fraction is 60%. There is diastolic dysfunction.    Right Ventricle: Normal right ventricular cavity size. Systolic function is normal.    Aortic Valve: The aortic valve is a trileaflet valve. There is mild aortic valve sclerosis. There is mild annular calcification present.    IVC/SVC: Normal  "venous pressure at 3 mmHg.        Pulmonary Functions Testing Results:    No results found for: "FEV1", "FVC", "NMV7FDQ", "TLC", "DLCO"    Assessment & Plan:     Possible right lower lobe pneumonia  COPD exacerbation  Pulmonary edema  -respiratory panel negative, lactic acid negative, COVID RSV flu negative  -respiratory culture pending  -received Rocephin 2 g in ED and azithromycin 500 mg, continue Rocephin 2 g q.d. and azithromycin 500 mg q.d.  -p.r.n. oxygen, wean as tolerated, goal saturation 88-92%  -DuoNebs q.4 hours awake  -prednisone 40 mg q.d.  -chest x-ray showed pulmonary vascular congestion  -CT chest showed pulmonary edema, right-sided pleural effusion, findings consistent with COPD/emphysema and chronic lung changes bilaterally.    TINO on CKD  Metabolic acidosis  Hypertension  -continue Coreg and nifedipine, holding lisinopril in the setting of TINO  -started on sodium bicarb 1300 mg b.i.d  -shifting potassium with insulin  -started on sodium bicarb 100 mEq at 100 cc for a bicarb of 9.  -ordered urine electrolytes, urine osmoles, and UA with reflex culture.    Bilateral lower extremity pain  -ordered bilateral lower extremity DVT ultrasound    Diastolic dysfunction  -echo from 05/2024 showed preserved ejection fraction, diastolic dysfunction, no pulmonary hypertension  -ordered repeat echo    Diabetes  -dispense reports shows on 10 units insulin q.h.s., hold  -started on Lantus 5 units q.h.s. and low-dose sliding scale      CODE STATUS: Full Code   Access: PIV  Antibiotics:  Rocephin and azithromycin  Diet: Diet diabetic Renal Non-Dialysis; 2000 Calories (up to 75 gm per meal)  DVT Prophylaxis: Heparin 5k   GI Prophylaxis: none  Fluids: -      Disposition: Suleiman Beck is a 75 y.o. male who is admitted for sepsis likely secondary to pneumonia, COPD exacerbation, TINO on CKD.  Discharge pending hospital course.      Luis Enrique Barragan DO  [facility], PGY-I  02/14/2025     "

## 2025-02-14 NOTE — PLAN OF CARE
Received call from Deuce with [facility],stating that they are unable to accept patient.    Spoke to son, Anabel, P: [phone], to provide update. Next choice is [facility]. Referral sent via Epic. CM will follow.   No

## 2025-02-14 NOTE — CONSULTS
Consult for inpatient rehab noted. Spoke to patient's son, Anabel, P: [phone], who stated that he is in agreement and prefers [facility], as he has been there in the past. Referral sent via Epic. CM will follow.

## 2025-02-14 NOTE — PLAN OF CARE
Problem: Adult Inpatient Plan of Care  Goal: Plan of Care Review  Outcome: Progressing  Goal: Patient-Specific Goal (Individualized)  Outcome: Progressing  Goal: Absence of Hospital-Acquired Illness or Injury  Outcome: Progressing  Goal: Optimal Comfort and Wellbeing  Outcome: Progressing  Goal: Readiness for Transition of Care  Outcome: Progressing     Problem: Diabetes Comorbidity  Goal: Blood Glucose Level Within Targeted Range  Outcome: Progressing     Problem: Pneumonia  Goal: Fluid Balance  Outcome: Progressing  Goal: Resolution of Infection Signs and Symptoms  Outcome: Progressing  Goal: Effective Oxygenation and Ventilation  Outcome: Progressing      cholecalciferol   levothyroxine   pantoprazole Tablet   polyethylene glycol   senna

## 2025-02-14 NOTE — PT/OT/SLP EVAL
Occupational Therapy   Evaluation  *Pt speaks dialect of [place] unavailable via our  services; pt's son provided translation via telephone for entire session  Name: Suleiman Beck  MRN: [id number]  Admitting Diagnosis: Community acquired pneumonia of right lower lobe of lung  Recent Surgery: * No surgery found *      Recommendations:     Discharge Recommendations: High Intensity Therapy  Discharge Equipment Recommendations:  walker, rolling, other (see comments), bedside commode (has shower chair at home)  Barriers to discharge:  Decreased caregiver support    Assessment:     Suleiman Beck is a 75 y.o. male with a medical diagnosis of Community acquired pneumonia of right lower lobe of lung.  He presents with posterior lean in standing, c/o R chronic shoulder pain with guarding RUE but able to demonstrate AROM to roughly 70 degrees flexion with compensation into abduction, very cooperative and pleasant, frequent spillage of food during eating d/t incoordination/L hand tremors during excursion to mouth, but able to feed himself.     Performance deficits affecting function: weakness, impaired endurance, impaired self care skills, impaired functional mobility, gait instability, impaired balance, decreased upper extremity function, decreased lower extremity function, decreased safety awareness, pain, decreased ROM, impaired coordination, impaired fine motor, edema, impaired cardiopulmonary response to activity.      Rehab Prognosis: Good; patient would benefit from acute skilled OT services to address these deficits and reach maximum level of function.       Plan:     Patient to be seen 3 x/week to address the above listed problems via therapeutic exercises, therapeutic activities, self-care/home management  Plan of Care Expires:  (DC)  Plan of Care Reviewed with: patient, son    Subjective     Chief Complaint: R shoulder pain, SOB, poor endurance and increased work of breathing with activity  Patient/Family  "Comments/goals: return to PL, DC home    Occupational Profile:  Living Environment: [facility] with multiple family members including his wife, his son and DIL, no steps to enter, walk-in shower with shower chair  Previous level of function: SBA for ADLs per son's report; stated pt is "mostly" able to ambulate unassisted but sometimes needs help  Roles and Routines: pt does not drive, family cooks and cleans and provides transportation/meals  Equipment Used at Home: shower chair  Assistance upon Discharge: pt's family however they are unable to provide more than CGA level of care, will recommend therapy services to maximize pt's independence for DC home    Pain/Comfort:  Pain Rating 1: 7/10 ((son reports chronic c/o pain))  Location - Side 1: Right  Location 1: shoulder  Pain Addressed 1: Nurse notified, Distraction, Reposition  Pain Rating Post-Intervention 1: 6/10  Pain Rating 2: 7/10 (during walking)  Location - Side 2: Bilateral  Location - Orientation 2: posterior  Location 2: calf  Pain Addressed 2: Nurse notified, Reposition, Cessation of Activity  Pain Rating Post-Intervention 2: 3/10    Patients cultural, spiritual, Evangelical conflicts given the current situation: no    Objective:     Communicated with: nurse Nathan prior to session.  Patient found HOB elevated with PureWick, telemetry, restraints, oxygen, peripheral IV upon OT entry to room.    General Precautions: Standard, fall  Orthopedic Precautions: N/A  Braces: N/A  Respiratory Status: Nasal cannula, flow 4 L/min; however noted pt is breathing almost solely through his mouth, nurse notified and RT notified for possible transition to oxygen mask    Initial vital signs:  /72, HR 83, O2 sat 95%  During ambulation, O2 sat decreased to low 80s  End of session O2 sat returned to 92%    Occupational Performance:    Bed Mobility:    Patient completed Supine to Sit with maximal assistance    Functional Mobility/Transfers:  Patient completed Sit <> Stand " Transfer with moderate assistance  with  rolling walker   Functional Mobility: moderate assistance to ambulate 15 ft using RW, for assist with managing RW, maintaining balance d/t posterior lean, incoordination, and decreased endurance    Activities of Daily Living:  Feeding:  contact guard assistance d/t frequent spillage, using L hand d/t c/o chronic R shoulder pain and guarding RUE, with setup of all items, seated at chair at bedside  Grooming: stand by assistance to wipe face with washcloth using L hand, seated EOB  Upper Body Dressing: minimum assistance seated EOB  Lower Body Dressing: maximal assistance to don socks seated EOB; pt unable to bend to thread BLE and poor standing balance  Toileting: maximal assistance for clothing management and for hygiene, BUE support required at RW at all times in standing    Cognitive/Visual Perceptual:  Cognitive/Psychosocial Skills:     -       Oriented to: Person and Place   -       Follows Commands/attention:Follows two-step commands  -       Safety awareness/insight to disability: impaired   -       Mood/Affect/Coping skills/emotional control: Cooperative and Happy    Physical Exam:  LUE AROM WFL, strength 4/5 with mild deconditioning, bilateral  strength 4+/5  RUE shoulder weakness and pain limiting AROM to roughly 70 degrees flexion with compensation into abduction, all joints distal AROM WFL    Treatment & Education:  Pt. And son (via telephone) educated on OT goals, POC, orientation to environment, use of call bell for assist with transfers OOB or for any other needs due to fall risk.  Pt verbalized understanding.      Patient left up in chair with all lines intact, call button in reach, chair alarm on, and nurse and CNA notified    GOALS:   Multidisciplinary Problems       Occupational Therapy Goals          Problem: Occupational Therapy    Goal Priority Disciplines Outcome Interventions   Occupational Therapy Goal     OT, PT/OT     Description: Goals to be met  by: DC     Patient will increase functional independence with ADLs by performing:    Feeding with Set-up Assistance seated at chair without spillage.  UE Dressing with Stand-by Assistance.  LE Dressing with Stand-by Assistance.  Grooming while standing at sink with Minimal Assistance.  Toileting from bedside commode with Minimal Assistance for hygiene and clothing management.   Toilet transfer to bedside commode with Contact Guard Assistance using RW.                         DME Justifications:   Suleiman's mobility limitation cannot be sufficiently resolved by the use of a cane. His functional mobility deficit can be sufficiently resolved with the use of a Rolling Walker. Patient's mobility limitation significantly impairs their ability to participate in one of more activities of daily living.  The use of a RW will significantly improve the patient's ability to participate in MRADLS and the patient will use it on regular basis in the home.    History:     Past Medical History:   Diagnosis Date    Arthritis     Diabetes mellitus     Essential (primary) hypertension     Unspecified chronic bronchitis          Past Surgical History:   Procedure Laterality Date    CYSTOSCOPY W/ URETERAL STENT PLACEMENT Left 12/11/2022    Procedure: CYSTOSCOPY, WITH URETERAL STENT INSERTION;  Surgeon: Mar Fernandez MD;  Location: [facility];  Service: Urology;  Laterality: Left;    WRIST SURGERY Left        Time Tracking:     OT Date of Treatment: 02/14/25  OT Start Time: 0848  OT Stop Time: 0922  OT Total Time (min): 34 min    Billable Minutes:Evaluation 34, moderate    2/14/2025

## 2025-02-14 NOTE — PLAN OF CARE
Problem: Occupational Therapy  Goal: Occupational Therapy Goal  Description: Goals to be met by: DC     Patient will increase functional independence with ADLs by performing:    Feeding with Set-up Assistance seated at chair without spillage.  UE Dressing with Stand-by Assistance.  LE Dressing with Stand-by Assistance.  Grooming while standing at sink with Minimal Assistance.  Toileting from bedside commode with Minimal Assistance for hygiene and clothing management.   Toilet transfer to bedside commode with Contact Guard Assistance using RW.    Outcome: Progressing

## 2025-02-14 NOTE — PROGRESS NOTES
Inpatient Nutrition Evaluation    Admit Date: 2/13/2025   Total duration of encounter: 1 day   Patient Age: 75 y.o.    Nutrition Recommendation/Prescription     Carb consistent, Renal diet  Monitor Weight Biweekly   Ion Torrent Renal (provides 475 kcal, 22 g protein per serving) once dialy    Nutrition Assessment     Chart Review    Reason Seen: continuous nutrition monitoring    Malnutrition Screening Tool Results   Have you recently lost weight without trying?: No  Have you been eating poorly because of a decreased appetite?: No   MST Score: 0   Diagnosis:  COPD, right lower lobe pneumonia, COPD exacerbation, TINO on CKD, Metabolic acidosis, HTN, DM    Relevant Medical History: HTN, DM, CVA, CKD    Scheduled Medications:  albuterol-ipratropium, 3 mL, Q4H  atorvastatin, 40 mg, QHS  azithromycin, 250 mg, Daily  carvediloL, 25 mg, BID  cefTRIAXone (Rocephin) IV (PEDS and ADULTS), 2 g, Q24H  heparin (porcine), 5,000 Units, Q8H  insulin glargine U-100, 5 Units, QHS  NIFEdipine, 90 mg, Daily  predniSONE, 40 mg, Daily  sodium bicarbonate, 1,300 mg, BID    Continuous Infusions:   PRN Medications:   Current Facility-Administered Medications:     dextrose 50%, 12.5 g, Intravenous, PRN    dextrose 50%, 25 g, Intravenous, PRN    glucagon (human recombinant), 1 mg, Intramuscular, PRN    glucose, 16 g, Oral, PRN    glucose, 24 g, Oral, PRN    HYDROcodone-acetaminophen, 1 tablet, Oral, Q6H PRN    hydrOXYzine pamoate, 25 mg, Oral, Daily PRN    insulin aspart U-100, 0-5 Units, Subcutaneous, QID (AC + HS) PRN    naloxone, 0.02 mg, Intravenous, PRN    sodium chloride 0.9%, 10 mL, Intravenous, Q12H PRN    Recent Labs   Lab 02/13/25  0339 02/13/25  0340 02/13/25  0856 02/14/25  0558     --   --   --    K 5.3*  --   --   --    CALCIUM 9.4  --   --   --    PHOS 4.5  --   --   --    MG 2.20  --   --   --    CO2 14*  --   --   --    BUN 55.9*  --   --   --    CREATININE 2.36*  --   --   --    EGFRNORACEVR 28  --   --   --    GLUCOSE  198*  --   --   --    BILITOT 0.3  --   --   --    ALKPHOS 66  --   --   --    ALT 7  --   --   --    AST 8  --   --   --    ALBUMIN 3.2*  --   --   --    HGBA1C  --   --  6.7  --    WBC  --  17.03*  --  15.76*   HGB  --  7.7*  --  8.1*   HCT  --  25.9*  --  27.1*     Nutrition Orders:  Diet diabetic Renal Non-Dialysis; 2000 Calories (up to 75 gm per meal)  Dietary nutrition supplements Daily (once daily); Novasource Renal - Any flavor    Appetite/Oral Intake: good/% of meals  Factors Affecting Nutritional Intake: none identified  Social Needs Impacting Access to Food: none identified  Food/Gnosticism/Cultural Preferences:  no beef, pork, fish, seafood; Only Poultry  Food Allergies: no known food allergies  Last Bowel Movement: 25  Wound(s):  skin intact    Comments    25 -- Pt with good intake of breakfast this am, nursing reports ~75% consumed; no reports of decreased appetite, no n/v/d; Significant weight loss indicated per EMR over the last 9 months, pt with stroke in May noted; will order Nov Renal once daily to supplement nutrition; BUN/Cr/K (H) - CKD, continue renal diet as ordered; Glu (H) h/o Dm, continue carb consistent diet    Anthropometrics    Height: 5' (152.4 cm), Height Method: Estimated  Last Weight: 56.6 kg (124 lb 12.5 oz) (25 0332), Weight Method: Standard Scale  BMI (Calculated): 24.4  BMI Classification: normal (BMI 18.5-24.9)        Ideal Body Weight (IBW), Male: 106 lb     % Ideal Body Weight, Male (lb): 117.72 %                 Usual Body Weight (UBW), k kg  % Usual Body Weight: 90.03     Usual Weight Provided By: EMR weight history    Wt Readings from Last 5 Encounters:   25 56.6 kg (124 lb 12.5 oz)   25 58.2 kg (128 lb 6.4 oz)   24 54.4 kg (120 lb)   24 57.2 kg (126 lb 1.7 oz)   10/29/24 56.2 kg (124 lb)     Weight Change(s) Since Admission:   Wt Readings from Last 1 Encounters:   25 0332 56.6 kg (124 lb 12.5 oz)   Admit Weight: 56.6  kg (124 lb 12.5 oz) (02/13/25 7876), Weight Method: Standard Scale    Patient Education     Not applicable.    Nutrition Goals & Monitoring     Dietitian will monitor: food and beverage intake, weight, electrolyte/renal panel, and glucose/endocrine profile  Discharge planning: continue renal, carb consistent diet with novosource renal or equivalent oral supplements  Nutrition Risk/Follow-Up: low (follow-up in 5-7 days)  Patients assigned 'low nutrition risk' status do not qualify for a full nutritional assessment but will be monitored and re-evaluated in a 5-7 day time period. Please consult if re-evaluation needed sooner.

## 2025-02-15 LAB
ALBUMIN SERPL-MCNC: 3 G/DL (ref 3.4–4.8)
ALBUMIN/GLOB SERPL: 0.7 RATIO (ref 1.1–2)
ALP SERPL-CCNC: 57 UNIT/L (ref 40–150)
ALT SERPL-CCNC: 8 UNIT/L (ref 0–55)
ANION GAP SERPL CALC-SCNC: 8 MEQ/L
AST SERPL-CCNC: 13 UNIT/L (ref 5–34)
BACTERIA #/AREA URNS AUTO: ABNORMAL /HPF
BASOPHILS # BLD AUTO: 0.02 X10(3)/MCL
BASOPHILS NFR BLD AUTO: 0.1 %
BILIRUB SERPL-MCNC: 0.2 MG/DL
BILIRUB UR QL STRIP.AUTO: NEGATIVE
BUN SERPL-MCNC: 71.4 MG/DL (ref 8.4–25.7)
CALCIUM SERPL-MCNC: 9 MG/DL (ref 8.8–10)
CHLORIDE SERPL-SCNC: 108 MMOL/L (ref 98–107)
CHLORIDE UR-SCNC: 32 MMOL/L
CLARITY UR: CLEAR
CO2 SERPL-SCNC: 21 MMOL/L (ref 23–31)
COLOR UR AUTO: COLORLESS
CREAT SERPL-MCNC: 1.92 MG/DL (ref 0.72–1.25)
CREAT/UREA NIT SERPL: 37
EOSINOPHIL # BLD AUTO: 0.01 X10(3)/MCL (ref 0–0.9)
EOSINOPHIL NFR BLD AUTO: 0.1 %
ERYTHROCYTE [DISTWIDTH] IN BLOOD BY AUTOMATED COUNT: 15 % (ref 11.5–17)
GFR SERPLBLD CREATININE-BSD FMLA CKD-EPI: 36 ML/MIN/1.73/M2
GLOBULIN SER-MCNC: 4.1 GM/DL (ref 2.4–3.5)
GLUCOSE SERPL-MCNC: 165 MG/DL (ref 82–115)
GLUCOSE UR QL STRIP: NORMAL
HCT VFR BLD AUTO: 23.6 % (ref 42–52)
HGB BLD-MCNC: 7.3 G/DL (ref 14–18)
HGB UR QL STRIP: ABNORMAL
HYALINE CASTS #/AREA URNS LPF: ABNORMAL /LPF
IMM GRANULOCYTES # BLD AUTO: 0.07 X10(3)/MCL (ref 0–0.04)
IMM GRANULOCYTES NFR BLD AUTO: 0.4 %
KETONES UR QL STRIP: NEGATIVE
LEUKOCYTE ESTERASE UR QL STRIP: 250
LYMPHOCYTES # BLD AUTO: 1.88 X10(3)/MCL (ref 0.6–4.6)
LYMPHOCYTES NFR BLD AUTO: 11.3 %
MAGNESIUM SERPL-MCNC: 2.3 MG/DL (ref 1.6–2.6)
MCH RBC QN AUTO: 26.5 PG (ref 27–31)
MCHC RBC AUTO-ENTMCNC: 30.9 G/DL (ref 33–36)
MCV RBC AUTO: 85.8 FL (ref 80–94)
MONOCYTES # BLD AUTO: 1.01 X10(3)/MCL (ref 0.1–1.3)
MONOCYTES NFR BLD AUTO: 6.1 %
NEUTROPHILS # BLD AUTO: 13.65 X10(3)/MCL (ref 2.1–9.2)
NEUTROPHILS NFR BLD AUTO: 82 %
NITRITE UR QL STRIP: NEGATIVE
NRBC BLD AUTO-RTO: 0 %
OSMOLALITY UR: 261 MOSM/KG (ref 300–1300)
PH UR STRIP: 5.5 [PH]
PHOSPHATE SERPL-MCNC: 4 MG/DL (ref 2.3–4.7)
PLATELET # BLD AUTO: 405 X10(3)/MCL (ref 130–400)
PMV BLD AUTO: 10.9 FL (ref 7.4–10.4)
POCT GLUCOSE: 178 MG/DL (ref 70–110)
POCT GLUCOSE: 322 MG/DL (ref 70–110)
POCT GLUCOSE: 329 MG/DL (ref 70–110)
POCT GLUCOSE: 348 MG/DL (ref 70–110)
POCT GLUCOSE: 379 MG/DL (ref 70–110)
POTASSIUM SERPL-SCNC: 4.3 MMOL/L (ref 3.5–5.1)
POTASSIUM UR-SCNC: 10.8 MMOL/L
PROT SERPL-MCNC: 7.1 GM/DL (ref 5.8–7.6)
PROT UR QL STRIP: ABNORMAL
RBC # BLD AUTO: 2.75 X10(6)/MCL (ref 4.7–6.1)
RBC #/AREA URNS AUTO: ABNORMAL /HPF
SODIUM SERPL-SCNC: 137 MMOL/L (ref 136–145)
SODIUM UR-SCNC: 39 MMOL/L
SP GR UR STRIP.AUTO: 1.01 (ref 1–1.03)
SQUAMOUS #/AREA URNS LPF: ABNORMAL /HPF
UROBILINOGEN UR STRIP-ACNC: NORMAL
WBC # BLD AUTO: 16.64 X10(3)/MCL (ref 4.5–11.5)
WBC #/AREA URNS AUTO: ABNORMAL /HPF

## 2025-02-15 PROCEDURE — 63600175 PHARM REV CODE 636 W HCPCS

## 2025-02-15 PROCEDURE — 94760 N-INVAS EAR/PLS OXIMETRY 1: CPT

## 2025-02-15 PROCEDURE — 87086 URINE CULTURE/COLONY COUNT: CPT

## 2025-02-15 PROCEDURE — 80053 COMPREHEN METABOLIC PANEL: CPT

## 2025-02-15 PROCEDURE — 21400001 HC TELEMETRY ROOM

## 2025-02-15 PROCEDURE — 82436 ASSAY OF URINE CHLORIDE: CPT

## 2025-02-15 PROCEDURE — 94761 N-INVAS EAR/PLS OXIMETRY MLT: CPT

## 2025-02-15 PROCEDURE — 25000003 PHARM REV CODE 250

## 2025-02-15 PROCEDURE — 63700000 PHARM REV CODE 250 ALT 637 W/O HCPCS

## 2025-02-15 PROCEDURE — 25000003 PHARM REV CODE 250: Performed by: INTERNAL MEDICINE

## 2025-02-15 PROCEDURE — 27000221 HC OXYGEN, UP TO 24 HOURS

## 2025-02-15 PROCEDURE — 27100171 HC OXYGEN HIGH FLOW UP TO 24 HOURS

## 2025-02-15 PROCEDURE — 94640 AIRWAY INHALATION TREATMENT: CPT

## 2025-02-15 PROCEDURE — 81001 URINALYSIS AUTO W/SCOPE: CPT

## 2025-02-15 PROCEDURE — 84100 ASSAY OF PHOSPHORUS: CPT

## 2025-02-15 PROCEDURE — 83735 ASSAY OF MAGNESIUM: CPT

## 2025-02-15 PROCEDURE — 84300 ASSAY OF URINE SODIUM: CPT

## 2025-02-15 PROCEDURE — 97110 THERAPEUTIC EXERCISES: CPT

## 2025-02-15 PROCEDURE — 83935 ASSAY OF URINE OSMOLALITY: CPT

## 2025-02-15 PROCEDURE — 84133 ASSAY OF URINE POTASSIUM: CPT

## 2025-02-15 PROCEDURE — 36415 COLL VENOUS BLD VENIPUNCTURE: CPT

## 2025-02-15 PROCEDURE — 25000242 PHARM REV CODE 250 ALT 637 W/ HCPCS

## 2025-02-15 PROCEDURE — 85025 COMPLETE CBC W/AUTO DIFF WBC: CPT

## 2025-02-15 RX ORDER — BENZONATATE 100 MG/1
CAPSULE ORAL
Status: COMPLETED
Start: 2025-02-15 | End: 2025-02-15

## 2025-02-15 RX ORDER — BENZONATATE 100 MG/1
100 CAPSULE ORAL 3 TIMES DAILY PRN
Status: DISCONTINUED | OUTPATIENT
Start: 2025-02-15 | End: 2025-02-18 | Stop reason: HOSPADM

## 2025-02-15 RX ORDER — POLYETHYLENE GLYCOL 3350 17 G/17G
17 POWDER, FOR SOLUTION ORAL DAILY
Status: DISCONTINUED | OUTPATIENT
Start: 2025-02-15 | End: 2025-02-16

## 2025-02-15 RX ADMIN — INSULIN GLARGINE 5 UNITS: 100 INJECTION, SOLUTION SUBCUTANEOUS at 08:02

## 2025-02-15 RX ADMIN — INSULIN ASPART 3 UNITS: 100 INJECTION, SOLUTION INTRAVENOUS; SUBCUTANEOUS at 08:02

## 2025-02-15 RX ADMIN — CARVEDILOL 25 MG: 12.5 TABLET, FILM COATED ORAL at 08:02

## 2025-02-15 RX ADMIN — PREDNISONE 40 MG: 20 TABLET ORAL at 08:02

## 2025-02-15 RX ADMIN — INSULIN ASPART 4 UNITS: 100 INJECTION, SOLUTION INTRAVENOUS; SUBCUTANEOUS at 12:02

## 2025-02-15 RX ADMIN — HEPARIN SODIUM 5000 UNITS: 5000 INJECTION INTRAVENOUS; SUBCUTANEOUS at 03:02

## 2025-02-15 RX ADMIN — HYDROCODONE BITARTRATE AND ACETAMINOPHEN 1 TABLET: 5; 325 TABLET ORAL at 11:02

## 2025-02-15 RX ADMIN — HYDROCODONE BITARTRATE AND ACETAMINOPHEN 1 TABLET: 5; 325 TABLET ORAL at 08:02

## 2025-02-15 RX ADMIN — INSULIN ASPART 4 UNITS: 100 INJECTION, SOLUTION INTRAVENOUS; SUBCUTANEOUS at 05:02

## 2025-02-15 RX ADMIN — HYDROCODONE BITARTRATE AND ACETAMINOPHEN 1 TABLET: 5; 325 TABLET ORAL at 05:02

## 2025-02-15 RX ADMIN — ATORVASTATIN CALCIUM 40 MG: 40 TABLET, FILM COATED ORAL at 08:02

## 2025-02-15 RX ADMIN — IPRATROPIUM BROMIDE AND ALBUTEROL SULFATE 3 ML: 2.5; .5 SOLUTION RESPIRATORY (INHALATION) at 08:02

## 2025-02-15 RX ADMIN — NIFEDIPINE 90 MG: 30 TABLET, FILM COATED, EXTENDED RELEASE ORAL at 08:02

## 2025-02-15 RX ADMIN — IPRATROPIUM BROMIDE AND ALBUTEROL SULFATE 3 ML: 2.5; .5 SOLUTION RESPIRATORY (INHALATION) at 12:02

## 2025-02-15 RX ADMIN — SODIUM BICARBONATE 650 MG TABLET 1300 MG: at 08:02

## 2025-02-15 RX ADMIN — IPRATROPIUM BROMIDE AND ALBUTEROL SULFATE 3 ML: 2.5; .5 SOLUTION RESPIRATORY (INHALATION) at 03:02

## 2025-02-15 RX ADMIN — HYDROXYZINE PAMOATE 25 MG: 25 CAPSULE ORAL at 01:02

## 2025-02-15 RX ADMIN — IPRATROPIUM BROMIDE AND ALBUTEROL SULFATE 3 ML: 2.5; .5 SOLUTION RESPIRATORY (INHALATION) at 07:02

## 2025-02-15 RX ADMIN — HEPARIN SODIUM 5000 UNITS: 5000 INJECTION INTRAVENOUS; SUBCUTANEOUS at 09:02

## 2025-02-15 RX ADMIN — BENZONATATE 100 MG: 100 CAPSULE ORAL at 08:02

## 2025-02-15 RX ADMIN — IPRATROPIUM BROMIDE AND ALBUTEROL SULFATE 3 ML: 2.5; .5 SOLUTION RESPIRATORY (INHALATION) at 11:02

## 2025-02-15 RX ADMIN — HEPARIN SODIUM 5000 UNITS: 5000 INJECTION INTRAVENOUS; SUBCUTANEOUS at 06:02

## 2025-02-15 RX ADMIN — AZITHROMYCIN DIHYDRATE 250 MG: 250 TABLET, FILM COATED ORAL at 08:02

## 2025-02-15 RX ADMIN — SODIUM BICARBONATE: 84 INJECTION, SOLUTION INTRAVENOUS at 03:02

## 2025-02-15 RX ADMIN — CEFTRIAXONE SODIUM 2 G: 2 INJECTION, POWDER, FOR SOLUTION INTRAMUSCULAR; INTRAVENOUS at 11:02

## 2025-02-15 RX ADMIN — POLYETHYLENE GLYCOL 3350 17 G: 17 POWDER, FOR SOLUTION ORAL at 10:02

## 2025-02-15 RX ADMIN — BENZONATATE 100 MG: 100 CAPSULE ORAL at 02:02

## 2025-02-15 NOTE — PT/OT/SLP PROGRESS
Physical Therapy Treatment    Patient Name:  Suleiman Beck   MRN:  [id number]    Recommendations     Therapy Intensity Recommendations at Discharge: High Intensity Therapy  Discharge Equipment Recommendations: walker, rolling, bedside commode   Barriers to discharge: fall risk, medical diagnosis, and past medical history    Assessment     Suleiman Beck is a 75 y.o. male admitted with a medical diagnosis of  Community acquired pneumonia of right lower lobe of lung.  1. Sepsis, due to unspecified organism, unspecified whether acute organ dysfunction present    2. Shortness of breath    3. Pneumonia of right lower lobe due to infectious organism    4. COPD exacerbation    5. Chest pain    6. Volume overload    7. DVT (deep venous thrombosis)    8. Community acquired pneumonia of right lower lobe of lung       Problem List[1]   He presents with the following impairments/functional limitations:  weakness, impaired endurance, impaired functional mobility, gait instability, impaired balance.    Rehab Prognosis: Good.    Patient would benefit from continued skilled acute PT services to: address above listed impairments/functional limitations; receive patient/caregiver education; reduce fall risk; and maximize independency/safety with functional mobility.    Recent Surgery: * No surgery found *      Plan     During this hospitalization, patient to be seen 3 x/week to address the identified impairments/functional limitations via gait training, therapeutic activities, therapeutic exercises and progress toward the established goals.    Plan of Care Expires:  03/16/25    Subjective     Communicated with patient's nurse  prior to session.    Patient agreeable to participate in treatment session.    Chief Complaint: Patient reports no new symptoms since previous session  Patient/Family Comments/goals: to dc home  Pain/Comfort:  Pain Rating 1: 0/10  Pain Rating Post-Intervention 1: 0/10    Objective     Patient found supine in bed with  peripheral IV  upon PT entry to room.    General Precautions: Standard, fall   Orthopedic Precautions:N/A   Braces:     Respiratory Status: room air    Functional Mobility:    Bed Mobility:  Rolling Left: minimum assistance  Rolling Right: minimum assistance  Supine to Sit: minimum assistance  Sit to Supine: minimum assistance    Transfers:  Sit to Stand: minimum assistance with hand-held assist  Stand to Sit: minimum assistance with hand-held assist    Gait:  Patient ambulated 2ft with hand-held assist and minimum assistance.  Patient demonstrates :       unsteady gait       decreased tam       decreased bilateral step length       wide base of support.    Other Mobility:  N/A    Patient left supine in bed with all lines intact, call button in reach, tray table at bedside, and patient's nurse notified.    DME Justifications:   Suleiman requires a commode for home use because difficulty getting to [facility] in timely fashion   Suleiman's mobility limitation cannot be sufficiently resolved by the use of a cane. His functional mobility deficit can be sufficiently resolved with the use of a Rolling Walker. Patient's mobility limitation significantly impairs their ability to participate in one of more activities of daily living.  The use of a RW will significantly improve the patient's ability to participate in MRADLS and the patient will use it on regular basis in the home.    Education     Patient educated on and assisted with functional mobility as noted above.  Patient educated on PT Plan of Care.  Patient was instructed to utilize staff assistance for mobility/transfers.  White board updated regarding patient's safest level of mobility with staff assistance    Goals     Multidisciplinary Problems       Physical Therapy Goals          Problem: Physical Therapy    Goal Priority Disciplines Outcome Interventions   Physical Therapy Goal     PT, PT/OT     Description: Goals to be met by: Discharge    Pt. Will increase  independence with functional mobility by performin. Supine<>sit with Min A  2. Sit<>stand with RW and Min A  3. Pt. Will ambulate x 130 feet with RW and Min A.                     Time Tracking     PT Received On: 02/15/25  PT Start Time: 1105     PT Stop Time: 1122  PT Total Time (min): 17 min     Billable Minutes: Therapeutic Activity 17    02/15/2025       [1]   Patient Active Problem List  Diagnosis    Ureteral obstruction    Controlled type 2 diabetes mellitus without complication    Hypertension    Thyroid nodule    Primary osteoarthritis of right shoulder    History of stroke    Chronic bronchitis    Ex-cigarette smoker    Bifascicular block    Community acquired pneumonia of right lower lobe of lung

## 2025-02-15 NOTE — PROGRESS NOTES
[facility] History and Physical     Patient Name: Suleiman Beck  MRN: [id number]  Admission Date: 2/13/2025  Hospital Length of Stay: 2 days  Code Status: Full Code  Attending Provider: Silvia Gutierrez MD  Primary Care Provider: Martin Sears MD     Chief Complaint:   Shortness of Breath (Patient presents to the ER w/ son who reports that the patient has been short of breath since about 1900 yesterday. Son reports at home oxygen saturation was in the 80s on room air so the son put him on oxygen. They deny any known medical hx other than him having a stroke last year. Son does report that his father was a heavy smoker in the past. Diminished lung sounds w/ expiratory wheezing noted during triage. )      Subjective:      Brief HPI:  Suleiman Beck is a 75 y.o. male who has a past medical history of hypertension, diabetes, CVA, CKD.  He presented to [facility] on 2/13/2025  with a primary complaint of shortness of breath.  Patient reports symptoms began 2 days ago while at rest.  Oxygen saturation was checked by son at home and was 85% on room air but later improved to 93%.  As symptoms began to worsen he continued to desaturate into the low 80s and was placed on his wife's home oxygen.  Patient denies cough but reports he feels the need to cough something up but is unable to do so.  He reports chest pain that is worse with inhalation and not present at time of evaluation.  Patient is uncertain of his medical history, reports his only medication at home is pain medicine for osteoarthritis in his right shoulder.  Patient denies use of home inhalers or oxygen.  He denies fevers, chills, chest pain, nausea, vomiting, diarrhea, constipation, dysuria, or fatigue.  Patient denies any recent travel or sick contacts.    ED course:  Patient presented afebrile, tachypneic 24, saturating 94% on 2 L nasal cannula, otherwise VSS.  Patient received 2 g of Rocephin and azithromycin 500 mg in the ED. patient was given  multiple DuoNeb treatments with improvement in shortness of breath.  Chest x-ray shows pulmonary vascular congestion and cardiac decompensation, possible right lower lobe developing infiltrate.  Internal medicine was consulted for sepsis secondary to pneumonia.    Interval history:  Patient continues to be tachypneic and hypertensive, otherwise vital signs stable saturating 95% on 3 L nasal cannula.  CMP showed improvement in sodium 137, potassium 4.3, improved bicarb 21, improvement in renal indices BUN/creatinine 71.4/1.92.  Continue treatment for COPD exacerbation/pneumonia.  Patient complained of bilateral lower extremity leg pain, negative for DVT.  Plan to continue sodium bicarb drip for 1 more day, monitor serum bicarb.    Patient family history is not on file.       Patient  has a past surgical history that includes Cystoscopy w/ ureteral stent placement (Left, 12/11/2022) and Wrist surgery (Left).    Patient is allergic to opioids - morphine analogues and dilaudid [hydromorphone].    Patient  reports that he quit smoking about 22 years ago. His smoking use included cigarettes. He has never used smokeless tobacco. He reports that he does not currently use alcohol. He reports that he does not use drugs.     Current Outpatient Medications   Medication Instructions    albuterol (PROVENTIL) 2.5 mg, Nebulization, Every 4 hours PRN, Rescue    atorvastatin (LIPITOR) 40 mg, Oral, Nightly    blood sugar diagnostic Strp 200 strips, Misc.(Non-Drug; Combo Route), 3 times daily    blood-glucose meter kit Use as instructed    carvediloL (COREG) 25 mg, 2 times daily    clopidogreL (PLAVIX) 75 mg, Oral, Daily    cyclobenzaprine (FLEXERIL) 5 MG tablet Daily PRN    HYDROcodone-acetaminophen (NORCO) 5-325 mg per tablet 1 tablet, Oral, Every 6 hours PRN    insulin aspart U-100 (NOVOLOG) 3 Units, Subcutaneous, 3 times daily    insulin glargine U-100 (Lantus) 10 Units, Subcutaneous, Daily    lancets 30 gauge Misc 200 lancets,  "Misc.(Non-Drug; Combo Route), 3 times daily    lisinopriL (PRINIVIL,ZESTRIL) 40 mg, Oral    methocarbamoL (ROBAXIN) 500 MG Tab     NIFEdipine (PROCARDIA-XL) 90 mg, Oral, Daily    NOVOLOG MIX 70-30FLEXPEN U-100 100 unit/mL (70-30) InPn pen 3 Units, 3 times daily    pen needle, diabetic 31 gauge x 5/16" Ndle 1 each, 3 times daily    traMADoL (ULTRAM) 50 mg, Every 6 hours PRN          Review of Systems:  The remainder of the 14 point ROS is noncontributory or negative unless mentioned/reviewed above.     Objective:     Vital Signs:  Vital Signs (Most Recent):  Temp: 97.8 °F (36.6 °C) (02/15/25 0455)  Pulse: 79 (02/15/25 0455)  Resp: (!) 21 (02/15/25 0455)  BP: (!) 152/76 (02/15/25 0455)  SpO2: (!) 93 % (02/15/25 0455)  Body mass index is 24.22 kg/m².  Weight: 56.2 kg (124 lb) Vital Signs (24h Range):  Temp:  [97.1 °F (36.2 °C)-98.3 °F (36.8 °C)] 97.8 °F (36.6 °C)  Pulse:  [75-87] 79  Resp:  [17-24] 21  SpO2:  [93 %-98 %] 93 %  BP: (127-152)/(64-76) 152/76       Input/output:     Intake/Output Summary (Last 24 hours) at 2/15/2025 0647  Last data filed at 2/15/2025 0626  Gross per 24 hour   Intake 1470.06 ml   Output 1250 ml   Net 220.06 ml       Physical Exam  Constitutional:       Appearance: Normal appearance. He is ill-appearing. He is not diaphoretic.   HENT:      Head: Normocephalic and atraumatic.      Mouth/Throat:      Mouth: Mucous membranes are moist.      Pharynx: Oropharynx is clear.   Eyes:      General: No scleral icterus.     Extraocular Movements: Extraocular movements intact.      Conjunctiva/sclera: Conjunctivae normal.   Cardiovascular:      Rate and Rhythm: Normal rate and regular rhythm.      Pulses: Normal pulses.      Heart sounds: Normal heart sounds. No murmur heard.     No friction rub. No gallop.   Pulmonary:      Effort: Pulmonary effort is normal. No respiratory distress.      Breath sounds: No stridor. Wheezing (Bilateral expiratory) present. No rhonchi (Right lower lobe) or rales.   Chest: " "     Chest wall: No tenderness.   Abdominal:      General: Abdomen is flat. Bowel sounds are normal. There is no distension.      Palpations: Abdomen is soft. There is no mass.      Tenderness: There is no abdominal tenderness. There is no guarding or rebound.      Hernia: No hernia is present.   Musculoskeletal:      Cervical back: Normal range of motion and neck supple.      Right lower leg: No edema.      Left lower leg: No edema.   Skin:     General: Skin is warm and dry.      Capillary Refill: Capillary refill takes less than 2 seconds.   Neurological:      General: No focal deficit present.      Mental Status: He is alert.          Lines/Drains/Airways       None                    Laboratory:    No results for input(s): "WBC", "HGB", "HCT", "PLT", "MCV", "RDW" in the last 24 hours.    No results for input(s): "TROPONINI", "CKTOTAL", "CKMB", "BNP" in the last 24 hours.    Recent Labs   Lab 02/13/25  0339 02/13/25  0340   TROPONINI 0.042  --    BNP  --  245.9*     No results for input(s): "CHOL", "HDL", "LDLCALC", "TRIG", "CHOLHDL" in the last 168 hours. Recent Labs   Lab 02/14/25  1101 02/14/25  2031    133*   K 6.1* 5.2*   CO2 9* 18*   BUN 66.6* 77.1*   CREATININE 2.21* 2.37*   CALCIUM 10.2* 9.3   MG 2.70*  --        Recent Labs   Lab 02/14/25  1101   ALBUMIN 3.5   BILITOT 0.3   AST 17   ALKPHOS 71   ALT 7       No results for input(s): "IRON", "TIBC", "FERRITIN", "SATURATEDIRO", "[id number]", "FOLATE" in the last 168 hours.  Recent Labs   Lab 02/13/25  0856   HGBA1C 6.7          Other Results:  Estimated Creatinine Clearance: 19.3 mL/min (A) (based on SCr of 2.37 mg/dL (H)).    Current Medications:     Infusions:   sodium bicarbonate 100 mEq in D5W 1,000 mL infusion   Intravenous Continuous 100 mL/hr at 02/15/25 0408 Rate Verify at 02/15/25 0408         Scheduled:   albuterol-ipratropium  3 mL Nebulization Q4H    atorvastatin  40 mg Oral QHS    azithromycin  250 mg Oral Daily    carvediloL  25 mg Oral " BID    cefTRIAXone (Rocephin) IV (PEDS and ADULTS)  2 g Intravenous Q24H    heparin (porcine)  5,000 Units Subcutaneous Q8H    insulin glargine U-100  5 Units Subcutaneous QHS    NIFEdipine  90 mg Oral Daily    predniSONE  40 mg Oral Daily    sodium bicarbonate  1,300 mg Oral BID         PRN:   albuterol-ipratropium 2.5 mg-0.5 mg/3 mL nebulizer solution 3 mL    atorvastatin tablet 40 mg    azithromycin tablet 250 mg    carvediloL tablet 25 mg    cefTRIAXone injection 2 g    heparin (porcine) injection 5,000 Units    insulin glargine U-100 (Lantus) injection 5 Units    NIFEdipine 24 hr tablet 90 mg    predniSONE tablet 40 mg    sodium bicarbonate tablet 1,300 mg        Microbiology Data:  Microbiology Results (last 7 days)       Procedure Component Value Units Date/Time    Blood Culture [[id number]]  (Normal) Collected: 02/13/25 0514    Order Status: Completed Specimen: Blood from Forearm, Left Updated: 02/15/25 0627     Blood Culture No Growth At 24 Hours    Blood Culture [[id number]]  (Normal) Collected: 02/13/25 0515    Order Status: Completed Specimen: Blood from Antecubital, Right Updated: 02/15/25 0627     Blood Culture No Growth At 24 Hours    Urine culture [[id number]] Collected: 02/15/25 0344    Order Status: Sent Specimen: Urine Updated: 02/15/25 0420    Respiratory Culture [[id number]]     Order Status: Sent Specimen: Sputum, Expectorated              Antibiotics and Day Number of Therapy:  Antibiotics (From admission, onward)      Start     Stop Route Frequency Ordered    02/14/25 0900  azithromycin tablet 250 mg         -- Oral Daily 02/13/25 0855    02/14/25 0000  cefTRIAXone injection 2 g         -- IV Every 24 hours (non-standard times) 02/13/25 0855             Imaging:  Echo    Left Ventricle: The left ventricle is normal in size. Mildly increased   ventricular mass. Moderately increased wall thickness. There is mild   concentric hypertrophy. Normal wall motion. There is normal systolic   function.  "Quantitated ejection fraction is 62%. Grade II diastolic   dysfunction. Elevated left ventricular filling pressure.    Right Ventricle: Normal right ventricular cavity size. Systolic   function is normal.    Left Atrium: Left atrium is mildly dilated. The left atrium volume   index MOD is 39 mL/m2.    Right Atrium: Normal right atrial size.    Aortic Valve: The aortic valve is a trileaflet valve. There is mild   aortic valve sclerosis. There is no stenosis. There is mild aortic   regurgitation.    Mitral Valve: The mitral valve is structurally normal. There is no   stenosis. There is trace regurgitation.    Tricuspid Valve: There is mild regurgitation.    Pulmonic Valve: There is no significant regurgitation.    Aorta: Aortic root is normal in size measuring 2.9 cm. Ascending aorta   is normal measuring 3.1 cm.    Pulmonary Artery: There is mild pulmonary hypertension. The estimated   pulmonary artery systolic pressure is 41 mmHg.    IVC/SVC: Normal venous pressure at 3 mmHg.    Pericardium: There is no pericardial effusion.        2D ECHO Results    Results for orders placed during the hospital encounter of 05/17/24    Echo Saline Bubble? Yes    Interpretation Summary    Left Ventricle: The left ventricle is smaller than normal. Mildly increased wall thickness. There is normal systolic function. Biplane (2D) method of discs ejection fraction is 60%. There is diastolic dysfunction.    Right Ventricle: Normal right ventricular cavity size. Systolic function is normal.    Aortic Valve: The aortic valve is a trileaflet valve. There is mild aortic valve sclerosis. There is mild annular calcification present.    IVC/SVC: Normal venous pressure at 3 mmHg.        Pulmonary Functions Testing Results:    No results found for: "FEV1", "FVC", "OKO6LKV", "TLC", "DLCO"    Assessment & Plan:     Possible right lower lobe pneumonia  COPD exacerbation  Pulmonary edema  -respiratory panel negative, lactic acid negative, COVID RSV " flu negative  -respiratory culture pending  -received Rocephin 2 g in ED and azithromycin 500 mg, continue Rocephin 2 g q.d. and azithromycin 500 mg q.d.  -p.r.n. oxygen, wean as tolerated, goal saturation 88-92%  -DuoNebs q.4 hours awake  -prednisone 40 mg q.d.  -chest x-ray showed pulmonary vascular congestion  -CT chest showed pulmonary edema, right-sided pleural effusion, findings consistent with COPD/emphysema and chronic lung changes bilaterally.    TINO on CKD  Non-anion gap Metabolic acidosis   Hypertension  -continue Coreg and nifedipine, holding lisinopril in the setting of TINO  -started on sodium bicarb 1300 mg b.i.d  -continue on sodium bicarb 100 mEq at 100 cc for 1 more day.  -UA shows +1 protein, trace blood, leukocyte esterase 250, WBC 11-20, reflex culture pending, pH within normal limits  -urine osmolality pending, urine sodium 39 normal, urine potassium 10.8 low, urine chloride 32, plasma aldosterone pending    Bilateral lower extremity pain  -DVT ultrasound negative    HFpEF   -EF of 62%, grade 2 diastolic dysfunction, mild pulmonary hypertension PASP 41 mmHg.    Diabetes  -dispense reports shows on 10 units insulin q.h.s., hold  -started on Lantus 5 units q.h.s. and low-dose sliding scale      CODE STATUS: Full Code   Access: PIV  Antibiotics:  Rocephin and azithromycin  Diet: Diet diabetic Renal Non-Dialysis; 2000 Calories (up to 75 gm per meal)  DVT Prophylaxis: Heparin 5k   GI Prophylaxis: none  Fluids:  100 mEq of sodium bicarb at 100 cc      Disposition: Suleiman Beck is a 75 y.o. male who is admitted for sepsis likely secondary to pneumonia, COPD exacerbation, TINO on CKD.  Discharge pending hospital course.      Luis Enrique Barragan DO  [facility] Internal Medicine, PGY-I  02/15/2025

## 2025-02-15 NOTE — PLAN OF CARE
Problem: Adult Inpatient Plan of Care  Goal: Plan of Care Review  Outcome: Progressing  Goal: Patient-Specific Goal (Individualized)  Outcome: Progressing  Goal: Absence of Hospital-Acquired Illness or Injury  Outcome: Progressing  Goal: Optimal Comfort and Wellbeing  Outcome: Progressing  Goal: Readiness for Transition of Care  Outcome: Progressing     Problem: Diabetes Comorbidity  Goal: Blood Glucose Level Within Targeted Range  Outcome: Progressing     Problem: Pneumonia  Goal: Fluid Balance  Outcome: Progressing  Goal: Resolution of Infection Signs and Symptoms  Outcome: Progressing  Goal: Effective Oxygenation and Ventilation  Outcome: Progressing     Problem: Skin Injury Risk Increased  Goal: Skin Health and Integrity  Outcome: Progressing

## 2025-02-16 LAB
ALBUMIN SERPL-MCNC: 2.9 G/DL (ref 3.4–4.8)
ALBUMIN/GLOB SERPL: 0.7 RATIO (ref 1.1–2)
ALP SERPL-CCNC: 53 UNIT/L (ref 40–150)
ALT SERPL-CCNC: 7 UNIT/L (ref 0–55)
ANION GAP SERPL CALC-SCNC: 8 MEQ/L
AST SERPL-CCNC: 9 UNIT/L (ref 5–34)
BASOPHILS # BLD AUTO: 0.01 X10(3)/MCL
BASOPHILS NFR BLD AUTO: 0.1 %
BILIRUB SERPL-MCNC: 0.2 MG/DL
BUN SERPL-MCNC: 68.2 MG/DL (ref 8.4–25.7)
CALCIUM SERPL-MCNC: 9.2 MG/DL (ref 8.8–10)
CHLORIDE SERPL-SCNC: 102 MMOL/L (ref 98–107)
CO2 SERPL-SCNC: 29 MMOL/L (ref 23–31)
CREAT SERPL-MCNC: 2.09 MG/DL (ref 0.72–1.25)
CREAT/UREA NIT SERPL: 33
EOSINOPHIL # BLD AUTO: 0.02 X10(3)/MCL (ref 0–0.9)
EOSINOPHIL NFR BLD AUTO: 0.1 %
ERYTHROCYTE [DISTWIDTH] IN BLOOD BY AUTOMATED COUNT: 14.8 % (ref 11.5–17)
GFR SERPLBLD CREATININE-BSD FMLA CKD-EPI: 33 ML/MIN/1.73/M2
GLOBULIN SER-MCNC: 4 GM/DL (ref 2.4–3.5)
GLUCOSE SERPL-MCNC: 253 MG/DL (ref 82–115)
HCT VFR BLD AUTO: 24.1 % (ref 42–52)
HGB BLD-MCNC: 7.6 G/DL (ref 14–18)
HOLD SPECIMEN: NORMAL
IMM GRANULOCYTES # BLD AUTO: 0.08 X10(3)/MCL (ref 0–0.04)
IMM GRANULOCYTES NFR BLD AUTO: 0.6 %
LYMPHOCYTES # BLD AUTO: 1.87 X10(3)/MCL (ref 0.6–4.6)
LYMPHOCYTES NFR BLD AUTO: 13.1 %
MAGNESIUM SERPL-MCNC: 2.2 MG/DL (ref 1.6–2.6)
MCH RBC QN AUTO: 26.6 PG (ref 27–31)
MCHC RBC AUTO-ENTMCNC: 31.5 G/DL (ref 33–36)
MCV RBC AUTO: 84.3 FL (ref 80–94)
MONOCYTES # BLD AUTO: 1.05 X10(3)/MCL (ref 0.1–1.3)
MONOCYTES NFR BLD AUTO: 7.3 %
NEUTROPHILS # BLD AUTO: 11.28 X10(3)/MCL (ref 2.1–9.2)
NEUTROPHILS NFR BLD AUTO: 78.8 %
NRBC BLD AUTO-RTO: 0 %
PHOSPHATE SERPL-MCNC: 3.3 MG/DL (ref 2.3–4.7)
PLATELET # BLD AUTO: 367 X10(3)/MCL (ref 130–400)
PMV BLD AUTO: 10.8 FL (ref 7.4–10.4)
POCT GLUCOSE: 209 MG/DL (ref 70–110)
POCT GLUCOSE: 238 MG/DL (ref 70–110)
POCT GLUCOSE: 314 MG/DL (ref 70–110)
POCT GLUCOSE: 319 MG/DL (ref 70–110)
POTASSIUM SERPL-SCNC: 4.2 MMOL/L (ref 3.5–5.1)
PROT SERPL-MCNC: 6.9 GM/DL (ref 5.8–7.6)
RBC # BLD AUTO: 2.86 X10(6)/MCL (ref 4.7–6.1)
SODIUM SERPL-SCNC: 139 MMOL/L (ref 136–145)
WBC # BLD AUTO: 14.31 X10(3)/MCL (ref 4.5–11.5)

## 2025-02-16 PROCEDURE — 83735 ASSAY OF MAGNESIUM: CPT

## 2025-02-16 PROCEDURE — 84100 ASSAY OF PHOSPHORUS: CPT

## 2025-02-16 PROCEDURE — 36415 COLL VENOUS BLD VENIPUNCTURE: CPT | Performed by: INTERNAL MEDICINE

## 2025-02-16 PROCEDURE — 94640 AIRWAY INHALATION TREATMENT: CPT

## 2025-02-16 PROCEDURE — 63600175 PHARM REV CODE 636 W HCPCS: Performed by: INTERNAL MEDICINE

## 2025-02-16 PROCEDURE — 36415 COLL VENOUS BLD VENIPUNCTURE: CPT

## 2025-02-16 PROCEDURE — 63700000 PHARM REV CODE 250 ALT 637 W/O HCPCS

## 2025-02-16 PROCEDURE — 80053 COMPREHEN METABOLIC PANEL: CPT

## 2025-02-16 PROCEDURE — 85025 COMPLETE CBC W/AUTO DIFF WBC: CPT

## 2025-02-16 PROCEDURE — 94760 N-INVAS EAR/PLS OXIMETRY 1: CPT

## 2025-02-16 PROCEDURE — 25000003 PHARM REV CODE 250

## 2025-02-16 PROCEDURE — 27000221 HC OXYGEN, UP TO 24 HOURS

## 2025-02-16 PROCEDURE — 63600175 PHARM REV CODE 636 W HCPCS

## 2025-02-16 PROCEDURE — 21400001 HC TELEMETRY ROOM

## 2025-02-16 PROCEDURE — 25000242 PHARM REV CODE 250 ALT 637 W/ HCPCS

## 2025-02-16 PROCEDURE — 94761 N-INVAS EAR/PLS OXIMETRY MLT: CPT

## 2025-02-16 RX ORDER — INSULIN ASPART 100 [IU]/ML
6 INJECTION, SOLUTION INTRAVENOUS; SUBCUTANEOUS
Status: DISCONTINUED | OUTPATIENT
Start: 2025-02-16 | End: 2025-02-18 | Stop reason: HOSPADM

## 2025-02-16 RX ORDER — TAMSULOSIN HYDROCHLORIDE 0.4 MG/1
0.4 CAPSULE ORAL DAILY
Status: DISCONTINUED | OUTPATIENT
Start: 2025-02-16 | End: 2025-02-18 | Stop reason: HOSPADM

## 2025-02-16 RX ORDER — SODIUM CHLORIDE, SODIUM LACTATE, POTASSIUM CHLORIDE, CALCIUM CHLORIDE 600; 310; 30; 20 MG/100ML; MG/100ML; MG/100ML; MG/100ML
INJECTION, SOLUTION INTRAVENOUS CONTINUOUS
Status: DISCONTINUED | OUTPATIENT
Start: 2025-02-16 | End: 2025-02-18 | Stop reason: HOSPADM

## 2025-02-16 RX ORDER — INSULIN GLARGINE 100 [IU]/ML
10 INJECTION, SOLUTION SUBCUTANEOUS NIGHTLY
Status: DISCONTINUED | OUTPATIENT
Start: 2025-02-16 | End: 2025-02-18 | Stop reason: HOSPADM

## 2025-02-16 RX ORDER — INSULIN GLARGINE 100 [IU]/ML
10 INJECTION, SOLUTION SUBCUTANEOUS NIGHTLY
Status: DISCONTINUED | OUTPATIENT
Start: 2025-02-16 | End: 2025-02-16

## 2025-02-16 RX ADMIN — INSULIN ASPART 2 UNITS: 100 INJECTION, SOLUTION INTRAVENOUS; SUBCUTANEOUS at 08:02

## 2025-02-16 RX ADMIN — INSULIN ASPART 2 UNITS: 100 INJECTION, SOLUTION INTRAVENOUS; SUBCUTANEOUS at 12:02

## 2025-02-16 RX ADMIN — IPRATROPIUM BROMIDE AND ALBUTEROL SULFATE 3 ML: 2.5; .5 SOLUTION RESPIRATORY (INHALATION) at 08:02

## 2025-02-16 RX ADMIN — INSULIN ASPART 6 UNITS: 100 INJECTION, SOLUTION INTRAVENOUS; SUBCUTANEOUS at 04:02

## 2025-02-16 RX ADMIN — PREDNISONE 40 MG: 20 TABLET ORAL at 08:02

## 2025-02-16 RX ADMIN — CARVEDILOL 25 MG: 12.5 TABLET, FILM COATED ORAL at 09:02

## 2025-02-16 RX ADMIN — IPRATROPIUM BROMIDE AND ALBUTEROL SULFATE 3 ML: 2.5; .5 SOLUTION RESPIRATORY (INHALATION) at 03:02

## 2025-02-16 RX ADMIN — ATORVASTATIN CALCIUM 40 MG: 40 TABLET, FILM COATED ORAL at 09:02

## 2025-02-16 RX ADMIN — HYDROCODONE BITARTRATE AND ACETAMINOPHEN 1 TABLET: 5; 325 TABLET ORAL at 03:02

## 2025-02-16 RX ADMIN — INSULIN ASPART 4 UNITS: 100 INJECTION, SOLUTION INTRAVENOUS; SUBCUTANEOUS at 04:02

## 2025-02-16 RX ADMIN — AZITHROMYCIN DIHYDRATE 250 MG: 250 TABLET, FILM COATED ORAL at 08:02

## 2025-02-16 RX ADMIN — IPRATROPIUM BROMIDE AND ALBUTEROL SULFATE 3 ML: 2.5; .5 SOLUTION RESPIRATORY (INHALATION) at 11:02

## 2025-02-16 RX ADMIN — SODIUM CHLORIDE, POTASSIUM CHLORIDE, SODIUM LACTATE AND CALCIUM CHLORIDE: 600; 310; 30; 20 INJECTION, SOLUTION INTRAVENOUS at 08:02

## 2025-02-16 RX ADMIN — HEPARIN SODIUM 5000 UNITS: 5000 INJECTION INTRAVENOUS; SUBCUTANEOUS at 09:02

## 2025-02-16 RX ADMIN — SODIUM BICARBONATE 650 MG TABLET 1300 MG: at 09:02

## 2025-02-16 RX ADMIN — HEPARIN SODIUM 5000 UNITS: 5000 INJECTION INTRAVENOUS; SUBCUTANEOUS at 06:02

## 2025-02-16 RX ADMIN — BENZONATATE 100 MG: 100 CAPSULE ORAL at 09:02

## 2025-02-16 RX ADMIN — SODIUM BICARBONATE: 84 INJECTION, SOLUTION INTRAVENOUS at 01:02

## 2025-02-16 RX ADMIN — SODIUM BICARBONATE 650 MG TABLET 1300 MG: at 08:02

## 2025-02-16 RX ADMIN — TAMSULOSIN HYDROCHLORIDE 0.4 MG: 0.4 CAPSULE ORAL at 12:02

## 2025-02-16 RX ADMIN — HEPARIN SODIUM 5000 UNITS: 5000 INJECTION INTRAVENOUS; SUBCUTANEOUS at 02:02

## 2025-02-16 RX ADMIN — CARVEDILOL 25 MG: 12.5 TABLET, FILM COATED ORAL at 08:02

## 2025-02-16 RX ADMIN — INSULIN ASPART 6 UNITS: 100 INJECTION, SOLUTION INTRAVENOUS; SUBCUTANEOUS at 08:02

## 2025-02-16 RX ADMIN — INSULIN GLARGINE 10 UNITS: 100 INJECTION, SOLUTION SUBCUTANEOUS at 09:02

## 2025-02-16 RX ADMIN — POLYETHYLENE GLYCOL 3350 17 G: 17 POWDER, FOR SOLUTION ORAL at 08:02

## 2025-02-16 RX ADMIN — HYDROCODONE BITARTRATE AND ACETAMINOPHEN 1 TABLET: 5; 325 TABLET ORAL at 09:02

## 2025-02-16 RX ADMIN — NIFEDIPINE 90 MG: 30 TABLET, FILM COATED, EXTENDED RELEASE ORAL at 08:02

## 2025-02-16 RX ADMIN — IPRATROPIUM BROMIDE AND ALBUTEROL SULFATE 3 ML: 2.5; .5 SOLUTION RESPIRATORY (INHALATION) at 07:02

## 2025-02-16 NOTE — PLAN OF CARE
Problem: Adult Inpatient Plan of Care  Goal: Plan of Care Review  2/16/2025 0731 by Guillermina Nogueira LPN  Outcome: Progressing  2/15/2025 1924 by Guillermina Nogueira LPN  Outcome: Progressing  Goal: Patient-Specific Goal (Individualized)  2/16/2025 0731 by Guillermina Nogueira LPN  Outcome: Progressing  2/15/2025 1924 by Guillermina Nogueira LPN  Outcome: Progressing  Goal: Absence of Hospital-Acquired Illness or Injury  2/16/2025 0731 by Guillermina Nogueira LPN  Outcome: Progressing  2/15/2025 1924 by Guillermina Nogueira LPN  Outcome: Progressing  Goal: Optimal Comfort and Wellbeing  2/16/2025 0731 by Guillermina Nogueira LPN  Outcome: Progressing  2/15/2025 1924 by Guillermina Nogueira LPN  Outcome: Progressing  Goal: Readiness for Transition of Care  2/16/2025 0731 by Guillermina Nogueira LPN  Outcome: Progressing  2/15/2025 1924 by Guillermina Nogueira LPN  Outcome: Progressing     Problem: Diabetes Comorbidity  Goal: Blood Glucose Level Within Targeted Range  2/16/2025 0731 by Guillermina Nogueira LPN  Outcome: Progressing  2/15/2025 1924 by Guillermina Nogueira LPN  Outcome: Progressing     Problem: Pneumonia  Goal: Fluid Balance  2/16/2025 0731 by Guillermina Nogueira LPN  Outcome: Progressing  2/15/2025 1924 by Guillermina Nogueira LPN  Outcome: Progressing  Goal: Resolution of Infection Signs and Symptoms  2/16/2025 0731 by Guillermina Nogueira LPN  Outcome: Progressing  2/15/2025 1924 by Guillermina Nogueira LPN  Outcome: Progressing  Goal: Effective Oxygenation and Ventilation  2/16/2025 0731 by Guillermina Nogueira LPN  Outcome: Progressing  2/15/2025 1924 by Guillermina Nogueira LPN  Outcome: Progressing

## 2025-02-16 NOTE — PROGRESS NOTES
[facility] Progress Notes     Patient Name: Suleiman Beck  MRN: [id number]  Admission Date: 2/13/2025  Hospital Length of Stay: 3 days  Code Status: Full Code  Attending Provider: Silvia Gutierrez MD  Primary Care Provider: Martin Sears MD     Chief Complaint:   Shortness of Breath (Patient presents to the ER w/ son who reports that the patient has been short of breath since about 1900 yesterday. Son reports at home oxygen saturation was in the 80s on room air so the son put him on oxygen. They deny any known medical hx other than him having a stroke last year. Son does report that his father was a heavy smoker in the past. Diminished lung sounds w/ expiratory wheezing noted during triage. )      Subjective:      Brief HPI:  Suleiman Beck is a 75 y.o. male who has a past medical history of hypertension, diabetes, CVA, CKD.  He presented to [facility] on 2/13/2025  with a primary complaint of shortness of breath.  Patient reports symptoms began 2 days ago while at rest.  Oxygen saturation was checked by son at home and was 85% on room air but later improved to 93%.  As symptoms began to worsen he continued to desaturate into the low 80s and was placed on his wife's home oxygen.  Patient denies cough but reports he feels the need to cough something up but is unable to do so.  He reports chest pain that is worse with inhalation and not present at time of evaluation.  Patient is uncertain of his medical history, reports his only medication at home is pain medicine for osteoarthritis in his right shoulder.  Patient denies use of home inhalers or oxygen.  He denies fevers, chills, chest pain, nausea, vomiting, diarrhea, constipation, dysuria, or fatigue.  Patient denies any recent travel or sick contacts.    ED course:  Patient presented afebrile, tachypneic 24, saturating 94% on 2 L nasal cannula, otherwise VSS.  Patient received 2 g of Rocephin and azithromycin 500 mg in the ED. patient was given  multiple DuoNeb treatments with improvement in shortness of breath.  Chest x-ray shows pulmonary vascular congestion and cardiac decompensation, possible right lower lobe developing infiltrate.  Internal medicine was consulted for sepsis secondary to pneumonia.    Interval history:  Patient says mild improvement in SOB, otherwise vital signs stable saturating 95% when turned off room air.  Still some mild wheeze on left lung. CMP showed improvement in sodium 137, potassium 4.2, improved bicarb 29, renal indices BUN/creatinine 68.2/2.09 which appears to be around his baseline recently (past 6 months around 1.6 - 1.7).  Continue treatment for COPD exacerbation/pneumonia.  Patient complained of bilateral lower extremity leg pain, negative for DVT.      Patient family history is not on file.       Patient  has a past surgical history that includes Cystoscopy w/ ureteral stent placement (Left, 12/11/2022) and Wrist surgery (Left).    Patient is allergic to opioids - morphine analogues and dilaudid [hydromorphone].    Patient  reports that he quit smoking about 22 years ago. His smoking use included cigarettes. He has never used smokeless tobacco. He reports that he does not currently use alcohol. He reports that he does not use drugs.     Current Outpatient Medications   Medication Instructions    albuterol (PROVENTIL) 2.5 mg, Nebulization, Every 4 hours PRN, Rescue    atorvastatin (LIPITOR) 40 mg, Oral, Nightly    blood sugar diagnostic Strp 200 strips, Misc.(Non-Drug; Combo Route), 3 times daily    blood-glucose meter kit Use as instructed    carvediloL (COREG) 25 mg, 2 times daily    clopidogreL (PLAVIX) 75 mg, Oral, Daily    cyclobenzaprine (FLEXERIL) 5 MG tablet Daily PRN    HYDROcodone-acetaminophen (NORCO) 5-325 mg per tablet 1 tablet, Oral, Every 6 hours PRN    insulin aspart U-100 (NOVOLOG) 3 Units, Subcutaneous, 3 times daily    insulin glargine U-100 (Lantus) 10 Units, Subcutaneous, Daily    lancets 30 gauge  "Misc 200 lancets, Misc.(Non-Drug; Combo Route), 3 times daily    lisinopriL (PRINIVIL,ZESTRIL) 40 mg, Oral    methocarbamoL (ROBAXIN) 500 MG Tab     NIFEdipine (PROCARDIA-XL) 90 mg, Oral, Daily    NOVOLOG MIX 70-30FLEXPEN U-100 100 unit/mL (70-30) InPn pen 3 Units, 3 times daily    pen needle, diabetic 31 gauge x 5/16" Ndle 1 each, 3 times daily    traMADoL (ULTRAM) 50 mg, Every 6 hours PRN          Review of Systems:  The remainder of the 14 point ROS is noncontributory or negative unless mentioned/reviewed above.     Objective:     Vital Signs:  Vital Signs (Most Recent):  Temp: 98.4 °F (36.9 °C) (02/16/25 0254)  Pulse: 73 (02/16/25 0301)  Resp: (!) 22 (02/16/25 0301)  BP: (!) 140/65 (02/16/25 0254)  SpO2: 95 % (02/16/25 0301)  Body mass index is 24.22 kg/m².  Weight: 56.2 kg (124 lb) Vital Signs (24h Range):  Temp:  [97.7 °F (36.5 °C)-98.4 °F (36.9 °C)] 98.4 °F (36.9 °C)  Pulse:  [73-85] 73  Resp:  [16-22] 22  SpO2:  [91 %-98 %] 95 %  BP: (122-152)/(58-76) 140/65       Input/output:     Intake/Output Summary (Last 24 hours) at 2/16/2025 0715  Last data filed at 2/16/2025 0642  Gross per 24 hour   Intake 2400.57 ml   Output 1600 ml   Net 800.57 ml       Physical Exam  Constitutional:       Appearance: Normal appearance. He is ill-appearing. He is not diaphoretic.   HENT:      Head: Normocephalic and atraumatic.      Mouth/Throat:      Mouth: Mucous membranes are moist.      Pharynx: Oropharynx is clear.   Eyes:      General: No scleral icterus.     Extraocular Movements: Extraocular movements intact.      Conjunctiva/sclera: Conjunctivae normal.   Cardiovascular:      Rate and Rhythm: Normal rate and regular rhythm.      Pulses: Normal pulses.      Heart sounds: Normal heart sounds. No murmur heard.     No friction rub. No gallop.   Pulmonary:      Effort: Pulmonary effort is normal. No respiratory distress.      Breath sounds: No stridor. Wheezing (Bilateral expiratory) present. No rhonchi (Right lower lobe) or " "rales.   Chest:      Chest wall: No tenderness.   Abdominal:      General: Abdomen is flat. Bowel sounds are normal. There is no distension.      Palpations: Abdomen is soft. There is no mass.      Tenderness: There is no abdominal tenderness. There is no guarding or rebound.      Hernia: No hernia is present.   Musculoskeletal:      Cervical back: Normal range of motion and neck supple.      Right lower leg: No edema.      Left lower leg: No edema.   Skin:     General: Skin is warm and dry.      Capillary Refill: Capillary refill takes less than 2 seconds.   Neurological:      General: No focal deficit present.      Mental Status: He is alert.          Lines/Drains/Airways       None                    Laboratory:    Recent Labs   Lab 02/16/25  0416   WBC 14.31*   HGB 7.6*   HCT 24.1*      MCV 84.3   RDW 14.8       No results for input(s): "TROPONINI", "CKTOTAL", "CKMB", "BNP" in the last 24 hours.    Recent Labs   Lab 02/13/25  0339 02/13/25  0340   TROPONINI 0.042  --    BNP  --  245.9*     No results for input(s): "CHOL", "HDL", "LDLCALC", "TRIG", "CHOLHDL" in the last 168 hours. Recent Labs   Lab 02/16/25  0416      K 4.2   CO2 29   BUN 68.2*   CREATININE 2.09*   CALCIUM 9.2   MG 2.20   PHOS 3.3       Recent Labs   Lab 02/16/25  0416   ALBUMIN 2.9*   BILITOT 0.2   AST 9   ALKPHOS 53   ALT 7       No results for input(s): "IRON", "TIBC", "FERRITIN", "SATURATEDIRO", "AVCKTRDX95", "FOLATE" in the last 168 hours.  Recent Labs   Lab 02/13/25  0856   HGBA1C 6.7          Other Results:  Estimated Creatinine Clearance: 21.9 mL/min (A) (based on SCr of 2.09 mg/dL (H)).    Current Medications:     Infusions:   lactated ringers   Intravenous Continuous             Scheduled:   albuterol-ipratropium  3 mL Nebulization Q4H    atorvastatin  40 mg Oral QHS    azithromycin  250 mg Oral Daily    carvediloL  25 mg Oral BID    cefTRIAXone (Rocephin) IV (PEDS and ADULTS)  2 g Intravenous Q24H    heparin (porcine)  " 5,000 Units Subcutaneous Q8H    insulin glargine U-100  5 Units Subcutaneous QHS    NIFEdipine  90 mg Oral Daily    polyethylene glycol  17 g Oral Daily    predniSONE  40 mg Oral Daily    sodium bicarbonate  1,300 mg Oral BID         PRN:   albuterol-ipratropium 2.5 mg-0.5 mg/3 mL nebulizer solution 3 mL    atorvastatin tablet 40 mg    azithromycin tablet 250 mg    carvediloL tablet 25 mg    cefTRIAXone injection 2 g    heparin (porcine) injection 5,000 Units    insulin glargine U-100 (Lantus) injection 5 Units    NIFEdipine 24 hr tablet 90 mg    polyethylene glycol packet 17 g    predniSONE tablet 40 mg    sodium bicarbonate tablet 1,300 mg        Microbiology Data:  Microbiology Results (last 7 days)       Procedure Component Value Units Date/Time    Urine culture [[id number]] Collected: 02/15/25 0344    Order Status: Completed Specimen: Urine Updated: 02/16/25 0632     Urine Culture No Growth At 24 Hours    Blood Culture [[id number]]  (Normal) Collected: 02/13/25 0514    Order Status: Completed Specimen: Blood from Forearm, Left Updated: 02/15/25 1433     Blood Culture No Growth At 48 Hours    Blood Culture [[id number]]  (Normal) Collected: 02/13/25 0515    Order Status: Completed Specimen: Blood from Antecubital, Right Updated: 02/15/25 1433     Blood Culture No Growth At 48 Hours    Respiratory Culture [[id number]]     Order Status: Sent Specimen: Sputum, Expectorated              Antibiotics and Day Number of Therapy:  Antibiotics (From admission, onward)      Start     Stop Route Frequency Ordered    02/14/25 0900  azithromycin tablet 250 mg         -- Oral Daily 02/13/25 0855    02/14/25 0000  cefTRIAXone injection 2 g         -- IV Every 24 hours (non-standard times) 02/13/25 0855             Imaging:  Echo    Left Ventricle: The left ventricle is normal in size. Mildly increased   ventricular mass. Moderately increased wall thickness. There is mild   concentric hypertrophy. Normal wall motion. There is  "normal systolic   function. Quantitated ejection fraction is 62%. Grade II diastolic   dysfunction. Elevated left ventricular filling pressure.    Right Ventricle: Normal right ventricular cavity size. Systolic   function is normal.    Left Atrium: Left atrium is mildly dilated. The left atrium volume   index MOD is 39 mL/m2.    Right Atrium: Normal right atrial size.    Aortic Valve: The aortic valve is a trileaflet valve. There is mild   aortic valve sclerosis. There is no stenosis. There is mild aortic   regurgitation.    Mitral Valve: The mitral valve is structurally normal. There is no   stenosis. There is trace regurgitation.    Tricuspid Valve: There is mild regurgitation.    Pulmonic Valve: There is no significant regurgitation.    Aorta: Aortic root is normal in size measuring 2.9 cm. Ascending aorta   is normal measuring 3.1 cm.    Pulmonary Artery: There is mild pulmonary hypertension. The estimated   pulmonary artery systolic pressure is 41 mmHg.    IVC/SVC: Normal venous pressure at 3 mmHg.    Pericardium: There is no pericardial effusion.        2D ECHO Results    Results for orders placed during the hospital encounter of 05/17/24    Echo Saline Bubble? Yes    Interpretation Summary    Left Ventricle: The left ventricle is smaller than normal. Mildly increased wall thickness. There is normal systolic function. Biplane (2D) method of discs ejection fraction is 60%. There is diastolic dysfunction.    Right Ventricle: Normal right ventricular cavity size. Systolic function is normal.    Aortic Valve: The aortic valve is a trileaflet valve. There is mild aortic valve sclerosis. There is mild annular calcification present.    IVC/SVC: Normal venous pressure at 3 mmHg.        Pulmonary Functions Testing Results:    No results found for: "FEV1", "FVC", "PKJ9ITJ", "TLC", "DLCO"    Assessment & Plan:     Possible right lower lobe pneumonia  COPD exacerbation  Pulmonary edema  -respiratory panel negative, lactic " acid negative, COVID RSV flu negative  -respiratory culture NGTD  -received Rocephin 2 g in ED and azithromycin 500 mg, continue Rocephin 2 g q.d. and azithromycin 500 mg q.d.  -p.r.n. oxygen, wean as tolerated, goal saturation 88-92%  -DuoNebs q.4 hours awake  -prednisone 40 mg q.d.  -chest x-ray showed pulmonary vascular congestion  -CT chest showed pulmonary edema, right-sided pleural effusion, findings consistent with COPD/emphysema and chronic lung changes bilaterally.  - Plan for 6 min walk test to note for need for Home O2    TINO on CKD  Non-anion gap Metabolic acidosis   Hypertension  -continue Coreg and nifedipine, holding lisinopril in the setting of TINO  -started on sodium bicarb 1300 mg b.i.d  -UA shows +1 protein, trace blood, leukocyte esterase 250, WBC 11-20, reflex culture pending, pH within normal limits  -urine osmolality pending, urine sodium 39 normal, urine potassium 10.8 low, urine chloride 32, plasma aldosterone pending  -switching to LR    Bilateral lower extremity pain  -DVT ultrasound negative    HFpEF   -EF of 62%, grade 2 diastolic dysfunction, mild pulmonary hypertension PASP 41 mmHg.    Diabetes  -started on Lantus 5 units q.h.s. and low-dose sliding scale  -increasing Lantus to 10 units, aspart 6units TIDWM given prednisone increase    CODE STATUS: Full Code   Access: PIV  Antibiotics:  Rocephin and azithromycin  Diet: Diet diabetic Renal Non-Dialysis; 2000 Calories (up to 75 gm per meal)  DVT Prophylaxis: Heparin 5k   GI Prophylaxis: none  Fluids: LR      Disposition: Suleiman Beck is a 75 y.o. male who is admitted for sepsis likely secondary to pneumonia, COPD exacerbation, TINO on CKD.  Discharge pending hospital course.      Murphy Hurst MD  [facility] INTERNAL MEDICINE PGY-3  02/16/2025 7:33 AM  [facility]

## 2025-02-17 LAB
ALBUMIN SERPL-MCNC: 3 G/DL (ref 3.4–4.8)
ALBUMIN/GLOB SERPL: 0.8 RATIO (ref 1.1–2)
ALP SERPL-CCNC: 54 UNIT/L (ref 40–150)
ALT SERPL-CCNC: 15 UNIT/L (ref 0–55)
ANION GAP SERPL CALC-SCNC: 6 MEQ/L
AST SERPL-CCNC: 14 UNIT/L (ref 5–34)
BACTERIA UR CULT: NO GROWTH
BASOPHILS # BLD AUTO: 0.02 X10(3)/MCL
BASOPHILS NFR BLD AUTO: 0.1 %
BILIRUB SERPL-MCNC: 0.2 MG/DL
BUN SERPL-MCNC: 61 MG/DL (ref 8.4–25.7)
CALCIUM SERPL-MCNC: 9.2 MG/DL (ref 8.8–10)
CHLORIDE SERPL-SCNC: 104 MMOL/L (ref 98–107)
CO2 SERPL-SCNC: 31 MMOL/L (ref 23–31)
CREAT SERPL-MCNC: 1.67 MG/DL (ref 0.72–1.25)
CREAT/UREA NIT SERPL: 37
EOSINOPHIL # BLD AUTO: 0.04 X10(3)/MCL (ref 0–0.9)
EOSINOPHIL NFR BLD AUTO: 0.2 %
ERYTHROCYTE [DISTWIDTH] IN BLOOD BY AUTOMATED COUNT: 14.8 % (ref 11.5–17)
GFR SERPLBLD CREATININE-BSD FMLA CKD-EPI: 43 ML/MIN/1.73/M2
GLOBULIN SER-MCNC: 3.9 GM/DL (ref 2.4–3.5)
GLUCOSE SERPL-MCNC: 144 MG/DL (ref 82–115)
HCT VFR BLD AUTO: 25.4 % (ref 42–52)
HGB BLD-MCNC: 7.7 G/DL (ref 14–18)
IMM GRANULOCYTES # BLD AUTO: 0.13 X10(3)/MCL (ref 0–0.04)
IMM GRANULOCYTES NFR BLD AUTO: 0.7 %
LYMPHOCYTES # BLD AUTO: 2.51 X10(3)/MCL (ref 0.6–4.6)
LYMPHOCYTES NFR BLD AUTO: 14.4 %
MAGNESIUM SERPL-MCNC: 2.1 MG/DL (ref 1.6–2.6)
MCH RBC QN AUTO: 26.2 PG (ref 27–31)
MCHC RBC AUTO-ENTMCNC: 30.3 G/DL (ref 33–36)
MCV RBC AUTO: 86.4 FL (ref 80–94)
MONOCYTES # BLD AUTO: 1.27 X10(3)/MCL (ref 0.1–1.3)
MONOCYTES NFR BLD AUTO: 7.3 %
NEUTROPHILS # BLD AUTO: 13.47 X10(3)/MCL (ref 2.1–9.2)
NEUTROPHILS NFR BLD AUTO: 77.3 %
NRBC BLD AUTO-RTO: 0 %
PHOSPHATE SERPL-MCNC: 3.3 MG/DL (ref 2.3–4.7)
PLATELET # BLD AUTO: 381 X10(3)/MCL (ref 130–400)
PMV BLD AUTO: 11.3 FL (ref 7.4–10.4)
POCT GLUCOSE: 281 MG/DL (ref 70–110)
POCT GLUCOSE: 291 MG/DL (ref 70–110)
POCT GLUCOSE: 84 MG/DL (ref 70–110)
POCT GLUCOSE: 99 MG/DL (ref 70–110)
POTASSIUM SERPL-SCNC: 4.4 MMOL/L (ref 3.5–5.1)
PROT SERPL-MCNC: 6.9 GM/DL (ref 5.8–7.6)
RBC # BLD AUTO: 2.94 X10(6)/MCL (ref 4.7–6.1)
SODIUM SERPL-SCNC: 141 MMOL/L (ref 136–145)
WBC # BLD AUTO: 17.44 X10(3)/MCL (ref 4.5–11.5)

## 2025-02-17 PROCEDURE — 25000003 PHARM REV CODE 250: Performed by: INTERNAL MEDICINE

## 2025-02-17 PROCEDURE — 36415 COLL VENOUS BLD VENIPUNCTURE: CPT

## 2025-02-17 PROCEDURE — 63600175 PHARM REV CODE 636 W HCPCS

## 2025-02-17 PROCEDURE — 83735 ASSAY OF MAGNESIUM: CPT

## 2025-02-17 PROCEDURE — 27000221 HC OXYGEN, UP TO 24 HOURS

## 2025-02-17 PROCEDURE — 94640 AIRWAY INHALATION TREATMENT: CPT

## 2025-02-17 PROCEDURE — 97116 GAIT TRAINING THERAPY: CPT

## 2025-02-17 PROCEDURE — 63600175 PHARM REV CODE 636 W HCPCS: Performed by: INTERNAL MEDICINE

## 2025-02-17 PROCEDURE — 25000242 PHARM REV CODE 250 ALT 637 W/ HCPCS

## 2025-02-17 PROCEDURE — 97530 THERAPEUTIC ACTIVITIES: CPT

## 2025-02-17 PROCEDURE — 84100 ASSAY OF PHOSPHORUS: CPT

## 2025-02-17 PROCEDURE — 97535 SELF CARE MNGMENT TRAINING: CPT

## 2025-02-17 PROCEDURE — 21400001 HC TELEMETRY ROOM

## 2025-02-17 PROCEDURE — 85025 COMPLETE CBC W/AUTO DIFF WBC: CPT

## 2025-02-17 PROCEDURE — 99233 SBSQ HOSP IP/OBS HIGH 50: CPT | Mod: ,,,

## 2025-02-17 PROCEDURE — 25000003 PHARM REV CODE 250

## 2025-02-17 PROCEDURE — 63700000 PHARM REV CODE 250 ALT 637 W/O HCPCS

## 2025-02-17 PROCEDURE — 94760 N-INVAS EAR/PLS OXIMETRY 1: CPT

## 2025-02-17 PROCEDURE — 80053 COMPREHEN METABOLIC PANEL: CPT

## 2025-02-17 RX ORDER — IBUPROFEN 200 MG
24 TABLET ORAL
Status: DISCONTINUED | OUTPATIENT
Start: 2025-02-17 | End: 2025-02-18 | Stop reason: HOSPADM

## 2025-02-17 RX ORDER — LIDOCAINE 50 MG/G
1 PATCH TOPICAL NIGHTLY
Status: DISCONTINUED | OUTPATIENT
Start: 2025-02-17 | End: 2025-02-18 | Stop reason: HOSPADM

## 2025-02-17 RX ORDER — INSULIN ASPART 100 [IU]/ML
0-5 INJECTION, SOLUTION INTRAVENOUS; SUBCUTANEOUS
Status: DISCONTINUED | OUTPATIENT
Start: 2025-02-17 | End: 2025-02-18 | Stop reason: HOSPADM

## 2025-02-17 RX ORDER — DICLOFENAC SODIUM 10 MG/G
2 GEL TOPICAL DAILY
Status: DISCONTINUED | OUTPATIENT
Start: 2025-02-17 | End: 2025-02-18 | Stop reason: HOSPADM

## 2025-02-17 RX ORDER — GLUCAGON 1 MG
1 KIT INJECTION
Status: DISCONTINUED | OUTPATIENT
Start: 2025-02-17 | End: 2025-02-18 | Stop reason: HOSPADM

## 2025-02-17 RX ORDER — IBUPROFEN 200 MG
16 TABLET ORAL
Status: DISCONTINUED | OUTPATIENT
Start: 2025-02-17 | End: 2025-02-18 | Stop reason: HOSPADM

## 2025-02-17 RX ADMIN — HYDROXYZINE PAMOATE 25 MG: 25 CAPSULE ORAL at 08:02

## 2025-02-17 RX ADMIN — IPRATROPIUM BROMIDE AND ALBUTEROL SULFATE 3 ML: 2.5; .5 SOLUTION RESPIRATORY (INHALATION) at 11:02

## 2025-02-17 RX ADMIN — SODIUM CHLORIDE, POTASSIUM CHLORIDE, SODIUM LACTATE AND CALCIUM CHLORIDE: 600; 310; 30; 20 INJECTION, SOLUTION INTRAVENOUS at 04:02

## 2025-02-17 RX ADMIN — HEPARIN SODIUM 5000 UNITS: 5000 INJECTION INTRAVENOUS; SUBCUTANEOUS at 09:02

## 2025-02-17 RX ADMIN — DICLOFENAC SODIUM TOPICAL GEL, 1% 2 G: 10 GEL TOPICAL at 11:02

## 2025-02-17 RX ADMIN — CARVEDILOL 25 MG: 12.5 TABLET, FILM COATED ORAL at 08:02

## 2025-02-17 RX ADMIN — HYDROCODONE BITARTRATE AND ACETAMINOPHEN 1 TABLET: 5; 325 TABLET ORAL at 03:02

## 2025-02-17 RX ADMIN — BENZONATATE 100 MG: 100 CAPSULE ORAL at 03:02

## 2025-02-17 RX ADMIN — AZITHROMYCIN DIHYDRATE 250 MG: 250 TABLET, FILM COATED ORAL at 08:02

## 2025-02-17 RX ADMIN — SODIUM BICARBONATE 650 MG TABLET 1300 MG: at 08:02

## 2025-02-17 RX ADMIN — INSULIN GLARGINE 10 UNITS: 100 INJECTION, SOLUTION SUBCUTANEOUS at 08:02

## 2025-02-17 RX ADMIN — LIDOCAINE 5% 1 PATCH: 700 PATCH TOPICAL at 08:02

## 2025-02-17 RX ADMIN — HEPARIN SODIUM 5000 UNITS: 5000 INJECTION INTRAVENOUS; SUBCUTANEOUS at 05:02

## 2025-02-17 RX ADMIN — PREDNISONE 40 MG: 20 TABLET ORAL at 08:02

## 2025-02-17 RX ADMIN — ATORVASTATIN CALCIUM 40 MG: 40 TABLET, FILM COATED ORAL at 08:02

## 2025-02-17 RX ADMIN — HYDROCODONE BITARTRATE AND ACETAMINOPHEN 1 TABLET: 5; 325 TABLET ORAL at 02:02

## 2025-02-17 RX ADMIN — INSULIN ASPART 6 UNITS: 100 INJECTION, SOLUTION INTRAVENOUS; SUBCUTANEOUS at 05:02

## 2025-02-17 RX ADMIN — NIFEDIPINE 90 MG: 30 TABLET, FILM COATED, EXTENDED RELEASE ORAL at 08:02

## 2025-02-17 RX ADMIN — INSULIN ASPART 6 UNITS: 100 INJECTION, SOLUTION INTRAVENOUS; SUBCUTANEOUS at 08:02

## 2025-02-17 RX ADMIN — IPRATROPIUM BROMIDE AND ALBUTEROL SULFATE 3 ML: 2.5; .5 SOLUTION RESPIRATORY (INHALATION) at 03:02

## 2025-02-17 RX ADMIN — INSULIN ASPART 3 UNITS: 100 INJECTION, SOLUTION INTRAVENOUS; SUBCUTANEOUS at 05:02

## 2025-02-17 RX ADMIN — INSULIN ASPART 1 UNITS: 100 INJECTION, SOLUTION INTRAVENOUS; SUBCUTANEOUS at 09:02

## 2025-02-17 RX ADMIN — IPRATROPIUM BROMIDE AND ALBUTEROL SULFATE 3 ML: 2.5; .5 SOLUTION RESPIRATORY (INHALATION) at 07:02

## 2025-02-17 RX ADMIN — CEFTRIAXONE SODIUM 2 G: 2 INJECTION, POWDER, FOR SOLUTION INTRAMUSCULAR; INTRAVENOUS at 12:02

## 2025-02-17 RX ADMIN — TAMSULOSIN HYDROCHLORIDE 0.4 MG: 0.4 CAPSULE ORAL at 08:02

## 2025-02-17 RX ADMIN — HEPARIN SODIUM 5000 UNITS: 5000 INJECTION INTRAVENOUS; SUBCUTANEOUS at 02:02

## 2025-02-17 RX ADMIN — IPRATROPIUM BROMIDE AND ALBUTEROL SULFATE 3 ML: 2.5; .5 SOLUTION RESPIRATORY (INHALATION) at 10:02

## 2025-02-17 RX ADMIN — IPRATROPIUM BROMIDE AND ALBUTEROL SULFATE 3 ML: 2.5; .5 SOLUTION RESPIRATORY (INHALATION) at 06:02

## 2025-02-17 NOTE — PT/OT/SLP PROGRESS
"Occupational Therapy   Treatment  Dr Barragan present at initiation of session, able to explain to pt goals of session, pt stated no questions "just do your thing!"    Name: Suleiman Beck  MRN: [id number]  Admitting Diagnosis:  Community acquired pneumonia of right lower lobe of lung       Recommendations:     Discharge Recommendations: High Intensity Therapy  Discharge Equipment Recommendations:  walker, rolling, other (see comments), bedside commode (has shower chair at home)  Barriers to discharge:  Decreased caregiver support    Assessment:     Suleiman Beck is a 75 y.o. male with a medical diagnosis of Community acquired pneumonia of right lower lobe of lung.  He presents with  posterior lean in standing, c/o R chronic shoulder pain with guarding RUE but able to demonstrate AROM to roughly 70 degrees flexion with compensation into abduction, very cooperative and pleasant, frequent spillage of food during eating d/t incoordination/L hand tremors during excursion to mouth, but able to feed himself.   Dr. Barragan translated OT questioning pt's spillage and whether this is baseline; pt and MD explained pt culturally normal for using fingers to eat food rather than utensils, also noted pt is R handed but d/t chronic shoulder pain R hand function is limited and pt now using L (nondominant) hand to feed himself with utensils.     Performance deficits affecting function are weakness, impaired endurance, impaired self care skills, impaired functional mobility, gait instability, impaired balance, decreased upper extremity function, decreased lower extremity function, decreased safety awareness, pain, decreased ROM, impaired coordination, impaired fine motor, edema, impaired cardiopulmonary response to activity.     Rehab Prognosis:  Good; patient would benefit from acute skilled OT services to address these deficits and reach maximum level of function.       Plan:     Patient to be seen 3 x/week to address the above listed " problems via therapeutic exercises, therapeutic activities, self-care/home management  Plan of Care Expires:  (DC)  Plan of Care Reviewed with: patient    Subjective     Chief Complaint: chronic R shoulder pain limiting RUE function  Patient/Family Comments/goals: DC to rehab for aggressive therapy to maximize functional independence for return home at [facility]  Pain/Comfort:  Pain Rating 1: 5/10  Location - Side 1: Right  Location 1: shoulder  Pain Addressed 1: Reposition, Distraction, Cessation of Activity  Pain Rating Post-Intervention 1: 5/10    Objective:     Communicated with: nurse Vanessa prior to session.  Patient found HOB elevated with peripheral IV, oxygen, telemetry upon OT entry to room.    General Precautions: Standard, fall    Orthopedic Precautions:N/A  Braces: N/A  Respiratory Status: Nasal cannula, flow 2 L/min   O2 sats 95-96% on 2L n.c. throughout session    Occupational Performance:     Bed Mobility:    Patient completed Supine to Sit with minimum assistance and to bring his trunk up into sitting; with noted proximal weakness while feeding himself at bed level with HOB elevated but falling to R side and unable to maintain midline without pillow for support at R side.  In sitting EOB pt able to maintain erect sitting posture, guarding R UE, but able to maintain sitting balance during all ADLs (dressing UB, using urinal seated Eob)      Functional Mobility/Transfers:  Patient completed Sit <> Stand Transfer with minimum assistance  with  rolling walker   Patient completed Bed <> Chair Transfer using Step Transfer technique with minimum assistance and frequent tactile cues for sequencing/safety during transfer with rolling walker  Functional Mobility: NA    Activities of Daily Living:  Feeding:  stand by assistance for setup of all items (opening all containers, positioning in bed with HOB elevated and pillow at R side d/t falling/leaning to R when trying to feed himself, and with max spillage using L  nondominant hand to feed himself, unfamiliar with use of utensils d/t culturally uses fingers to feed himself at home, with L hand incoordination noted)  Grooming: stand by assistance seated EOB to wash his face and brush his teeth with setup, spillage noted during rinsing/spitting water out of his mouth, and pt unable to clean his dentures once removed (brushed them while wearing them but also required max assist to clean inside surfaces once removed d/t difficulty with tasks requiring B hands)  Upper Body Dressing: moderate assistance to help thread BUE while seated EOB; pt able to use L hand to help with dressing  Lower Body Dressing: maximal assistance to don socks seated EOB; pt able to lift each LE but unable to reach to ankle level or to perform activity requiring B hands  Toileting: maximal assistance for OT to place urinal while seated EOB, following visual cues to scoot to EOB and OT placing/holding urinal, pt able to lean backward to enable access to groin area but required assist to return to upright sitting d/t abdominal weakness      Treatment & Education:  Pt. educated on OT goals of session via Dr Barragan, POC, and via gestures indicated use of call bell for assist with transfers OOB or for any other needs due to fall risk and to remain in chair, no transfer back to bed without assistance.  Pt demonstrated understanding (of use of call bell, and OT later observed pt used call bell to call for assist and indicated BSC, CNA to room to assist pt to BSC for toileting)    Patient left up in chair with all lines intact, call button in reach, chair alarm on, and nurse notified    GOALS:   Multidisciplinary Problems       Occupational Therapy Goals          Problem: Occupational Therapy    Goal Priority Disciplines Outcome Interventions   Occupational Therapy Goal     OT, PT/OT Progressing    Description: Goals to be met by: DC     Patient will increase functional independence with ADLs by  performing:    Feeding with Set-up Assistance seated at chair without spillage.  UE Dressing with Stand-by Assistance.  LE Dressing with Stand-by Assistance.  Grooming while standing at sink with Minimal Assistance.  Toileting from bedside commode with Minimal Assistance for hygiene and clothing management.   Toilet transfer to bedside commode with Contact Guard Assistance using RW.                         DME Justifications:   Suleiman requires a commode for home use because he is confined to a single room.   Suleiman's mobility limitation cannot be sufficiently resolved by the use of a cane. His functional mobility deficit can be sufficiently resolved with the use of a Rolling Walker. Patient's mobility limitation significantly impairs their ability to participate in one of more activities of daily living.  The use of a RW will significantly improve the patient's ability to participate in MRADLS and the patient will use it on regular basis in the home.    Time Tracking:     OT Date of Treatment: 02/17/25  OT Start Time: 0845  OT Stop Time: 0926  OT Total Time (min): 41 min    Billable Minutes:Self Care/Home Management 30  Therapeutic Activity 11  Total Time with 1 unit of assist to PT later in morning for safety during ambulation d/t significant balance deficits    OT/REMI: OT          2/17/2025

## 2025-02-17 NOTE — PLAN OF CARE
Spoke to Clive at [facility], P: [phone], who stated that they are currently reviewing patient's clinical records for admission. CM will continue to follow.

## 2025-02-17 NOTE — PROGRESS NOTES
[facility] Progress Notes     Patient Name: Suleiman Beck  MRN: [id number]  Admission Date: 2/13/2025  Hospital Length of Stay: 4 days  Code Status: Full Code  Attending Provider: Silvia Gutierrez MD  Primary Care Provider: Martin Sears MD     Chief Complaint:   Shortness of Breath (Patient presents to the ER w/ son who reports that the patient has been short of breath since about 1900 yesterday. Son reports at home oxygen saturation was in the 80s on room air so the son put him on oxygen. They deny any known medical hx other than him having a stroke last year. Son does report that his father was a heavy smoker in the past. Diminished lung sounds w/ expiratory wheezing noted during triage. )      Subjective:      Brief HPI:  Suleiman Beck is a 75 y.o. male who has a past medical history of hypertension, diabetes, CVA, CKD.  He presented to [facility] on 2/13/2025  with a primary complaint of shortness of breath.  Patient reports symptoms began 2 days ago while at rest.  Oxygen saturation was checked by son at home and was 85% on room air but later improved to 93%.  As symptoms began to worsen he continued to desaturate into the low 80s and was placed on his wife's home oxygen.  Patient denies cough but reports he feels the need to cough something up but is unable to do so.  He reports chest pain that is worse with inhalation and not present at time of evaluation.  Patient is uncertain of his medical history, reports his only medication at home is pain medicine for osteoarthritis in his right shoulder.  Patient denies use of home inhalers or oxygen.  He denies fevers, chills, chest pain, nausea, vomiting, diarrhea, constipation, dysuria, or fatigue.  Patient denies any recent travel or sick contacts.    ED course:  Patient presented afebrile, tachypneic 24, saturating 94% on 2 L nasal cannula, otherwise VSS.  Patient received 2 g of Rocephin and azithromycin 500 mg in the ED. patient was given  multiple DuoNeb treatments with improvement in shortness of breath.  Chest x-ray shows pulmonary vascular congestion and cardiac decompensation, possible right lower lobe developing infiltrate.  Internal medicine was consulted for sepsis secondary to pneumonia.    Interval history:  Patient says mild improvement in SOB, otherwise vital signs stable.  Saturating 98% on 3 L nasal cannula, need to be weaned down to goal saturation.  Patient is saturating in the mid 80s on room air.  CBC significant for elevated leukocytosis 17.44, otherwise stable.  CMP shows improvement in renal indices BUN/creatinine 61/1.37.  Plan for 6 minute walk test today.  Continue treatment for COPD exacerbation/pneumonia.  Deescalating antibiotics.  Given Voltaren gel and lidocaine patch for shoulder/knee pain.     Patient family history is not on file.       Patient  has a past surgical history that includes Cystoscopy w/ ureteral stent placement (Left, 12/11/2022) and Wrist surgery (Left).    Patient is allergic to opioids - morphine analogues and dilaudid [hydromorphone].    Patient  reports that he quit smoking about 22 years ago. His smoking use included cigarettes. He has never used smokeless tobacco. He reports that he does not currently use alcohol. He reports that he does not use drugs.     Current Outpatient Medications   Medication Instructions    albuterol (PROVENTIL) 2.5 mg, Nebulization, Every 4 hours PRN, Rescue    atorvastatin (LIPITOR) 40 mg, Oral, Nightly    blood sugar diagnostic Strp 200 strips, Misc.(Non-Drug; Combo Route), 3 times daily    blood-glucose meter kit Use as instructed    carvediloL (COREG) 25 mg, 2 times daily    clopidogreL (PLAVIX) 75 mg, Oral, Daily    cyclobenzaprine (FLEXERIL) 5 MG tablet Daily PRN    HYDROcodone-acetaminophen (NORCO) 5-325 mg per tablet 1 tablet, Oral, Every 6 hours PRN    insulin aspart U-100 (NOVOLOG) 3 Units, Subcutaneous, 3 times daily    insulin glargine U-100 (Lantus) 10 Units,  "Subcutaneous, Daily    lancets 30 gauge Misc 200 lancets, Misc.(Non-Drug; Combo Route), 3 times daily    lisinopriL (PRINIVIL,ZESTRIL) 40 mg, Oral    methocarbamoL (ROBAXIN) 500 MG Tab     NIFEdipine (PROCARDIA-XL) 90 mg, Oral, Daily    NOVOLOG MIX 70-30FLEXPEN U-100 100 unit/mL (70-30) InPn pen 3 Units, 3 times daily    pen needle, diabetic 31 gauge x 5/16" Ndle 1 each, 3 times daily    traMADoL (ULTRAM) 50 mg, Every 6 hours PRN          Review of Systems:  The remainder of the 14 point ROS is noncontributory or negative unless mentioned/reviewed above.     Objective:     Vital Signs:  Vital Signs (Most Recent):  Temp: 98.1 °F (36.7 °C) (02/17/25 0326)  Pulse: 106 (02/17/25 0346)  Resp: 20 (02/17/25 0346)  BP: 135/82 (02/17/25 0326)  SpO2: 96 % (02/17/25 0346)  Body mass index is 24.22 kg/m².  Weight: 56.2 kg (124 lb) Vital Signs (24h Range):  Temp:  [98 °F (36.7 °C)-98.4 °F (36.9 °C)] 98.1 °F (36.7 °C)  Pulse:  [] 106  Resp:  [16-22] 20  SpO2:  [80 %-99 %] 96 %  BP: (116-157)/(57-82) 135/82       Input/output:     Intake/Output Summary (Last 24 hours) at 2/17/2025 0652  Last data filed at 2/17/2025 0624  Gross per 24 hour   Intake 2647.18 ml   Output 1746 ml   Net 901.18 ml       Physical Exam  Constitutional:       Appearance: Normal appearance. He is ill-appearing. He is not diaphoretic.   HENT:      Head: Normocephalic and atraumatic.      Mouth/Throat:      Mouth: Mucous membranes are moist.      Pharynx: Oropharynx is clear.   Eyes:      General: No scleral icterus.     Extraocular Movements: Extraocular movements intact.      Conjunctiva/sclera: Conjunctivae normal.   Cardiovascular:      Rate and Rhythm: Normal rate and regular rhythm.      Pulses: Normal pulses.      Heart sounds: Normal heart sounds. No murmur heard.     No friction rub. No gallop.   Pulmonary:      Effort: Pulmonary effort is normal. No respiratory distress.      Breath sounds: No stridor. No wheezing, rhonchi or rales.   Chest:    " "  Chest wall: No tenderness.   Abdominal:      General: Abdomen is flat. Bowel sounds are normal. There is no distension.      Palpations: Abdomen is soft. There is no mass.      Tenderness: There is no abdominal tenderness. There is no guarding or rebound.      Hernia: No hernia is present.   Musculoskeletal:      Cervical back: Normal range of motion and neck supple.      Right lower leg: No edema.      Left lower leg: No edema.   Skin:     General: Skin is warm and dry.      Capillary Refill: Capillary refill takes less than 2 seconds.   Neurological:      General: No focal deficit present.      Mental Status: He is alert.          Lines/Drains/Airways       None                    Laboratory:    Recent Labs   Lab 02/17/25  0504   WBC 17.44*   HGB 7.7*   HCT 25.4*      MCV 86.4   RDW 14.8       No results for input(s): "TROPONINI", "CKTOTAL", "CKMB", "BNP" in the last 24 hours.    Recent Labs   Lab 02/13/25  0339 02/13/25  0340   TROPONINI 0.042  --    BNP  --  245.9*     No results for input(s): "CHOL", "HDL", "LDLCALC", "TRIG", "CHOLHDL" in the last 168 hours. Recent Labs   Lab 02/17/25  0504      K 4.4   CO2 31   BUN 61.0*   CREATININE 1.67*   CALCIUM 9.2   MG 2.10   PHOS 3.3       Recent Labs   Lab 02/17/25  0504   ALBUMIN 3.0*   BILITOT 0.2   AST 14   ALKPHOS 54   ALT 15       No results for input(s): "IRON", "TIBC", "FERRITIN", "SATURATEDIRO", "IHALXIIP55", "FOLATE" in the last 168 hours.  Recent Labs   Lab 02/13/25  0856   HGBA1C 6.7          Other Results:  Estimated Creatinine Clearance: 27.4 mL/min (A) (based on SCr of 1.67 mg/dL (H)).    Current Medications:     Infusions:   lactated ringers   Intravenous Continuous 100 mL/hr at 02/17/25 0624 Rate Verify at 02/17/25 0624         Scheduled:   albuterol-ipratropium  3 mL Nebulization Q4H    atorvastatin  40 mg Oral QHS    azithromycin  250 mg Oral Daily    carvediloL  25 mg Oral BID    cefTRIAXone (Rocephin) IV (PEDS and ADULTS)  2 g " Intravenous Q24H    heparin (porcine)  5,000 Units Subcutaneous Q8H    insulin aspart U-100  6 Units Subcutaneous TIDWM    insulin glargine U-100  10 Units Subcutaneous QHS    NIFEdipine  90 mg Oral Daily    predniSONE  40 mg Oral Daily    sodium bicarbonate  1,300 mg Oral BID    tamsulosin  0.4 mg Oral Daily         PRN:   albuterol-ipratropium 2.5 mg-0.5 mg/3 mL nebulizer solution 3 mL    atorvastatin tablet 40 mg    azithromycin tablet 250 mg    carvediloL tablet 25 mg    cefTRIAXone injection 2 g    heparin (porcine) injection 5,000 Units    insulin aspart U-100 injection 6 Units    insulin glargine U-100 (Lantus) injection 10 Units    NIFEdipine 24 hr tablet 90 mg    predniSONE tablet 40 mg    sodium bicarbonate tablet 1,300 mg    tamsulosin 24 hr capsule 0.4 mg        Microbiology Data:  Microbiology Results (last 7 days)       Procedure Component Value Units Date/Time    Blood Culture [[id number]]  (Normal) Collected: 02/13/25 0514    Order Status: Completed Specimen: Blood from Forearm, Left Updated: 02/16/25 1318     Blood Culture No Growth At 72 Hours    Blood Culture [[id number]]  (Normal) Collected: 02/13/25 0515    Order Status: Completed Specimen: Blood from Antecubital, Right Updated: 02/16/25 1318     Blood Culture No Growth At 72 Hours    Urine culture [[id number]] Collected: 02/15/25 0344    Order Status: Completed Specimen: Urine Updated: 02/16/25 0632     Urine Culture No Growth At 24 Hours    Respiratory Culture [[id number]]     Order Status: Sent Specimen: Sputum, Expectorated              Antibiotics and Day Number of Therapy:  Antibiotics (From admission, onward)      Start     Stop Route Frequency Ordered    02/14/25 0900  azithromycin tablet 250 mg         -- Oral Daily 02/13/25 0855    02/14/25 0000  cefTRIAXone injection 2 g         -- IV Every 24 hours (non-standard times) 02/13/25 0855             Imaging:  CV Ultrasound doppler venous legs bilat    There is no evidence of a right lower  "extremity DVT.    There is no evidence of a left lower extremity DVT.    Negative for deep and superficial vein thrombosis in the right lower   extremity.  Negative for deep and superficial vein thrombosis in the left lower   extremity.         2D ECHO Results    Results for orders placed during the hospital encounter of 05/17/24    Echo Saline Bubble? Yes    Interpretation Summary    Left Ventricle: The left ventricle is smaller than normal. Mildly increased wall thickness. There is normal systolic function. Biplane (2D) method of discs ejection fraction is 60%. There is diastolic dysfunction.    Right Ventricle: Normal right ventricular cavity size. Systolic function is normal.    Aortic Valve: The aortic valve is a trileaflet valve. There is mild aortic valve sclerosis. There is mild annular calcification present.    IVC/SVC: Normal venous pressure at 3 mmHg.        Pulmonary Functions Testing Results:    No results found for: "FEV1", "FVC", "ISG0GNB", "TLC", "DLCO"    Assessment & Plan:     Possible right lower lobe pneumonia  COPD exacerbation  Pulmonary edema  -respiratory panel negative, lactic acid negative, COVID RSV flu negative  -respiratory culture NGTD  -discontinue Rocephin.  Continue azithromycin for 1 more day  -p.r.n. oxygen, wean as tolerated, goal saturation 88-92%  -DuoNebs q.4 hours awake  -prednisone 40 mg q.d.  -chest x-ray showed pulmonary vascular congestion  -CT chest showed pulmonary edema, right-sided pleural effusion, findings consistent with COPD/emphysema and chronic lung changes bilaterally.  - Plan for 6 min walk test to note for need for Home O2  - plan to discharge with Anoro Ellipta inhaler    TINO on CKD  Non-anion gap Metabolic acidosis   Hypertension  -continue Coreg and nifedipine, holding lisinopril in the setting of TINO  -started on sodium bicarb 1300 mg b.i.d  -UA shows +1 protein, trace blood, leukocyte esterase 250, WBC 11-20, reflex culture pending, pH within normal " limits  -urine osmolality pending, urine sodium 39 normal, urine potassium 10.8 low, urine chloride 32, plasma aldosterone pending  -switching to LR    Bilateral lower extremity pain  -DVT ultrasound negative    HFpEF   -EF of 62%, grade 2 diastolic dysfunction, mild pulmonary hypertension PASP 41 mmHg.    Diabetes  -started on Lantus 5 units q.h.s. and low-dose sliding scale  -increasing Lantus to 10 units, aspart 6units TIDWM given prednisone increase    CODE STATUS: Full Code   Access: PIV  Antibiotics: azithromycin  Diet: Diet diabetic Renal Non-Dialysis; 2000 Calories (up to 75 gm per meal)  DVT Prophylaxis: Heparin 5k   GI Prophylaxis: none  Fluids:       Disposition: Suleiman Beck is a 75 y.o. male who is admitted for sepsis likely secondary to pneumonia, COPD exacerbation, TINO on CKD.  Discharge pending hospital course, pending placement.      Luis Enrique Barragan DO  [facility] INTERNAL MEDICINE PGY-1  02/17/2025   [facility]

## 2025-02-17 NOTE — PT/OT/SLP PROGRESS
Physical Therapy Treatment    Patient Name:  Suleiman Beck   MRN:  [id number]    Recommendations     Therapy Intensity Recommendations at Discharge: High Intensity Therapy  Discharge Equipment Recommendations: walker, rolling, bedside commode   Barriers to discharge: fall risk, decreased endurance, and decreased safety awareness    Assessment     Suleiman Beck is a 75 y.o. male admitted with a medical diagnosis of  Community acquired pneumonia of right lower lobe of lung.  1. Sepsis, due to unspecified organism, unspecified whether acute organ dysfunction present    2. Shortness of breath    3. Pneumonia of right lower lobe due to infectious organism    4. COPD exacerbation    5. Chest pain    6. Volume overload    7. DVT (deep venous thrombosis)    8. Community acquired pneumonia of right lower lobe of lung       Problem List[1]   He presents with the following impairments/functional limitations:  weakness, impaired endurance, impaired self care skills, impaired functional mobility, gait instability, impaired balance, decreased safety awareness, decreased lower extremity function.    Rehab Prognosis: Good.    Patient would benefit from continued skilled acute PT services to: address above listed impairments/functional limitations; receive patient/caregiver education; reduce fall risk; and maximize independency/safety with functional mobility.    Recent Surgery: * No surgery found *      Plan     During this hospitalization, patient to be seen 5 x/week to address the identified impairments/functional limitations via gait training, therapeutic activities, therapeutic exercises and progress toward the established goals.    Plan of Care Expires:  03/16/25    Subjective     Communicated with patient's nurse Otf prior to session.    Patient agreeable to participate in treatment session.    Chief Complaint: none  Patient/Family Comments/goals: none stated this session  Pain/Comfort:  Pain Rating 1: 0/10  Pain Rating  Post-Intervention 2: 0/10    Objective     Patient found supine in bed and with HOB elevated with    upon PT entry to room.    General Precautions: Standard, fall   Orthopedic Precautions:N/A   Braces:  N/A    Respiratory Status:     96% on 3 liters O2 via nasal cannula.   On room air pt. Dropped to 87%  Walked pt. On 2 liters and SAT's were 92%                    Functional Mobility:    Bed Mobility:  Supine to Sit: minimum assistance    Transfers:  Sit to Stand: minimum assistance with rolling walker. Pt. Was leaning backwards and was unsteady.    Gait:  Patient ambulated 60 ft with rolling walker and minimum assistance and of 2 persons. WC behind pt. For safety  Patient demonstrates :       occasional unsteady gait       decreased tam       narrow base of support       ambulates outside HERNAN of RW       flexed posture.    Other Mobility:  N/A    Patient left supine in bed and with HOB elevated with all lines intact, call button in reach, tray table at bedside, and patient's nurse notified.    DME Justifications:   Suleiman's mobility limitation cannot be sufficiently resolved by the use of a cane. His functional mobility deficit can be sufficiently resolved with the use of a Rolling Walker. Patient's mobility limitation significantly impairs their ability to participate in one of more activities of daily living.  The use of a RW will significantly improve the patient's ability to participate in MRADLS and the patient will use it on regular basis in the home.    Education     Patient educated on and assisted with functional mobility as noted above.  Patient educated on PT Plan of Care.  Patient was instructed to utilize staff assistance for mobility/transfers.  White board updated regarding patient's safest level of mobility with staff assistance    Goals     Multidisciplinary Problems       Physical Therapy Goals          Problem: Physical Therapy    Goal Priority Disciplines Outcome Interventions   Physical  Therapy Goal     PT, PT/OT     Description: Goals to be met by: Discharge    Pt. Will increase independence with functional mobility by performin. Supine<>sit with Min A met 2025    2. Sit<>stand with RW and Min A  3. Pt. Will ambulate x 130 feet with RW and Min A.  Reviewed 2025                       Time Tracking     PT Received On: 25  PT Start Time: 807     PT Stop Time: 831  PT Total Time (min): 24 min     Billable Minutes: Gait Training 12 and Therapeutic Activity 12    2025       [1]   Patient Active Problem List  Diagnosis    Ureteral obstruction    Controlled type 2 diabetes mellitus without complication    Hypertension    Thyroid nodule    Primary osteoarthritis of right shoulder    History of stroke    Chronic bronchitis    Ex-cigarette smoker    Bifascicular block    Community acquired pneumonia of right lower lobe of lung

## 2025-02-18 VITALS
OXYGEN SATURATION: 97 % | WEIGHT: 124 LBS | TEMPERATURE: 98 F | RESPIRATION RATE: 19 BRPM | BODY MASS INDEX: 24.35 KG/M2 | DIASTOLIC BLOOD PRESSURE: 74 MMHG | SYSTOLIC BLOOD PRESSURE: 142 MMHG | HEIGHT: 60 IN | HEART RATE: 78 BPM

## 2025-02-18 LAB
A-ADO2 BLOOD GAS (OHS): 83 MMHG
ALBUMIN SERPL-MCNC: 2.9 G/DL (ref 3.4–4.8)
ALBUMIN/GLOB SERPL: 0.8 RATIO (ref 1.1–2)
ALLENS TEST BLOOD GAS (OHS): YES
ALP SERPL-CCNC: 51 UNIT/L (ref 40–150)
ALT SERPL-CCNC: 18 UNIT/L (ref 0–55)
ANION GAP SERPL CALC-SCNC: 6 MEQ/L
AST SERPL-CCNC: 16 UNIT/L (ref 5–34)
BACTERIA BLD CULT: NORMAL
BACTERIA BLD CULT: NORMAL
BASE EXCESS BLD CALC-SCNC: 11 MMOL/L (ref -2–2)
BASOPHILS # BLD AUTO: 0.01 X10(3)/MCL
BASOPHILS NFR BLD AUTO: 0.1 %
BILIRUB SERPL-MCNC: 0.3 MG/DL
BLOOD GAS SAMPLE TYPE (OHS): ABNORMAL
BUN SERPL-MCNC: 54.6 MG/DL (ref 8.4–25.7)
CALCIUM SERPL-MCNC: 9.3 MG/DL (ref 8.8–10)
CHLORIDE SERPL-SCNC: 104 MMOL/L (ref 98–107)
CO2 BLDA-SCNC: 36.5 MMOL/L (ref 22–26)
CO2 SERPL-SCNC: 31 MMOL/L (ref 23–31)
COHGB MFR BLDA: 0.5 % (ref 0.5–1.5)
CREAT SERPL-MCNC: 1.49 MG/DL (ref 0.72–1.25)
CREAT/UREA NIT SERPL: 37
DRAWN BY BLOOD GAS (OHS): ABNORMAL
EOSINOPHIL # BLD AUTO: 0.12 X10(3)/MCL (ref 0–0.9)
EOSINOPHIL NFR BLD AUTO: 0.8 %
ERYTHROCYTE [DISTWIDTH] IN BLOOD BY AUTOMATED COUNT: 14.9 % (ref 11.5–17)
GAS PNL BLD: 145 MMHG
GFR SERPLBLD CREATININE-BSD FMLA CKD-EPI: 49 ML/MIN/1.73/M2
GLOBULIN SER-MCNC: 3.6 GM/DL (ref 2.4–3.5)
GLUCOSE SERPL-MCNC: 103 MG/DL (ref 82–115)
HCO3 BLDA-SCNC: 35.1 MMOL/L (ref 22–26)
HCT VFR BLD AUTO: 24.7 % (ref 42–52)
HGB BLD-MCNC: 7.5 G/DL (ref 14–18)
HOLD SPECIMEN: NORMAL
IMM GRANULOCYTES # BLD AUTO: 0.09 X10(3)/MCL (ref 0–0.04)
IMM GRANULOCYTES NFR BLD AUTO: 0.6 %
INHALED O2 CONCENTRATION: 28 %
LPM (OHS): 2
LYMPHOCYTES # BLD AUTO: 2.49 X10(3)/MCL (ref 0.6–4.6)
LYMPHOCYTES NFR BLD AUTO: 16.7 %
MAGNESIUM SERPL-MCNC: 2 MG/DL (ref 1.6–2.6)
MCH RBC QN AUTO: 26.6 PG (ref 27–31)
MCHC RBC AUTO-ENTMCNC: 30.4 G/DL (ref 33–36)
MCV RBC AUTO: 87.6 FL (ref 80–94)
METHGB MFR BLDA: 0.9 % (ref 0–1.5)
MONOCYTES # BLD AUTO: 1.03 X10(3)/MCL (ref 0.1–1.3)
MONOCYTES NFR BLD AUTO: 6.9 %
NEUTROPHILS # BLD AUTO: 11.2 X10(3)/MCL (ref 2.1–9.2)
NEUTROPHILS NFR BLD AUTO: 74.9 %
NRBC BLD AUTO-RTO: 0 %
O2 HB BLOOD GAS (OHS): 90.8 % (ref 94–100)
OXYGEN DEVICE BLOOD GAS (OHS): ABNORMAL
OXYHGB MFR BLDA: 8.2 G/DL (ref 12–18)
PCO2 BLDA: 44 MMHG (ref 35–45)
PH BLDA: 7.51 [PH] (ref 7.35–7.45)
PHOSPHATE SERPL-MCNC: 3 MG/DL (ref 2.3–4.7)
PLATELET # BLD AUTO: 349 X10(3)/MCL (ref 130–400)
PMV BLD AUTO: 11 FL (ref 7.4–10.4)
PO2 BLDA: 62 MMHG (ref 75–100)
POCT GLUCOSE: 203 MG/DL (ref 70–110)
POCT GLUCOSE: 323 MG/DL (ref 70–110)
POCT GLUCOSE: 85 MG/DL (ref 70–110)
POTASSIUM SERPL-SCNC: 4.1 MMOL/L (ref 3.5–5.1)
PROT SERPL-MCNC: 6.5 GM/DL (ref 5.8–7.6)
RBC # BLD AUTO: 2.82 X10(6)/MCL (ref 4.7–6.1)
SAMPLE SITE BLOOD GAS (OHS): ABNORMAL
SAO2 % BLDA: 92.1 %
SODIUM SERPL-SCNC: 141 MMOL/L (ref 136–145)
WBC # BLD AUTO: 14.94 X10(3)/MCL (ref 4.5–11.5)

## 2025-02-18 PROCEDURE — 25000242 PHARM REV CODE 250 ALT 637 W/ HCPCS

## 2025-02-18 PROCEDURE — 97535 SELF CARE MNGMENT TRAINING: CPT

## 2025-02-18 PROCEDURE — 27000221 HC OXYGEN, UP TO 24 HOURS

## 2025-02-18 PROCEDURE — 94640 AIRWAY INHALATION TREATMENT: CPT

## 2025-02-18 PROCEDURE — 82803 BLOOD GASES ANY COMBINATION: CPT

## 2025-02-18 PROCEDURE — 63600175 PHARM REV CODE 636 W HCPCS

## 2025-02-18 PROCEDURE — 25000003 PHARM REV CODE 250

## 2025-02-18 PROCEDURE — 99900035 HC TECH TIME PER 15 MIN (STAT)

## 2025-02-18 PROCEDURE — 36415 COLL VENOUS BLD VENIPUNCTURE: CPT

## 2025-02-18 PROCEDURE — 36600 WITHDRAWAL OF ARTERIAL BLOOD: CPT

## 2025-02-18 PROCEDURE — 97116 GAIT TRAINING THERAPY: CPT

## 2025-02-18 PROCEDURE — 94761 N-INVAS EAR/PLS OXIMETRY MLT: CPT

## 2025-02-18 PROCEDURE — 84100 ASSAY OF PHOSPHORUS: CPT

## 2025-02-18 PROCEDURE — 85025 COMPLETE CBC W/AUTO DIFF WBC: CPT

## 2025-02-18 PROCEDURE — 36415 COLL VENOUS BLD VENIPUNCTURE: CPT | Performed by: INTERNAL MEDICINE

## 2025-02-18 PROCEDURE — 80053 COMPREHEN METABOLIC PANEL: CPT

## 2025-02-18 PROCEDURE — 83735 ASSAY OF MAGNESIUM: CPT

## 2025-02-18 PROCEDURE — 63700000 PHARM REV CODE 250 ALT 637 W/O HCPCS

## 2025-02-18 RX ORDER — FLUTICASONE FUROATE AND VILANTEROL 100; 25 UG/1; UG/1
1 POWDER RESPIRATORY (INHALATION) DAILY
Status: DISCONTINUED | OUTPATIENT
Start: 2025-02-18 | End: 2025-02-18 | Stop reason: HOSPADM

## 2025-02-18 RX ORDER — NAPROXEN SODIUM 220 MG/1
81 TABLET, FILM COATED ORAL DAILY
Status: DISCONTINUED | OUTPATIENT
Start: 2025-02-18 | End: 2025-02-18 | Stop reason: HOSPADM

## 2025-02-18 RX ORDER — UMECLIDINIUM BROMIDE AND VILANTEROL TRIFENATATE 62.5; 25 UG/1; UG/1
1 POWDER RESPIRATORY (INHALATION) DAILY
Qty: 60 EACH | Refills: 2 | Status: SHIPPED | OUTPATIENT
Start: 2025-02-18 | End: 2025-08-17

## 2025-02-18 RX ADMIN — INSULIN ASPART 6 UNITS: 100 INJECTION, SOLUTION INTRAVENOUS; SUBCUTANEOUS at 12:02

## 2025-02-18 RX ADMIN — INSULIN ASPART 4 UNITS: 100 INJECTION, SOLUTION INTRAVENOUS; SUBCUTANEOUS at 05:02

## 2025-02-18 RX ADMIN — IPRATROPIUM BROMIDE AND ALBUTEROL SULFATE 3 ML: 2.5; .5 SOLUTION RESPIRATORY (INHALATION) at 03:02

## 2025-02-18 RX ADMIN — FLUTICASONE FUROATE AND VILANTEROL TRIFENATATE 1 PUFF: 100; 25 POWDER RESPIRATORY (INHALATION) at 12:02

## 2025-02-18 RX ADMIN — ASPIRIN 81 MG CHEWABLE TABLET 81 MG: 81 TABLET CHEWABLE at 02:02

## 2025-02-18 RX ADMIN — HEPARIN SODIUM 5000 UNITS: 5000 INJECTION INTRAVENOUS; SUBCUTANEOUS at 06:02

## 2025-02-18 RX ADMIN — HEPARIN SODIUM 5000 UNITS: 5000 INJECTION INTRAVENOUS; SUBCUTANEOUS at 02:02

## 2025-02-18 RX ADMIN — DICLOFENAC SODIUM TOPICAL GEL, 1% 2 G: 10 GEL TOPICAL at 08:02

## 2025-02-18 RX ADMIN — HYDROCODONE BITARTRATE AND ACETAMINOPHEN 1 TABLET: 5; 325 TABLET ORAL at 05:02

## 2025-02-18 RX ADMIN — INSULIN ASPART 2 UNITS: 100 INJECTION, SOLUTION INTRAVENOUS; SUBCUTANEOUS at 12:02

## 2025-02-18 RX ADMIN — TAMSULOSIN HYDROCHLORIDE 0.4 MG: 0.4 CAPSULE ORAL at 08:02

## 2025-02-18 RX ADMIN — NIFEDIPINE 90 MG: 30 TABLET, FILM COATED, EXTENDED RELEASE ORAL at 08:02

## 2025-02-18 RX ADMIN — IPRATROPIUM BROMIDE AND ALBUTEROL SULFATE 3 ML: 2.5; .5 SOLUTION RESPIRATORY (INHALATION) at 07:02

## 2025-02-18 RX ADMIN — AZITHROMYCIN DIHYDRATE 250 MG: 250 TABLET, FILM COATED ORAL at 08:02

## 2025-02-18 RX ADMIN — PREDNISONE 40 MG: 20 TABLET ORAL at 08:02

## 2025-02-18 RX ADMIN — SODIUM BICARBONATE 650 MG TABLET 1300 MG: at 08:02

## 2025-02-18 RX ADMIN — SODIUM CHLORIDE, POTASSIUM CHLORIDE, SODIUM LACTATE AND CALCIUM CHLORIDE: 600; 310; 30; 20 INJECTION, SOLUTION INTRAVENOUS at 06:02

## 2025-02-18 RX ADMIN — IPRATROPIUM BROMIDE AND ALBUTEROL SULFATE 3 ML: 2.5; .5 SOLUTION RESPIRATORY (INHALATION) at 11:02

## 2025-02-18 RX ADMIN — INSULIN ASPART 6 UNITS: 100 INJECTION, SOLUTION INTRAVENOUS; SUBCUTANEOUS at 05:02

## 2025-02-18 RX ADMIN — CARVEDILOL 25 MG: 12.5 TABLET, FILM COATED ORAL at 08:02

## 2025-02-18 RX ADMIN — HYDROCODONE BITARTRATE AND ACETAMINOPHEN 1 TABLET: 5; 325 TABLET ORAL at 11:02

## 2025-02-18 NOTE — PLAN OF CARE
02/18/25 1451   Final Note   Assessment Type Final Discharge Note   Anticipated Discharge Disposition Rehab   Post-Acute Status   Post-Acute Authorization Placement   Post-Acute Placement Status Set-up Complete/Auth obtained   Coverage [facility] Medicaid   Discharge Delays (!) Ambulance Transport/Facility Transport     Patient is being discharged to [facility] for inpatient rehab.

## 2025-02-18 NOTE — PT/OT/SLP PROGRESS
Physical Therapy Treatment    Patient Name:  Suleiman Beck   MRN:  [id number]    Recommendations     Therapy Intensity Recommendations at Discharge: High Intensity Therapy  Discharge Equipment Recommendations: walker, rolling, bedside commode   Barriers to discharge: fall risk    Assessment     Suleiman Beck is a 75 y.o. male admitted with a medical diagnosis of  Community acquired pneumonia of right lower lobe of lung.  1. Sepsis, due to unspecified organism, unspecified whether acute organ dysfunction present    2. Shortness of breath    3. Pneumonia of right lower lobe due to infectious organism    4. COPD exacerbation    5. Chest pain    6. Volume overload    7. DVT (deep venous thrombosis)    8. Community acquired pneumonia of right lower lobe of lung       Problem List[1]   He presents with the following impairments/functional limitations:  weakness, impaired endurance, impaired self care skills, gait instability, impaired balance, pain, decreased safety awareness, decreased lower extremity function.    Rehab Prognosis: Good.    Patient would benefit from continued skilled acute PT services to: address above listed impairments/functional limitations; receive patient/caregiver education; reduce fall risk; and maximize independency/safety with functional mobility.    Recent Surgery: * No surgery found *      Plan     During this hospitalization, patient to be seen 5 x/week to address the identified impairments/functional limitations via gait training, therapeutic activities, therapeutic exercises and progress toward the established goals.    Plan of Care Expires:  03/16/25    Subjective     Communicated with patient's nurse Otf prior to session.    Patient agreeable to participate in treatment session.    Chief Complaint: R shoulder pain  Patient/Family Comments/goals: none stated this session  Pain/Comfort:  Pain Rating 1: 3/10  Location - Side 1: Right  Location 1: shoulder  Pain Addressed 1: Nurse notified  Pain  Rating Post-Intervention 1: 5/10  Pain Rating Post-Intervention 2: 0/10    Objective     Patient found supine in bed and with HOB elevated with    upon PT entry to room.    General Precautions: Standard, fall   Orthopedic Precautions:N/A   Braces:  N/A  Respiratory Status: 2 liters/min O2 via nasal cannula    Functional Mobility:    Bed Mobility:  Sit to Supine: contact guard assistance    Transfers:  Sit to Stand: minimum assistance with rolling walker    Gait:  Patient ambulated 130 ft with rolling walker and minimum assistance.  Patient demonstrates :       unsteady gait       narrow base of support       ambulates outside HERNAN of RW       shortened/quickened step on bilateral       flexed posture.    Other Mobility:  N/A    Patient left sitting in chair with all lines intact, call button in reach, tray table at bedside, and patient's nurse notified.    DME Justifications:   Suleiman's mobility limitation cannot be sufficiently resolved by the use of a cane. His functional mobility deficit can be sufficiently resolved with the use of a Rolling Walker. Patient's mobility limitation significantly impairs their ability to participate in one of more activities of daily living.  The use of a RW will significantly improve the patient's ability to participate in MRADLS and the patient will use it on regular basis in the home.    Education     Patient educated on and assisted with functional mobility as noted above.  Patient educated on PT Plan of Care.  Patient was instructed to utilize staff assistance for mobility/transfers.  White board updated regarding patient's safest level of mobility with staff assistance    Goals     Multidisciplinary Problems       Physical Therapy Goals          Problem: Physical Therapy    Goal Priority Disciplines Outcome Interventions   Physical Therapy Goal     PT, PT/OT     Description: Goals to be met by: Discharge    Pt. Will increase independence with functional mobility by performin.  Supine<>sit with Min A met 2/17/2025    2. Sit<>stand with RW and Min A  3. Pt. Will ambulate x 130 feet with RW and Min A.  Reviewed 2/17/2025                       Time Tracking     PT Received On: 02/18/25  PT Start Time: 0823     PT Stop Time: 0852  PT Total Time (min): 29 min     Billable Minutes: Gait Training 14    02/18/2025       [1]   Patient Active Problem List  Diagnosis    Ureteral obstruction    Controlled type 2 diabetes mellitus without complication    Hypertension    Thyroid nodule    Primary osteoarthritis of right shoulder    History of stroke    Chronic bronchitis    Ex-cigarette smoker    Bifascicular block    Community acquired pneumonia of right lower lobe of lung

## 2025-02-18 NOTE — DISCHARGE SUMMARY
[facility] INTERNAL MEDICINE  INPATIENT DISCHARGE SUMMARY    Admitting Physician: Silvia Gutierrez MD  Attending Physician: Silvia Gutierrez MD  Date of Admit: 2/13/2025  Date of Discharge: 2/18/2025    Discharge to: Rehab Facility   Condition: Stable    Discharge Diagnoses     Problem List[1]    Consultants and Procedures     Consultants:  Consults (From admission, onward)          Status Ordering Provider     Inpatient consult to Social Work/Case Management  Once        Provider:  (Not yet assigned)    Completed FLORA HARTLEY     Inpatient consult to Internal Medicine  Once        Provider:  Rosalinda Romero MD    Acknowledged FLORA HARTLEY             Procedures:   -    Brief History of Present Illness      Suleiman Beck is a 75 y.o. male who has a past medical history of hypertension, diabetes, CVA, CKD.  He presented to [facility] on 2/13/2025  with a primary complaint of shortness of breath.  Patient reports symptoms began 2 days ago while at rest.  Oxygen saturation was checked by son at home and was 85% on room air but later improved to 93%.  As symptoms began to worsen he continued to desaturate into the low 80s and was placed on his wife's home oxygen.  Patient denies cough but reports he feels the need to cough something up but is unable to do so.  He reports chest pain that is worse with inhalation and not present at time of evaluation.  Patient is uncertain of his medical history, reports his only medication at home is pain medicine for osteoarthritis in his right shoulder.  Patient denies use of home inhalers or oxygen.  He denies fevers, chills, chest pain, nausea, vomiting, diarrhea, constipation, dysuria, or fatigue.  Patient denies any recent travel or sick contacts.     Hospital Course with Pertinent Findings     Admitted to inpatient unit for ongoing monitoring and medical therapy on 2/13/25. Inpatient imaging included: CT chest showed pulmonary edema, right-sided pleural effusion, findings consistent  with COPD/emphysema and chronic lung changes bilaterally . Hospital course:  Noted for COPD exacerbation versus possible right lower lobe pneumonia.  Received course of antibiotics and breathing treatments with improvement in symptoms.  Patient also presented with non-anion gap metabolic acidosis and TINO on CKD, received sodium bicarb and fluids with improvement in renal indices and acidosis.  Overall patient is physically deconditioned and will require rehab for high-intensity therapy.  Patient is currently stable and ready for discharge to rehab facility.  Plan to start Anoro Ellipta inhaler for COPD maintenance.  Most recent ABG shows metabolic alkalosis, discontinued sodium bicarb supplementation.    Discharge physical exam:  Vitals:    02/18/25 1258   BP:    Pulse: 77   Resp: 18   Temp:        General:  Cachectic appearance  HEENT: NC/AT; PERRL; nasal and oral mucosa moist and clear  Neck: Full ROM; no lymphadenopathy  Pulm: CTA bilaterally, normal work of breathing  CV: S1, S2 w/o murmurs or gallops; no edema noted  GI: Soft with normal bowel sounds in all quadrants, no masses on palpation  MSK: Full ROM of all extremities   Neuro: AAOx4; motor/sensory function intact  Psych: Cooperative; appropriate mood and affect    TIME SPENT ON DISCHARGE: 60 minutes    Discharge Medications        Medication List        START taking these medications      ANORO ELLIPTA 62.5-25 mcg/actuation Dsdv  Generic drug: umeclidinium-vilanteroL  Inhale 1 puff into the lungs once daily. Controller            CONTINUE taking these medications      albuterol 2.5 mg /3 mL (0.083 %) nebulizer solution  Commonly known as: PROVENTIL  Take 3 mLs (2.5 mg total) by nebulization every 4 (four) hours as needed for Wheezing. Rescue     atorvastatin 40 MG tablet  Commonly known as: LIPITOR  TAKE 1 TABLET BY MOUTH AT BEDTIME FOR CHOLESTEROL     blood sugar diagnostic Strp  200 strips by Misc.(Non-Drug; Combo Route) route 3 (three) times daily.    "  blood-glucose meter kit  Use as instructed     carvediloL 25 MG tablet  Commonly known as: COREG     cyclobenzaprine 5 MG tablet  Commonly known as: FLEXERIL     insulin aspart U-100 100 unit/mL injection  Commonly known as: NovoLOG  Inject 3 Units into the skin 3 (three) times daily.     insulin glargine U-100 (Lantus) 100 unit/mL injection  Inject 10 Units into the skin once daily.     lancets 30 gauge Misc  200 lancets by Misc.(Non-Drug; Combo Route) route 3 (three) times daily.     lisinopriL 40 MG tablet  Commonly known as: PRINIVIL,ZESTRIL  TAKE 1 TABLET BY MOUTH ONCE DAILY FOR  HYPERTENSION.     methocarbamoL 500 MG Tab  Commonly known as: Robaxin     NIFEdipine 90 MG (OSM) 24 hr tablet  Commonly known as: PROCARDIA-XL  Take 1 tablet (90 mg total) by mouth once daily.     NovoLOG Mix 70-30FlexPen U-100 100 unit/mL (70-30) Inpn pen  Generic drug: insulin aspart protamine-insulin aspart     pen needle, diabetic 31 gauge x 5/16" Ndle            STOP taking these medications      clopidogreL 75 mg tablet  Commonly known as: PLAVIX     HYDROcodone-acetaminophen 5-325 mg per tablet  Commonly known as: NORCO     traMADoL 50 mg tablet  Commonly known as: ULTRAM               Where to Get Your Medications        These medications were sent to [facility] - [address]  [place] [place] [address]      Phone: [phone]   ANORO ELLIPTA 62.5-25 mcg/actuation Dsdv         Discharge Information:     Mr. Suleiman Beck is being discharged Rehab Facility.    Discharge Procedure Orders   Ambulatory referral/consult to Internal Medicine   Standing Status: Future   Referral Priority: Routine Referral Type: Consultation   Referral Reason: Specialty Services Required   Requested Specialty: Internal Medicine   Number of Visits Requested: 1        Follow-Up Appointments:   Follow-up Information       [facility] - Emergency Dept Follow up.    Specialty: Emergency " Medicine  Why: As needed, If symptoms worsen  Contact information:  [address] [facility] [address] [phone]                             To address at follow-up:  -No pertinent labs or studies required    The above information was discussed with the patient in clear terms. He was able to repeat the instructions to me in his own words. All questions answered. ED precautions provided.    Luis Enrique Barragan DO  [facility] Internal [facility], PGY-I           [1]   Patient Active Problem List  Diagnosis    Ureteral obstruction    Controlled type 2 diabetes mellitus without complication    Hypertension    Thyroid nodule    Primary osteoarthritis of right shoulder    History of stroke    Chronic bronchitis    Ex-cigarette smoker    Bifascicular block    Community acquired pneumonia of right lower lobe of lung

## 2025-02-18 NOTE — PLAN OF CARE
Received text message from Clive with [facility], P: [phone], stating that nurse for report is Jayed, P: [phone]. Information for report has been sent to patient's nurse, Otf, via secure chat. Facility will provide van for transportation.

## 2025-02-18 NOTE — PT/OT/SLP PROGRESS
"Occupational Therapy   Treatment and Discharge Note  [facility]  services utilized, #[id number]  Name: Suleiman Beck  MRN: [id number]  Admitting Diagnosis:  Community acquired pneumonia of right lower lobe of lung       Recommendations:     Discharge Recommendations: High Intensity Therapy  Discharge Equipment Recommendations:  walker, rolling, other (see comments), bedside commode (has shower chair at home)  Barriers to discharge:  Decreased caregiver support    Assessment:     Suleiman Beck is a 75 y.o. male with a medical diagnosis of Community acquired pneumonia of right lower lobe of lung.  He presents with  posterior lean in standing, c/o R chronic shoulder pain with guarding RUE but able to demonstrate AROM to roughly 70 degrees flexion with compensation into abduction, very cooperative and pleasant, frequent spillage of food during eating d/t incoordination/L hand tremors during excursion to mouth, but able to feed himself.   Today pt also c/o L shoulder pain (still worse at R shoulder) but denied offer of requesting pain med from nursing, stating nurse had recently applied cream (Voltaren gel per nurse) and he would "be okay".  Pt demonstrated significant improvement in endurance for ambulating 130 ft today in edward with RW (OT following PT closely with WC) and remained up in chair to eat breakfast after session.    Performance deficits affecting function are weakness, impaired endurance, impaired self care skills, impaired functional mobility, gait instability, impaired balance, decreased upper extremity function, decreased lower extremity function, decreased safety awareness, pain, decreased ROM, impaired coordination, impaired fine motor, edema, impaired cardiopulmonary response to activity.     Rehab Prognosis:  Good; patient would benefit from acute skilled OT services to address these deficits and reach maximum level of function.       Plan:     DC to IP rehab today.    Subjective     Chief Complaint: " chronic R shoulder pain limiting RUE function  Patient/Family Comments/goals: DC to rehab for aggressive therapy to maximize functional independence for return home at [facility]  Pain/Comfort:  Pain Rating 1: 5/10  Location - Side 1: Right  Location 1: shoulder  Pain Addressed 1: Nurse notified, Pre-medicate for activity, Reposition, Distraction  Pain Rating Post-Intervention 1: 5/10  Pain Rating 2: 4/10  Location - Side 2: Left  Location 2: shoulder  Pain Addressed 2: Pre-medicate for activity, Nurse notified, Reposition, Distraction  Pain Rating Post-Intervention 2: 4/10    Objective:     Communicated with: nurse Vanessa prior to session.  Patient found HOB elevated with peripheral IV, oxygen, telemetry upon OT entry to room.    General Precautions: Standard, fall    Orthopedic Precautions:N/A  Braces: N/A  Respiratory Status: Nasal cannula, flow 2 L/min    Occupational Performance:     Bed Mobility:    Patient completed Supine to Sit with minimum assistance     Functional Mobility/Transfers:  Patient completed Sit <> Stand Transfer with minimum assistance  with  rolling walker   Patient completed Bed <> Chair Transfer using Step Transfer technique with minimum assistance and frequent tactile cues for sequencing/safety during transfer with rolling walker  Functional Mobility: see PT note for details; OT assisted during gait to follow with WC d/t poor endurance and balance deficits limiting tolerance for ambulation yesterday.  Min assist to ambulate 130 ft using RW.    Activities of Daily Living:  Feeding:  stand by assistance for setup of all items (opening all containers, positioning in chair in erect posture and pillow under R elbow to prevent falling/leaning to R when trying to feed himself, and with moderate spillage using L nondominant hand to feed himself, somewhat improved from yesterday with positioning in chair rather than in bed)  Upper Body Dressing: moderate assistance to help thread BUE while seated EOB; pt  able to use L hand to help with dressing      Treatment & Education:  Pt. educated on updated OT goals, POC, and use of call bell for assist with transfers OOB or for any other needs due to fall risk.  Pt verbalized understanding.      Patient left up in chair with all lines intact, call button in reach, chair alarm on, and nurse notified    GOALS:   Multidisciplinary Problems       Occupational Therapy Goals          Problem: Occupational Therapy    Goal Priority Disciplines Outcome Interventions   Occupational Therapy Goal     OT, PT/OT Adequate for Care Transition    Description: Goals to be met by: DC     Patient will increase functional independence with ADLs by performing:    Feeding with Set-up Assistance seated at chair without spillage.  Progressing 2/18 (SBA at chair level)  UE Dressing with Stand-by Assistance.  LE Dressing with Stand-by Assistance.  Grooming while standing at sink with Minimal Assistance.  Progressing 2/18 (min assist seated EOB)  Toileting from bedside commode with Minimal Assistance for hygiene and clothing management. Progressing 2/18   Toilet transfer to bedside commode with Contact Guard Assistance using RW.  Progressing 2/18                         DME Justifications:   Suleiman requires a commode for home use because he is confined to a single room.   Suleiman's mobility limitation cannot be sufficiently resolved by the use of a cane. His functional mobility deficit can be sufficiently resolved with the use of a Rolling Walker. Patient's mobility limitation significantly impairs their ability to participate in one of more activities of daily living.  The use of a RW will significantly improve the patient's ability to participate in MRADLS and the patient will use it on regular basis in the home.    Time Tracking:     OT Date of Treatment: 02/18/25  OT Start Time: 0823  OT Stop Time: 0852  OT Total Time (min): 29 min    Billable Minutes:Self Care/Home Management 15  Total Time with 1 unit of  assist to PT during ambulation d/t endurance and balance deficits    OT/REMI: OT          2/18/2025

## 2025-02-18 NOTE — PLAN OF CARE
Received call from Clive with [facility], P: [phone], stating that they have received insurance authorization and will be able to accept patient later this afternoon under the care of Dr. Albaro Craig. Spoke to Dr. Cotter to provide update. Spoke to patient's son, Anabel, P: [phone], to provide update. Anabel stated that he will call patient to provide this information. Will send DC summary when available.

## 2025-02-18 NOTE — PLAN OF CARE
Received text message from Clive with [facility], P: [phone], stating that patient has been accepted and she has submitted to patient's insurance for prior authorization.

## 2025-02-19 NOTE — PT/OT/SLP DISCHARGE
POST DISCHARGE DOCUMENTATION - 2025 9:49 AM    Physical Therapy Discharge Summary    Name: Suleiman Beck  MRN: [id number]   Principal Problem: Community acquired pneumonia of right lower lobe of lung   1. Sepsis, due to unspecified organism, unspecified whether acute organ dysfunction present    2. Shortness of breath    3. Pneumonia of right lower lobe due to infectious organism    4. COPD exacerbation    5. Chest pain    6. Volume overload    7. DVT (deep venous thrombosis)    8. Community acquired pneumonia of right lower lobe of lung       Problem List[1]   Recommendations - per last treatment session     Therapy Intensity Recommendations at Discharge: High Intensity Therapy  Discharge Equipment Recommendations: walker, rolling, bedside commode     Assessment:       Patient was unexpectedly discharged from hospital.    Objective     GOALS:  Multidisciplinary Problems       Physical Therapy Goals          Problem: Physical Therapy    Goal Priority Disciplines Outcome Interventions   Physical Therapy Goal     PT, PT/OT     Description: Goals to be met by: Discharge    Pt. Will increase independence with functional mobility by performin. Supine<>sit with Min A met 2025    2. Sit<>stand with RW and Min A  3. Pt. Will ambulate x 130 feet with RW and Min A.  Reviewed 2025    Partially met                     Plan     Patient Discharged to: alternate level of care .    2025       [1]   Patient Active Problem List  Diagnosis    Ureteral obstruction    Controlled type 2 diabetes mellitus without complication    Hypertension    Thyroid nodule    Primary osteoarthritis of right shoulder    History of stroke    Chronic bronchitis    Ex-cigarette smoker    Bifascicular block    Community acquired pneumonia of right lower lobe of lung

## 2025-02-21 DIAGNOSIS — J15.9 PNEUMONIA, BACTERIAL: Primary | ICD-10-CM

## 2025-02-25 ENCOUNTER — CLINICAL SUPPORT (OUTPATIENT)
Dept: REHABILITATION | Facility: HOSPITAL | Age: 75
End: 2025-02-25
Attending: PHYSICAL MEDICINE & REHABILITATION

## 2025-02-25 ENCOUNTER — HOSPITAL ENCOUNTER (OUTPATIENT)
Dept: RADIOLOGY | Facility: HOSPITAL | Age: 75
Discharge: HOME | End: 2025-02-25
Attending: PHYSICAL MEDICINE & REHABILITATION

## 2025-02-25 DIAGNOSIS — J15.9 PNEUMONIA, BACTERIAL: ICD-10-CM

## 2025-02-25 PROCEDURE — 74230 X-RAY XM SWLNG FUNCJ C+: CPT | Mod: TC

## 2025-02-25 PROCEDURE — 92611 MOTION FLUOROSCOPY/SWALLOW: CPT

## 2025-02-26 NOTE — PROGRESS NOTES
[facility]  MODIFIED BARIUM SWALLOW STUDY  Outpatient    Date: 02/26/2025      Name: Suleiman Beck   MRN: [id number]    Therapy Diagnosis: pharyngeal dysphagia    Physician: Albaro Craig MD  Physician Orders: SLP Video Swallow  Medical Diagnosis from Referral:     Date of Evaluation:  02/26/2025    Time In:  0930  Time Out:  1000  Total Billable Time: 30     Procedure Min.   Fl Modified Barium Swallow Speech  30     Precautions: Standard and Aspiration    Recommendations:   Consistency Recommendations:  Thin liquids (IDDSI 0) and Soft/bite size consistencies (IDDSI 6).  Medications should be taken whole one at a time however language barrier may impact understanding, therefore crush as indicated.   Precautions:supervision recommended  Risk Management: use good oral hygiene , sit upright for all PO intake, and increase physical mobility as tolerated  Specialist Referrals:   Ancillary Tests:  Therapy: as indicated by treating SLP  Frequency:   Follow-up exam: Follow up swallow study is not indicated at this time.    Subjective       Past Medical History: Suleiman Beck  has a past surgical history that includes Cystoscopy w/ ureteral stent placement (Left, 12/11/2022) and Wrist surgery (Left).       Active Ambulatory Problems     Diagnosis Date Noted    Ureteral obstruction 12/11/2022    Controlled type 2 diabetes mellitus without complication 01/23/2023    Hypertension 01/23/2023    Thyroid nodule 05/23/2024    Primary osteoarthritis of right shoulder 10/29/2024    History of stroke 02/04/2025    Chronic bronchitis 02/04/2025    Ex-cigarette smoker 02/04/2025    Bifascicular block 02/04/2025    Community acquired pneumonia of right lower lobe of lung 02/13/2025     Resolved Ambulatory Problems     Diagnosis Date Noted    Ischemic stroke 05/17/2024     Past Medical History:   Diagnosis Date    Arthritis     Diabetes mellitus     Essential (primary) hypertension     Unspecified chronic  bronchitis       Medical Hx and Allergies:    Review of patient's allergies indicates:   Allergen Reactions    Opioids - morphine analogues     Dilaudid [hydromorphone] Anxiety       Modified barium swallow study (MBSS) completed to assess swallow effectiveness, ID/rule out penetration and aspiration, make appropriate recommendations regarding safest diet consistency, effective compensatory strategies and safe eating environment.       The patient's daughter gave the following history:   -Current diet at home: regular (soft) with thin liquids      The following observations were made:   -Mental status: Alert and Cooperative  -Factors affecting performance:  per daughter  -Feeding Method: independent in self-feeding    Respiratory Status:   -Respiratory Status: room air      Pain Scale:  6/10 on VAS currently.   Pain Location: right shoulder    Objective   Cranial Nerve Examination  Cranial Nerve 5: Trigeminal Nerve  Motor Jaw Posture at rest: Closed  Mandible Elevation/Depression: WFL  Mandible lateralization: WFL  Abnormal movement: absent Interpretation:   intact   Sensory Forehead: WFL  Cheek: WFL  Jaw: WFL  Facial Pain: None noted Interpretation:   intact     Cranial Nerve 7: Facial Nerve  Motor Facial Symmetry: WNL  Wrinkle Forehead: WFL  Close eyes tightly: WFL  Labial Protrusion: WFL  Labial Retraction: WFL  Abnormal movement: absent Interpretation:   intact   Sensory Formal testing not completed. Patient denied any changes in taste      Cranial Nerves IX and X: Glossopharyngeal and Vagus Nerves  Motor Palatal Symmetry (Rest): WNL  Palatal Symmetry (Movement): WNL  Cough: Perceptually strong  Voice Prior to PO intake: Clear  Resonance: Normal  Abnormal movement: absent Interpretation:   intact     Cranial Nerve XII: Hypoglossal Nerve  Motor Tongue at rest: WNL  Lingual Protrusion: WNL  Lingual Protrusion against Resistance: WNL  Lingual Lateralization: WNL  Abnormal movement: absent Interpretation:    intact     Other information:  Volitional Swallow: Able to palpate laryngeal rise  Mucosal Quality: No abnormal findings  Secretion Management: Secretion Mgmt: adequate  Dentition: Upper dentures and Lower dentures     CONSISTENCIES ADMINISTERED:  Thin Liquids (IDDSI 0):  Mode: Self-regulated straw sip   Oral Residue: trace    Vallecular Residue: trace    Pyriform Sinus Residue: trace    Rosenbeck's 8-Point Penetration-Aspiration Scale:  Best: (1) Material does not enter the airway  penetration or aspiration did not occur during the swallow with thin liquids via straw  Worst: (4) Material enters the airway, contacts the vocal folds, and is ejected from the airway  penetration did occur during the swallow with thin liquids via straw  Strategies attempted:   Barium Tablet: patient chewed barium tablet     Mildly Thick Liquids (IDDSI 2):  Mode: Self-regulated straw sip  Oral Residue: trace   Vallecular Residue: trace   Pyriform Sinus Residue: none   Rosenbeck's 8-Point Penetration-Aspiration Scale:   Best: (1) Material does not enter the airway  penetration or aspiration did not occur during the swallow with mildly thick liquids via straw    Strategies attempted:   Barium Tablet:     Puree (IDDSI 4):  Mode: SLP fed  Oral Residue: none   Vallecular Residue: none   Pyriform Sinus Residue: none   Rosenbeck's 8-Point Penetration-Aspiration Scale:   Best: (1) Material does not enter the airway  penetration or aspiration did not occur during the swallow.    Strategies attempted:   Barium Tablet:     Mixed (Soft and Bite Sized) Consistency Bolus (IDDSI 6 with IDDSI 2):  DNT    Regular (IDDSI 7):  Mode: Independent  Oral Residue: trace   Vallecular Residue: none   Pyriform Sinus Residue: none   Rosenbeck's 8-Point Penetration-Aspiration Scale:   Best: (1) Material does not enter the airway  penetration or aspiration did not occur during the swallow.    Strategies attempted:     Impression         Assessment     Preparatory  Phase:     Patient Positioning:   Upright in wheelchair       Level of Assistance:   Set up assistance      Regulation of Bolus:   Independent and SLP fed    Oral Phase:     Labial Seal:   no labial escape      Lingual Movement:    Lingual motion was delayed.    Bolus Acceptance:   WFL     Oral Manipulation:   WFL     Mastication:   prolonged with complete recollection    Oral Transit:   WFL     Oral Pocketing:   No    Oral Residue:   There was no significant oral residue.    Pharyngeal Phase:     Tongue Base Retraction:   Coating of contrast between base of tongue and posterior pharyngeal wall with some consistencies.       Initiation of Pharyngeal Swallow:     Occurs when the bolus head reaches the valleculae with puree, solids, nectar    Occurs when the bolus head is at the pyriform sinuses with thin        Epiglottic Closure:  Partial inversion      Laryngeal Elevation:   Partial elevation      Anterior Hyoid Excursion:   Partial excursion      Pharyngeal Contractions (A-P view only):   DNT     Pharyngeal Stripping Wave:   Present       Vallecular Residue:  None observed with consistencies presented      Posterior Pharyngeal Wall Residue:   None observed with consistencies presented        Pyriform Sinus Residue:   None observed with consistencies presented       Esophageal Phase:   DNT        Goals:     LONG TERM GOAL    Suleiman Beck will tolerate Soft and Bite Sized (IDDSI Level 6) consistency diet with Thin (IDDSI Level 0) liquids without overt s/sx of aspiration to maintain appropriate nutrition and hydration.  Initial         The Functional Oral Intake Scale (FOIS) is an ordinal scale that is used to assess the current status and meaningful change in the oral intake. FOIS levels include:        TUBE DEPENDENT   (Levels 1-3) 1. No oral intake    2. Tube dependent with minimal/inconsistent oral intake    3. Tube supplements with consistent oral intake          TOTAL ORAL INTAKE (Levels 4-7) 4. Total oral  intake of a single consistency    5. Total oral intake of multiple consistencies requiring special preparation    6. Total oral intake with no special preparation, but must avoid specific foods or liquid items    7. Total oral intake with no restrictions   Patient is currently judged to be at FOIS Level 6      *SHWETA Galicia (2005, March). Pneumonia: Factors Beyond Aspiration. Perspectives in Swallowing and Swallowing Disorders (Dysphagia), 14, 10-16.  Education   Education: Aspiration precautions and Recommendations were discussed with the patient. Patient expressed understanding.     Barriers to Learning:     Teaching Method: Verbal    Abdiaziz Pryor CCC-SLP   Speech-Language Pathologist    Date: 2/25/2025

## 2025-04-03 ENCOUNTER — TELEPHONE (OUTPATIENT)
Dept: INTERNAL MEDICINE | Facility: CLINIC | Age: 75
End: 2025-04-03

## 2025-04-03 NOTE — TELEPHONE ENCOUNTER
Mckenna from [facility] ([phone])Called asking if patient would be able to be followed after discharge from home health. Please advise.

## 2025-04-05 ENCOUNTER — HOSPITAL ENCOUNTER (EMERGENCY)
Facility: HOSPITAL | Age: 75
Discharge: HOME | End: 2025-04-05
Attending: INTERNAL MEDICINE
Payer: MEDICAID

## 2025-04-05 VITALS
HEART RATE: 94 BPM | BODY MASS INDEX: 21.87 KG/M2 | RESPIRATION RATE: 18 BRPM | SYSTOLIC BLOOD PRESSURE: 156 MMHG | HEIGHT: 63 IN | OXYGEN SATURATION: 95 % | DIASTOLIC BLOOD PRESSURE: 79 MMHG | TEMPERATURE: 98 F | WEIGHT: 123.44 LBS

## 2025-04-05 DIAGNOSIS — T21.25XA PARTIAL THICKNESS BURN OF BUTTOCK, INITIAL ENCOUNTER: ICD-10-CM

## 2025-04-05 DIAGNOSIS — T21.25XA SECOND DEGREE BURN OF BUTTOCK, INITIAL ENCOUNTER: Primary | ICD-10-CM

## 2025-04-05 PROCEDURE — 99283 EMERGENCY DEPT VISIT LOW MDM: CPT | Mod: 25

## 2025-04-05 PROCEDURE — 25000003 PHARM REV CODE 250: Performed by: INTERNAL MEDICINE

## 2025-04-05 PROCEDURE — 16020 DRESS/DEBRID P-THICK BURN S: CPT

## 2025-04-05 RX ORDER — SILVER SULFADIAZINE 10 G/1000G
CREAM TOPICAL 2 TIMES DAILY
Qty: 30 G | Refills: 0 | Status: SHIPPED | OUTPATIENT
Start: 2025-04-05 | End: 2025-04-12

## 2025-04-05 RX ORDER — ACETAMINOPHEN 500 MG
1000 TABLET ORAL
Status: COMPLETED | OUTPATIENT
Start: 2025-04-05 | End: 2025-04-05

## 2025-04-05 RX ORDER — SILVER SULFADIAZINE 10 G/1000G
1 CREAM TOPICAL
Status: COMPLETED | OUTPATIENT
Start: 2025-04-05 | End: 2025-04-05

## 2025-04-05 RX ADMIN — SILVER SULFADIAZINE 1 TUBE: 10 CREAM TOPICAL at 10:04

## 2025-04-05 RX ADMIN — ACETAMINOPHEN 1000 MG: 500 TABLET, FILM COATED ORAL at 09:04

## 2025-04-05 NOTE — DISCHARGE INSTRUCTIONS
Apply Silvadene cream to buttocks twice a day.  Take Tylenol or Advil for pain.  Follow up with [facility] Monday morning at 7:00 a.m..
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
## 2025-04-14 ENCOUNTER — OFFICE VISIT (OUTPATIENT)
Dept: INTERNAL MEDICINE | Facility: CLINIC | Age: 75
End: 2025-04-14
Payer: MEDICAID

## 2025-04-14 VITALS
HEIGHT: 62 IN | HEART RATE: 85 BPM | DIASTOLIC BLOOD PRESSURE: 66 MMHG | WEIGHT: 118 LBS | RESPIRATION RATE: 20 BRPM | BODY MASS INDEX: 21.71 KG/M2 | SYSTOLIC BLOOD PRESSURE: 124 MMHG | TEMPERATURE: 97 F | OXYGEN SATURATION: 97 %

## 2025-04-14 DIAGNOSIS — Z76.0 MEDICATION REFILL: ICD-10-CM

## 2025-04-14 DIAGNOSIS — T21.25XA SECOND DEGREE BURN OF BUTTOCK, INITIAL ENCOUNTER: Primary | ICD-10-CM

## 2025-04-14 DIAGNOSIS — I10 PRIMARY HYPERTENSION: ICD-10-CM

## 2025-04-14 PROCEDURE — 99213 OFFICE O/P EST LOW 20 MIN: CPT | Mod: PBBFAC

## 2025-04-14 RX ORDER — GABAPENTIN 100 MG/1
100 CAPSULE ORAL 3 TIMES DAILY PRN
Qty: 90 CAPSULE | Refills: 11 | Status: SHIPPED | OUTPATIENT
Start: 2025-04-14 | End: 2026-04-14

## 2025-04-14 RX ORDER — CARVEDILOL 25 MG/1
25 TABLET ORAL 2 TIMES DAILY
Qty: 180 TABLET | Refills: 3 | Status: SHIPPED | OUTPATIENT
Start: 2025-04-14 | End: 2026-04-09

## 2025-04-14 RX ORDER — HYDROCODONE BITARTRATE AND ACETAMINOPHEN 5; 325 MG/1; MG/1
1 TABLET ORAL EVERY 12 HOURS PRN
Qty: 14 TABLET | Refills: 0 | Status: SHIPPED | OUTPATIENT
Start: 2025-04-14 | End: 2025-04-21

## 2025-04-14 RX ORDER — ATORVASTATIN CALCIUM 40 MG/1
40 TABLET, FILM COATED ORAL NIGHTLY
Qty: 90 TABLET | Refills: 3 | Status: SHIPPED | OUTPATIENT
Start: 2025-04-14 | End: 2026-04-09

## 2025-04-14 RX ORDER — LISINOPRIL 40 MG/1
40 TABLET ORAL DAILY
Qty: 30 TABLET | Refills: 11 | Status: SHIPPED | OUTPATIENT
Start: 2025-04-14 | End: 2026-04-09

## 2025-04-14 RX ORDER — NIFEDIPINE 90 MG/1
90 TABLET, EXTENDED RELEASE ORAL DAILY
Qty: 90 TABLET | Refills: 3 | Status: SHIPPED | OUTPATIENT
Start: 2025-04-14 | End: 2026-04-09
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
## 2025-04-28 DIAGNOSIS — I10 PRIMARY HYPERTENSION: ICD-10-CM

## 2025-04-28 RX ORDER — NIFEDIPINE 90 MG/1
90 TABLET, EXTENDED RELEASE ORAL DAILY
Qty: 90 TABLET | Refills: 3 | OUTPATIENT
Start: 2025-04-28 | End: 2026-04-23

## 2025-04-29 RX ORDER — INSULIN ASPART 100 [IU]/ML
3 INJECTION, SUSPENSION SUBCUTANEOUS 3 TIMES DAILY
Qty: 32.4 ML | Refills: 0 | Status: SHIPPED | OUTPATIENT
Start: 2025-04-29 | End: 2026-04-29

## 2025-05-06 ENCOUNTER — OFFICE VISIT (OUTPATIENT)
Dept: INTERNAL MEDICINE | Facility: CLINIC | Age: 75
End: 2025-05-06
Payer: MEDICAID

## 2025-05-06 ENCOUNTER — LAB VISIT (OUTPATIENT)
Dept: LAB | Facility: HOSPITAL | Age: 75
End: 2025-05-06
Attending: INTERNAL MEDICINE
Payer: MEDICAID

## 2025-05-06 VITALS
WEIGHT: 122.63 LBS | RESPIRATION RATE: 18 BRPM | SYSTOLIC BLOOD PRESSURE: 109 MMHG | DIASTOLIC BLOOD PRESSURE: 63 MMHG | BODY MASS INDEX: 22.56 KG/M2 | TEMPERATURE: 98 F | HEART RATE: 58 BPM | OXYGEN SATURATION: 97 % | HEIGHT: 62 IN

## 2025-05-06 DIAGNOSIS — M19.011 PRIMARY OSTEOARTHRITIS OF RIGHT SHOULDER: ICD-10-CM

## 2025-05-06 DIAGNOSIS — E11.9 CONTROLLED TYPE 2 DIABETES MELLITUS WITHOUT COMPLICATION, WITHOUT LONG-TERM CURRENT USE OF INSULIN: Primary | ICD-10-CM

## 2025-05-06 DIAGNOSIS — E11.9 CONTROLLED TYPE 2 DIABETES MELLITUS WITHOUT COMPLICATION, WITHOUT LONG-TERM CURRENT USE OF INSULIN: ICD-10-CM

## 2025-05-06 DIAGNOSIS — T21.25XA SECOND DEGREE BURN OF BUTTOCK, INITIAL ENCOUNTER: ICD-10-CM

## 2025-05-06 DIAGNOSIS — F29 PSYCHOSIS, UNSPECIFIED PSYCHOSIS TYPE: ICD-10-CM

## 2025-05-06 DIAGNOSIS — E78.2 HYPERLIPEMIA, MIXED: ICD-10-CM

## 2025-05-06 DIAGNOSIS — Z86.73 HISTORY OF STROKE: Primary | ICD-10-CM

## 2025-05-06 DIAGNOSIS — E04.1 THYROID NODULE: ICD-10-CM

## 2025-05-06 DIAGNOSIS — I10 PRIMARY HYPERTENSION: ICD-10-CM

## 2025-05-06 DIAGNOSIS — N13.5 URETERAL OBSTRUCTION, LEFT: ICD-10-CM

## 2025-05-06 DIAGNOSIS — M25.511 RIGHT SHOULDER PAIN, UNSPECIFIED CHRONICITY: ICD-10-CM

## 2025-05-06 DIAGNOSIS — Z87.891 EX-CIGARETTE SMOKER: ICD-10-CM

## 2025-05-06 DIAGNOSIS — J18.9 COMMUNITY ACQUIRED PNEUMONIA OF RIGHT LOWER LOBE OF LUNG: ICD-10-CM

## 2025-05-06 DIAGNOSIS — R97.20 PSA ELEVATION: ICD-10-CM

## 2025-05-06 DIAGNOSIS — N18.32 STAGE 3B CHRONIC KIDNEY DISEASE: ICD-10-CM

## 2025-05-06 DIAGNOSIS — J47.9 BRONCHIECTASIS, UNCOMPLICATED: ICD-10-CM

## 2025-05-06 DIAGNOSIS — J41.0 SIMPLE CHRONIC BRONCHITIS: ICD-10-CM

## 2025-05-06 DIAGNOSIS — N13.5 OBSTRUCTION OF URETER, UNSPECIFIED LATERALITY: ICD-10-CM

## 2025-05-06 DIAGNOSIS — I45.2 BIFASCICULAR BLOCK: ICD-10-CM

## 2025-05-06 LAB
ALBUMIN SERPL-MCNC: 3.5 G/DL (ref 3.4–4.8)
ALBUMIN/GLOB SERPL: 0.9 RATIO (ref 1.1–2)
ALP SERPL-CCNC: 89 UNIT/L (ref 40–150)
ALT SERPL-CCNC: 10 UNIT/L (ref 0–55)
ANION GAP SERPL CALC-SCNC: 7 MEQ/L
AST SERPL-CCNC: 10 UNIT/L (ref 11–45)
BASOPHILS # BLD AUTO: 0.03 X10(3)/MCL
BASOPHILS NFR BLD AUTO: 0.3 %
BILIRUB SERPL-MCNC: 0.3 MG/DL
BUN SERPL-MCNC: 55.9 MG/DL (ref 8.4–25.7)
CALCIUM SERPL-MCNC: 9.3 MG/DL (ref 8.8–10)
CHLORIDE SERPL-SCNC: 114 MMOL/L (ref 98–107)
CHOLEST SERPL-MCNC: 117 MG/DL
CHOLEST/HDLC SERPL: 4 {RATIO} (ref 0–5)
CO2 SERPL-SCNC: 17 MMOL/L (ref 23–31)
CREAT SERPL-MCNC: 2.03 MG/DL (ref 0.72–1.25)
CREAT UR-MCNC: 119.6 MG/DL (ref 63–166)
CREAT/UREA NIT SERPL: 28
EOSINOPHIL # BLD AUTO: 0.42 X10(3)/MCL (ref 0–0.9)
EOSINOPHIL NFR BLD AUTO: 3.6 %
ERYTHROCYTE [DISTWIDTH] IN BLOOD BY AUTOMATED COUNT: 15.2 % (ref 11.5–17)
ERYTHROCYTE [SEDIMENTATION RATE] IN BLOOD: 10 MM/HR (ref 0–15)
GFR SERPLBLD CREATININE-BSD FMLA CKD-EPI: 34 ML/MIN/1.73/M2
GLOBULIN SER-MCNC: 3.7 GM/DL (ref 2.4–3.5)
GLUCOSE SERPL-MCNC: 196 MG/DL (ref 82–115)
HBA1C MFR BLD: 6.4 %
HCT VFR BLD AUTO: 35.1 % (ref 42–52)
HDLC SERPL-MCNC: 32 MG/DL (ref 35–60)
HGB BLD-MCNC: 10.8 G/DL (ref 14–18)
IMM GRANULOCYTES # BLD AUTO: 0.03 X10(3)/MCL (ref 0–0.04)
IMM GRANULOCYTES NFR BLD AUTO: 0.3 %
LDLC SERPL CALC-MCNC: 63 MG/DL (ref 50–140)
LYMPHOCYTES # BLD AUTO: 2.95 X10(3)/MCL (ref 0.6–4.6)
LYMPHOCYTES NFR BLD AUTO: 25.4 %
MCH RBC QN AUTO: 28.3 PG (ref 27–31)
MCHC RBC AUTO-ENTMCNC: 30.8 G/DL (ref 33–36)
MCV RBC AUTO: 92.1 FL (ref 80–94)
MICROALBUMIN UR-MCNC: 1498.1 UG/ML
MICROALBUMIN/CREAT RATIO PNL UR: 1252.6 MG/GM CR (ref 0–30)
MONOCYTES # BLD AUTO: 0.65 X10(3)/MCL (ref 0.1–1.3)
MONOCYTES NFR BLD AUTO: 5.6 %
NEUTROPHILS # BLD AUTO: 7.55 X10(3)/MCL (ref 2.1–9.2)
NEUTROPHILS NFR BLD AUTO: 64.8 %
NRBC BLD AUTO-RTO: 0 %
PLATELET # BLD AUTO: 281 X10(3)/MCL (ref 130–400)
PMV BLD AUTO: 10.6 FL (ref 7.4–10.4)
POTASSIUM SERPL-SCNC: 5.8 MMOL/L (ref 3.5–5.1)
PROT SERPL-MCNC: 7.2 GM/DL (ref 5.8–7.6)
RBC # BLD AUTO: 3.81 X10(6)/MCL (ref 4.7–6.1)
SODIUM SERPL-SCNC: 138 MMOL/L (ref 136–145)
TRIGL SERPL-MCNC: 112 MG/DL (ref 34–140)
VLDLC SERPL CALC-MCNC: 22 MG/DL
WBC # BLD AUTO: 11.63 X10(3)/MCL (ref 4.5–11.5)

## 2025-05-06 PROCEDURE — 80053 COMPREHEN METABOLIC PANEL: CPT

## 2025-05-06 PROCEDURE — 83036 HEMOGLOBIN GLYCOSYLATED A1C: CPT | Mod: PBBFAC | Performed by: INTERNAL MEDICINE

## 2025-05-06 PROCEDURE — 36415 COLL VENOUS BLD VENIPUNCTURE: CPT

## 2025-05-06 PROCEDURE — 80061 LIPID PANEL: CPT

## 2025-05-06 PROCEDURE — 82570 ASSAY OF URINE CREATININE: CPT

## 2025-05-06 PROCEDURE — 85652 RBC SED RATE AUTOMATED: CPT

## 2025-05-06 PROCEDURE — 85025 COMPLETE CBC W/AUTO DIFF WBC: CPT

## 2025-05-06 PROCEDURE — 99215 OFFICE O/P EST HI 40 MIN: CPT | Mod: PBBFAC | Performed by: INTERNAL MEDICINE

## 2025-05-06 RX ORDER — DICLOFENAC SODIUM 10 MG/G
GEL TOPICAL
COMMUNITY
Start: 2024-06-08

## 2025-05-06 RX ORDER — ASPIRIN 81 MG/1
81 TABLET ORAL DAILY
Qty: 360 TABLET | Refills: 0 | Status: SHIPPED | OUTPATIENT
Start: 2025-05-06 | End: 2026-05-06

## 2025-05-06 RX ORDER — ACETAMINOPHEN 500 MG
TABLET ORAL EVERY 4 HOURS PRN
COMMUNITY
Start: 2024-06-08

## 2025-05-06 RX ORDER — QUETIAPINE FUMARATE 25 MG/1
25 TABLET, FILM COATED ORAL DAILY
Qty: 30 TABLET | Refills: 11 | Status: SHIPPED | OUTPATIENT
Start: 2025-05-06 | End: 2026-05-06
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
## 2025-05-08 ENCOUNTER — HOSPITAL ENCOUNTER (EMERGENCY)
Facility: HOSPITAL | Age: 75
Discharge: HOME | End: 2025-05-08
Attending: EMERGENCY MEDICINE
Payer: MEDICAID

## 2025-05-08 VITALS
HEART RATE: 92 BPM | SYSTOLIC BLOOD PRESSURE: 131 MMHG | WEIGHT: 120.81 LBS | DIASTOLIC BLOOD PRESSURE: 75 MMHG | HEIGHT: 62 IN | RESPIRATION RATE: 18 BRPM | TEMPERATURE: 97 F | OXYGEN SATURATION: 98 % | BODY MASS INDEX: 22.23 KG/M2
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
## 2025-05-08 DIAGNOSIS — T14.8XXA FRACTURE: ICD-10-CM

## 2025-05-08 PROCEDURE — 25000003 PHARM REV CODE 250

## 2025-05-08 PROCEDURE — 99283 EMERGENCY DEPT VISIT LOW MDM: CPT | Mod: 25

## 2025-05-08 RX ORDER — ACETAMINOPHEN 500 MG
1000 TABLET ORAL
Status: COMPLETED | OUTPATIENT
Start: 2025-05-08 | End: 2025-05-08

## 2025-05-08 RX ADMIN — ACETAMINOPHEN 1000 MG: 500 TABLET, FILM COATED ORAL at 10:05

## 2025-05-09 NOTE — ED PROVIDER NOTES
Encounter Date: 5/8/2025       History     Chief Complaint   Patient presents with    Fall    Facial Injury    Hand Injury     Son states patient was walking outside and tripped and fell.  Obvious injury to left face and left hand.  Has had CVA in past and unsteady gait from previous CVA.  Not on blood thinners.  Was starting ASA today, did not.       A 75 yr old male with history of multiple falls, CVA T2DM,Htn came to the ER with fall and abrasion on his face. Patient reports that he went for a walk outside and fell on the ground. Cannot tell me why fell down. Denies any dizziness or headache or loss of consciousness. No changes in visual fields, no acute changes in gait. Has an abnormal gait since the stroke. Lives with family. Family reports that they are not able to provide adequate care for him. Had Tdap vaccination done in 2023. No other complaints today.    The history is provided by the patient, medical records and a caregiver. No  was used.     Review of patient's allergies indicates:   Allergen Reactions    Opioids - morphine analogues     Dilaudid [hydromorphone] Anxiety     Past Medical History:   Diagnosis Date    Arthritis     Diabetes mellitus     Essential (primary) hypertension     Unspecified chronic bronchitis      Past Surgical History:   Procedure Laterality Date    CYSTOSCOPY W/ URETERAL STENT PLACEMENT Left 12/11/2022    Procedure: CYSTOSCOPY, WITH URETERAL STENT INSERTION;  Surgeon: Mar Fernandez MD;  Location: [facility];  Service: Urology;  Laterality: Left;    FOOT SURGERY Left     HEMORRHOID SURGERY      WRIST SURGERY Left      No family history on file.  Social History[1]  Review of Systems   Constitutional:  Negative for chills and fever.   HENT: Negative.     Eyes: Negative.    Respiratory:  Negative for shortness of breath and wheezing.    Cardiovascular:  Negative for chest pain.   Gastrointestinal:  Negative for abdominal pain.   Genitourinary:  Negative  for dysuria and flank pain.   Musculoskeletal:  Positive for arthralgias, gait problem and joint swelling.   Skin:  Positive for wound.   Neurological:  Negative for dizziness and light-headedness.   All other systems reviewed and are negative.      Physical Exam     Initial Vitals [05/08/25 2110]   BP Pulse Resp Temp SpO2   131/75 92 18 97.4 °F (36.3 °C) 98 %      MAP       --         Physical Exam    Nursing note and vitals reviewed.  Constitutional: He appears well-developed and well-nourished.   HENT:   Head: Normocephalic. Mouth/Throat: Oropharynx is clear and moist.   Abrasion on the left side of the face, on the zygomatic bone.    Eyes: Pupils are equal, round, and reactive to light.   Neck:   Normal range of motion.  Cardiovascular:  Normal rate, regular rhythm and normal heart sounds.           Pulmonary/Chest: Breath sounds normal. He has no rhonchi. He has no rales. He exhibits no tenderness.   Abdominal: Abdomen is soft. There is no rebound and no guarding.   Musculoskeletal:         General: Tenderness present.      Cervical back: Normal range of motion.      Comments: Chronic pain with restricted motion in the right shoulder, tenderness noted in the left wrist - acute from the fall without any swelling or erythema.     Neurological: He is alert and oriented to person, place, and time. GCS score is 15. GCS eye subscore is 4. GCS verbal subscore is 5. GCS motor subscore is 6.   Residual gait abnormality from stroke   Skin: Skin is warm and dry. Capillary refill takes less than 2 seconds.   Psychiatric: He has a normal mood and affect. His behavior is normal. Judgment and thought content normal.         ED Course   Procedures  Labs Reviewed - No data to display       Imaging Results              X-Ray Wrist Complete Left (Preliminary result)  Result time 05/08/25 22:23:59      Wet Read by Navi Mullen MD (05/08/25 22:23:59, [facility], Emergency Medicine)    NAF                                      Medications   acetaminophen tablet 1,000 mg (1,000 mg Oral Given 25)     Medical Decision Making  Amount and/or Complexity of Data Reviewed  External Data Reviewed: notes.  Radiology: ordered and independent interpretation performed. Decision-making details documented in ED Course.    Risk  OTC drugs.      Additional MDM:   Differential Diagnosis:   Other: The following diagnoses were also considered and will be evaluated: wrist fracture, sub arachanoid hemorrhage and Stroke.                                   Clinical Impression:  Final diagnoses:  [T14.8XXA] Fracture  [W19.XXXD] Fall, subsequent encounter (Primary)          ED Disposition Condition    Discharge Good          ED Prescriptions    None       Follow-up Information       Follow up With Specialties Details Why Contact Info    Martin Sears MD Pulmonary Disease, Internal Medicine Call in 1 week  [address] [facility] [address] [phone]      [facility] Emergency Medicine Go to  As needed, If symptoms worsen [address] [facility] [address] [phone]                 [1]   Social History  Tobacco Use    Smoking status: Former     Current packs/day: 0.00     Types: Cigarettes     Quit date:      Years since quittin.3    Smokeless tobacco: Never   Substance Use Topics    Alcohol use: Not Currently    Drug use: Never        Mere Alan MD  Resident  [phone]

## 2025-05-13 ENCOUNTER — TELEPHONE (OUTPATIENT)
Dept: INTERVENTIONAL RADIOLOGY/VASCULAR | Facility: HOSPITAL | Age: 75
End: 2025-05-13
Payer: MEDICAID

## 2025-05-13 NOTE — TELEPHONE ENCOUNTER
Messaged Dr. Sears: This patient was referred to IR for an FNA but his last imaging was from over a year ago. The radiologist requires at least having imaging within a three month period. Will need this imaging to move forward.

## 2025-05-14 DIAGNOSIS — E04.1 THYROID NODULE: Primary | ICD-10-CM

## 2025-05-16 ENCOUNTER — TELEPHONE (OUTPATIENT)
Dept: INTERNAL MEDICINE | Facility: CLINIC | Age: 75
End: 2025-05-16
Payer: MEDICAID

## 2025-05-16 ENCOUNTER — PATIENT MESSAGE (OUTPATIENT)
Dept: INTERNAL MEDICINE | Facility: CLINIC | Age: 75
End: 2025-05-16
Payer: MEDICAID

## 2025-05-16 DIAGNOSIS — R45.3: Primary | ICD-10-CM

## 2025-05-16 NOTE — TELEPHONE ENCOUNTER
Received phone call from Dr Sears requesting assistance. Patient needs psych or long term care. He is not sure what family may want. After speaking to Son he is concerned about the language barrier if an eval is done without him being present. Dr Sears will refer to Misty Soler at [facility] for psych eval. Will follow up with son once that is completed

## 2025-05-16 NOTE — TELEPHONE ENCOUNTER
Will follow with full note - spoke to the son.  For the 4th time I will do this dictation on this patient.    The son called and said that his father was not doing well at home.  They can no longer care for him in his home.  He seems to have such an apathetic attitude that they can no longer care for him his son Dil has a small home his family and his mother who is quite ill homeless same home and his father.  His father had a stroke within the last year he is right-handed it has right hemiparesis.  He eats now with his left hand but he thinks the problem is not that he so much has apraxia as that he just does not care in his apathetic he makes a toe mass when he eats does not pull the chair or table together he makes a mess in the bathroom and just makes a mess wherever he goes he refuses to use his quad cane in falls often recurrently.  When he speaks to other people he often gives the wrong answer in apathetic intentional fashion although he knows the right answer.    He initially requested that he go back to skilled nursing.  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  Wonder if there is he had a severe depression PTSD self-esteem problems etc. his son says that this apathy was present before his stroke.  His behavior was present before his stroke.  It maybe worse now but maybe not.  Together we have decided that it would be good that he has a psychiatric evaluation maybe inpatient psychiatric care before he goes to a long term inpatient institution such as a nursing home.  The some wishes that he goes to nursing home for long-term institutional care because he can not care for him he longer at least for the near future I think we should try to evaluate and diagnose why this man has such extreme atrophy apathy and then plan for at least long term care.    One issue is that he does have a language barrier that we will have to be considered  in wherever he is referred to    We are working with Ms. Heike Barney  if there is any question they will call me thank you    Time spent on this issue including working with this computer program equal 2 hours

## 2025-05-19 ENCOUNTER — TELEPHONE (OUTPATIENT)
Dept: INTERNAL MEDICINE | Facility: CLINIC | Age: 75
End: 2025-05-19
Payer: MEDICAID

## 2025-05-19 ENCOUNTER — HOSPITAL ENCOUNTER (OUTPATIENT)
Dept: RADIOLOGY | Facility: HOSPITAL | Age: 75
Discharge: HOME | End: 2025-05-19
Attending: INTERNAL MEDICINE
Payer: MEDICAID

## 2025-05-19 DIAGNOSIS — Z86.73 HISTORY OF STROKE: ICD-10-CM

## 2025-05-19 DIAGNOSIS — J47.9 BRONCHIECTASIS, UNCOMPLICATED: ICD-10-CM

## 2025-05-19 PROCEDURE — 76700 US EXAM ABDOM COMPLETE: CPT | Mod: TC

## 2025-05-19 PROCEDURE — 71250 CT THORAX DX C-: CPT | Mod: TC

## 2025-05-20 ENCOUNTER — HOSPITAL ENCOUNTER (OUTPATIENT)
Dept: RADIOLOGY | Facility: HOSPITAL | Age: 75
Discharge: HOME | End: 2025-05-20
Attending: INTERNAL MEDICINE
Payer: MEDICAID

## 2025-05-20 DIAGNOSIS — E04.1 THYROID NODULE: ICD-10-CM

## 2025-05-20 PROCEDURE — 76536 US EXAM OF HEAD AND NECK: CPT | Mod: TC

## 2025-05-21 ENCOUNTER — TELEPHONE (OUTPATIENT)
Dept: HEPATOLOGY | Facility: HOSPITAL | Age: 75
End: 2025-05-21
Payer: MEDICAID

## 2025-05-21 ENCOUNTER — OFFICE VISIT (OUTPATIENT)
Dept: NEPHROLOGY | Facility: CLINIC | Age: 75
End: 2025-05-21
Payer: MEDICAID

## 2025-05-21 VITALS
HEIGHT: 62 IN | RESPIRATION RATE: 20 BRPM | HEART RATE: 56 BPM | BODY MASS INDEX: 22.6 KG/M2 | WEIGHT: 122.81 LBS | SYSTOLIC BLOOD PRESSURE: 132 MMHG | DIASTOLIC BLOOD PRESSURE: 60 MMHG | OXYGEN SATURATION: 98 % | TEMPERATURE: 98 F

## 2025-05-21 DIAGNOSIS — E04.1 THYROID NODULE: Primary | ICD-10-CM

## 2025-05-21 DIAGNOSIS — R97.20 ELEVATED PSA: ICD-10-CM

## 2025-05-21 DIAGNOSIS — I51.89 DIASTOLIC DYSFUNCTION: ICD-10-CM

## 2025-05-21 DIAGNOSIS — I10 PRIMARY HYPERTENSION: ICD-10-CM

## 2025-05-21 DIAGNOSIS — E87.5 HYPERKALEMIA: ICD-10-CM

## 2025-05-21 DIAGNOSIS — E11.22 TYPE 2 DIABETES MELLITUS WITH STAGE 3B CHRONIC KIDNEY DISEASE, WITH LONG-TERM CURRENT USE OF INSULIN: ICD-10-CM

## 2025-05-21 DIAGNOSIS — N18.32 CKD STAGE G3B/A3, GFR 30-44 AND ALBUMIN CREATININE RATIO >300 MG/G: Primary | ICD-10-CM

## 2025-05-21 DIAGNOSIS — D63.1 ANEMIA, CHRONIC RENAL FAILURE, STAGE 3 (MODERATE): ICD-10-CM

## 2025-05-21 DIAGNOSIS — Z79.4 TYPE 2 DIABETES MELLITUS WITH STAGE 3B CHRONIC KIDNEY DISEASE, WITH LONG-TERM CURRENT USE OF INSULIN: ICD-10-CM

## 2025-05-21 DIAGNOSIS — E87.20 METABOLIC ACIDOSIS: ICD-10-CM

## 2025-05-21 DIAGNOSIS — N18.32 TYPE 2 DIABETES MELLITUS WITH STAGE 3B CHRONIC KIDNEY DISEASE, WITH LONG-TERM CURRENT USE OF INSULIN: ICD-10-CM

## 2025-05-21 DIAGNOSIS — N18.30 ANEMIA, CHRONIC RENAL FAILURE, STAGE 3 (MODERATE): ICD-10-CM

## 2025-05-21 PROCEDURE — 99215 OFFICE O/P EST HI 40 MIN: CPT | Mod: PBBFAC | Performed by: NURSE PRACTITIONER

## 2025-05-21 RX ORDER — SODIUM BICARBONATE 650 MG/1
1300 TABLET ORAL DAILY
Qty: 180 TABLET | Refills: 1 | Status: SHIPPED | OUTPATIENT
Start: 2025-05-21 | End: 2025-11-17

## 2025-05-21 NOTE — PROGRESS NOTES
Medicare Wellness Visit  Plan for Preventive Care    A good way for you to stay healthy is to use preventive care.  Medicare covers many services that can help you stay healthy.* The goal of these services is to find any health problems as quickly as possible. Finding problems early can help make them easier to treat.  Your personal plan below lists the services you may need and when they are due.      Health Maintenance Summary     Diabetes Foot Exam (Yearly)  Overdue since 5/12/2023    Diabetes Eye Exam (Yearly)  Next due on 9/19/2024    Breast Cancer Screening (Every 2 Years)  Scheduled for 6/26/2024    Diabetes A1C (Every 6 Months)  Next due on 11/14/2024    DM/CKD GFR (Yearly)  Ordered on 12/13/2023    Depression Screening (Yearly)  Next due on 5/15/2025    Colorectal Cancer Risk - Colonoscopy (Every 5 Years)  Next due on 3/1/2026    DTaP/Tdap/Td Vaccine (4 - Td or Tdap)  Next due on 11/19/2028    Hepatitis C Screening   Completed    Shingles Vaccine   Completed    Pneumococcal Vaccine 0-64   Completed    Influenza Vaccine   Completed    COVID-19 Vaccine   Completed    Medicare Advantage- Medicare Wellness Visit   Completed    Meningococcal Vaccine   Aged Out    Hepatitis B Vaccine (For Physician/APC Discussion)   Aged Out    HPV Vaccine   Aged Out           Preventive Care for Women and Men    Heart Screenings (Cardiovascular):  Blood tests are used to check your cholesterol, lipid and triglyceride levels. High levels can increase your risk for heart disease and stroke. High levels can be treated with medications, diet and exercise. Lowering your levels can help keep your heart and blood vessels healthy.  Your provider will order these tests if they are needed.    An ultrasound is done to see if you have an abdominal aortic aneurysm (AAA).  This is an enlargement of one of the main blood vessels that delivers blood to the body.   In the [place], 9,000 deaths are caused by AAA.  You may not even know  " [facility]  Nephrology Clinic    Chief complaint: Chronic Kidney Disease (New referral)    History of present illness:   Suleiman Beck is a 75 y.o. male who presents to establish care in Nephrology clinic today for management of chronic kidney disease. 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 Patient is accompanied by his son, who is patient's primary caregiver. Patient's son tells me that patient takes 600 mg of ibuprofen daily.     Review of Systems  12 point review of systems conducted, negative except as stated in the history of present illness.    Allergies: Patient is allergic to opioids - morphine analogues and dilaudid [hydromorphone].     Past Medical History:  has a past medical history of Arthritis, Diabetes mellitus, Essential (primary) hypertension, Stroke, and Unspecified chronic bronchitis.    Procedure History:  has a past surgical history that includes Cystoscopy w/ ureteral stent placement (Left, 12/11/2022); Wrist surgery (Left); Hemorrhoid surgery; and Foot surgery (Left).    Family History: family history is not on file.    Social History:  reports that he quit smoking about 22 years ago. His smoking use included cigarettes. He has never used smokeless tobacco. He reports that he does not currently use alcohol. He reports that he does not use drugs.    Physical exam  /60 (BP Location: Right arm, Patient Position: Sitting)   Pulse (!) 56   Temp 97.5 °F (36.4 °C) (Oral)   Resp 20   Ht 5' 2" (1.575 m)   Wt 55.7 kg (122 lb 12.8 oz)   SpO2 98%   BMI 22.46 kg/m²   General appearance: Patient is in no acute distress.  Skin: No rashes or wounds.  HEENT: PERRLA, EOMI, no scleral icterus, no JVD. Neck is supple.  Chest: Respirations are unlabored. Lungs sounds are clear.   Heart: S1, S2.   Abdomen: Benign.  : Deferred.  Extremities: No " edema, peripheral pulses are palpable.   Neuro: No focal deficits.     Home Medications:  Current Medications[1]    Laboratory data    Lab Results   Component Value Date    WBC 11.63 (H) 05/06/2025    HGB 10.8 (L) 05/06/2025    HCT 35.1 (L) 05/06/2025     05/06/2025    IRON 36 (L) 03/09/2025    TIBC 168 (L) 03/09/2025    LABIRON 21 03/09/2025    FERRITIN 160.99 03/09/2025     01/31/2022     05/06/2025    K 5.8 (H) 05/06/2025    CO2 17 (L) 05/06/2025    BUN 55.9 (H) 05/06/2025    CREATININE 2.03 (H) 05/06/2025    EGFRNORACEVR 34 05/06/2025    CALCIUM 9.3 05/06/2025    ALKPHOS 89 05/06/2025    ALBUMIN 3.5 05/06/2025    BILIDIR 0.4 02/14/2022    IBILI 0.50 02/14/2022    AST 10 (L) 05/06/2025    ALT 10 05/06/2025    MG 2.00 02/18/2025    PHOS 3.0 02/18/2025      Lab Results   Component Value Date    HGBA1C 6.8 02/19/2025    HIV Nonreactive 02/10/2022    HEPBSAB Reactive 02/02/2022     Urine:  Lab Results   Component Value Date    APPEARANCEUA Clear 03/07/2025    SGUA 1.010 03/07/2025    PROTEINUA 1+ (A) 03/07/2025    KETONESUA Negative 03/07/2025    LEUKOCYTESUR Negative 03/07/2025    RBCUA 0-5 03/07/2025    WBCUA 0-5 03/07/2025    BACTERIA None Seen 03/07/2025    SQEPUA None Seen 03/07/2025    HYALINECASTS None Seen 02/15/2025    CREATRANDUR 119.6 05/06/2025         Imaging  Complete abdominal ultrasound 05/19/2025:  Pancreas: Obscured by gas.  Aorta: The proximal and mid portions are obscured by bowel gas.  Normal caliber of the distal abdominal aorta.  Liver: 12.8 cm, normal in size. Homogeneous parenchymal echotexture. No focal lesions.  Main portal vein is patent and demonstrates hepatopetal flow.  Gallbladder: No calculi, wall thickening, or pericholecystic fluid.  Negative sonographic Kate's sign.  Biliary system: 3.6 mm common bile duct.  No intrahepatic ductal dilatation.  Inferior vena cava: Normal in appearance.  Right kidney: 9.4 cm. No hydronephrosis.  Multiple simple cortical cysts  you have this problem and as many as 1 in 3 people will have a serious problem if it is not treated.  Early diagnosis allows for more effective treatment and cure.  If you have a family history of AAA or are a male age 65-75 who has smoked, you are at higher risk of an AAA.  Your provider can order this test, if needed.    Colorectal Screening:  There are many tests that are used to check for cancer of your colon and rectum. You and your provider should discuss what test is best for you and when to have it done.  Options include:  Screening Colonoscopy: exam of the entire colon, seen through a flexible lighted tube.  Flexible Sigmoidoscopy: exam of the last third (sigmoid portion) of the colon and rectum, seen through a flexible lighted tube.  Cologuard DNA stool test: a sample of your stool is used to screen for cancer and unseen blood in your stool.  Fecal Occult Blood Test: a sample of your stool is studied to find any unseen blood    Flu Shot:  An immunization that helps to prevent influenza (the flu). You should get this every year. The best time to get the shot is in the fall.    Pneumococcal Shot:  Vaccines help prevent pneumococcal disease, which is any type of illness caused by Streptococcus pneumoniae bacteria. There are two kinds of pneumococcal vaccines available in the [place]:   Pneumococcal conjugate vaccines (PCV20 or Hzbgfvt17®)  Pneumococcal polysaccharide vaccine (PPSV23 or Kxvewgraf72®)  For those who have never received any pneumococcal conjugate vaccine, CDC recommends PVC20 for adults 65 years or older and adults 19 through 64 years old with certain medical conditions or risk factors.   For those who have previously received PCV13, this should be followed by a dose of PPSV23.     Hepatitis B Shot:  An immunization that helps to protect people from getting Hepatitis B. Hepatitis B is a virus that spreads through contact with infected blood or body fluids. Many people with the virus do not  have symptoms.  The virus can lead to serious problems, such as liver disease. Some people are at higher risk than others. Your doctor will tell you if you need this shot.     Diabetes Screening:  A test to measure sugar (glucose) in your blood is called a fasting blood sugar. Fasting means you cannot have food or drink for at least 8 hours before the test. This test can detect diabetes long before you may notice symptoms.    Glaucoma Screening:  Glaucoma screening is performed by your eye doctor. The test measures the fluid pressure inside your eyes to determine if you have glaucoma.     Hepatitis C Screening:  A blood test to see if you have the hepatitis C virus.  Hepatitis C attacks the liver and is a major cause of chronic liver disease.  Medicare will cover a single screening for all adults born between 1945 & 1965, or high risk patients (people who have injected illegal drugs or people who have had blood transfusions).  High risk patients who continue to inject illegal drugs can be screened for Hepatitis C every year.    Smoking and Tobacco-Use Cessation Counseling:  Tobacco is the single greatest cause of disease and early death in our country today. Medication and counseling together can increase a person’s chance of quitting for good.   Medicare covers two quitting attempts per year, with four counseling sessions per attempt (eight sessions in a 12 month period)    Preventive Screening tests for Women    Screening Mammograms and Breast Exams:  An x-ray of your breasts to check for breast cancer before you or your doctor may be able to feel it.  If breast cancer is found early it can usually be treated with success.    Pelvic Exams and Pap Tests:  An exam to check for cervical and vaginal cancer. A Pap test is a lab test in which cells are taken from your cervix and sent to the lab to look for signs of cervical cancer. If cancer of the cervix is found early, chances for a cure are good. Testing can  with the largest measuring up to 3.3 cm.  Left kidney: 10.5 cm. No hydronephrosis.  Multiple simple cortical cysts with the largest measuring up to 4.3 cm.  Spleen: 10.4 cm.  Normal in size with homogeneous echotexture.  Miscellaneous: No ascites.     Impression   Multiple simple appearing cortical cysts of the kidneys bilaterally.  The pancreas and proximal-mid aspects of the abdominal aorta are obscured by bowel gas.  Otherwise, unremarkable exam.    Impression    ICD-10-CM ICD-9-CM   1. CKD stage G3b/A3, GFR 30-44 and albumin creatinine ratio >300 mg/g  N18.32 585.3   2. Anemia, chronic renal failure, stage 3 (moderate)  N18.30 285.21    D63.1 585.3   3. Type 2 diabetes mellitus with stage 3b chronic kidney disease, with long-term current use of insulin  E11.22 250.40    N18.32 585.3    Z79.4 V58.67   4. Hyperkalemia  E87.5 276.7   5. Primary hypertension  I10 401.9   6. Metabolic acidosis  E87.20 276.2   7. Diastolic dysfunction  I51.89 429.9        Plan  CKD stage G3b/A3, GFR 30-44 and albumin creatinine ratio >300 mg/g  -     Ambulatory referral/consult to Nephrology  -     Basic Metabolic Panel; Future; Expected date: 05/29/2025    Anemia, chronic renal failure, stage 3 (moderate)    Type 2 diabetes mellitus with stage 3b chronic kidney disease, with long-term current use of insulin    Hyperkalemia    Primary hypertension    Metabolic acidosis  -     sodium bicarbonate 650 MG tablet; Take 2 tablets (1,300 mg total) by mouth once daily.  Dispense: 180 tablet; Refill: 1    Diastolic dysfunction        Follow up in about 4 months (around 9/21/2025).     Orders Placed This Encounter   Procedures    Basic Metabolic Panel     Standing Status:   Future     Expected Date:   5/29/2025     Expiration Date:   6/1/2025     Send normal result to authorizing provider's In Basket if patient is active on MyChart::   Yes        Brisa Stanley NP  [facility] Nephrology            [1]   Current Outpatient Medications:      "acetaminophen (TYLENOL EXTRA STRENGTH) 500 MG tablet, Take by mouth every 4 (four) hours as needed for Pain., Disp: , Rfl:     albuterol (PROVENTIL) 2.5 mg /3 mL (0.083 %) nebulizer solution, Take 3 mLs (2.5 mg total) by nebulization every 4 (four) hours as needed for Wheezing. Rescue, Disp: 90 mL, Rfl: 5    aspirin (ECOTRIN) 81 MG EC tablet, Take 1 tablet (81 mg total) by mouth once daily., Disp: 360 tablet, Rfl: 0    atorvastatin (LIPITOR) 40 MG tablet, Take 1 tablet (40 mg total) by mouth every evening., Disp: 90 tablet, Rfl: 3    blood sugar diagnostic Strp, 200 strips by Misc.(Non-Drug; Combo Route) route 3 (three) times daily., Disp: 200 each, Rfl: 11    blood-glucose meter kit, Use as instructed, Disp: 1 each, Rfl: 5    carvediloL (COREG) 25 MG tablet, Take 1 tablet (25 mg total) by mouth 2 (two) times daily., Disp: 180 tablet, Rfl: 3    cyclobenzaprine (FLEXERIL) 5 MG tablet, daily as needed., Disp: , Rfl:     gabapentin (NEURONTIN) 100 MG capsule, Take 1 capsule (100 mg total) by mouth 3 (three) times daily as needed (pain)., Disp: 90 capsule, Rfl: 11    insulin aspart U-100 (NOVOLOG) 100 unit/mL injection, Inject 3 Units into the skin 3 (three) times daily., Disp: 10 mL, Rfl: 11    insulin glargine U-100, Lantus, 100 unit/mL injection, Inject 10 Units into the skin once daily., Disp: 10 mL, Rfl: 11    lancets 30 gauge Misc, 200 lancets by Misc.(Non-Drug; Combo Route) route 3 (three) times daily., Disp: 200 each, Rfl: 11    NIFEdipine (PROCARDIA-XL) 90 MG (OSM) 24 hr tablet, Take 1 tablet (90 mg total) by mouth once daily., Disp: 90 tablet, Rfl: 3    NOVOLOG MIX 70-30FLEXPEN U-100 100 unit/mL (70-30) InPn pen, Inject 3 Units into the skin 3 (three) times daily. Per sliding scale, Disp: 32.4 mL, Rfl: 0    pen needle, diabetic 31 gauge x 5/16" Ndle, Inject 1 each into the skin 3 (three) times daily., Disp: , Rfl:     QUEtiapine (SEROQUEL) 25 MG Tab, Take 1 tablet (25 mg total) by mouth once daily., Disp: 30 " generally end at age 65, or if a woman has a hysterectomy for a benign condition. Your provider may recommend more frequent testing if certain abnormal results are found.    Bone Mass Measurements:  A painless x-ray of your bone density to see if you are at risk for a broken bone. Bone density refers to the thickness of bones or how tightly the bone tissue is packed.    Preventive Screening tests for Men    Prostate Screening:  Should you have a prostate cancer test (PSA)?  It is up to you to decide if you want a prostate cancer test. Talk to your clinician to find out if the test is right for you.  Things for you to consider and talk about should include:  Benefits and harms of the test  Your family history  How your race/ethnicity may influence the test  If the test may impact other medical conditions you have  Your values on screenings and treatments    *Medicare pays for many preventive services to keep you healthy. For some of these services, you might have to pay a deductible, coinsurance, and / or copayment.  The amounts vary depending on the type of services you need and the kind of Medicare health plan you have.    For further details on screenings offered by Medicare please visit: https://www.medicare.gov/coverage/preventive-screening-services      tablet, Rfl: 11    umeclidinium-vilanteroL (ANORO ELLIPTA) 62.5-25 mcg/actuation DsDv, Inhale 1 puff into the lungs once daily. Controller, Disp: 60 each, Rfl: 2    VOLTAREN ARTHRITIS PAIN 1 % Gel, Apply topically., Disp: , Rfl:     methocarbamoL (ROBAXIN) 500 MG Tab, , Disp: , Rfl:     sodium bicarbonate 650 MG tablet, Take 2 tablets (1,300 mg total) by mouth once daily., Disp: 180 tablet, Rfl: 1

## 2025-05-21 NOTE — TELEPHONE ENCOUNTER
Spoke to IR - they will get him in for needle bx of thyroid nodule. When he comes in, have ordered tsh.

## 2025-05-22 ENCOUNTER — PATIENT OUTREACH (OUTPATIENT)
Dept: ADMINISTRATIVE | Facility: OTHER | Age: 75
End: 2025-05-22
Payer: MEDICAID

## 2025-05-23 ENCOUNTER — TELEPHONE (OUTPATIENT)
Dept: INTERNAL MEDICINE | Facility: CLINIC | Age: 75
End: 2025-05-23
Payer: MEDICAID

## 2025-05-28 ENCOUNTER — TELEPHONE (OUTPATIENT)
Dept: INTERVENTIONAL RADIOLOGY/VASCULAR | Facility: HOSPITAL | Age: 75
End: 2025-05-28
Payer: MEDICAID

## 2025-05-28 NOTE — TELEPHONE ENCOUNTER
Patient called and reminded of IR FNA in Radiology entrance 3 at 10:30 am. The patient was also remained that this is a non fasting procedure.

## 2025-05-29 ENCOUNTER — RESULTS FOLLOW-UP (OUTPATIENT)
Dept: NEPHROLOGY | Facility: CLINIC | Age: 75
End: 2025-05-29

## 2025-05-29 ENCOUNTER — CLINICAL SUPPORT (OUTPATIENT)
Dept: NEPHROLOGY | Facility: CLINIC | Age: 75
End: 2025-05-29
Payer: MEDICAID

## 2025-05-29 ENCOUNTER — OFFICE VISIT (OUTPATIENT)
Dept: ENDOCRINOLOGY | Facility: CLINIC | Age: 75
End: 2025-05-29
Payer: MEDICAID

## 2025-05-29 ENCOUNTER — HOSPITAL ENCOUNTER (OUTPATIENT)
Dept: INTERVENTIONAL RADIOLOGY/VASCULAR | Facility: HOSPITAL | Age: 75
Discharge: HOME | End: 2025-05-29
Attending: INTERNAL MEDICINE
Payer: MEDICAID

## 2025-05-29 ENCOUNTER — TELEPHONE (OUTPATIENT)
Dept: NEPHROLOGY | Facility: CLINIC | Age: 75
End: 2025-05-29
Payer: MEDICAID

## 2025-05-29 VITALS
HEIGHT: 62 IN | SYSTOLIC BLOOD PRESSURE: 98 MMHG | RESPIRATION RATE: 16 BRPM | TEMPERATURE: 98 F | DIASTOLIC BLOOD PRESSURE: 60 MMHG | BODY MASS INDEX: 22.41 KG/M2 | HEART RATE: 64 BPM | WEIGHT: 121.81 LBS

## 2025-05-29 VITALS
HEIGHT: 62 IN | WEIGHT: 121.94 LBS | DIASTOLIC BLOOD PRESSURE: 58 MMHG | SYSTOLIC BLOOD PRESSURE: 95 MMHG | OXYGEN SATURATION: 97 % | BODY MASS INDEX: 22.44 KG/M2 | HEART RATE: 61 BPM | TEMPERATURE: 98 F | RESPIRATION RATE: 18 BRPM

## 2025-05-29 DIAGNOSIS — E04.2 MULTINODULAR GOITER: Primary | ICD-10-CM

## 2025-05-29 DIAGNOSIS — E87.5 HYPERKALEMIA: Primary | ICD-10-CM

## 2025-05-29 DIAGNOSIS — E04.2 MULTINODULAR THYROID: Primary | Chronic | ICD-10-CM

## 2025-05-29 DIAGNOSIS — E04.1 THYROID NODULE: ICD-10-CM

## 2025-05-29 LAB
ANION GAP SERPL CALC-SCNC: 5 MEQ/L
BUN SERPL-MCNC: 44.2 MG/DL (ref 8.4–25.7)
CALCIUM SERPL-MCNC: 9.6 MG/DL (ref 8.8–10)
CHLORIDE SERPL-SCNC: 111 MMOL/L (ref 98–107)
CO2 SERPL-SCNC: 23 MMOL/L (ref 23–31)
CREAT SERPL-MCNC: 1.97 MG/DL (ref 0.72–1.25)
CREAT/UREA NIT SERPL: 22
GFR SERPLBLD CREATININE-BSD FMLA CKD-EPI: 35 ML/MIN/1.73/M2
GLUCOSE SERPL-MCNC: 125 MG/DL (ref 82–115)
POTASSIUM SERPL-SCNC: 4.7 MMOL/L (ref 3.5–5.1)
SODIUM SERPL-SCNC: 139 MMOL/L (ref 136–145)

## 2025-05-29 PROCEDURE — 80048 BASIC METABOLIC PNL TOTAL CA: CPT

## 2025-05-29 PROCEDURE — 99214 OFFICE O/P EST MOD 30 MIN: CPT | Mod: PBBFAC,27

## 2025-05-29 PROCEDURE — 88305 TISSUE EXAM BY PATHOLOGIST: CPT | Mod: TC,91 | Performed by: INTERNAL MEDICINE

## 2025-05-29 PROCEDURE — 99213 OFFICE O/P EST LOW 20 MIN: CPT | Mod: PBBFAC

## 2025-05-29 NOTE — PROGRESS NOTES
Please let the patient know that potassium level is now normal. Continue to hold lisinopril.Thank you

## 2025-05-29 NOTE — TELEPHONE ENCOUNTER
----- Message from MARILEE Winn sent at 5/29/2025 12:08 PM CDT -----  Please let the patient know that potassium level is now normal. Continue to hold lisinopril.Thank you  ----- Message -----  From: Lab, Background User  Sent: 5/29/2025  11:51 AM CDT  To: MARILEE Lu

## 2025-05-29 NOTE — PROGRESS NOTES
Subjective:      Patient ID: Suleiman Beck is a 75 y.o. male.    Chief Complaint:  Thyroid Nodule      History of Present Illness  Pt is a 74 y/o w/ Pmhx of MNG.  Hx obtained via son due to pt not speaking English.  Pt had imaging during prior stroke episode where MNG was found.  Let to u/s and TSH.  U/s showed MNG.  TSH wnl.  Led to setting up FNA with IR which is scheduled today.    Objective:   Physical Exam  Vitals reviewed.   Constitutional:       Appearance: Normal appearance.   HENT:      Head: Normocephalic and atraumatic.   Neurological:      Mental Status: He is alert.       Lab Review:   TSH wnl 5/21/24, 5/17/24.  Ca nl today.  U/s 5/25 showed MNG.    Assessment:     1. Multinodular goiter  Ambulatory referral/consult to Endocrinology           Plan:     Multinodular goiter  -     Ambulatory referral/consult to Endocrinology     Pt has isthmus and right nodules of uncertain prognosis while awaiting path.  To IR today for FNA.  Await results.    F/u next month once path is back to sort further mgmt.

## 2025-05-29 NOTE — DISCHARGE SUMMARY
Radiology Post-Procedure Note    Pre Op Diagnosis: Multinodular thyroid    Post Op Diagnosis: Same    Secondary Diagnoses:   Problem List Items Addressed This Visit       Thyroid nodule    Relevant Orders    IR FNA with Ultrasound    * (Principal) Multinodular thyroid - Primary (Chronic)        Procedure: US Guided Right & Left Isthmus Thyroid Nodule Biopsy    Procedure performed by: Rio Humphreys MD    Assistant: None    Written Informed Consent Obtained: Yes    Specimen Removed: Right FNA x 6 & Left Isthmus FNA x 5    Estimated Blood Loss: <10 cc    Condition: Stable    Outcome: The patient tolerated the procedure well and was without complications.    For further details please see the imaging report associated.    Disposition: Home or Self Care    Follow Up: With primary care provider    Discharge Instructions:  No discharge procedures on file.       Time Spent On Discharge: 2 minutes

## 2025-05-29 NOTE — INTERVAL H&P NOTE
The patient has been examined and the H&P has been reviewed:    I concur with the findings and no changes have occurred since H&P was written. Suleiman Beck is a 75 y.o. male with Right Lobe & Left Isthmus Thyroid Nodule who presents for Right Lobe & Thyroid Isthmus Nodule Biopsy.           There are no hospital problems to display for this patient.

## 2025-05-30 LAB
ESTROGEN SERPL-MCNC: NORMAL PG/ML
INSULIN SERPL-ACNC: NORMAL U[IU]/ML
LAB AP COMMENTS: NORMAL
LAB AP GROSS DESCRIPTION: NORMAL
LAB AP NON-GYN INTERPRETATION SPECIMEN 1: NORMAL
LAB AP PATHOLOGIST ADEQUACY INTERP: NORMAL

## 2025-06-05 ENCOUNTER — TELEPHONE (OUTPATIENT)
Dept: BEHAVIORAL HEALTH | Facility: CLINIC | Age: 75
End: 2025-06-05
Payer: MEDICAID

## 2025-06-07 NOTE — PROGRESS NOTES
CC:  Elevated PSA    HPI:  Suleiman Beck is a 75 y.o. male being seen in consultation for elevated PSA.  He was found to have an elevated PSA.  He does not know his family members since he is an orphan so he is not sure if there is prostate cancer in the family.    He denies any urinary symptoms.    He did have a positive urine culture on 26 February 2025.  The PSA was drawn about two weeks later.  I am unsure of the treatment he received for that but a repeat urine culture on 7 March 2025 was no growth.  He has a history of kidney stones with the last one two years ago.  He had a stent and then repeat imaging showed that the stone was no longer present so he required no further intervention.      Urinalysis:  Results for orders placed or performed in visit on 06/09/25   POCT URINE DIPSTICK WITH MICROSCOPE, AUTOMATED   Result Value Ref Range    Color, UA Yellow     Spec Grav UA 1.020     pH, UA 5.5     WBC, UA Negative     Nitrite, UA Negative     Protein,      Glucose, UA Negative     Ketones, UA Negative     Urobilinogen, UA 0.2     Bilirubin, POC Negative     Blood, UA Trace-intact        Microscopic Urinalysis:  WBC:   None per HPF     RBC:    0-1 per HPF     Bacteria:    None per HPF     Squamous epithelial cells:  None per HPF       Crystals:   None    Lab Results:  PSA History:    03/13/25 02:20 06/09/25 14:41   9.17 (H) 9.42 (H)     Recent Labs     05/29/25  1049   CREATININE 1.97*      Urine culture - 26 February 2025: >100,000 Pseudomonas aeruginosa.  Urine culture - 7 March 2025:  Negative    Imaging:  Abdominal ultrasound - 19 May 2025:  Multiple simple cortical cysts measuring up to 3.3 cm in the right kidney  Multiple simple cortical cysts measuring up to 4.3 cm in the left kidney    Data Review:  Note from referring provider, Martin Sears MD dated 6 May 2025.     ROS:  All systems reviewed and are negative except as documented in HPI and/or Assessment and Plan.     Patient Active Problem  List:     Problem List[1]     Past Medical History:  Past Medical History:   Diagnosis Date    Arthritis     Diabetes mellitus     Essential (primary) hypertension     Stroke     Unspecified chronic bronchitis         Past Surgical History:  Past Surgical History:   Procedure Laterality Date    CYSTOSCOPY W/ URETERAL STENT PLACEMENT Left 2022    Procedure: CYSTOSCOPY, WITH URETERAL STENT INSERTION;  Surgeon: Mar Fernandez MD;  Location: [facility];  Service: Urology;  Laterality: Left;    FOOT SURGERY Left     HEMORRHOID SURGERY      WRIST SURGERY Left         Family History:  History reviewed. No pertinent family history.     Social History:  Social History     Socioeconomic History    Marital status:    Tobacco Use    Smoking status: Former     Current packs/day: 0.00     Types: Cigarettes     Quit date:      Years since quittin.4    Smokeless tobacco: Never   Substance and Sexual Activity    Alcohol use: Not Currently    Drug use: Never    Sexual activity: Not Currently     Social Drivers of Health     Financial Resource Strain: Patient Declined (2025)    Overall Financial Resource Strain (CARDIA)     Difficulty of Paying Living Expenses: Patient declined   Food Insecurity: Patient Declined (2025)    Hunger Vital Sign     Worried About Running Out of Food in the Last Year: Patient declined     Ran Out of Food in the Last Year: Patient declined   Transportation Needs: No Transportation Needs (2024)    TRANSPORTATION NEEDS     Transportation : No   Physical Activity: Inactive (2024)    Exercise Vital Sign     Days of Exercise per Week: 0 days     Minutes of Exercise per Session: 0 min   Stress: Patient Declined (2025)    [employer] - Occupational Stress Questionnaire     Feeling of Stress : Patient declined   Housing Stability: Patient Declined (2025)    Housing Stability Vital Sign     Unable to Pay for Housing in the Last  Year: Patient declined     Homeless in the Last Year: Patient declined        Allergies:  Review of patient's allergies indicates:   Allergen Reactions    Opioids - morphine analogues     Dilaudid [hydromorphone] Anxiety        Objective:  Vitals:    06/09/25 1518   BP: 118/67   Pulse: 66   Resp: 20   Temp: 97.9 °F (36.6 °C)     General:  Well developed, well nourished adult male in no acute distress  Abdomen: Soft, nontender, no masses  Extremities:  No clubbing, cyanosis, or edema  Neurologic:  Grossly intact  Musculoskeletal:  Normal tone  Penis:  Circumcised, no lesions  Scrotum:  No hydrocele  Testicles:  Nontender, no masses  Epididymis:  Normal without masses  Prostate exam:  Nontender, symmetrical, no nodules  Rectal:  Normal rectal tone    Assessment:  1. Elevated PSA  - Ambulatory referral/consult to Urology  - POCT URINE DIPSTICK WITH MICROSCOPE, AUTOMATED  - MRI Prostate W W/O Contrast; Future  - LORazepam (ATIVAN) 1 MG tablet; Take one tablet one hour prior to the scheduled procedure.  If still anxious at the time of the procedure take the second tablet.  Dispense: 2 tablet; Refill: 0     Plan:  I felt like the PSA may be elevated due to the infection since it was drawn only two weeks later however a repeat today it is just a little bit higher.  I am going to get an MRI of the prostate to see if biopsy is indicated.  He does have difficulty holding still so if that is an issue I am going to treat him with antibiotics and repeat the PSA before proceeding with a biopsy.    Follow-up tests needed:   MRI of the prostate     Return appointment:  After MRI.                   [1]   Patient Active Problem List  Diagnosis    Ureteral obstruction    Controlled type 2 diabetes mellitus without complication    Hypertension    Thyroid nodule    Primary osteoarthritis of right shoulder    History of stroke    Chronic bronchitis    Ex-cigarette smoker    Bifascicular block    Community acquired pneumonia of right  lower lobe of lung    Second degree burn of buttock, initial encounter    Multinodular thyroid

## 2025-06-09 ENCOUNTER — LAB VISIT (OUTPATIENT)
Dept: LAB | Facility: HOSPITAL | Age: 75
End: 2025-06-09
Attending: UROLOGY
Payer: MEDICAID

## 2025-06-09 ENCOUNTER — OFFICE VISIT (OUTPATIENT)
Dept: UROLOGY | Facility: CLINIC | Age: 75
End: 2025-06-09
Payer: MEDICAID

## 2025-06-09 VITALS
HEART RATE: 66 BPM | RESPIRATION RATE: 20 BRPM | BODY MASS INDEX: 23.03 KG/M2 | SYSTOLIC BLOOD PRESSURE: 118 MMHG | HEIGHT: 61 IN | TEMPERATURE: 98 F | OXYGEN SATURATION: 97 % | WEIGHT: 122 LBS | DIASTOLIC BLOOD PRESSURE: 67 MMHG

## 2025-06-09 DIAGNOSIS — R97.20 ELEVATED PSA: Primary | ICD-10-CM

## 2025-06-09 DIAGNOSIS — R97.20 ELEVATED PSA: ICD-10-CM

## 2025-06-09 LAB
BILIRUB SERPL-MCNC: NEGATIVE MG/DL
BLOOD URINE, POC: NORMAL
COLOR, POC UA: YELLOW
GLUCOSE UR QL STRIP: NEGATIVE
KETONES UR QL STRIP: NEGATIVE
LEUKOCYTE ESTERASE URINE, POC: NEGATIVE
NITRITE, POC UA: NEGATIVE
PH, POC UA: 5.5
PROTEIN, POC: 300
PSA SERPL-MCNC: 9.42 NG/ML
SPECIFIC GRAVITY, POC UA: 1.02
UROBILINOGEN, POC UA: 0.2

## 2025-06-09 PROCEDURE — 99204 OFFICE O/P NEW MOD 45 MIN: CPT | Mod: S$PBB,,, | Performed by: UROLOGY

## 2025-06-09 PROCEDURE — 84153 ASSAY OF PSA TOTAL: CPT

## 2025-06-09 PROCEDURE — 1100F PTFALLS ASSESS-DOCD GE2>/YR: CPT | Mod: CPTII,,, | Performed by: UROLOGY

## 2025-06-09 PROCEDURE — 3066F NEPHROPATHY DOC TX: CPT | Mod: CPTII,,, | Performed by: UROLOGY

## 2025-06-09 PROCEDURE — 3078F DIAST BP <80 MM HG: CPT | Mod: CPTII,,, | Performed by: UROLOGY

## 2025-06-09 PROCEDURE — 3062F POS MACROALBUMINURIA REV: CPT | Mod: CPTII,,, | Performed by: UROLOGY

## 2025-06-09 PROCEDURE — 3044F HG A1C LEVEL LT 7.0%: CPT | Mod: CPTII,,, | Performed by: UROLOGY

## 2025-06-09 PROCEDURE — 99215 OFFICE O/P EST HI 40 MIN: CPT | Mod: PBBFAC | Performed by: UROLOGY

## 2025-06-09 PROCEDURE — 81001 URINALYSIS AUTO W/SCOPE: CPT | Mod: PBBFAC | Performed by: UROLOGY

## 2025-06-09 PROCEDURE — 3008F BODY MASS INDEX DOCD: CPT | Mod: CPTII,,, | Performed by: UROLOGY

## 2025-06-09 PROCEDURE — 3288F FALL RISK ASSESSMENT DOCD: CPT | Mod: CPTII,,, | Performed by: UROLOGY

## 2025-06-09 PROCEDURE — 1160F RVW MEDS BY RX/DR IN RCRD: CPT | Mod: CPTII,,, | Performed by: UROLOGY

## 2025-06-09 PROCEDURE — 36415 COLL VENOUS BLD VENIPUNCTURE: CPT

## 2025-06-09 PROCEDURE — 4010F ACE/ARB THERAPY RXD/TAKEN: CPT | Mod: CPTII,,, | Performed by: UROLOGY

## 2025-06-09 PROCEDURE — 1159F MED LIST DOCD IN RCRD: CPT | Mod: CPTII,,, | Performed by: UROLOGY

## 2025-06-09 PROCEDURE — 3074F SYST BP LT 130 MM HG: CPT | Mod: CPTII,,, | Performed by: UROLOGY

## 2025-06-09 PROCEDURE — 1125F AMNT PAIN NOTED PAIN PRSNT: CPT | Mod: CPTII,,, | Performed by: UROLOGY

## 2025-06-10 RX ORDER — LORAZEPAM 1 MG/1
TABLET ORAL
Qty: 2 TABLET | Refills: 0 | Status: SHIPPED | OUTPATIENT
Start: 2025-06-10

## 2025-06-12 ENCOUNTER — TELEPHONE (OUTPATIENT)
Dept: INTERNAL MEDICINE | Facility: CLINIC | Age: 75
End: 2025-06-12
Payer: MEDICAID

## 2025-06-20 ENCOUNTER — TELEPHONE (OUTPATIENT)
Dept: HEPATOLOGY | Facility: HOSPITAL | Age: 75
End: 2025-06-20
Payer: MEDICAID

## 2025-06-20 NOTE — TELEPHONE ENCOUNTER
I spoke to Dr. Humphreys, IR, thyroid needle bx with inadequate tissue.  He will call patient back for re -bx. Please call patient to tell him and to call us if he does not hear from IR.

## 2025-06-23 ENCOUNTER — TELEPHONE (OUTPATIENT)
Dept: INTERNAL MEDICINE | Facility: CLINIC | Age: 75
End: 2025-06-23
Payer: MEDICAID

## 2025-06-23 NOTE — TELEPHONE ENCOUNTER
Spoke to son and also to [facility]. They are awaiting the financial department to complete their part for admission

## 2025-06-24 ENCOUNTER — TELEPHONE (OUTPATIENT)
Dept: INTERNAL MEDICINE | Facility: CLINIC | Age: 75
End: 2025-06-24
Payer: MEDICAID

## 2025-06-24 NOTE — TELEPHONE ENCOUNTER
Spoke to son and he states patient has been approved for admission to [facility] and will be admitted 6/30/25

## 2025-06-25 ENCOUNTER — HOSPITAL ENCOUNTER (OUTPATIENT)
Dept: RADIOLOGY | Facility: HOSPITAL | Age: 75
Discharge: HOME | End: 2025-06-25
Attending: UROLOGY
Payer: MEDICAID

## 2025-06-25 DIAGNOSIS — R97.20 ELEVATED PSA: ICD-10-CM

## 2025-06-25 PROCEDURE — 25500020 PHARM REV CODE 255

## 2025-06-25 PROCEDURE — A9577 INJ MULTIHANCE: HCPCS

## 2025-06-25 PROCEDURE — 72197 MRI PELVIS W/O & W/DYE: CPT | Mod: TC

## 2025-06-25 RX ADMIN — GADOBENATE DIMEGLUMINE 12 ML: 529 INJECTION, SOLUTION INTRAVENOUS at 08:06

## 2025-06-30 ENCOUNTER — OFFICE VISIT (OUTPATIENT)
Dept: ENDOCRINOLOGY | Facility: CLINIC | Age: 75
End: 2025-06-30
Payer: MEDICAID

## 2025-06-30 VITALS
SYSTOLIC BLOOD PRESSURE: 124 MMHG | DIASTOLIC BLOOD PRESSURE: 67 MMHG | HEIGHT: 61 IN | TEMPERATURE: 98 F | RESPIRATION RATE: 16 BRPM | BODY MASS INDEX: 23.49 KG/M2 | HEART RATE: 78 BPM | WEIGHT: 124.44 LBS

## 2025-06-30 DIAGNOSIS — E04.2 MULTINODULAR NON-TOXIC GOITER: Primary | ICD-10-CM

## 2025-06-30 PROCEDURE — 99215 OFFICE O/P EST HI 40 MIN: CPT | Mod: PBBFAC | Performed by: INTERNAL MEDICINE

## 2025-06-30 NOTE — ADDENDUM NOTE
Addended by: ROBERTO MAYORGA on: 6/30/2025 02:04 PM     Modules accepted: Orders, Level of Service

## 2025-06-30 NOTE — PROGRESS NOTES
Subjective:      Patient ID: Suleiman Beck is a 75 y.o. male.    Chief Complaint:  Multinodular goiter      History of Present Illness  Pt is a 76 y/o w/ Pmhx of MNG for follow up.  Hx obtained via son due to pt not speaking English.  Multinodular goiter incidentally found on imaging during a stroke in 05/2024.  Follow up ultrasound consistent with multinodular goiter, TSH within normal limits.  Patient underwent fine-needle aspiration with IR with inconclusive results, plan for repeat procedure on 07/10/2025.  At this time patient denies any acute complaints; denies fatigue, weight loss, heat intolerance, changes in appetite.  Baseline functional status is limited due to residual right-sided weakness following CVA in the past.    Objective:   Physical Exam  Vitals reviewed.   Constitutional:       Appearance: Normal appearance.   HENT:      Head: Normocephalic and atraumatic.      Comments: No palpable goiter appreciated  Eyes:      General: No scleral icterus.     Extraocular Movements: Extraocular movements intact.      Conjunctiva/sclera: Conjunctivae normal.   Cardiovascular:      Rate and Rhythm: Normal rate and regular rhythm.   Musculoskeletal:         General: No swelling.      Cervical back: Normal range of motion. No rigidity.   Skin:     General: Skin is warm and dry.   Neurological:      Mental Status: He is alert. Mental status is at baseline.      Comments: Residual right-sided weakness       Lab Review:   TSH wnl 5/21/24, 5/17/24.  Ca nl today.  U/s 5/25 showed MNG.    Assessment:     1. Multinodular non-toxic goiter             Plan:     Multinodular non-toxic goiter  -Pt has isthmus (1.1cm) and right thyroid lobe (5.7cm) nodules seen on ultrasound 05/14/2025 of uncertain prognosis while awaiting path.   -05/29/2025 fine-needle aspiration of right thyroid and left isthmus nondiagnostic due to inadequate tissue for interpretation.  -repeat fine-needle aspiration pending, planned for 7/10/25, plan for  follow up.  -5/21/25: TSH 1.751, Free T4 1.22      Luis Enrique Barragan DO  [facility] Internal Medicine, PGY-II        One-month follow up to address path results and discuss further management.

## 2025-07-02 ENCOUNTER — PATIENT MESSAGE (OUTPATIENT)
Dept: INTERNAL MEDICINE | Facility: CLINIC | Age: 75
End: 2025-07-02
Payer: MEDICAID

## 2025-07-03 ENCOUNTER — TELEPHONE (OUTPATIENT)
Dept: HEPATOLOGY | Facility: HOSPITAL | Age: 75
End: 2025-07-03
Payer: MEDICAID

## 2025-07-03 ENCOUNTER — HOSPITAL ENCOUNTER (EMERGENCY)
Facility: HOSPITAL | Age: 75
Discharge: HOME | End: 2025-07-03
Attending: INTERNAL MEDICINE
Payer: MEDICAID

## 2025-07-03 ENCOUNTER — TELEPHONE (OUTPATIENT)
Dept: INTERNAL MEDICINE | Facility: CLINIC | Age: 75
End: 2025-07-03
Payer: MEDICAID

## 2025-07-03 ENCOUNTER — PATIENT MESSAGE (OUTPATIENT)
Dept: INTERNAL MEDICINE | Facility: CLINIC | Age: 75
End: 2025-07-03
Payer: MEDICAID

## 2025-07-03 VITALS
OXYGEN SATURATION: 98 % | BODY MASS INDEX: 23.41 KG/M2 | HEART RATE: 87 BPM | HEIGHT: 61 IN | DIASTOLIC BLOOD PRESSURE: 70 MMHG | RESPIRATION RATE: 18 BRPM | SYSTOLIC BLOOD PRESSURE: 138 MMHG | TEMPERATURE: 98 F | WEIGHT: 124 LBS

## 2025-07-03 DIAGNOSIS — S01.21XA LACERATION OF NOSE, INITIAL ENCOUNTER: Primary | ICD-10-CM

## 2025-07-03 PROCEDURE — 12011 RPR F/E/E/N/L/M 2.5 CM/<: CPT

## 2025-07-03 PROCEDURE — 99284 EMERGENCY DEPT VISIT MOD MDM: CPT | Mod: 25

## 2025-07-03 PROCEDURE — 63600175 PHARM REV CODE 636 W HCPCS: Performed by: PHYSICIAN ASSISTANT

## 2025-07-03 PROCEDURE — 25000003 PHARM REV CODE 250: Performed by: PHYSICIAN ASSISTANT

## 2025-07-03 RX ORDER — AMOXICILLIN AND CLAVULANATE POTASSIUM 875; 125 MG/1; MG/1
1 TABLET, FILM COATED ORAL EVERY 12 HOURS
Qty: 14 TABLET | Refills: 0 | Status: SHIPPED | OUTPATIENT
Start: 2025-07-03 | End: 2025-07-10

## 2025-07-03 RX ORDER — HYDROCODONE BITARTRATE AND ACETAMINOPHEN 5; 325 MG/1; MG/1
1 TABLET ORAL
Refills: 0 | Status: COMPLETED | OUTPATIENT
Start: 2025-07-03 | End: 2025-07-03

## 2025-07-03 RX ORDER — ACETAMINOPHEN 500 MG
500 TABLET ORAL
Status: COMPLETED | OUTPATIENT
Start: 2025-07-03 | End: 2025-07-03

## 2025-07-03 RX ORDER — HYDROCODONE BITARTRATE AND ACETAMINOPHEN 5; 325 MG/1; MG/1
1 TABLET ORAL EVERY 6 HOURS PRN
Qty: 12 TABLET | Refills: 0 | Status: SHIPPED | OUTPATIENT
Start: 2025-07-03

## 2025-07-03 RX ORDER — LIDOCAINE HYDROCHLORIDE 10 MG/ML
5 INJECTION, SOLUTION EPIDURAL; INFILTRATION; INTRACAUDAL; PERINEURAL
Status: COMPLETED | OUTPATIENT
Start: 2025-07-03 | End: 2025-07-03

## 2025-07-03 RX ORDER — LIDOCAINE HYDROCHLORIDE 10 MG/ML
10 INJECTION, SOLUTION EPIDURAL; INFILTRATION; INTRACAUDAL; PERINEURAL
Status: DISCONTINUED | OUTPATIENT
Start: 2025-07-03 | End: 2025-07-03

## 2025-07-03 RX ADMIN — HYDROCODONE BITARTRATE AND ACETAMINOPHEN 1 TABLET: 5; 325 TABLET ORAL at 10:07

## 2025-07-03 RX ADMIN — ACETAMINOPHEN 500 MG: 500 TABLET ORAL at 09:07

## 2025-07-03 RX ADMIN — LIDOCAINE HYDROCHLORIDE 50 MG: 10 INJECTION, SOLUTION EPIDURAL; INFILTRATION; INTRACAUDAL; PERINEURAL at 09:07

## 2025-07-03 NOTE — TELEPHONE ENCOUNTER
LATE CHARTING FOR TUESDAY 7/1/2025    Spoke to the son and he is having difficulty understanding some of the processes being explained by the nursing home. He is stating that they are telling I'm that his father will lose his medicaid if/when he is admitted to their facility. He is reaelly worried about this if it is in fact true.  I called the nursing home and spoke to Kerri. She explained that he would lose his coverage and go to behavioral coverage only and that it could take up to a year for other medicaid coverage to go into effect.  Called the [facility] number and they referred me to long term medicaid office. The long term office explained that this is not correct. He would still have medical coverage.  Spoke to the son and gave him the number for long term medicaid so they could first hand explain the process.  After he spoke to them he called me back and was appreciative that he now understands better. His plan will be to wait until the next Dr visit 7/17 to discuss test results. After that the plan is to move ahead with admission to the facility.  He will remain in contact with me for any further assistance with that process or if he becomes confused about any

## 2025-07-03 NOTE — TELEPHONE ENCOUNTER
I  spoke to son.  Short acting 3u ac, lantus 10 units hs.  Call us If glucoses not b/w 100-150. Called  pharmacy to confirm.

## 2025-07-04 NOTE — DISCHARGE INSTRUCTIONS
Keep area clean and dry for the first 24 hours then clean sutured area gently with just soap and water.  Make sure your hands are clean when you care for wound.  DO NOT clean with peroxide. Return immediatly with any signs of infection (increasing redness in surrounding area, pus draining from sutured wound).  Sutures need to be removed in 5 days.  Follow up with your doctor within 2-3 days.

## 2025-07-04 NOTE — ED PROVIDER NOTES
Encounter Date: 7/3/2025       History     Chief Complaint   Patient presents with    Fall    abrasion /laceration  to nose     75-year-old male presents to the emergency department with nasal laceration after falling when getting out of bed.  The patient's son states that he had a stroke awhile back and has frequent falls.  No active bleeding.    The history is provided by a relative (His son). No  was used.     Review of patient's allergies indicates:   Allergen Reactions    Opioids - morphine analogues     Dilaudid [hydromorphone] Anxiety     Past Medical History:   Diagnosis Date    Arthritis     Diabetes mellitus     Essential (primary) hypertension     Multinodular thyroid     Stroke     Unspecified chronic bronchitis      Past Surgical History:   Procedure Laterality Date    CYSTOSCOPY W/ URETERAL STENT PLACEMENT Left 12/11/2022    Procedure: CYSTOSCOPY, WITH URETERAL STENT INSERTION;  Surgeon: Mar Fernandez MD;  [facility]: [facility];  Service: Urology;  Laterality: Left;    FOOT SURGERY Left     HEMORRHOID SURGERY      WRIST SURGERY Left      No family history on file.  Social History[1]  Review of Systems   Skin:         Nose laceration       Physical Exam     Initial Vitals [07/03/25 2120]   BP Pulse Resp Temp SpO2   (!) 120/59 72 20 97.9 °F (36.6 °C) 97 %      MAP       --         Physical Exam    Nursing note and vitals reviewed.  Constitutional: He appears well-developed and well-nourished.   HENT:   Nose: Nose normal.     Mouth/Throat: Oropharynx is clear and moist.   Eyes: EOM are normal. Pupils are equal, round, and reactive to light.   Cardiovascular:  Normal rate, normal heart sounds and intact distal pulses.           Pulmonary/Chest: Breath sounds normal.   Abdominal: Abdomen is soft. Bowel sounds are normal.   Musculoskeletal:         General: Normal range of motion.     Neurological: He is alert and oriented to person, place, and time. GCS score is 15. GCS eye  subscore is 4. GCS verbal subscore is 5. GCS motor subscore is 6.   Skin: Skin is warm. Capillary refill takes less than 2 seconds.         ED Course   Lac Repair    Date/Time: 7/3/2025 10:20 PM    Performed by: Danni Saleh PA  Authorized by: Danni Saleh PA    Consent:     Consent obtained:  Verbal    Consent given by:  Patient    Risks, benefits, and alternatives were discussed: yes      Risks discussed:  Pain, infection, need for additional repair, nerve damage, poor wound healing and poor cosmetic result  Universal protocol:     Procedure explained and questions answered to patient or proxy's satisfaction: yes      Patient identity confirmed:  Verbally with patient  Anesthesia:     Anesthesia method:  Local infiltration    Local anesthetic:  Lidocaine 1% w/o epi  Laceration details:     Location:  Face    Face location:  Nose    Length (cm):  1  Pre-procedure details:     Preparation:  Patient was prepped and draped in usual sterile fashion  Treatment:     Area cleansed with:  Povidone-iodine    Amount of cleaning:  Standard    Irrigation solution:  Sterile saline    Irrigation method:  Syringe  Skin repair:     Repair method:  Sutures    Suture size:  6-0    Suture material:  Nylon    Suture technique:  Simple interrupted    Number of sutures:  3  Approximation:     Approximation:  Close  Repair type:     Repair type:  Simple  Post-procedure details:     Dressing:  Open (no dressing)    Procedure completion:  Tolerated well, no immediate complications    Labs Reviewed - No data to display       Imaging Results              CT Maxillofacial Without Contrast (Preliminary result)  Result time 07/03/25 22:20:02      Preliminary result by Naeem Gilbert MD (07/03/25 22:20:02)                   Narrative:    START OF REPORT:  Technique: Noncontrast maxillofacial CT was performed with axial as well as sagittal and coronal images being submitted for interpretation.    Comparison: None.    Clinical history:  Maxillofacial pain, Nasal fracture suspected, nasal pain.    Findings: Significant motion artifact is seen in some of the images which reduces the sensitivity and specificity of the study for subtle fractures.  Facial soft tissues: The facial soft tissues appear unremarkable.  Orbital soft tissues: The orbital soft tissues appear unremarkable.  Bones:  Orbital bony structures: The bilateral orbital bony structures are intact with no orbital fracture identified.  Mandible: The mandible appears unremarkable with no fracture identified.  Maxilla: The maxilla appears unremarkable with no fracture identified.  Pterygoid plates: No fracture identified of the right or left pterygoid plates.  Zygoma: The zygomatic arches are intact with no zygomatic complex fracture identified.  TMJ: The mandibular condyles appear normally placed with respect to the mandibular fossa.  Nasal Bones: The nasal septum is midline. No displaced nasal bone fracture is seen.  Paranasal sinuses: The visualized paranasal sinuses appear clear with no significant mucoperiosteal thickening or air fluid levels identified.  Mastoid air cells: The visualized mastoid air cells appear clear.  Brain: The visualized intracranial structures appear unremarkable.      Impression:  1. No acute maxillofacial fracture identified. Details and other findings as noted above.                                         Medications   acetaminophen tablet 500 mg (500 mg Oral Given 7/3/25 2143)   LIDOcaine (PF) 10 mg/ml (1%) injection 50 mg (50 mg Infiltration Given 7/3/25 2143)   HYDROcodone-acetaminophen 5-325 mg per tablet 1 tablet (1 tablet Oral Given 7/3/25 2238)     Medical Decision Making  75-year-old male presents to the emergency department with nasal laceration after falling when getting out of bed.  The patient's son states that he had a stroke awhile back and has frequent falls.  No active bleeding.    3 sutures placed and patient was told to return in 5 days for  suture removal.  CT maxillofacial unremarkable.  Prescribed Augmentin and Norco.  Tdap given in 2023    Amount and/or Complexity of Data Reviewed  Radiology: ordered. Decision-making details documented in ED Course.    Risk  OTC drugs.  Prescription drug management.               ED Course as of 07/03/25 2256   Thu Jul 03, 2025 2237 CT Maxillofacial Without Contrast  Unremarkable   [ER]   2244 The patient is resting comfortably and in no acute distress.  He states that his symptoms have improved after treatment in Emergency Department. I personally discussed his test results and treatment plan.  Gave strict ED precautions and specific conditions for return to the emergency department and importance of follow up with pcp.  Patient voices understanding and agrees to the plan discussed. All patients' questions have been answered at this time. He has remained hemodynamically stable throughout entire stay in ED and is stable for discharge home. [ER]      ED Course User Index  [ER] Danni Saleh PA                           Clinical Impression:  Final diagnoses:  [S01.21XA] Laceration of nose, initial encounter (Primary)          ED Disposition Condition    Discharge Stable          ED Prescriptions       Medication Sig Dispense Start Date End Date Auth. Provider    amoxicillin-clavulanate 875-125mg (AUGMENTIN) 875-125 mg per tablet Take 1 tablet by mouth every 12 (twelve) hours. for 7 days 14 tablet 7/3/2025 7/10/2025 Danni Saleh PA    HYDROcodone-acetaminophen (NORCO) 5-325 mg per tablet Take 1 tablet by mouth every 6 (six) hours as needed for Pain. 12 tablet 7/3/2025 -- Danni Saleh PA          Follow-up Information       Follow up With Specialties Details Why Contact Info [facility] - Emergency Dept Emergency Medicine  As needed, If symptoms worsen [address] [facility] [address] [phone]    Martin Sears MD Pulmonary Disease, Internal Medicine Schedule an appointment  as soon as possible for a visit in 2 days  [address] [facility] [address]  [phone]                     [1]   Social History  Tobacco Use    Smoking status: Former     Current packs/day: 0.00     Types: Cigarettes     Quit date:      Years since quittin.    Smokeless tobacco: Never   Substance Use Topics    Alcohol use: Not Currently    Drug use: Never        Danni Saleh PA  [phone]

## 2025-07-08 ENCOUNTER — HOSPITAL ENCOUNTER (EMERGENCY)
Facility: HOSPITAL | Age: 75
Discharge: HOME | End: 2025-07-08
Attending: FAMILY MEDICINE
Payer: MEDICAID

## 2025-07-08 VITALS
TEMPERATURE: 98 F | HEIGHT: 61 IN | WEIGHT: 123 LBS | OXYGEN SATURATION: 99 % | BODY MASS INDEX: 23.22 KG/M2 | RESPIRATION RATE: 18 BRPM | HEART RATE: 60 BPM | DIASTOLIC BLOOD PRESSURE: 54 MMHG | SYSTOLIC BLOOD PRESSURE: 142 MMHG

## 2025-07-08 DIAGNOSIS — Z51.89 ENCOUNTER FOR WOUND RE-CHECK: ICD-10-CM

## 2025-07-08 DIAGNOSIS — Z48.02 VISIT FOR SUTURE REMOVAL: Primary | ICD-10-CM

## 2025-07-08 LAB — POCT GLUCOSE: 126 MG/DL (ref 70–110)

## 2025-07-08 PROCEDURE — 99999 HC NO LEVEL OF SERVICE - ED ONLY: CPT

## 2025-07-08 PROCEDURE — 82962 GLUCOSE BLOOD TEST: CPT

## 2025-07-08 NOTE — ED PROVIDER NOTES
Encounter Date: 7/8/2025       History     Chief Complaint   Patient presents with    Suture / Staple Removal     Patient reports to the ed secondary to suture removal. No other complaints at this time. Indy biswas     74 y/o m presents for suture removal and wound recheck to the right side of the nose. Son states pt fell 5 days ago when getting out of the bed. Denies any recent increased pain, redness, swelling, or drainage from the laceration.     The history is provided by the patient and a relative. The history is limited by a language barrier (Son interpreted for exam and suture removal.). No  was used.     Review of patient's allergies indicates:   Allergen Reactions    Opioids - morphine analogues     Dilaudid [hydromorphone] Anxiety     Past Medical History:   Diagnosis Date    Arthritis     Diabetes mellitus     Essential (primary) hypertension     Multinodular thyroid     Stroke     Unspecified chronic bronchitis      Past Surgical History:   Procedure Laterality Date    CYSTOSCOPY W/ URETERAL STENT PLACEMENT Left 12/11/2022    Procedure: CYSTOSCOPY, WITH URETERAL STENT INSERTION;  Surgeon: Mar Fernandez MD;  Location: [facility];  Service: Urology;  Laterality: Left;    FOOT SURGERY Left     HEMORRHOID SURGERY      WRIST SURGERY Left      No family history on file.  Social History[1]  Review of Systems   Constitutional:  Negative for fever.   HENT:  Negative for sore throat.    Respiratory:  Negative for shortness of breath.    Cardiovascular:  Negative for chest pain.   Gastrointestinal:  Negative for nausea.   Genitourinary:  Negative for dysuria.   Musculoskeletal:  Negative for back pain.   Skin:  Positive for wound. Negative for rash.   Neurological:  Negative for weakness.   Hematological:  Does not bruise/bleed easily.       Physical Exam     Initial Vitals [07/08/25 1115]   BP Pulse Resp Temp SpO2   (!) 142/54 60 18 97.5 °F (36.4 °C) 99 %      MAP       --          Physical Exam    Nursing note and vitals reviewed.  Constitutional: He appears well-developed and well-nourished.   HENT:   Head: Normocephalic and atraumatic.   Eyes: EOM are normal. Pupils are equal, round, and reactive to light.   Neck: Neck supple.   Normal range of motion.  Cardiovascular:  Normal rate, regular rhythm, normal heart sounds and intact distal pulses.           Pulmonary/Chest: Breath sounds normal.   Abdominal: Abdomen is soft. Bowel sounds are normal.   Musculoskeletal:         General: Normal range of motion.      Cervical back: Normal range of motion and neck supple.     Neurological: He is alert and oriented to person, place, and time. He has normal strength and normal reflexes. GCS score is 15. GCS eye subscore is 4. GCS verbal subscore is 5. GCS motor subscore is 6.   Skin: Skin is warm and dry. Capillary refill takes less than 2 seconds.   Right distal nare laceration that appears well healed and well approximated without surrounding erythema, edema, or drainage. 3 intact sutures noted   Psychiatric: He has a normal mood and affect. His behavior is normal. Thought content normal.         ED Course   Suture Removal    Date/Time: 7/8/2025 11:35 AM  Location procedure was performed: [facility] EMERGENCY DEPARTMENT    Performed by: Glenny Almanza FNP  Authorized by: Glenny Almanza FNP  Location: Right nare.  Wound Appearance: well healed, normal color and no drainage  Sutures Removed: 3  Staples Removed: 0  Post-removal: no dressing applied  Facility: sutures placed in this facility  Complications: No  Specimens: No  Patient tolerance: Patient tolerated the procedure well with no immediate complications        Labs Reviewed   POCT GLUCOSE - Abnormal       Result Value    POCT Glucose 126 (*)           Imaging Results    None          Medications - No data to display  Medical Decision Making  76 y/o m with healing laceration to the right nare presents for suture removal and wound recheck.  Differentials; Laceration/wound recheck, Suture removal.     Amount and/or Complexity of Data Reviewed  Independent Historian: caregiver     Details: Son               ED Course as of 07/08/25 1142   Tue Jul 08, 2025   1141 Given strict ED return precautions. I have spoken with the patient and/or caregivers. I have explained the patient's condition, diagnoses and treatment plan based on the information available to me at this time. I have answered the patient's and/or caregiver's questions and addressed any concerns. The patient and/or caregivers have as good an understanding of the patient's diagnosis, condition and treatment plan as can be expected at this point. The vital signs have been stable. The patient's condition is stable and appropriate for discharge from the emergency department.      The patient will pursue further outpatient evaluation with the primary care physician or other designated or consulting physician as outlined in the discharge instructions. The patient and/or caregivers are agreeable to this plan of care and follow-up instructions have been explained in detail. The patient and/or caregivers have received these instructions in written format and have expressed an understanding of the discharge instructions. The patient and/or caregivers are aware that any significant change in condition or worsening of symptoms should prompt an immediate return to this or the closest emergency department or a call to 911.  [FS]      ED Course User Index  [FS] Glenny Almanza FNP                           Clinical Impression:  Final diagnoses:  [Z48.02] Visit for suture removal (Primary)  [Z51.89] Encounter for wound re-check          ED Disposition Condition    Discharge Stable          ED Prescriptions    None       Follow-up Information       Follow up With Specialties Details Why Contact Info    Martin Sears MD Pulmonary Disease, Internal Medicine In 3 days  [address]  [address]  [phone]      [facility] Emergency Dept Emergency Medicine  If symptoms worsen [address] [facility] [place] [address] [phone]                   [1]   Social History  Tobacco Use    Smoking status: Former     Current packs/day: 0.00     Types: Cigarettes     Quit date:      Years since quittin.5    Smokeless tobacco: Never   Substance Use Topics    Alcohol use: Not Currently    Drug use: Never        Glenny Almanza, MARILEE  25 1142

## 2025-07-08 NOTE — DISCHARGE INSTRUCTIONS
Clean the wound gently with warm water and antibacterial soap twice daily. The scab will slough off. Return should you notice any redness, swelling, increased tenderness, or drainage from the area.

## 2025-07-10 ENCOUNTER — HOSPITAL ENCOUNTER (OUTPATIENT)
Dept: INTERVENTIONAL RADIOLOGY/VASCULAR | Facility: HOSPITAL | Age: 75
Discharge: HOME | End: 2025-07-10
Attending: INTERNAL MEDICINE
Payer: MEDICAID

## 2025-07-10 ENCOUNTER — OFFICE VISIT (OUTPATIENT)
Dept: UROLOGY | Facility: CLINIC | Age: 75
End: 2025-07-10
Payer: MEDICAID

## 2025-07-10 VITALS
RESPIRATION RATE: 20 BRPM | WEIGHT: 123.63 LBS | SYSTOLIC BLOOD PRESSURE: 132 MMHG | TEMPERATURE: 98 F | DIASTOLIC BLOOD PRESSURE: 64 MMHG | BODY MASS INDEX: 23.34 KG/M2 | HEIGHT: 61 IN | OXYGEN SATURATION: 99 % | HEART RATE: 60 BPM

## 2025-07-10 DIAGNOSIS — E04.2 MULTIPLE THYROID NODULES: ICD-10-CM

## 2025-07-10 DIAGNOSIS — R97.20 ELEVATED PSA: Primary | ICD-10-CM

## 2025-07-10 PROCEDURE — 1160F RVW MEDS BY RX/DR IN RCRD: CPT | Mod: CPTII,,, | Performed by: UROLOGY

## 2025-07-10 PROCEDURE — 3078F DIAST BP <80 MM HG: CPT | Mod: CPTII,,, | Performed by: UROLOGY

## 2025-07-10 PROCEDURE — 3066F NEPHROPATHY DOC TX: CPT | Mod: CPTII,,, | Performed by: UROLOGY

## 2025-07-10 PROCEDURE — 3044F HG A1C LEVEL LT 7.0%: CPT | Mod: CPTII,,, | Performed by: UROLOGY

## 2025-07-10 PROCEDURE — 3008F BODY MASS INDEX DOCD: CPT | Mod: CPTII,,, | Performed by: UROLOGY

## 2025-07-10 PROCEDURE — 1101F PT FALLS ASSESS-DOCD LE1/YR: CPT | Mod: CPTII,,, | Performed by: UROLOGY

## 2025-07-10 PROCEDURE — 99214 OFFICE O/P EST MOD 30 MIN: CPT | Mod: S$PBB,,, | Performed by: UROLOGY

## 2025-07-10 PROCEDURE — 99215 OFFICE O/P EST HI 40 MIN: CPT | Mod: PBBFAC | Performed by: UROLOGY

## 2025-07-10 PROCEDURE — 4010F ACE/ARB THERAPY RXD/TAKEN: CPT | Mod: CPTII,,, | Performed by: UROLOGY

## 2025-07-10 PROCEDURE — 3075F SYST BP GE 130 - 139MM HG: CPT | Mod: CPTII,,, | Performed by: UROLOGY

## 2025-07-10 PROCEDURE — 3288F FALL RISK ASSESSMENT DOCD: CPT | Mod: CPTII,,, | Performed by: UROLOGY

## 2025-07-10 PROCEDURE — 1126F AMNT PAIN NOTED NONE PRSNT: CPT | Mod: CPTII,,, | Performed by: UROLOGY

## 2025-07-10 PROCEDURE — 1159F MED LIST DOCD IN RCRD: CPT | Mod: CPTII,,, | Performed by: UROLOGY

## 2025-07-10 PROCEDURE — 3062F POS MACROALBUMINURIA REV: CPT | Mod: CPTII,,, | Performed by: UROLOGY

## 2025-07-10 NOTE — PROGRESS NOTES
Pt seen by Dr. Conn; Pt instructed to return to clinic after prostate biopsy; Discharge paperwork given w/pt verbalizing understanding

## 2025-07-10 NOTE — PROGRESS NOTES
CC:  MRI results    HPI:  Suleiman Beck is a 75 y.o. male seen for follow-up of MRI.  He was found to have an elevated PSA.  He does not know his family members since he is an orphan so he is not sure if there is prostate cancer in the family.  He had an MRI for the visit today.    He denies any urinary symptoms.    He did have a positive urine culture on 26 February 2025.  The PSA was drawn about two weeks later.  I am unsure of the treatment he received for that but a repeat urine culture on 7 March 2025 was no growth.  He has a history of kidney stones with the last one two years ago.  He had a stent and then repeat imaging showed that the stone was no longer present so he required no further intervention.      Urinalysis:  Unable to urinate for a sample today.     Lab Results:  PSA History:     Latest Reference Range & Units 03/13/25 02:20 06/09/25 14:41   Prostate Specific Antigen <=4.00 ng/mL 9.17 (H) 9.42 (H)     Imaging:  MRI Prostate - 25 June 2025:  Prostate volume estimate:  32 cc  Lesion 1:  An 18 mm x 7 mm by 12 mm fusiform lesion is present to the anterior transitional zone and the anterior fibromuscular stroma from the base to the mid gland. This demonstrates markedly restricted diffusion. Multi parametric characteristics are highly concerning for malignancy (PI-RADS 4/5). The anterior capsule is nodular and bulges outward and the possibility of minimal anterior extracapsular extension could not be excluded.    Capsule:  The anterior capsule is nodular and bulges outward and the possibility of minimal anterior extracapsular extension can not be excluded.  Lymph Nodes:  No lymphadenopathy  Seminal vesicles:  Unremarkable      ROS:  All systems reviewed and are negative except as documented in HPI and/or Assessment and Plan.     Patient Active Problem List:     Problem List[1]     Past Medical History:  Past Medical History:   Diagnosis Date    Arthritis     Diabetes mellitus     Essential (primary)  hypertension     Multinodular thyroid     Stroke     Unspecified chronic bronchitis         Past Surgical History:  Past Surgical History:   Procedure Laterality Date    CYSTOSCOPY W/ URETERAL STENT PLACEMENT Left 2022    Procedure: CYSTOSCOPY, WITH URETERAL STENT INSERTION;  Surgeon: Mar Fernandez MD;  Location: [facility];  Service: Urology;  Laterality: Left;    FOOT SURGERY Left     HEMORRHOID SURGERY      WRIST SURGERY Left         Family History:  History reviewed. No pertinent family history.     Social History:  Social History     Socioeconomic History    Marital status:    Tobacco Use    Smoking status: Former     Current packs/day: 0.00     Types: Cigarettes     Quit date:      Years since quittin.5    Smokeless tobacco: Never   Substance and Sexual Activity    Alcohol use: Not Currently    Drug use: Never    Sexual activity: Not Currently     Social Drivers of Health     Financial Resource Strain: Patient Declined (2025)    Overall Financial Resource Strain (CARDIA)     Difficulty of Paying Living Expenses: Patient declined   Food Insecurity: Patient Declined (2025)    Hunger Vital Sign     Worried About Running Out of Food in the Last Year: Patient declined     Ran Out of Food in the Last Year: Patient declined   Transportation Needs: No Transportation Needs (2024)    TRANSPORTATION NEEDS     Transportation : No   Physical Activity: Inactive (2024)    Exercise Vital Sign     Days of Exercise per Week: 0 days     Minutes of Exercise per Session: 0 min   Stress: Patient Declined (2025)    [employer] [place] [employer] - Occupational Stress Questionnaire     Feeling of Stress : Patient declined   Housing Stability: Patient Declined (2025)    Housing Stability Vital Sign     Unable to Pay for Housing in the Last Year: Patient declined     Homeless in the Last Year: Patient declined        Allergies:  Review of patient's allergies  indicates:   Allergen Reactions    Opioids - morphine analogues     Dilaudid [hydromorphone] Anxiety        Objective:  Vitals:    07/10/25 1426   BP: 132/64   Pulse: 60   Resp: 20   Temp: 98.3 °F (36.8 °C)     General:  Well developed, well nourished adult male in no acute distress  Abdomen: Soft, nontender, no masses  Extremities:  No clubbing, cyanosis, or edema  Neurologic:  Grossly intact  Musculoskeletal:  Normal tone    Assessment:  1. Elevated PSA  - POCT URINE DIPSTICK WITH MICROSCOPE, AUTOMATED  - Case Request Endoscopy: BIOPSY, PROSTATE, RECTAL APPROACH, WITH US GUIDANCE    Plan:  With the elevated PSA and the abnormal MRI he will need a biopsy of the prostate.      Follow-up tests needed:   None     Return appointment:  For prostate biopsy.                   [1]   Patient Active Problem List  Diagnosis    Ureteral obstruction    Controlled type 2 diabetes mellitus without complication    Hypertension    Thyroid nodule    Primary osteoarthritis of right shoulder    History of stroke    Chronic bronchitis    Ex-cigarette smoker    Bifascicular block    Community acquired pneumonia of right lower lobe of lung    Second degree burn of buttock, initial encounter    Multinodular thyroid

## 2025-07-14 ENCOUNTER — TELEPHONE (OUTPATIENT)
Dept: INTERVENTIONAL RADIOLOGY/VASCULAR | Facility: HOSPITAL | Age: 75
End: 2025-07-14
Payer: MEDICAID

## 2025-07-14 NOTE — TELEPHONE ENCOUNTER
Patient called and IR Fna scheduled and confirmed for 7/31 at 8 am by his son. He is aware that this is a non fasting procedure in radiology entrance 3. Pt. Agreed.

## 2025-07-15 ENCOUNTER — OFFICE VISIT (OUTPATIENT)
Dept: INTERNAL MEDICINE | Facility: CLINIC | Age: 75
End: 2025-07-15
Payer: MEDICAID

## 2025-07-15 ENCOUNTER — LAB VISIT (OUTPATIENT)
Dept: LAB | Facility: HOSPITAL | Age: 75
End: 2025-07-15
Attending: INTERNAL MEDICINE
Payer: MEDICAID

## 2025-07-15 VITALS
WEIGHT: 122 LBS | RESPIRATION RATE: 18 BRPM | DIASTOLIC BLOOD PRESSURE: 66 MMHG | HEIGHT: 61 IN | TEMPERATURE: 98 F | BODY MASS INDEX: 23.03 KG/M2 | SYSTOLIC BLOOD PRESSURE: 109 MMHG | OXYGEN SATURATION: 97 % | HEART RATE: 61 BPM

## 2025-07-15 DIAGNOSIS — E04.1 THYROID NODULE: ICD-10-CM

## 2025-07-15 DIAGNOSIS — Z00.00 ROUTINE GENERAL MEDICAL EXAMINATION AT HEALTH CARE FACILITY: ICD-10-CM

## 2025-07-15 DIAGNOSIS — E11.9 CONTROLLED TYPE 2 DIABETES MELLITUS WITHOUT COMPLICATION, WITHOUT LONG-TERM CURRENT USE OF INSULIN: ICD-10-CM

## 2025-07-15 DIAGNOSIS — R26.9 GAIT DISORDER: ICD-10-CM

## 2025-07-15 DIAGNOSIS — F10.27 DEMENTIA ASSOCIATED WITH ALCOHOLISM WITH BEHAVIORAL DISTURBANCE: ICD-10-CM

## 2025-07-15 DIAGNOSIS — I45.2 BIFASCICULAR BLOCK: ICD-10-CM

## 2025-07-15 DIAGNOSIS — I10 PRIMARY HYPERTENSION: ICD-10-CM

## 2025-07-15 DIAGNOSIS — R97.20 ELEVATED PSA: ICD-10-CM

## 2025-07-15 DIAGNOSIS — M19.011 PRIMARY OSTEOARTHRITIS OF RIGHT SHOULDER: ICD-10-CM

## 2025-07-15 DIAGNOSIS — J18.9 COMMUNITY ACQUIRED PNEUMONIA OF RIGHT LOWER LOBE OF LUNG: ICD-10-CM

## 2025-07-15 DIAGNOSIS — N13.5 OBSTRUCTION OF BOTH URETERS: ICD-10-CM

## 2025-07-15 DIAGNOSIS — Z87.891 EX-CIGARETTE SMOKER: ICD-10-CM

## 2025-07-15 DIAGNOSIS — T21.25XA SECOND DEGREE BURN OF BUTTOCK, INITIAL ENCOUNTER: ICD-10-CM

## 2025-07-15 DIAGNOSIS — E11.9 CONTROLLED TYPE 2 DIABETES MELLITUS WITHOUT COMPLICATION, UNSPECIFIED WHETHER LONG TERM INSULIN USE: Primary | ICD-10-CM

## 2025-07-15 DIAGNOSIS — Z23 NEED FOR SHINGLES VACCINE: ICD-10-CM

## 2025-07-15 DIAGNOSIS — E04.2 MULTINODULAR THYROID: Chronic | ICD-10-CM

## 2025-07-15 DIAGNOSIS — J41.0 SIMPLE CHRONIC BRONCHITIS: ICD-10-CM

## 2025-07-15 DIAGNOSIS — Z86.73 HISTORY OF STROKE: Primary | ICD-10-CM

## 2025-07-15 DIAGNOSIS — R29.6 RECURRENT FALLS: ICD-10-CM

## 2025-07-15 DIAGNOSIS — N18.32 STAGE 3B CHRONIC KIDNEY DISEASE: ICD-10-CM

## 2025-07-15 PROBLEM — N18.30 CKD (CHRONIC KIDNEY DISEASE) STAGE 3, GFR 30-59 ML/MIN: Status: ACTIVE | Noted: 2025-07-15

## 2025-07-15 LAB
ALBUMIN SERPL-MCNC: 3.4 G/DL (ref 3.4–4.8)
ALBUMIN/GLOB SERPL: 0.9 RATIO (ref 1.1–2)
ALP SERPL-CCNC: 77 UNIT/L (ref 40–150)
ALT SERPL-CCNC: 8 UNIT/L (ref 0–55)
ANION GAP SERPL CALC-SCNC: 5 MEQ/L
AST SERPL-CCNC: 10 UNIT/L (ref 11–45)
BILIRUB SERPL-MCNC: 0.2 MG/DL
BUN SERPL-MCNC: 57.6 MG/DL (ref 8.4–25.7)
CALCIUM SERPL-MCNC: 9.4 MG/DL (ref 8.8–10)
CHLORIDE SERPL-SCNC: 117 MMOL/L (ref 98–107)
CO2 SERPL-SCNC: 18 MMOL/L (ref 23–31)
CREAT SERPL-MCNC: 2.16 MG/DL (ref 0.72–1.25)
CREAT/UREA NIT SERPL: 27
GFR SERPLBLD CREATININE-BSD FMLA CKD-EPI: 31 ML/MIN/1.73/M2
GLOBULIN SER-MCNC: 3.8 GM/DL (ref 2.4–3.5)
GLUCOSE SERPL-MCNC: 70 MG/DL (ref 82–115)
HBA1C MFR BLD: 6.2 %
POTASSIUM SERPL-SCNC: 5 MMOL/L (ref 3.5–5.1)
PROT SERPL-MCNC: 7.2 GM/DL (ref 5.8–7.6)
SODIUM SERPL-SCNC: 140 MMOL/L (ref 136–145)
TSH SERPL-ACNC: 1.23 UIU/ML (ref 0.35–4.94)

## 2025-07-15 PROCEDURE — 84443 ASSAY THYROID STIM HORMONE: CPT

## 2025-07-15 PROCEDURE — 80053 COMPREHEN METABOLIC PANEL: CPT

## 2025-07-15 PROCEDURE — 83036 HEMOGLOBIN GLYCOSYLATED A1C: CPT | Mod: PBBFAC | Performed by: INTERNAL MEDICINE

## 2025-07-15 PROCEDURE — 36415 COLL VENOUS BLD VENIPUNCTURE: CPT

## 2025-07-15 PROCEDURE — 99215 OFFICE O/P EST HI 40 MIN: CPT | Mod: PBBFAC | Performed by: INTERNAL MEDICINE

## 2025-07-15 RX ORDER — INSULIN ASPART 100 [IU]/ML
3 INJECTION, SOLUTION INTRAVENOUS; SUBCUTANEOUS 3 TIMES DAILY
Qty: 10 ML | Refills: 11 | Status: SHIPPED | OUTPATIENT
Start: 2025-07-15

## 2025-07-15 NOTE — H&P (VIEW-ONLY)
Subjective     Patient ID: Suleiman Beck is a 75 y.o. male.    Chief Complaint: Follow-up  Test 123 patient comes to us with recurrent falls at home urinary requiring ER visits but he refuses to use a walker or a cane at home he does display some form of dementia.  He says he is going to [place] but does not know the town he knows this is a hospital but does not know which 1 the son says he is at different is stubborn in his had this attitude even before he had his stroke I do not know that it is a form of personality disorder anger PTSD etc. 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         Objective     Physical Exam  Vitals and nursing note reviewed.   Constitutional:       Appearance: Normal appearance.   HENT:      Head: Normocephalic and atraumatic.      Right Ear: Tympanic membrane, ear canal and external ear normal.      Left Ear: Tympanic membrane, ear canal and external ear normal.      Nose: Nose normal.      Mouth/Throat:      Mouth: Mucous membranes are moist.      Pharynx: Oropharynx is clear.   Eyes:      Extraocular Movements: Extraocular movements intact.      Conjunctiva/sclera: Conjunctivae normal.      Pupils: Pupils are equal,  round, and reactive to light.   Cardiovascular:      Rate and Rhythm: Normal rate.      Pulses: Normal pulses.      Heart sounds: Normal heart sounds.   Pulmonary:      Effort: Pulmonary effort is normal.      Breath sounds: Normal breath sounds.   Abdominal:      General: Bowel sounds are normal.      Palpations: Abdomen is soft.   Musculoskeletal:      Cervical back: Normal range of motion and neck supple.   Skin:     General: Skin is warm and dry.   Neurological:      General: No focal deficit present.      Mental Status: He is alert and oriented to person, place, and time. Mental status is at baseline.   Psychiatric:         Mood and Affect: Mood normal.         Behavior: Behavior normal.         Thought Content: Thought content normal.         Judgment: Judgment normal.            Assessment and Plan     1. History of stroke  -     WALKER FOR HOME USE    2. Simple chronic bronchitis    3. Community acquired pneumonia of right lower lobe of lung    4. Primary hypertension    5. Bifascicular block    6. Obstruction of both ureters    7. Controlled type 2 diabetes mellitus without complication, without long-term current use of insulin  -     POCT HEMOGLOBIN A1C; Future; Expected date: 07/15/2025  -     Comprehensive Metabolic Panel; Future; Expected date: 07/15/2025  -     CBC Auto Differential; Future; Expected date: 07/15/2025    8. Thyroid nodule    9. Multinodular thyroid    10. Primary osteoarthritis of right shoulder    11. Second degree burn of buttock, initial encounter    12. Ex-cigarette smoker    13. Routine general medical examination at health care facility    14. Stage 3b chronic kidney disease    15. Elevated PSA    16. Gait disorder  -     WALKER FOR HOME USE    17. Recurrent falls  -     WALKER FOR HOME USE    18. 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 Need for shingles vaccine        Plan is to get CMP CBC today.  Order him a walker.  Follow up with the evaluation  as above.  Consider nursing home placement pending psychiatric evaluation follow up in 6 weeks         Follow up in about 6 weeks (around 8/26/2025).

## 2025-07-18 ENCOUNTER — PATIENT MESSAGE (OUTPATIENT)
Dept: INTERNAL MEDICINE | Facility: CLINIC | Age: 75
End: 2025-07-18
Payer: MEDICAID

## 2025-07-18 DIAGNOSIS — Z79.4 TYPE 2 DIABETES MELLITUS WITHOUT COMPLICATION, WITH LONG-TERM CURRENT USE OF INSULIN: ICD-10-CM

## 2025-07-18 DIAGNOSIS — E11.9 TYPE 2 DIABETES MELLITUS WITHOUT COMPLICATION, WITH LONG-TERM CURRENT USE OF INSULIN: ICD-10-CM

## 2025-07-18 RX ORDER — BLOOD-GLUCOSE CONTROL, NORMAL
200 EACH MISCELLANEOUS 3 TIMES DAILY
Qty: 200 EACH | Refills: 11 | Status: CANCELLED | OUTPATIENT
Start: 2025-07-18 | End: 2026-07-18

## 2025-07-18 NOTE — TELEPHONE ENCOUNTER
Spoke to son and he is still uncertain if he is going to place father in nursing home. He is concerned since patient is falling frequently but he is having health issues that concern him about the insurance details he was given by the nursing home. I advised him to keep me update if we need to do anything else in this process. All documents are at the nursing home. Unless contacted by son or new referral placed, will close referral for now

## 2025-07-22 DIAGNOSIS — Z01.818 PRE-OP TESTING: ICD-10-CM

## 2025-07-22 DIAGNOSIS — R30.0 DYSURIA: Primary | ICD-10-CM

## 2025-07-29 ENCOUNTER — TELEPHONE (OUTPATIENT)
Dept: ENDOCRINOLOGY | Facility: CLINIC | Age: 75
End: 2025-07-29
Payer: MEDICAID

## 2025-07-29 NOTE — TELEPHONE ENCOUNTER
Saw pt for [facility] a month ago with f/u tomorrow scheduled to go over FNA results.    Chart shows FNA is booked out later this week.     If pt has questions to discuss then keep Endo appt.    If not then would push back f/u appt 2-4 weeks so will have FNA path result to sort next steps.

## 2025-07-29 NOTE — TELEPHONE ENCOUNTER
----- Message from Mary sent at 7/29/2025 10:45 AM CDT -----  Patient of [facility]    Dil called stating Biopsy 8/19/2025.    Patient wants to know if he still has to come to appt.     Contact # [phone].    10:47  Thanks,  ----- Message -----  From: Mary August  Sent: 7/29/2025  10:48 AM CDT  To: [facility] Endocrinology Clinical Support Staff    Patient of [facility]    Dil called stating Biopsy 8/19/2025.    Patient wants to know if he still has to come to appt.     Contact # [phone].    10:47  Thanks,

## 2025-07-30 ENCOUNTER — TELEPHONE (OUTPATIENT)
Dept: INTERVENTIONAL RADIOLOGY/VASCULAR | Facility: HOSPITAL | Age: 75
End: 2025-07-30
Payer: MEDICAID

## 2025-07-30 NOTE — TELEPHONE ENCOUNTER
Patient called and son, Anabel, was reminded of IR fna in radiology on tomorrow 7/31 st 8 am. Son agreed.

## 2025-07-31 ENCOUNTER — HOSPITAL ENCOUNTER (OUTPATIENT)
Dept: INTERVENTIONAL RADIOLOGY/VASCULAR | Facility: HOSPITAL | Age: 75
Discharge: HOME | End: 2025-07-31
Attending: INTERNAL MEDICINE
Payer: MEDICAID

## 2025-07-31 NOTE — DISCHARGE SUMMARY
Radiology Post-Procedure Note    Pre Op Diagnosis: Multinodular Thyroid    Post Op Diagnosis: Same    Secondary Diagnoses:   Problem List Items Addressed This Visit    None       Procedure: US Guided Left Isthmus & Right Thyroid Nodule Biopsy    Procedure performed by: Rio Humphreys MD    Assistant: None    Written Informed Consent Obtained: Yes    Specimen Removed: 18g core x 2 for each nodule    Estimated Blood Loss: <10 cc    Condition: Stable    Outcome: The patient tolerated the procedure well and was without complications.    For further details please see the imaging report associated.    Disposition: Home or Self Care    Follow Up: With primary care provider    Discharge Instructions:  No discharge procedures on file.       Time Spent On Discharge: 2 minutes

## 2025-07-31 NOTE — INTERVAL H&P NOTE
The patient has been examined and the H&P has been reviewed:    I concur with the findings and no changes have occurred since H&P was written. Suleiman Beck is a 75 y.o. male with Multinodular Thyroid who presents for Repeat Right & Left Isthmus Thyroid Nodule Biopsy.           There are no hospital problems to display for this patient.

## 2025-08-01 ENCOUNTER — TELEPHONE (OUTPATIENT)
Dept: INTERVENTIONAL RADIOLOGY/VASCULAR | Facility: HOSPITAL | Age: 75
End: 2025-08-01
Payer: MEDICAID

## 2025-08-01 NOTE — TELEPHONE ENCOUNTER
Called the son Anabel who takes care of all his dads medical needs and he said his dad Is doing ok.

## 2025-08-04 ENCOUNTER — TELEPHONE (OUTPATIENT)
Dept: HEPATOLOGY | Facility: HOSPITAL | Age: 75
End: 2025-08-04
Payer: MEDICAID

## 2025-08-04 DIAGNOSIS — E04.2 MULTIPLE THYROID NODULES: Primary | ICD-10-CM

## 2025-08-04 NOTE — TELEPHONE ENCOUNTER
Has had nondiagnostic needle biopsies per IR times 2.  Will refer to ENT for further eval  asap thanks

## 2025-08-07 ENCOUNTER — TELEPHONE (OUTPATIENT)
Dept: UROLOGY | Facility: CLINIC | Age: 75
End: 2025-08-07
Payer: MEDICAID

## 2025-08-07 NOTE — TELEPHONE ENCOUNTER
Called pt's son 07Aug2025 & explained that urine culture needed to be done on Monday August 11th before prostate biopsy on 19Aug2025. Instructed pt to go to [facility] lab at entrance #2 on Monday to provide urine sample w/pt's son stating understanding.

## 2025-08-12 ENCOUNTER — OFFICE VISIT (OUTPATIENT)
Dept: ENDOCRINOLOGY | Facility: CLINIC | Age: 75
End: 2025-08-12
Payer: MEDICAID

## 2025-08-12 ENCOUNTER — LAB VISIT (OUTPATIENT)
Dept: LAB | Facility: HOSPITAL | Age: 75
End: 2025-08-12
Attending: UROLOGY
Payer: MEDICAID

## 2025-08-12 ENCOUNTER — TELEPHONE (OUTPATIENT)
Dept: ENDOCRINOLOGY | Facility: CLINIC | Age: 75
End: 2025-08-12

## 2025-08-12 VITALS
HEIGHT: 61 IN | TEMPERATURE: 98 F | BODY MASS INDEX: 23.34 KG/M2 | SYSTOLIC BLOOD PRESSURE: 103 MMHG | HEART RATE: 70 BPM | WEIGHT: 123.63 LBS | DIASTOLIC BLOOD PRESSURE: 60 MMHG

## 2025-08-12 DIAGNOSIS — Z01.818 PRE-OP TESTING: ICD-10-CM

## 2025-08-12 DIAGNOSIS — N18.32 CKD STAGE G3B/A3, GFR 30-44 AND ALBUMIN CREATININE RATIO >300 MG/G: ICD-10-CM

## 2025-08-12 DIAGNOSIS — E04.2 MULTINODULAR THYROID: Primary | Chronic | ICD-10-CM

## 2025-08-12 DIAGNOSIS — R30.0 DYSURIA: ICD-10-CM

## 2025-08-12 LAB
BACTERIA #/AREA URNS AUTO: ABNORMAL /HPF
BILIRUB UR QL STRIP.AUTO: NEGATIVE
CLARITY UR: CLEAR
COLOR UR AUTO: ABNORMAL
GLUCOSE UR QL STRIP: NORMAL
HGB UR QL STRIP: ABNORMAL
HYALINE CASTS #/AREA URNS LPF: ABNORMAL /LPF
KETONES UR QL STRIP: NEGATIVE
LEUKOCYTE ESTERASE UR QL STRIP: NEGATIVE
NITRITE UR QL STRIP: NEGATIVE
PH UR STRIP: 6 [PH]
PROT UR QL STRIP: ABNORMAL
RBC #/AREA URNS AUTO: ABNORMAL /HPF
SP GR UR STRIP.AUTO: 1.01 (ref 1–1.03)
SQUAMOUS #/AREA URNS LPF: ABNORMAL /HPF
UROBILINOGEN UR STRIP-ACNC: NORMAL
WBC #/AREA URNS AUTO: ABNORMAL /HPF

## 2025-08-12 PROCEDURE — 87086 URINE CULTURE/COLONY COUNT: CPT

## 2025-08-12 PROCEDURE — 99214 OFFICE O/P EST MOD 30 MIN: CPT | Mod: PBBFAC | Performed by: INTERNAL MEDICINE

## 2025-08-12 PROCEDURE — 81001 URINALYSIS AUTO W/SCOPE: CPT

## 2025-08-13 ENCOUNTER — HOSPITAL ENCOUNTER (EMERGENCY)
Facility: HOSPITAL | Age: 75
Discharge: HOME | End: 2025-08-13
Attending: FAMILY MEDICINE
Payer: MEDICAID

## 2025-08-13 VITALS
HEIGHT: 61 IN | OXYGEN SATURATION: 96 % | SYSTOLIC BLOOD PRESSURE: 149 MMHG | TEMPERATURE: 98 F | BODY MASS INDEX: 22.71 KG/M2 | WEIGHT: 120.31 LBS | RESPIRATION RATE: 19 BRPM | DIASTOLIC BLOOD PRESSURE: 86 MMHG | HEART RATE: 70 BPM

## 2025-08-13 DIAGNOSIS — S00.03XA CONTUSION OF SCALP, INITIAL ENCOUNTER: ICD-10-CM
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
## 2025-08-13 PROCEDURE — 99283 EMERGENCY DEPT VISIT LOW MDM: CPT | Mod: 25

## 2025-08-13 RX ORDER — ONDANSETRON 4 MG/1
4 TABLET, ORALLY DISINTEGRATING ORAL
Status: DISCONTINUED | OUTPATIENT
Start: 2025-08-13 | End: 2025-08-13 | Stop reason: HOSPADM

## 2025-08-13 RX ORDER — ACETAMINOPHEN 500 MG
1000 TABLET ORAL
Status: DISCONTINUED | OUTPATIENT
Start: 2025-08-13 | End: 2025-08-13 | Stop reason: HOSPADM

## 2025-08-14 LAB — BACTERIA UR CULT: NO GROWTH

## 2025-08-19 ENCOUNTER — ANESTHESIA (OUTPATIENT)
Dept: ENDOSCOPY | Facility: HOSPITAL | Age: 75
End: 2025-08-19
Payer: MEDICAID

## 2025-08-19 ENCOUNTER — HOSPITAL ENCOUNTER (OUTPATIENT)
Facility: HOSPITAL | Age: 75
Discharge: HOME | End: 2025-08-19
Attending: UROLOGY | Admitting: UROLOGY
Payer: MEDICAID

## 2025-08-19 ENCOUNTER — ANESTHESIA EVENT (OUTPATIENT)
Dept: ENDOSCOPY | Facility: HOSPITAL | Age: 75
End: 2025-08-19
Payer: MEDICAID

## 2025-08-19 DIAGNOSIS — R97.20 ELEVATED PSA: ICD-10-CM

## 2025-08-19 LAB
GLUCOSE SERPL-MCNC: 120 MG/DL (ref 70–110)
POCT GLUCOSE: 120 MG/DL (ref 70–110)

## 2025-08-19 PROCEDURE — 63600175 PHARM REV CODE 636 W HCPCS: Performed by: NURSE ANESTHETIST, CERTIFIED REGISTERED

## 2025-08-19 PROCEDURE — 88305 TISSUE EXAM BY PATHOLOGIST: CPT | Mod: TC,91 | Performed by: UROLOGY

## 2025-08-19 PROCEDURE — 37000009 HC ANESTHESIA EA ADD 15 MINS: Performed by: UROLOGY

## 2025-08-19 PROCEDURE — 25000003 PHARM REV CODE 250: Performed by: UROLOGY

## 2025-08-19 PROCEDURE — 55700 HC BIOPSY OF PROSTATE - NEEDLE OR PUNCH: CPT | Performed by: UROLOGY

## 2025-08-19 PROCEDURE — 63600175 PHARM REV CODE 636 W HCPCS: Performed by: UROLOGY

## 2025-08-19 PROCEDURE — 27201423 OPTIME MED/SURG SUP & DEVICES STERILE SUPPLY: Performed by: UROLOGY

## 2025-08-19 PROCEDURE — 55700 PR BIOPSY OF PROSTATE,NEEDLE/PUNCH: CPT | Mod: ,,, | Performed by: UROLOGY

## 2025-08-19 PROCEDURE — 37000008 HC ANESTHESIA 1ST 15 MINUTES: Performed by: UROLOGY

## 2025-08-19 PROCEDURE — 76872 US TRANSRECTAL: CPT | Mod: 26,,, | Performed by: UROLOGY

## 2025-08-19 RX ORDER — CEFTRIAXONE 1 G/1
1 INJECTION, POWDER, FOR SOLUTION INTRAMUSCULAR; INTRAVENOUS
Status: COMPLETED | OUTPATIENT
Start: 2025-08-19 | End: 2025-08-19

## 2025-08-19 RX ORDER — PROPOFOL 10 MG/ML
VIAL (ML) INTRAVENOUS
Status: DISCONTINUED | OUTPATIENT
Start: 2025-08-19 | End: 2025-08-19

## 2025-08-19 RX ORDER — LIDOCAINE HYDROCHLORIDE 20 MG/ML
INJECTION INTRAVENOUS
Status: DISCONTINUED | OUTPATIENT
Start: 2025-08-19 | End: 2025-08-19

## 2025-08-19 RX ORDER — CIPROFLOXACIN 500 MG/1
500 TABLET, FILM COATED ORAL 2 TIMES DAILY
Qty: 6 TABLET | Refills: 0 | Status: SHIPPED | OUTPATIENT
Start: 2025-08-19 | End: 2025-08-26

## 2025-08-19 RX ORDER — CIPROFLOXACIN 500 MG/1
500 TABLET, FILM COATED ORAL ONCE
Status: COMPLETED | OUTPATIENT
Start: 2025-08-19 | End: 2025-08-19

## 2025-08-19 RX ORDER — SODIUM CHLORIDE, SODIUM LACTATE, POTASSIUM CHLORIDE, CALCIUM CHLORIDE 600; 310; 30; 20 MG/100ML; MG/100ML; MG/100ML; MG/100ML
INJECTION, SOLUTION INTRAVENOUS CONTINUOUS
Status: DISCONTINUED | OUTPATIENT
Start: 2025-08-19 | End: 2025-08-19 | Stop reason: HOSPADM

## 2025-08-19 RX ORDER — LIDOCAINE HYDROCHLORIDE 10 MG/ML
1 INJECTION, SOLUTION EPIDURAL; INFILTRATION; INTRACAUDAL; PERINEURAL ONCE
Status: DISCONTINUED | OUTPATIENT
Start: 2025-08-19 | End: 2025-08-19 | Stop reason: HOSPADM

## 2025-08-19 RX ADMIN — CIPROFLOXACIN 500 MG: 500 TABLET ORAL at 09:08

## 2025-08-19 RX ADMIN — PROPOFOL 20 MG: 10 INJECTION, EMULSION INTRAVENOUS at 10:08

## 2025-08-19 RX ADMIN — SODIUM CHLORIDE, POTASSIUM CHLORIDE, SODIUM LACTATE AND CALCIUM CHLORIDE: 600; 310; 30; 20 INJECTION, SOLUTION INTRAVENOUS at 08:08

## 2025-08-19 RX ADMIN — CEFTRIAXONE SODIUM 1 G: 1 INJECTION, POWDER, FOR SOLUTION INTRAMUSCULAR; INTRAVENOUS at 09:08

## 2025-08-19 RX ADMIN — PROPOFOL 10 MG: 10 INJECTION, EMULSION INTRAVENOUS at 10:08

## 2025-08-19 RX ADMIN — LIDOCAINE HYDROCHLORIDE 50 MG: 20 INJECTION INTRAVENOUS at 10:08

## 2025-08-20 VITALS
HEIGHT: 61 IN | BODY MASS INDEX: 21.94 KG/M2 | WEIGHT: 116.19 LBS | RESPIRATION RATE: 18 BRPM | DIASTOLIC BLOOD PRESSURE: 103 MMHG | HEART RATE: 65 BPM | SYSTOLIC BLOOD PRESSURE: 148 MMHG | TEMPERATURE: 98 F | OXYGEN SATURATION: 99 %

## 2025-08-21 LAB
ESTRIOL SERPL-MCNC: NORMAL NG/ML
ESTROGEN SERPL-MCNC: NORMAL PG/ML
INSULIN SERPL-ACNC: NORMAL U[IU]/ML
LAB AP CLINICAL INFORMATION: NORMAL
LAB AP GROSS DESCRIPTION: NORMAL
LAB AP REPORT FOOTNOTES: NORMAL

## 2025-08-26 ENCOUNTER — OFFICE VISIT (OUTPATIENT)
Dept: INTERNAL MEDICINE | Facility: CLINIC | Age: 75
End: 2025-08-26
Payer: MEDICAID

## 2025-08-26 ENCOUNTER — HOSPITAL ENCOUNTER (OUTPATIENT)
Dept: RADIOLOGY | Facility: HOSPITAL | Age: 75
Discharge: HOME | End: 2025-08-26
Attending: INTERNAL MEDICINE
Payer: MEDICAID

## 2025-08-26 VITALS
WEIGHT: 125 LBS | RESPIRATION RATE: 18 BRPM | HEART RATE: 72 BPM | TEMPERATURE: 98 F | OXYGEN SATURATION: 99 % | DIASTOLIC BLOOD PRESSURE: 65 MMHG | SYSTOLIC BLOOD PRESSURE: 114 MMHG | HEIGHT: 61 IN | BODY MASS INDEX: 23.6 KG/M2

## 2025-08-26 DIAGNOSIS — M54.9 PAIN, UPPER BACK: ICD-10-CM
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
## 2025-08-26 DIAGNOSIS — N18.31 STAGE 3A CHRONIC KIDNEY DISEASE: ICD-10-CM

## 2025-08-26 DIAGNOSIS — R97.20 ELEVATED PSA: ICD-10-CM

## 2025-08-26 DIAGNOSIS — J18.9 COMMUNITY ACQUIRED PNEUMONIA OF RIGHT LOWER LOBE OF LUNG: ICD-10-CM

## 2025-08-26 DIAGNOSIS — M19.011 PRIMARY OSTEOARTHRITIS OF RIGHT SHOULDER: ICD-10-CM

## 2025-08-26 DIAGNOSIS — E11.9 CONTROLLED TYPE 2 DIABETES MELLITUS WITHOUT COMPLICATION, WITHOUT LONG-TERM CURRENT USE OF INSULIN: Primary | ICD-10-CM

## 2025-08-26 DIAGNOSIS — Z86.73 HISTORY OF STROKE: Primary | ICD-10-CM

## 2025-08-26 DIAGNOSIS — I45.2 BIFASCICULAR BLOCK: ICD-10-CM

## 2025-08-26 DIAGNOSIS — F32.0 CURRENT MILD EPISODE OF MAJOR DEPRESSIVE DISORDER WITHOUT PRIOR EPISODE: ICD-10-CM

## 2025-08-26 DIAGNOSIS — T21.25XA SECOND DEGREE BURN OF BUTTOCK, INITIAL ENCOUNTER: ICD-10-CM

## 2025-08-26 DIAGNOSIS — N13.5 OBSTRUCTION OF BOTH URETERS: ICD-10-CM

## 2025-08-26 DIAGNOSIS — E04.1 THYROID NODULE: ICD-10-CM

## 2025-08-26 DIAGNOSIS — I10 PRIMARY HYPERTENSION: ICD-10-CM

## 2025-08-26 DIAGNOSIS — F41.9 ANXIETY: ICD-10-CM

## 2025-08-26 DIAGNOSIS — E04.2 MULTINODULAR THYROID: Chronic | ICD-10-CM

## 2025-08-26 DIAGNOSIS — Z87.891 EX-CIGARETTE SMOKER: ICD-10-CM

## 2025-08-26 DIAGNOSIS — E11.9 CONTROLLED TYPE 2 DIABETES MELLITUS WITHOUT COMPLICATION, WITHOUT LONG-TERM CURRENT USE OF INSULIN: ICD-10-CM

## 2025-08-26 DIAGNOSIS — J41.0 SIMPLE CHRONIC BRONCHITIS: ICD-10-CM

## 2025-08-26 LAB — HBA1C MFR BLD: 5.9 %

## 2025-08-26 PROCEDURE — 99215 OFFICE O/P EST HI 40 MIN: CPT | Mod: PBBFAC,25 | Performed by: INTERNAL MEDICINE

## 2025-08-26 PROCEDURE — 72070 X-RAY EXAM THORAC SPINE 2VWS: CPT | Mod: TC

## 2025-08-26 PROCEDURE — 83036 HEMOGLOBIN GLYCOSYLATED A1C: CPT | Mod: PBBFAC | Performed by: INTERNAL MEDICINE

## 2025-08-26 PROCEDURE — 72050 X-RAY EXAM NECK SPINE 4/5VWS: CPT | Mod: TC

## 2025-08-26 RX ORDER — INSULIN ASPART 100 [IU]/ML
3 INJECTION, SOLUTION INTRAVENOUS; SUBCUTANEOUS
COMMUNITY
Start: 2025-07-16

## 2025-08-26 RX ORDER — PAROXETINE 10 MG/1
10 TABLET, FILM COATED ORAL DAILY
Qty: 30 TABLET | Refills: 11 | Status: SHIPPED | OUTPATIENT
Start: 2025-08-26 | End: 2026-08-26

## 2025-08-26 RX ORDER — QUETIAPINE FUMARATE 50 MG/1
50 TABLET, FILM COATED ORAL NIGHTLY
Qty: 30 TABLET | Refills: 11 | Status: SHIPPED | OUTPATIENT
Start: 2025-08-26 | End: 2026-08-26

## 2025-08-28 DIAGNOSIS — Z12.11 SCREENING FOR COLON CANCER: Primary | ICD-10-CM

## 2025-08-28 RX ORDER — POLYETHYLENE GLYCOL 3350, SODIUM SULFATE, SODIUM CHLORIDE, POTASSIUM CHLORIDE, SODIUM ASCORBATE, AND ASCORBIC ACID 7.5-2.691G
KIT ORAL
Qty: 1 KIT | Refills: 0 | Status: SHIPPED | OUTPATIENT
Start: 2025-08-28

## 2025-08-29 ENCOUNTER — TELEPHONE (OUTPATIENT)
Dept: HEPATOLOGY | Facility: HOSPITAL | Age: 75
End: 2025-08-29
Payer: MEDICAID

## 2025-08-29 DIAGNOSIS — M48.54XA: Primary | ICD-10-CM

## (undated) DEVICE — TRAY SKIN SCRUB WET PREMIUM

## (undated) DEVICE — GUN BIOPSY 18GA MONOPLY

## (undated) DEVICE — MARKER WRITESITE SKIN CHLRAPRP

## (undated) DEVICE — SUPPORT ULNA NERVE PROTECTOR

## (undated) DEVICE — SYR 30CC LUER LOCK

## (undated) DEVICE — BOWL STERILE LARGE 32OZ

## (undated) DEVICE — Device

## (undated) DEVICE — KIT SURGICAL TURNOVER

## (undated) DEVICE — BAG DRAIN UROLOGY AND HOSE

## (undated) DEVICE — JUMPSUIT SURG W/TIE UNIV

## (undated) DEVICE — CATH POLLACK OPEN-END FLEXI-TI

## (undated) DEVICE — EXTRACTOR STNE 3WR115CMX2.5FR

## (undated) DEVICE — SENSOR DUAL FLEX STR 150CM

## (undated) DEVICE — SYR 10CC LUER LOCK

## (undated) DEVICE — GLOVE PROTEXIS HYDROGEL SZ6.5

## (undated) DEVICE — POSITIONER HEAD ADULT